# Patient Record
Sex: FEMALE | Race: ASIAN
[De-identification: names, ages, dates, MRNs, and addresses within clinical notes are randomized per-mention and may not be internally consistent; named-entity substitution may affect disease eponyms.]

---

## 2023-03-20 PROBLEM — Z00.00 ENCOUNTER FOR PREVENTIVE HEALTH EXAMINATION: Status: ACTIVE | Noted: 2023-03-20

## 2023-04-05 ENCOUNTER — APPOINTMENT (OUTPATIENT)
Dept: ORTHOPEDIC SURGERY | Facility: CLINIC | Age: 68
End: 2023-04-05
Payer: MEDICARE

## 2023-04-05 DIAGNOSIS — M77.12 LATERAL EPICONDYLITIS, LEFT ELBOW: ICD-10-CM

## 2023-04-05 DIAGNOSIS — M77.01 MEDIAL EPICONDYLITIS, RIGHT ELBOW: ICD-10-CM

## 2023-04-05 PROCEDURE — 99204 OFFICE O/P NEW MOD 45 MIN: CPT

## 2023-04-05 PROCEDURE — 99072 ADDL SUPL MATRL&STAF TM PHE: CPT

## 2023-04-05 NOTE — ASSESSMENT
[FreeTextEntry1] : Patient comes in with pain in her both of her elbows. This has been happening for about 4-5 years. She does not recall any injury. She has not have treatment for this. She also has complaints about her shoulder. She has had injections for her shoulder.\par \par \par full active range of motion of the  left shoulder, wrist and fingers\par full active range of motion of the left elbow\par Tenderness to palpation of the lateral epicondyle and proximal extensor supinator mass\par pain with resisted wrist extension and forearm supination\par 4/5 strength in supination and wrist extension, otherwise 5/5 strength\par median/ulnar/radial sensation intact to light touch\par ain/pin/ulnar motor intact\par palpable pulses CR<2s\par \par full active range of motion of the right shoulder, wrist and fingers\par full active range of motion of the right elbow\par Tenderness to palpation of the medial epicondyle and proximal flexor pronator\par pain with resisted wrist flexion and forearm pronation\par 4/5 strength in pronation, otherwise 5/5 strength\par median/ulnar/radial sensation intact to light touch\par ain/pin/ulnar motor intact\par palpable pulses CR<2s\par \par \par The patient was advised of the diagnosis. The natural history of the pathology was explained in full to the patient in layman's terms. All questions were answered. The risks and benefits of surgical and non-surgical treatment alternatives were explained in full to the patient. We discussed treatment options including nsaids, topical gels, tennis elbow strap, PT, activity modification, cortisone inj, sx .pt is aware that symptoms usually resolve on their own in 95-99% of people, but the timeframe is unknown. Home exercises/stretches were demonstrated and the patient practiced them as well- They are to do the exercises hourly and hold the stretch for 30 seconds. \par Avoid repetitive wrist flexion time was spent instructing the patient on appropriate placement of the elbow strap as well. \par She will start therapy.  She will follow-up as needed\par Patient was shown home exercises and stretches. She was also given a hand out.She will start therapy. She will set up an appointment with  for her shoulder. \par

## 2023-04-06 VITALS — BODY MASS INDEX: 23.92 KG/M2 | HEIGHT: 62 IN | WEIGHT: 130 LBS

## 2023-04-13 ENCOUNTER — OUTPATIENT (OUTPATIENT)
Dept: OUTPATIENT SERVICES | Facility: HOSPITAL | Age: 68
LOS: 1 days | End: 2023-04-13
Payer: MEDICAID

## 2023-04-13 DIAGNOSIS — Z00.00 ENCOUNTER FOR GENERAL ADULT MEDICAL EXAMINATION WITHOUT ABNORMAL FINDINGS: ICD-10-CM

## 2023-04-13 DIAGNOSIS — Z00.8 ENCOUNTER FOR OTHER GENERAL EXAMINATION: ICD-10-CM

## 2023-04-13 PROCEDURE — 97035 APP MDLTY 1+ULTRASOUND EA 15: CPT | Mod: GO

## 2023-04-13 PROCEDURE — 97140 MANUAL THERAPY 1/> REGIONS: CPT | Mod: GO

## 2023-04-14 DIAGNOSIS — Z00.00 ENCOUNTER FOR GENERAL ADULT MEDICAL EXAMINATION WITHOUT ABNORMAL FINDINGS: ICD-10-CM

## 2023-04-26 ENCOUNTER — NON-APPOINTMENT (OUTPATIENT)
Age: 68
End: 2023-04-26

## 2023-04-27 ENCOUNTER — OUTPATIENT (OUTPATIENT)
Dept: OUTPATIENT SERVICES | Facility: HOSPITAL | Age: 68
LOS: 1 days | End: 2023-04-27
Payer: MEDICARE

## 2023-04-27 ENCOUNTER — APPOINTMENT (OUTPATIENT)
Dept: ENDOCRINOLOGY | Facility: CLINIC | Age: 68
End: 2023-04-27
Payer: MEDICARE

## 2023-04-27 VITALS
SYSTOLIC BLOOD PRESSURE: 132 MMHG | BODY MASS INDEX: 25.4 KG/M2 | HEART RATE: 87 BPM | WEIGHT: 138 LBS | HEIGHT: 62 IN | DIASTOLIC BLOOD PRESSURE: 81 MMHG

## 2023-04-27 DIAGNOSIS — Z00.00 ENCOUNTER FOR GENERAL ADULT MEDICAL EXAMINATION WITHOUT ABNORMAL FINDINGS: ICD-10-CM

## 2023-04-27 DIAGNOSIS — Z78.9 OTHER SPECIFIED HEALTH STATUS: ICD-10-CM

## 2023-04-27 PROCEDURE — ZZZZZ: CPT

## 2023-04-27 PROCEDURE — 99204 OFFICE O/P NEW MOD 45 MIN: CPT

## 2023-04-27 PROCEDURE — 97140 MANUAL THERAPY 1/> REGIONS: CPT | Mod: GO

## 2023-04-27 PROCEDURE — 97035 APP MDLTY 1+ULTRASOUND EA 15: CPT | Mod: GO

## 2023-04-27 NOTE — PHYSICAL EXAM
[Alert] : alert [Obese] : obese [No Acute Distress] : no acute distress [Normal Sclera/Conjunctiva] : normal sclera/conjunctiva [Normal Outer Ear/Nose] : the ears and nose were normal in appearance [No Respiratory Distress] : no respiratory distress

## 2023-04-27 NOTE — ASSESSMENT
[FreeTextEntry1] : 68 y/o F w/ PMHx of HTN, HLD, DMII is here to establish care\par \par #DMII\par -FS ~ 120-210s\par -Last A1c ~ 8% (3/2023) per pt\par -On metformin-glipizide 500-5 mg BID \par -On Humulin 70/30 40 units in the AM, 26 units qhs\par Plan:\par -Stop metformin-glipizide 500-5 mg BID\par -Start metformin 500 mg BID\par -Start pioglitazone 30 mg qd\par -Start jardiance 25 mg qd\par -Start ozempic 0.25 mg q1 week >> increase to 0.5 mg after 2 weeks if no nausea\par -Will obtain lab records from [facility]\par -Monitor FS\par -Advised to decrease insulin by 1/2 if FS <80\par \par #HTN\par -/80 this visit\par -C/w losartan 50 mg qd\par -Stop losartan if experiencing dizziness w/ standing \par \par \par RTC in 1 month

## 2023-04-27 NOTE — ASSESSMENT
Hospitalist [FreeTextEntry1] : 68 y/o F w/ PMHx of HTN, HLD, DMII is here to establish care\par \par #DMII\par -FS ~ 120-210s\par -Last A1c ~ 8% (3/2023) per pt\par -On metformin-glipizide 500-5 mg BID \par -On Humulin 70/30 40 units in the AM, 26 units qhs\par Plan:\par -Stop metformin-glipizide 500-5 mg BID\par -Start metformin 500 mg BID\par -Start pioglitazone 30 mg qd\par -Start jardiance 25 mg qd\par -Start ozempic 0.25 mg q1 week >> increase to 0.5 mg after 2 weeks if no nausea\par -Will obtain lab records from [facility]\par -Monitor FS\par -Advised to decrease insulin by 1/2 if FS <80\par \par #HTN\par -/80 this visit\par -C/w losartan 50 mg qd\par -Stop losartan if experiencing dizziness w/ standing \par \par \par RTC in 1 month

## 2023-04-28 DIAGNOSIS — E11.65 TYPE 2 DIABETES MELLITUS WITH HYPERGLYCEMIA: ICD-10-CM

## 2023-04-30 NOTE — HISTORY OF PRESENT ILLNESS
[de-identified] : 68 y/o F w/ PMHx of HTN, HLD, DMII who is here to establish care. Per son, dx'd w/ DMII ~ 25 years ago & has been on insulin for ~ 15 years. Reports pt came from [facility] in 10/2022, and since her glucose levels have been uncontrolled. She is currently taking Metformin-Glipizide 500-5 mg BID in addition to Humulin 70/30 40 units in the AM & 26 units qhs. FS ranges from 120s-210s. A1c ~ 8, tested 1 month ago. Hx of L cataract sx on 8/2022. No hx of DFU per pt.  [FreeTextEntry1] : To establish care

## 2023-04-30 NOTE — HISTORY OF PRESENT ILLNESS
[de-identified] : 66 y/o F w/ PMHx of HTN, HLD, DMII who is here to establish care. Per son, dx'd w/ DMII ~ 25 years ago & has been on insulin for ~ 15 years. Reports pt came from [facility] in 10/2022, and since her glucose levels have been uncontrolled. She is currently taking Metformin-Glipizide 500-5 mg BID in addition to Humulin 70/30 40 units in the AM & 26 units qhs. FS ranges from 120s-210s. A1c ~ 8, tested 1 month ago. Hx of L cataract sx on 8/2022. No hx of DFU per pt.  [FreeTextEntry1] : To establish care

## 2023-05-01 ENCOUNTER — OUTPATIENT (OUTPATIENT)
Dept: OUTPATIENT SERVICES | Facility: HOSPITAL | Age: 68
LOS: 1 days | End: 2023-05-01
Payer: COMMERCIAL

## 2023-05-01 DIAGNOSIS — Z00.00 ENCOUNTER FOR GENERAL ADULT MEDICAL EXAMINATION WITHOUT ABNORMAL FINDINGS: ICD-10-CM

## 2023-05-01 PROCEDURE — 97140 MANUAL THERAPY 1/> REGIONS: CPT | Mod: GO

## 2023-05-01 PROCEDURE — 97035 APP MDLTY 1+ULTRASOUND EA 15: CPT | Mod: GO

## 2023-05-02 ENCOUNTER — APPOINTMENT (OUTPATIENT)
Dept: ORTHOPEDIC SURGERY | Facility: CLINIC | Age: 68
End: 2023-05-02

## 2023-05-03 ENCOUNTER — OUTPATIENT (OUTPATIENT)
Dept: OUTPATIENT SERVICES | Facility: HOSPITAL | Age: 68
LOS: 1 days | End: 2023-05-03
Payer: MEDICARE

## 2023-05-03 DIAGNOSIS — Z00.00 ENCOUNTER FOR GENERAL ADULT MEDICAL EXAMINATION WITHOUT ABNORMAL FINDINGS: ICD-10-CM

## 2023-05-03 PROCEDURE — 97110 THERAPEUTIC EXERCISES: CPT | Mod: GO

## 2023-05-03 PROCEDURE — 97140 MANUAL THERAPY 1/> REGIONS: CPT | Mod: GO

## 2023-05-08 ENCOUNTER — OUTPATIENT (OUTPATIENT)
Dept: OUTPATIENT SERVICES | Facility: HOSPITAL | Age: 68
LOS: 1 days | End: 2023-05-08

## 2023-05-08 DIAGNOSIS — Z00.00 ENCOUNTER FOR GENERAL ADULT MEDICAL EXAMINATION WITHOUT ABNORMAL FINDINGS: ICD-10-CM

## 2023-05-08 PROCEDURE — 97140 MANUAL THERAPY 1/> REGIONS: CPT | Mod: GO

## 2023-05-08 PROCEDURE — L3908: CPT

## 2023-05-10 RX ORDER — LANCETS 28 GAUGE
EACH MISCELLANEOUS
Qty: 100 | Refills: 3 | Status: ACTIVE | COMMUNITY
Start: 2023-05-10 | End: 1900-01-01

## 2023-05-10 RX ORDER — BLOOD SUGAR DIAGNOSTIC
STRIP MISCELLANEOUS 3 TIMES DAILY
Qty: 1 | Refills: 2 | Status: ACTIVE | COMMUNITY
Start: 2023-05-10 | End: 1900-01-01

## 2023-05-10 RX ORDER — BLOOD-GLUCOSE METER
W/DEVICE KIT MISCELLANEOUS
Qty: 1 | Refills: 0 | Status: ACTIVE | COMMUNITY
Start: 2023-05-10 | End: 1900-01-01

## 2023-06-05 RX ORDER — INSULIN LISPRO 100 [IU]/ML
(75-25) 100 INJECTION, SUSPENSION SUBCUTANEOUS
Qty: 3 | Refills: 3 | Status: DISCONTINUED | COMMUNITY
Start: 2023-05-23 | End: 2023-06-05

## 2023-06-05 RX ORDER — SEMAGLUTIDE 0.68 MG/ML
2 INJECTION, SOLUTION SUBCUTANEOUS
Qty: 3 | Refills: 1 | Status: DISCONTINUED | COMMUNITY
Start: 2023-04-27 | End: 2023-06-05

## 2023-06-07 ENCOUNTER — NON-APPOINTMENT (OUTPATIENT)
Age: 68
End: 2023-06-07

## 2023-06-08 RX ORDER — PEN NEEDLE, DIABETIC 29 G X1/2"
31G X 5 MM NEEDLE, DISPOSABLE MISCELLANEOUS
Qty: 100 | Refills: 2 | Status: ACTIVE | COMMUNITY
Start: 2023-05-23 | End: 1900-01-01

## 2023-06-14 ENCOUNTER — OUTPATIENT (OUTPATIENT)
Dept: OUTPATIENT SERVICES | Facility: HOSPITAL | Age: 68
LOS: 1 days | End: 2023-06-14
Payer: MEDICAID

## 2023-06-14 DIAGNOSIS — Z00.00 ENCOUNTER FOR GENERAL ADULT MEDICAL EXAMINATION WITHOUT ABNORMAL FINDINGS: ICD-10-CM

## 2023-06-14 PROCEDURE — 97140 MANUAL THERAPY 1/> REGIONS: CPT | Mod: GO

## 2023-07-26 ENCOUNTER — OUTPATIENT (OUTPATIENT)
Dept: OUTPATIENT SERVICES | Facility: HOSPITAL | Age: 68
LOS: 1 days | End: 2023-07-26
Payer: MEDICAID

## 2023-07-26 DIAGNOSIS — M77.12 LATERAL EPICONDYLITIS, LEFT ELBOW: ICD-10-CM

## 2023-07-26 DIAGNOSIS — M77.01 MEDIAL EPICONDYLITIS, RIGHT ELBOW: ICD-10-CM

## 2023-07-26 PROCEDURE — 97140 MANUAL THERAPY 1/> REGIONS: CPT | Mod: GO

## 2023-07-27 DIAGNOSIS — M77.01 MEDIAL EPICONDYLITIS, RIGHT ELBOW: ICD-10-CM

## 2023-07-27 DIAGNOSIS — M77.12 LATERAL EPICONDYLITIS, LEFT ELBOW: ICD-10-CM

## 2023-08-02 ENCOUNTER — APPOINTMENT (OUTPATIENT)
Dept: ENDOCRINOLOGY | Facility: CLINIC | Age: 68
End: 2023-08-02

## 2023-08-02 ENCOUNTER — OUTPATIENT (OUTPATIENT)
Dept: OUTPATIENT SERVICES | Facility: HOSPITAL | Age: 68
LOS: 1 days | End: 2023-08-02
Payer: COMMERCIAL

## 2023-08-02 DIAGNOSIS — M77.12 LATERAL EPICONDYLITIS, LEFT ELBOW: ICD-10-CM

## 2023-08-02 PROCEDURE — 97140 MANUAL THERAPY 1/> REGIONS: CPT | Mod: GO

## 2023-08-03 DIAGNOSIS — M77.12 LATERAL EPICONDYLITIS, LEFT ELBOW: ICD-10-CM

## 2023-08-04 ENCOUNTER — OUTPATIENT (OUTPATIENT)
Dept: OUTPATIENT SERVICES | Facility: HOSPITAL | Age: 68
LOS: 1 days | End: 2023-08-04

## 2023-08-04 DIAGNOSIS — M77.12 LATERAL EPICONDYLITIS, LEFT ELBOW: ICD-10-CM

## 2023-08-07 ENCOUNTER — EMERGENCY (EMERGENCY)
Facility: HOSPITAL | Age: 68
LOS: 0 days | Discharge: HOME | End: 2023-08-07
Attending: EMERGENCY MEDICINE
Payer: MEDICAID

## 2023-08-07 VITALS
TEMPERATURE: 98 F | RESPIRATION RATE: 18 BRPM | HEART RATE: 100 BPM | DIASTOLIC BLOOD PRESSURE: 72 MMHG | OXYGEN SATURATION: 99 % | SYSTOLIC BLOOD PRESSURE: 166 MMHG

## 2023-08-07 DIAGNOSIS — E78.00 PURE HYPERCHOLESTEROLEMIA, UNSPECIFIED: ICD-10-CM

## 2023-08-07 DIAGNOSIS — J45.909 UNSPECIFIED ASTHMA, UNCOMPLICATED: ICD-10-CM

## 2023-08-07 DIAGNOSIS — Z87.39 PERSONAL HISTORY OF OTHER DISEASES OF THE MUSCULOSKELETAL SYSTEM AND CONNECTIVE TISSUE: ICD-10-CM

## 2023-08-07 DIAGNOSIS — M77.12 LATERAL EPICONDYLITIS, LEFT ELBOW: ICD-10-CM

## 2023-08-07 DIAGNOSIS — M25.522 PAIN IN LEFT ELBOW: ICD-10-CM

## 2023-08-07 DIAGNOSIS — E11.9 TYPE 2 DIABETES MELLITUS WITHOUT COMPLICATIONS: ICD-10-CM

## 2023-08-07 PROCEDURE — 99284 EMERGENCY DEPT VISIT MOD MDM: CPT

## 2023-08-07 PROCEDURE — 96372 THER/PROPH/DIAG INJ SC/IM: CPT

## 2023-08-07 PROCEDURE — 99283 EMERGENCY DEPT VISIT LOW MDM: CPT | Mod: 25

## 2023-08-07 RX ORDER — LIDOCAINE 4 G/100G
1 CREAM TOPICAL ONCE
Refills: 0 | Status: COMPLETED | OUTPATIENT
Start: 2023-08-07 | End: 2023-08-07

## 2023-08-07 RX ORDER — KETOROLAC TROMETHAMINE 30 MG/ML
30 SYRINGE (ML) INJECTION ONCE
Refills: 0 | Status: DISCONTINUED | OUTPATIENT
Start: 2023-08-07 | End: 2023-08-07

## 2023-08-07 RX ORDER — MELOXICAM 15 MG/1
1 TABLET ORAL
Qty: 7 | Refills: 0
Start: 2023-08-07 | End: 2023-08-13

## 2023-08-07 RX ADMIN — Medication 30 MILLIGRAM(S): at 11:53

## 2023-08-07 RX ADMIN — LIDOCAINE 1 PATCH: 4 CREAM TOPICAL at 11:53

## 2023-08-07 NOTE — ED ADULT NURSE NOTE - NSFALLUNIVINTERV_ED_ALL_ED
Bed/Stretcher in lowest position, wheels locked, appropriate side rails in place/Call bell, personal items and telephone in reach/Instruct patient to call for assistance before getting out of bed/chair/stretcher/Non-slip footwear applied when patient is off stretcher/[place] to call system/Physically safe environment - no spills, clutter or unnecessary equipment/Purposeful proactive rounding/Room/bathroom lighting operational, light cord in reach

## 2023-08-07 NOTE — ED ADULT NURSE NOTE - CHIEF COMPLAINT QUOTE
pt was receiving cortisone injections for arm pain in [facility] and having relief. pt now having pain in left arm, worsening with movement to the shoulder

## 2023-08-07 NOTE — ED PROVIDER NOTE - NSFOLLOWUPINSTRUCTIONS_ED_ALL_ED_FT
Our Emergency Department Referral Coordinators will be reaching out to you in the next 24-48 hours from 9:00am to 5:00pm with a follow up appointment. Please expect a phone call from the hospital in that time frame. If you do not receive a call or if you have any questions or concerns, you can reach them at   [phone]    Tennis Elbow  ImageTennis elbow (lateral epicondylitis) is inflammation of the outer tendons of your forearm close to your elbow. Your tendons attach your muscles to your bones. The outer tendons of your forearm are used to extend your wrist, and they attach on the outside part of your elbow. Tennis elbow is often found in people who play tennis, but anyone may get the condition from repeatedly extending the wrist or turning the forearm.    What are the causes?  This condition is caused by repeatedly extending your wrist and using your hands. It can result from sports or work that requires repetitive forearm movements. Tennis elbow may also be caused by an injury.    What increases the risk?  You have a higher risk of developing tennis elbow if you play tennis or another racquet sport. You also have a higher risk if you frequently use your hands for work. This condition is also more likely to develop in:    Musicians.  , painters, and plumbers.  Cooks.  [place].  People who work in factories.  Construction workers.  Butchers.  People who use computers.    What are the signs or symptoms?  Symptoms of this condition include:    Pain and tenderness in your forearm and the outer part of your elbow. You may only feel the pain when you use your arm, or you may feel it even when you are not using your arm.  A burning feeling that runs from your elbow through your arm.  Weak  in your hands.    How is this diagnosed?  This condition may be diagnosed by medical history and physical exam. You may also have other tests, including:    X-rays.  MRI.    How is this treated?  Your health care provider will recommend lifestyle adjustments, such as resting and icing your arm. Treatment may also include:    Medicines for inflammation. This may include shots of cortisone if your pain continues.  Physical therapy. This may include massage or exercises.  An elbow brace.    Surgery may eventually be recommended if your pain does not go away with treatment.    Follow these instructions at home:  Activity     Rest your elbow and wrist as directed by your health care provider. Try to avoid any activities that caused the problem until your health care provider says that you can do them again.  If a physical therapist teaches you exercises, do all of them as directed.  If you lift an object, lift it with your palm facing upward. This lowers the stress on your elbow.  Lifestyle     If your tennis elbow is caused by sports, check your equipment and make sure that:    You are using it correctly.  It is the best fit for you.    If your tennis elbow is caused by work, take breaks frequently, if you are able. Talk with your manager about how to best perform tasks in a way that is safe.    If your tennis elbow is caused by computer use, talk with your manager about any changes that can be made to your work environment.    General instructions     If directed, apply ice to the painful area:    Put ice in a plastic bag.  Place a towel between your skin and the bag.  Leave the ice on for 20 minutes, 2–3 times per day.    Take medicines only as directed by your health care provider.  If you were given a brace, wear it as directed by your health care provider.  Keep all follow-up visits as directed by your health care provider. This is important.  Contact a health care provider if:  Your pain does not get better with treatment.  Your pain gets worse.  You have numbness or weakness in your forearm, hand, or fingers.

## 2023-08-07 NOTE — ED PROVIDER NOTE - ATTENDING APP SHARED VISIT CONTRIBUTION OF CARE
I have reviewed and agree with the mid-level note, except as documented in my note below.    68-year-old female history of HLD, asthma, DM, seen several months ago by orthopedics and diagnosed with bilateral epicondylitis and referred to occupational therapy, now presents with worsening of the left elbow pain, sx are constant, worse with certain movements and position, better with rest, denies fever, tactile temp, chills, paresthesias, focal weakness, or other associated complaints at present. Pt denies recent heavy lifting or trauma. Old chart reviewed. I have reviewed and agree with the initial nursing note, except as documented in my note.    VSS, awake, alert, clear speech, steady gait, LUE: No scapular, clavicle, AC joint, shoulder, elbow, forearm, wrist, hand tenderness, appears symmetrical, no gross deformity, no anterior fullness of anterior shoulder, no lacerations or abrasions, skin breakdown, no crepitus, point tenderness, swelling, warmth, erythema, induration, fluctuance, lymphangitis, purulence or lesions, sensation intact over deltoid region, active ROM and passive ROM intact, no joint laxity appreciated, motor and sensation intact distally, 5/5 strength, NV intact distally with 2+ radial pulses, compartments non-tense, pain not out of proportion to exam not appreciated.

## 2023-08-07 NOTE — ED PROVIDER NOTE - OBJECTIVE STATEMENT
68-year-old female with history of diabetes, high cholesterol, lateral epicondylitis presents to the ED accompanied by son complaining of left elbow pain worsening for 2 months.  Patient was seen by orthopedics and started occupational therapy.  No new trauma, numbness, tingling, chest pain or shortness of breath.

## 2023-08-07 NOTE — ED ADULT TRIAGE NOTE - CHIEF COMPLAINT QUOTE
pt was receiving cortisone injections for arm pain in [facility] and having relief. pt now having pain in left arm, worsening with movement to the shoulder

## 2023-08-07 NOTE — ED PROVIDER NOTE - PATIENT PORTAL LINK FT
You can access the FollowMyHealth Patient Portal offered by [facility] by registering at the following website: [email or web address]. By joining [facility]’s FollowMyHealth portal, you will also be able to view your health information using other applications (apps) compatible with our system.

## 2023-08-13 ENCOUNTER — INPATIENT (INPATIENT)
Facility: HOSPITAL | Age: 68
LOS: 23 days | Discharge: HOME HEALTH | DRG: 165 | End: 2023-09-06
Attending: THORACIC SURGERY (CARDIOTHORACIC VASCULAR SURGERY) | Admitting: INTERNAL MEDICINE
Payer: MEDICAID

## 2023-08-13 VITALS
SYSTOLIC BLOOD PRESSURE: 138 MMHG | TEMPERATURE: 98 F | OXYGEN SATURATION: 99 % | DIASTOLIC BLOOD PRESSURE: 64 MMHG | HEART RATE: 75 BPM | WEIGHT: 137.13 LBS | RESPIRATION RATE: 20 BRPM

## 2023-08-13 DIAGNOSIS — E87.20 ACIDOSIS, UNSPECIFIED: ICD-10-CM

## 2023-08-13 DIAGNOSIS — Z79.82 LONG TERM (CURRENT) USE OF ASPIRIN: ICD-10-CM

## 2023-08-13 DIAGNOSIS — G92.8 OTHER TOXIC ENCEPHALOPATHY: ICD-10-CM

## 2023-08-13 DIAGNOSIS — I97.190 OTHER POSTPROCEDURAL CARDIAC FUNCTIONAL DISTURBANCES FOLLOWING CARDIAC SURGERY: ICD-10-CM

## 2023-08-13 DIAGNOSIS — J45.901 UNSPECIFIED ASTHMA WITH (ACUTE) EXACERBATION: ICD-10-CM

## 2023-08-13 DIAGNOSIS — D62 ACUTE POSTHEMORRHAGIC ANEMIA: ICD-10-CM

## 2023-08-13 DIAGNOSIS — I21.4 NON-ST ELEVATION (NSTEMI) MYOCARDIAL INFARCTION: ICD-10-CM

## 2023-08-13 DIAGNOSIS — J69.0 PNEUMONITIS DUE TO INHALATION OF FOOD AND VOMIT: ICD-10-CM

## 2023-08-13 DIAGNOSIS — R56.9 UNSPECIFIED CONVULSIONS: ICD-10-CM

## 2023-08-13 DIAGNOSIS — D50.9 IRON DEFICIENCY ANEMIA, UNSPECIFIED: ICD-10-CM

## 2023-08-13 DIAGNOSIS — E78.00 PURE HYPERCHOLESTEROLEMIA, UNSPECIFIED: ICD-10-CM

## 2023-08-13 DIAGNOSIS — I50.23 ACUTE ON CHRONIC SYSTOLIC (CONGESTIVE) HEART FAILURE: ICD-10-CM

## 2023-08-13 DIAGNOSIS — E11.65 TYPE 2 DIABETES MELLITUS WITH HYPERGLYCEMIA: ICD-10-CM

## 2023-08-13 DIAGNOSIS — J96.01 ACUTE RESPIRATORY FAILURE WITH HYPOXIA: ICD-10-CM

## 2023-08-13 DIAGNOSIS — I82.442 ACUTE EMBOLISM AND THROMBOSIS OF LEFT TIBIAL VEIN: ICD-10-CM

## 2023-08-13 DIAGNOSIS — I82.412 ACUTE EMBOLISM AND THROMBOSIS OF LEFT FEMORAL VEIN: ICD-10-CM

## 2023-08-13 DIAGNOSIS — I44.7 LEFT BUNDLE-BRANCH BLOCK, UNSPECIFIED: ICD-10-CM

## 2023-08-13 DIAGNOSIS — I48.0 PAROXYSMAL ATRIAL FIBRILLATION: ICD-10-CM

## 2023-08-13 DIAGNOSIS — I11.0 HYPERTENSIVE HEART DISEASE WITH HEART FAILURE: ICD-10-CM

## 2023-08-13 DIAGNOSIS — E66.9 OBESITY, UNSPECIFIED: ICD-10-CM

## 2023-08-13 DIAGNOSIS — J44.9 CHRONIC OBSTRUCTIVE PULMONARY DISEASE, UNSPECIFIED: ICD-10-CM

## 2023-08-13 DIAGNOSIS — I25.5 ISCHEMIC CARDIOMYOPATHY: ICD-10-CM

## 2023-08-13 DIAGNOSIS — I25.119 ATHEROSCLEROTIC HEART DISEASE OF NATIVE CORONARY ARTERY WITH UNSPECIFIED ANGINA PECTORIS: ICD-10-CM

## 2023-08-13 DIAGNOSIS — Z20.822 CONTACT WITH AND (SUSPECTED) EXPOSURE TO COVID-19: ICD-10-CM

## 2023-08-13 DIAGNOSIS — E87.6 HYPOKALEMIA: ICD-10-CM

## 2023-08-13 DIAGNOSIS — F17.220 NICOTINE DEPENDENCE, CHEWING TOBACCO, UNCOMPLICATED: ICD-10-CM

## 2023-08-13 DIAGNOSIS — Z79.1 LONG TERM (CURRENT) USE OF NON-STEROIDAL ANTI-INFLAMMATORIES (NSAID): ICD-10-CM

## 2023-08-13 DIAGNOSIS — M77.12 LATERAL EPICONDYLITIS, LEFT ELBOW: ICD-10-CM

## 2023-08-13 DIAGNOSIS — I26.99 OTHER PULMONARY EMBOLISM WITHOUT ACUTE COR PULMONALE: ICD-10-CM

## 2023-08-13 DIAGNOSIS — Z79.899 OTHER LONG TERM (CURRENT) DRUG THERAPY: ICD-10-CM

## 2023-08-13 DIAGNOSIS — Z79.84 LONG TERM (CURRENT) USE OF ORAL HYPOGLYCEMIC DRUGS: ICD-10-CM

## 2023-08-13 DIAGNOSIS — I82.451 ACUTE EMBOLISM AND THROMBOSIS OF RIGHT PERONEAL VEIN: ICD-10-CM

## 2023-08-13 DIAGNOSIS — K92.2 GASTROINTESTINAL HEMORRHAGE, UNSPECIFIED: ICD-10-CM

## 2023-08-13 PROBLEM — E11.9 TYPE 2 DIABETES MELLITUS WITHOUT COMPLICATIONS: Chronic | Status: ACTIVE | Noted: 2023-08-07

## 2023-08-13 LAB
ALBUMIN SERPL ELPH-MCNC: 4.6 G/DL — SIGNIFICANT CHANGE UP (ref 3.5–5.2)
ALP SERPL-CCNC: 116 U/L — HIGH (ref 30–115)
ALT FLD-CCNC: 15 U/L — SIGNIFICANT CHANGE UP (ref 0–41)
ANION GAP SERPL CALC-SCNC: 12 MMOL/L — SIGNIFICANT CHANGE UP (ref 7–14)
ANISOCYTOSIS BLD QL: SLIGHT — SIGNIFICANT CHANGE UP
AST SERPL-CCNC: 18 U/L — SIGNIFICANT CHANGE UP (ref 0–41)
BASE EXCESS BLDA CALC-SCNC: -9 MMOL/L — LOW (ref -2–3)
BASE EXCESS BLDV CALC-SCNC: -5.4 MMOL/L — LOW (ref -2–3)
BASOPHILS # BLD AUTO: 0.13 K/UL — SIGNIFICANT CHANGE UP (ref 0–0.2)
BASOPHILS NFR BLD AUTO: 0.9 % — SIGNIFICANT CHANGE UP (ref 0–1)
BILIRUB SERPL-MCNC: <0.2 MG/DL — SIGNIFICANT CHANGE UP (ref 0.2–1.2)
BUN SERPL-MCNC: 12 MG/DL — SIGNIFICANT CHANGE UP (ref 10–20)
CA-I SERPL-SCNC: 1.27 MMOL/L — SIGNIFICANT CHANGE UP (ref 1.15–1.33)
CALCIUM SERPL-MCNC: 9.7 MG/DL — SIGNIFICANT CHANGE UP (ref 8.4–10.5)
CHLORIDE SERPL-SCNC: 106 MMOL/L — SIGNIFICANT CHANGE UP (ref 98–110)
CO2 SERPL-SCNC: 21 MMOL/L — SIGNIFICANT CHANGE UP (ref 17–32)
CREAT SERPL-MCNC: 0.6 MG/DL — LOW (ref 0.7–1.5)
EGFR: 96 ML/MIN/1.73M2 — SIGNIFICANT CHANGE UP
EOSINOPHIL # BLD AUTO: 0.13 K/UL — SIGNIFICANT CHANGE UP (ref 0–0.7)
EOSINOPHIL NFR BLD AUTO: 0.9 % — SIGNIFICANT CHANGE UP (ref 0–8)
GAS PNL BLDA: SIGNIFICANT CHANGE UP
GAS PNL BLDV: 141 MMOL/L — SIGNIFICANT CHANGE UP (ref 136–145)
GAS PNL BLDV: SIGNIFICANT CHANGE UP
GLUCOSE BLDC GLUCOMTR-MCNC: 218 MG/DL — HIGH (ref 70–99)
GLUCOSE SERPL-MCNC: 253 MG/DL — HIGH (ref 70–99)
HCO3 BLDA-SCNC: 19 MMOL/L — LOW (ref 21–28)
HCO3 BLDV-SCNC: 22 MMOL/L — SIGNIFICANT CHANGE UP (ref 22–29)
HCT VFR BLD CALC: 34.3 % — LOW (ref 37–47)
HCT VFR BLDA CALC: 30 % — LOW (ref 39–51)
HGB BLD CALC-MCNC: 10.1 G/DL — LOW (ref 12.6–17.4)
HGB BLD-MCNC: 10 G/DL — LOW (ref 12–16)
HOROWITZ INDEX BLDA+IHG-RTO: 100 — SIGNIFICANT CHANGE UP
HYPOCHROMIA BLD QL: SLIGHT — SIGNIFICANT CHANGE UP
LACTATE BLDV-MCNC: 5.7 MMOL/L — CRITICAL HIGH (ref 0.5–2)
LYMPHOCYTES # BLD AUTO: 50.9 % — SIGNIFICANT CHANGE UP (ref 20.5–51.1)
LYMPHOCYTES # BLD AUTO: 7.56 K/UL — HIGH (ref 1.2–3.4)
MANUAL SMEAR VERIFICATION: SIGNIFICANT CHANGE UP
MCHC RBC-ENTMCNC: 22.4 PG — LOW (ref 27–31)
MCHC RBC-ENTMCNC: 29.2 G/DL — LOW (ref 32–37)
MCV RBC AUTO: 76.7 FL — LOW (ref 81–99)
MICROCYTES BLD QL: SLIGHT — SIGNIFICANT CHANGE UP
MONOCYTES # BLD AUTO: 0.13 K/UL — SIGNIFICANT CHANGE UP (ref 0.1–0.6)
MONOCYTES NFR BLD AUTO: 0.9 % — LOW (ref 1.7–9.3)
NEUTROPHILS # BLD AUTO: 6.9 K/UL — HIGH (ref 1.4–6.5)
NEUTROPHILS NFR BLD AUTO: 46.4 % — SIGNIFICANT CHANGE UP (ref 42.2–75.2)
NT-PROBNP SERPL-SCNC: 1305 PG/ML — HIGH (ref 0–300)
PCO2 BLDA: 49 MMHG — HIGH (ref 25–48)
PCO2 BLDV: 55 MMHG — HIGH (ref 39–42)
PH BLDA: 7.19 — CRITICAL LOW (ref 7.35–7.45)
PH BLDV: 7.22 — LOW (ref 7.32–7.43)
PLAT MORPH BLD: NORMAL — SIGNIFICANT CHANGE UP
PLATELET # BLD AUTO: 363 K/UL — SIGNIFICANT CHANGE UP (ref 130–400)
PMV BLD: 10 FL — SIGNIFICANT CHANGE UP (ref 7.4–10.4)
PO2 BLDA: 332 MMHG — HIGH (ref 83–108)
PO2 BLDV: 66 MMHG — SIGNIFICANT CHANGE UP
POLYCHROMASIA BLD QL SMEAR: SLIGHT — SIGNIFICANT CHANGE UP
POTASSIUM BLDV-SCNC: 6.3 MMOL/L — CRITICAL HIGH (ref 3.5–5.1)
POTASSIUM SERPL-MCNC: 5.4 MMOL/L — HIGH (ref 3.5–5)
POTASSIUM SERPL-SCNC: 5.4 MMOL/L — HIGH (ref 3.5–5)
PROT SERPL-MCNC: 8.1 G/DL — HIGH (ref 6–8)
RBC # BLD: 4.47 M/UL — SIGNIFICANT CHANGE UP (ref 4.2–5.4)
RBC # FLD: 19.1 % — HIGH (ref 11.5–14.5)
RBC BLD AUTO: ABNORMAL
SAO2 % BLDA: 97.7 % — SIGNIFICANT CHANGE UP (ref 94–98)
SAO2 % BLDV: 84 % — SIGNIFICANT CHANGE UP
SMUDGE CELLS # BLD: PRESENT — SIGNIFICANT CHANGE UP
SODIUM SERPL-SCNC: 139 MMOL/L — SIGNIFICANT CHANGE UP (ref 135–146)
TROPONIN T SERPL-MCNC: 0.13 NG/ML — CRITICAL HIGH
TROPONIN T SERPL-MCNC: <0.01 NG/ML — SIGNIFICANT CHANGE UP
WBC # BLD: 14.86 K/UL — HIGH (ref 4.8–10.8)
WBC # FLD AUTO: 14.86 K/UL — HIGH (ref 4.8–10.8)

## 2023-08-13 PROCEDURE — 85018 HEMOGLOBIN: CPT

## 2023-08-13 PROCEDURE — 62270 DX LMBR SPI PNXR: CPT

## 2023-08-13 PROCEDURE — 74176 CT ABD & PELVIS W/O CONTRAST: CPT | Mod: 26

## 2023-08-13 PROCEDURE — 87070 CULTURE OTHR SPECIMN AEROBIC: CPT

## 2023-08-13 PROCEDURE — C1729: CPT

## 2023-08-13 PROCEDURE — 36430 TRANSFUSION BLD/BLD COMPNT: CPT

## 2023-08-13 PROCEDURE — 86341 ISLET CELL ANTIBODY: CPT

## 2023-08-13 PROCEDURE — C1889: CPT

## 2023-08-13 PROCEDURE — 71045 X-RAY EXAM CHEST 1 VIEW: CPT | Mod: 26,77

## 2023-08-13 PROCEDURE — 31500 INSERT EMERGENCY AIRWAY: CPT

## 2023-08-13 PROCEDURE — 83690 ASSAY OF LIPASE: CPT

## 2023-08-13 PROCEDURE — 85027 COMPLETE CBC AUTOMATED: CPT

## 2023-08-13 PROCEDURE — 99291 CRITICAL CARE FIRST HOUR: CPT

## 2023-08-13 PROCEDURE — 82550 ASSAY OF CK (CPK): CPT

## 2023-08-13 PROCEDURE — 80307 DRUG TEST PRSMV CHEM ANLYZR: CPT

## 2023-08-13 PROCEDURE — 85014 HEMATOCRIT: CPT

## 2023-08-13 PROCEDURE — 83873 ASSAY OF CSF PROTEIN: CPT

## 2023-08-13 PROCEDURE — 82570 ASSAY OF URINE CREATININE: CPT

## 2023-08-13 PROCEDURE — 87207 SMEAR SPECIAL STAIN: CPT

## 2023-08-13 PROCEDURE — 86880 COOMBS TEST DIRECT: CPT

## 2023-08-13 PROCEDURE — 86789 WEST NILE VIRUS ANTIBODY: CPT

## 2023-08-13 PROCEDURE — 86255 FLUORESCENT ANTIBODY SCREEN: CPT

## 2023-08-13 PROCEDURE — 70450 CT HEAD/BRAIN W/O DYE: CPT | Mod: 26,MA,59

## 2023-08-13 PROCEDURE — 86850 RBC ANTIBODY SCREEN: CPT

## 2023-08-13 PROCEDURE — 84466 ASSAY OF TRANSFERRIN: CPT

## 2023-08-13 PROCEDURE — 74176 CT ABD & PELVIS W/O CONTRAST: CPT

## 2023-08-13 PROCEDURE — 82945 GLUCOSE OTHER FLUID: CPT

## 2023-08-13 PROCEDURE — 94640 AIRWAY INHALATION TREATMENT: CPT

## 2023-08-13 PROCEDURE — 93880 EXTRACRANIAL BILAT STUDY: CPT

## 2023-08-13 PROCEDURE — 83540 ASSAY OF IRON: CPT

## 2023-08-13 PROCEDURE — 84436 ASSAY OF TOTAL THYROXINE: CPT

## 2023-08-13 PROCEDURE — 97530 THERAPEUTIC ACTIVITIES: CPT | Mod: GP

## 2023-08-13 PROCEDURE — 93306 TTE W/DOPPLER COMPLETE: CPT

## 2023-08-13 PROCEDURE — 87799 DETECT AGENT NOS DNA QUANT: CPT

## 2023-08-13 PROCEDURE — 86788 WEST NILE VIRUS AB IGM: CPT

## 2023-08-13 PROCEDURE — 97116 GAIT TRAINING THERAPY: CPT | Mod: GP

## 2023-08-13 PROCEDURE — 87086 URINE CULTURE/COLONY COUNT: CPT

## 2023-08-13 PROCEDURE — 83935 ASSAY OF URINE OSMOLALITY: CPT

## 2023-08-13 PROCEDURE — 84156 ASSAY OF PROTEIN URINE: CPT

## 2023-08-13 PROCEDURE — 82330 ASSAY OF CALCIUM: CPT

## 2023-08-13 PROCEDURE — 93970 EXTREMITY STUDY: CPT

## 2023-08-13 PROCEDURE — 83880 ASSAY OF NATRIURETIC PEPTIDE: CPT

## 2023-08-13 PROCEDURE — 75574 CT ANGIO HRT W/3D IMAGE: CPT

## 2023-08-13 PROCEDURE — 84133 ASSAY OF URINE POTASSIUM: CPT

## 2023-08-13 PROCEDURE — 86592 SYPHILIS TEST NON-TREP QUAL: CPT

## 2023-08-13 PROCEDURE — 82010 KETONE BODYS QUAN: CPT

## 2023-08-13 PROCEDURE — 93010 ELECTROCARDIOGRAM REPORT: CPT

## 2023-08-13 PROCEDURE — 86891 AUTOLOGOUS BLOOD OP SALVAGE: CPT

## 2023-08-13 PROCEDURE — 83036 HEMOGLOBIN GLYCOSYLATED A1C: CPT

## 2023-08-13 PROCEDURE — 80061 LIPID PANEL: CPT

## 2023-08-13 PROCEDURE — 85025 COMPLETE CBC W/AUTO DIFF WBC: CPT

## 2023-08-13 PROCEDURE — 82728 ASSAY OF FERRITIN: CPT

## 2023-08-13 PROCEDURE — 84540 ASSAY OF URINE/UREA-N: CPT

## 2023-08-13 PROCEDURE — 93308 TTE F-UP OR LMTD: CPT | Mod: 26

## 2023-08-13 PROCEDURE — P9016: CPT

## 2023-08-13 PROCEDURE — C1887: CPT

## 2023-08-13 PROCEDURE — 84300 ASSAY OF URINE SODIUM: CPT

## 2023-08-13 PROCEDURE — 86900 BLOOD TYPING SEROLOGIC ABO: CPT

## 2023-08-13 PROCEDURE — 83916 OLIGOCLONAL BANDS: CPT

## 2023-08-13 PROCEDURE — 87205 SMEAR GRAM STAIN: CPT

## 2023-08-13 PROCEDURE — 94010 BREATHING CAPACITY TEST: CPT

## 2023-08-13 PROCEDURE — 83615 LACTATE (LD) (LDH) ENZYME: CPT

## 2023-08-13 PROCEDURE — 87385 HISTOPLASMA CAPSUL AG IA: CPT

## 2023-08-13 PROCEDURE — 94660 CPAP INITIATION&MGMT: CPT

## 2023-08-13 PROCEDURE — 97164 PT RE-EVAL EST PLAN CARE: CPT | Mod: GP

## 2023-08-13 PROCEDURE — 87116 MYCOBACTERIA CULTURE: CPT

## 2023-08-13 PROCEDURE — 80048 BASIC METABOLIC PNL TOTAL CA: CPT

## 2023-08-13 PROCEDURE — 85730 THROMBOPLASTIN TIME PARTIAL: CPT

## 2023-08-13 PROCEDURE — C1751: CPT

## 2023-08-13 PROCEDURE — C9113: CPT

## 2023-08-13 PROCEDURE — 71045 X-RAY EXAM CHEST 1 VIEW: CPT | Mod: 26,76

## 2023-08-13 PROCEDURE — 84295 ASSAY OF SERUM SODIUM: CPT

## 2023-08-13 PROCEDURE — 84132 ASSAY OF SERUM POTASSIUM: CPT

## 2023-08-13 PROCEDURE — 87483 CNS DNA AMP PROBE TYPE 12-25: CPT

## 2023-08-13 PROCEDURE — 83605 ASSAY OF LACTIC ACID: CPT

## 2023-08-13 PROCEDURE — 73070 X-RAY EXAM OF ELBOW: CPT | Mod: 26,LT

## 2023-08-13 PROCEDURE — 99221 1ST HOSP IP/OBS SF/LOW 40: CPT

## 2023-08-13 PROCEDURE — 84439 ASSAY OF FREE THYROXINE: CPT

## 2023-08-13 PROCEDURE — 84100 ASSAY OF PHOSPHORUS: CPT

## 2023-08-13 PROCEDURE — 95714 VEEG EA 12-26 HR UNMNTR: CPT

## 2023-08-13 PROCEDURE — 83735 ASSAY OF MAGNESIUM: CPT

## 2023-08-13 PROCEDURE — 71250 CT THORAX DX C-: CPT

## 2023-08-13 PROCEDURE — 93458 L HRT ARTERY/VENTRICLE ANGIO: CPT

## 2023-08-13 PROCEDURE — 97163 PT EVAL HIGH COMPLEX 45 MIN: CPT | Mod: GP

## 2023-08-13 PROCEDURE — 81001 URINALYSIS AUTO W/SCOPE: CPT

## 2023-08-13 PROCEDURE — 71045 X-RAY EXAM CHEST 1 VIEW: CPT

## 2023-08-13 PROCEDURE — 82947 ASSAY GLUCOSE BLOOD QUANT: CPT

## 2023-08-13 PROCEDURE — 70551 MRI BRAIN STEM W/O DYE: CPT

## 2023-08-13 PROCEDURE — 87640 STAPH A DNA AMP PROBE: CPT

## 2023-08-13 PROCEDURE — 83550 IRON BINDING TEST: CPT

## 2023-08-13 PROCEDURE — C9399: CPT

## 2023-08-13 PROCEDURE — 70498 CT ANGIOGRAPHY NECK: CPT | Mod: 26,MA

## 2023-08-13 PROCEDURE — 94002 VENT MGMT INPAT INIT DAY: CPT

## 2023-08-13 PROCEDURE — 92526 ORAL FUNCTION THERAPY: CPT | Mod: GN

## 2023-08-13 PROCEDURE — 82784 ASSAY IGA/IGD/IGG/IGM EACH: CPT

## 2023-08-13 PROCEDURE — 84484 ASSAY OF TROPONIN QUANT: CPT

## 2023-08-13 PROCEDURE — 0042T: CPT | Mod: MA

## 2023-08-13 PROCEDURE — 93010 ELECTROCARDIOGRAM REPORT: CPT | Mod: 77

## 2023-08-13 PROCEDURE — 80202 ASSAY OF VANCOMYCIN: CPT

## 2023-08-13 PROCEDURE — 84443 ASSAY THYROID STIM HORMONE: CPT

## 2023-08-13 PROCEDURE — 71046 X-RAY EXAM CHEST 2 VIEWS: CPT

## 2023-08-13 PROCEDURE — 87102 FUNGUS ISOLATION CULTURE: CPT

## 2023-08-13 PROCEDURE — 94003 VENT MGMT INPAT SUBQ DAY: CPT

## 2023-08-13 PROCEDURE — 99291 CRITICAL CARE FIRST HOUR: CPT | Mod: 25

## 2023-08-13 PROCEDURE — 85379 FIBRIN DEGRADATION QUANT: CPT

## 2023-08-13 PROCEDURE — 87040 BLOOD CULTURE FOR BACTERIA: CPT

## 2023-08-13 PROCEDURE — 70496 CT ANGIOGRAPHY HEAD: CPT | Mod: 26,MA

## 2023-08-13 PROCEDURE — 82040 ASSAY OF SERUM ALBUMIN: CPT

## 2023-08-13 PROCEDURE — 84480 ASSAY TRIIODOTHYRONINE (T3): CPT

## 2023-08-13 PROCEDURE — 86923 COMPATIBILITY TEST ELECTRIC: CPT

## 2023-08-13 PROCEDURE — 82962 GLUCOSE BLOOD TEST: CPT

## 2023-08-13 PROCEDURE — 82803 BLOOD GASES ANY COMBINATION: CPT

## 2023-08-13 PROCEDURE — 80053 COMPREHEN METABOLIC PANEL: CPT

## 2023-08-13 PROCEDURE — 93005 ELECTROCARDIOGRAM TRACING: CPT

## 2023-08-13 PROCEDURE — 86362 MOG-IGG1 ANTB CBA EACH: CPT

## 2023-08-13 PROCEDURE — C1894: CPT

## 2023-08-13 PROCEDURE — 92610 EVALUATE SWALLOWING FUNCTION: CPT | Mod: GN

## 2023-08-13 PROCEDURE — 36415 COLL VENOUS BLD VENIPUNCTURE: CPT

## 2023-08-13 PROCEDURE — 86403 PARTICLE AGGLUT ANTBDY SCRN: CPT

## 2023-08-13 PROCEDURE — 97110 THERAPEUTIC EXERCISES: CPT | Mod: GP

## 2023-08-13 PROCEDURE — 84145 PROCALCITONIN (PCT): CPT

## 2023-08-13 PROCEDURE — 87529 HSV DNA AMP PROBE: CPT

## 2023-08-13 PROCEDURE — 84157 ASSAY OF PROTEIN OTHER: CPT

## 2023-08-13 PROCEDURE — 83010 ASSAY OF HAPTOGLOBIN QUANT: CPT

## 2023-08-13 PROCEDURE — C1769: CPT

## 2023-08-13 PROCEDURE — 82042 OTHER SOURCE ALBUMIN QUAN EA: CPT

## 2023-08-13 PROCEDURE — 87476 LYME DIS DNA AMP PROBE: CPT

## 2023-08-13 PROCEDURE — 85610 PROTHROMBIN TIME: CPT

## 2023-08-13 PROCEDURE — 85045 AUTOMATED RETICULOCYTE COUNT: CPT

## 2023-08-13 PROCEDURE — 0225U NFCT DS DNA&RNA 21 SARSCOV2: CPT

## 2023-08-13 PROCEDURE — 89051 BODY FLUID CELL COUNT: CPT

## 2023-08-13 PROCEDURE — 86617 LYME DISEASE ANTIBODY: CPT

## 2023-08-13 PROCEDURE — 86901 BLOOD TYPING SEROLOGIC RH(D): CPT

## 2023-08-13 PROCEDURE — 82164 ANGIOTENSIN I ENZYME TEST: CPT

## 2023-08-13 PROCEDURE — 87641 MR-STAPH DNA AMP PROBE: CPT

## 2023-08-13 RX ORDER — FENTANYL CITRATE 50 UG/ML
0.5 INJECTION INTRAVENOUS
Qty: 2500 | Refills: 0 | Status: DISCONTINUED | OUTPATIENT
Start: 2023-08-13 | End: 2023-08-15

## 2023-08-13 RX ORDER — CHLORHEXIDINE GLUCONATE 213 G/1000ML
15 SOLUTION TOPICAL EVERY 12 HOURS
Refills: 0 | Status: DISCONTINUED | OUTPATIENT
Start: 2023-08-13 | End: 2023-08-14

## 2023-08-13 RX ORDER — PROPOFOL 10 MG/ML
20 INJECTION, EMULSION INTRAVENOUS
Qty: 1000 | Refills: 0 | Status: DISCONTINUED | OUTPATIENT
Start: 2023-08-13 | End: 2023-08-15

## 2023-08-13 RX ORDER — DEXTROSE 50 % IN WATER 50 %
15 SYRINGE (ML) INTRAVENOUS ONCE
Refills: 0 | Status: DISCONTINUED | OUTPATIENT
Start: 2023-08-13 | End: 2023-08-30

## 2023-08-13 RX ORDER — INSULIN LISPRO 100/ML
VIAL (ML) SUBCUTANEOUS
Refills: 0 | Status: DISCONTINUED | OUTPATIENT
Start: 2023-08-13 | End: 2023-08-16

## 2023-08-13 RX ORDER — ASPIRIN/CALCIUM CARB/MAGNESIUM 324 MG
81 TABLET ORAL DAILY
Refills: 0 | Status: DISCONTINUED | OUTPATIENT
Start: 2023-08-13 | End: 2023-08-30

## 2023-08-13 RX ORDER — DEXTROSE 50 % IN WATER 50 %
25 SYRINGE (ML) INTRAVENOUS ONCE
Refills: 0 | Status: DISCONTINUED | OUTPATIENT
Start: 2023-08-13 | End: 2023-08-30

## 2023-08-13 RX ORDER — ATORVASTATIN CALCIUM 80 MG/1
40 TABLET, FILM COATED ORAL AT BEDTIME
Refills: 0 | Status: DISCONTINUED | OUTPATIENT
Start: 2023-08-13 | End: 2023-08-22

## 2023-08-13 RX ORDER — ETOMIDATE 2 MG/ML
20 INJECTION INTRAVENOUS ONCE
Refills: 0 | Status: COMPLETED | OUTPATIENT
Start: 2023-08-13 | End: 2023-08-13

## 2023-08-13 RX ORDER — KETOROLAC TROMETHAMINE 30 MG/ML
30 SYRINGE (ML) INJECTION ONCE
Refills: 0 | Status: DISCONTINUED | OUTPATIENT
Start: 2023-08-13 | End: 2023-08-13

## 2023-08-13 RX ORDER — SUCCINYLCHOLINE CHLORIDE 100 MG/5ML
100 SYRINGE (ML) INTRAVENOUS ONCE
Refills: 0 | Status: COMPLETED | OUTPATIENT
Start: 2023-08-13 | End: 2023-08-13

## 2023-08-13 RX ORDER — FENTANYL CITRATE 50 UG/ML
100 INJECTION INTRAVENOUS ONCE
Refills: 0 | Status: DISCONTINUED | OUTPATIENT
Start: 2023-08-13 | End: 2023-08-13

## 2023-08-13 RX ORDER — ATORVASTATIN CALCIUM 80 MG/1
10 TABLET, FILM COATED ORAL AT BEDTIME
Refills: 0 | Status: DISCONTINUED | OUTPATIENT
Start: 2023-08-13 | End: 2023-08-13

## 2023-08-13 RX ORDER — LEVETIRACETAM 250 MG/1
3000 TABLET, FILM COATED ORAL ONCE
Refills: 0 | Status: DISCONTINUED | OUTPATIENT
Start: 2023-08-13 | End: 2023-08-13

## 2023-08-13 RX ORDER — ACETAMINOPHEN 500 MG
650 TABLET ORAL EVERY 6 HOURS
Refills: 0 | Status: DISCONTINUED | OUTPATIENT
Start: 2023-08-13 | End: 2023-08-30

## 2023-08-13 RX ORDER — LEVETIRACETAM 250 MG/1
3000 TABLET, FILM COATED ORAL ONCE
Refills: 0 | Status: COMPLETED | OUTPATIENT
Start: 2023-08-13 | End: 2023-08-13

## 2023-08-13 RX ORDER — METOPROLOL TARTRATE 50 MG
25 TABLET ORAL EVERY 12 HOURS
Refills: 0 | Status: DISCONTINUED | OUTPATIENT
Start: 2023-08-13 | End: 2023-08-23

## 2023-08-13 RX ORDER — LEVETIRACETAM 250 MG/1
1000 TABLET, FILM COATED ORAL EVERY 12 HOURS
Refills: 0 | Status: DISCONTINUED | OUTPATIENT
Start: 2023-08-13 | End: 2023-08-14

## 2023-08-13 RX ORDER — CALCIUM GLUCONATE 100 MG/ML
2 VIAL (ML) INTRAVENOUS ONCE
Refills: 0 | Status: COMPLETED | OUTPATIENT
Start: 2023-08-13 | End: 2023-08-13

## 2023-08-13 RX ORDER — GLUCAGON INJECTION, SOLUTION 0.5 MG/.1ML
1 INJECTION, SOLUTION SUBCUTANEOUS ONCE
Refills: 0 | Status: DISCONTINUED | OUTPATIENT
Start: 2023-08-13 | End: 2023-08-30

## 2023-08-13 RX ORDER — SODIUM CHLORIDE 9 MG/ML
1000 INJECTION, SOLUTION INTRAVENOUS
Refills: 0 | Status: DISCONTINUED | OUTPATIENT
Start: 2023-08-13 | End: 2023-08-30

## 2023-08-13 RX ADMIN — Medication 200 GRAM(S): at 12:00

## 2023-08-13 RX ADMIN — ATORVASTATIN CALCIUM 40 MILLIGRAM(S): 80 TABLET, FILM COATED ORAL at 23:30

## 2023-08-13 RX ADMIN — Medication 4: at 21:07

## 2023-08-13 RX ADMIN — PROPOFOL 7.46 MICROGRAM(S)/KG/MIN: 10 INJECTION, EMULSION INTRAVENOUS at 23:29

## 2023-08-13 RX ADMIN — Medication 100 MILLIGRAM(S): at 12:03

## 2023-08-13 RX ADMIN — Medication 2 MILLIGRAM(S): at 11:40

## 2023-08-13 RX ADMIN — Medication 2 MILLIGRAM(S): at 11:45

## 2023-08-13 RX ADMIN — PROPOFOL 7.46 MICROGRAM(S)/KG/MIN: 10 INJECTION, EMULSION INTRAVENOUS at 12:10

## 2023-08-13 RX ADMIN — ETOMIDATE 20 MILLIGRAM(S): 2 INJECTION INTRAVENOUS at 12:02

## 2023-08-13 RX ADMIN — FENTANYL CITRATE 3.09 MICROGRAM(S)/KG/HR: 50 INJECTION INTRAVENOUS at 23:28

## 2023-08-13 RX ADMIN — LEVETIRACETAM 1000 MILLIGRAM(S): 250 TABLET, FILM COATED ORAL at 11:45

## 2023-08-13 RX ADMIN — FENTANYL CITRATE 100 MICROGRAM(S): 50 INJECTION INTRAVENOUS at 12:40

## 2023-08-13 RX ADMIN — Medication 81 MILLIGRAM(S): at 23:29

## 2023-08-13 RX ADMIN — Medication 25 MILLIGRAM(S): at 23:29

## 2023-08-13 RX ADMIN — FENTANYL CITRATE 100 MICROGRAM(S): 50 INJECTION INTRAVENOUS at 12:10

## 2023-08-13 NOTE — ED PROCEDURE NOTE - NS ED PROCEUDURE1 POST INTUBATION REVIEW
Appropriate capnography/Breath sounds bilateral/Positive end tidal Co2 noted Appropriate capnography/Breath sounds bilateral/Positive end tidal Co2 noted/Chest X-Ray

## 2023-08-13 NOTE — ED PROVIDER NOTE - PHYSICAL EXAMINATION
CONSTITUTIONAL: well-appearing, in NAD  SKIN: Warm dry, normal skin turgor  HEAD: NCAT  EYES: EOMI, PERRLA, no scleral icterus, conjunctiva pink  ENT: normal pharynx with no erythema or exudates  NECK: Supple; non tender. Full ROM.  CARD: RRR, no murmurs.  RESP: clear to ausculation b/l. No crackles or wheezing.  EXT: Full ROM, no bony tenderness, no pedal edema, no calf tenderness. Tenderness to palpation over lateral epicondyle.   NEURO: decreased motor in L UE 3/5. decreased sensory over left forearm. Normal gait.  PSYCH: Cooperative, appropriate.

## 2023-08-13 NOTE — ED ADULT NURSE REASSESSMENT NOTE - NS ED NURSE REASSESS COMMENT FT1
Pt brought back from XR room by XR tech. As per XR Tech pt suddenly c/o shortness of breath, mentation became altered (drowsy), (+) diaphoresis noted, (+) tachypnea, (+) strong pulses. Pt unable to speech. STAT EKG done. Noted (+) ST elevation noted on EKG. Pt upgraded to Trauma 3 - Critical Care Area fur further management. Pt's mentation declined en route to Critical Care. Hand-off report given.

## 2023-08-13 NOTE — H&P ADULT - NSHPLABSRESULTS_GEN_ALL_CORE
.  LABS:                         10.0   14.86 )-----------( 363      ( 13 Aug 2023 11:25 )             34.3     08-13    139  |  106  |  12  ----------------------------<  253<H>  5.4<H>   |  21  |  0.6<L>    Ca    9.7      13 Aug 2023 11:25    TPro  8.1<H>  /  Alb  4.6  /  TBili  <0.2  /  DBili  x   /  AST  18  /  ALT  15  /  AlkPhos  116<H>  08-13      Urinalysis Basic - ( 13 Aug 2023 11:25 )    Color: x / Appearance: x / SG: x / pH: x  Gluc: 253 mg/dL / Ketone: x  / Bili: x / Urobili: x   Blood: x / Protein: x / Nitrite: x   Leuk Esterase: x / RBC: x / WBC x   Sq Epi: x / Non Sq Epi: x / Bacteria: x            RADIOLOGY, EKG & ADDITIONAL TESTS: Reviewed.

## 2023-08-13 NOTE — CONSULT NOTE ADULT - ASSESSMENT
68 year old male with DM, HTN, obesity who presents for severe L arm pain with LBBB on ekg. Pt reported to be having repetitive activity of mouth and midline gaze with respiratory distress. NSICU c/s for management of status epilepticus. Pt received ativan x2 2mg prior to exam. pt found to be in respiratory distress and not following commands. Not withdrawing to pain. She was later intubated and started on propofol gtt. After intubation pt was with flaccid exam, but withdrawing to pain. Low suspicion of status epilepticus. Would continue propofol gtt, IV keppra, and video EEG.      # suspected seizure r/o status epilepticus   - received ativan 2mg IV x2, intubated for airway protection/ respiratory distress  - routine EEG negative for epileptiform/ or seizurelike activity  - CTA H/N no stenosis/ no perfusion deficits/ no structural lesions    PLAN  - continue video eeg  - continue propofol gtt  - keppra IV 1g q12 hours  - MR brain w/ w/o contrast when stable   - r/o metabolic cause of suspected seizure-like activity  - will follow    # Lactic acidosis  # hypoxic respiratory failure  # leukocytosis  # troponin elevation  # pulmonary edema, elevated BNP  # Low EF on bedside echo  - cardiology eval  - sepsis work up    # AMS, likely metabolic  - history negative for headaches/ malaise prior to admission  - no fevers  - no focal deficits on exam  - low suspicion for meningitis/ encephalitis  - if no improvement in mental status or spikes fevers, consider LP    Case and Plan discussed with NSICU attending Dr. Schwab 68 year old male with DM, HTN, obesity who presents for severe L arm pain with LBBB on ekg. Pt reported to be having repetitive activity of mouth and midline gaze with respiratory distress. NSICU c/s for management of status epilepticus. Pt received ativan x2 2mg prior to exam. pt found to be in respiratory distress and not following commands. Not withdrawing to pain. She was later intubated and started on propofol gtt. After intubation pt was with flaccid exam, but withdrawing to pain. Low suspicion of status epilepticus. Would continue propofol gtt, IV keppra, and video EEG.      # suspected seizure r/o status epilepticus   - received ativan 2mg IV x2, intubated for airway protection/ respiratory distress  - routine EEG negative for epileptiform/ or seizurelike activity  - CTH & CTA H/N without any acuity    PLAN  - continue video eeg  - continue propofol gtt  - keppra IV 1g q12 hours  - MR brain w/ w/o contrast when stable   - sepsis/metabolic w/u  - will follow    # Lactic acidosis  # hypoxic respiratory failure  # leukocytosis  # troponin elevation  # pulmonary edema, elevated BNP  # Low EF on bedside echo  - cardiology eval  - sepsis work up    # AMS, likely metabolic  - history negative for headaches/ malaise prior to admission  - no fevers  - no focal deficits on exam  - low suspicion for meningitis/ encephalitis  - if no improvement in mental status or pt develops fevers without source, then consider LP    Case and Plan discussed with NSICU attending Dr. Schwab

## 2023-08-13 NOTE — ED PROVIDER NOTE - DIFFERENTIAL DIAGNOSIS
The differential diagnosis for patients clinical presentation includes but is not limited to:  ACS, MI, aortic dissection, pneumothorax, pneumonia, MSK, pulmonary embolism  CVA, metabolic encepahlopathy, meningitis Differential Diagnosis

## 2023-08-13 NOTE — PATIENT PROFILE ADULT - NSPROPTRIGHTSUPPORTPERSON_GEN_A_NUR
Patient is calling back to speak to a nurse. Patient is having a lot of pain and would like to get seen today or tomorrow    information could not be obtained

## 2023-08-13 NOTE — ED ADULT NURSE REASSESSMENT NOTE - NS ED NURSE REASSESS COMMENT FT1
pt. sleeping comfortably with moderate purposeful movement. pt. on low dose of propofol IV as ordered. pt. started on video EEG as ordered. vss. pt. on continuos cardiac and pulse ox monitoring. awaiting ICU bed

## 2023-08-13 NOTE — H&P ADULT - NSHPPHYSICALEXAM_GEN_ALL_CORE
PHYSICAL EXAM:  GENERAL: sedated, intubated   HEAD: intubated, ET tube noted   EYES: Anicteric sclera   NECK: Supple, No JVD  CHEST/LUNG: CTAB  HEART: Regular rate and rhythm; No murmurs;   ABDOMEN: disteded and hard in suprapubic area; Bowel sounds present; No guarding  EXTREMITIES:  2+ Peripheral Pulses, No cyanosis or edema  PSYCH: Sedated   NEUROLOGY: unable to assess due to intubation   SKIN: No rashes or lesions

## 2023-08-13 NOTE — H&P ADULT - HISTORY OF PRESENT ILLNESS
67yo F w/a PMH of Type II DM, HTN, DL, obesity, lateral epidconylitis presenting to the ED w/ Left Arm pain. with long-standing history of Type 2 DM, on insulin therapy, HTN, DL, obesity, presenting for evaluation to the ER for left arm pain - several weeks duration, dyspnea, mental status changes. Patient had no prior cardiac events, not following with cardiology. Last A1c 9.8%. In the Ed was noted to have LBBB on ECG and STEMI code was called. No chest pain reported. STEMI Code was cancelled. And patient was admitted for further work-up. Troponin was negative. Patient had elevated latae level and was acidotic on the blood gas. While in the ED patient had two seizure episodes and was treated with benzodiazepine. Intubated by ER physician for airway protection. Urgent neuro evaluation was obtained 67yo F w/a PMH of Type II DM, HTN, DL, obesity, lateral epicondylitis presenting to the ED w/ Left Arm pain. At time of my exam, patient intubated so spoke with the son at bedside. This arm started about two months ago and has continued to progress but in the last few days became severe. She went to the ED a few days ago, was given pain medications then sent home. Per son, pain still continued to progress after this.   On admission to the ED today, she presented with severe left arm pain w findings of LBBB on ECG (unsure if new or not). Code STEMI was called and then canceled because troponin negative and no chest pain. Patient was then noted to have acute mental status change, SOB, lactate elevation, and acidosis on abg..  While in the ED patient had two seizure episodes and was treated with benzodiazepine. Intubated by ER for airway protection.     Vitals: /64, HR 75, RR 20, T 98.5, satting 99 percent on RA   Labs: WBC 14.86, Hgb 10, , Na 139, K 5.4, BUN 12, Cr .6, Trop <.01 --> .13 on repeat  Imaging:  67yo F w/a PMH of Type II DM, HTN, DLD, obesity, lateral epicondylitis presenting to the ED w/ Left Arm pain. At time of my exam, patient intubated so spoke with the son at bedside. This arm started about two months ago and has continued to progress but in the last few days became severe. She went to the ED a few days ago, was given pain medications then sent home. Per son, pain still continued to progress after this. No fevers, chills, nausea, vomiting, diarrhea, constipation, SOB, CP.   On admission to the ED today, she presented with severe left arm pain w findings of LBBB on ECG (unsure if new or not). Code STEMI was called and then canceled because troponin negative and no chest pain. Patient was then noted to have acute mental status change, SOB, lactate elevation, and acidosis on abg..Patient had two seizure episodes and was treated with benzodiazepine. Intubated by ER for airway protection.     On Admission  Vitals: /64, HR 75, RR 20, T 98.5, satting 99 percent on RA   Labs: WBC 14.86, Hgb 10, , Na 139, K 5.4, BUN 12, Cr .6, Trop <.01 --> .13 on repeat  Imaging:   CXR -  Patchy bibasilar opacities, right greater than left.  CT Brain Stroke protocol - No evidence of acute transcortical infarct, hydrocephalus, acute intracranial hemorrhage, or mass effect.  CT brain perfusion: No evidence of acute infarct or ischemia.  CTA neck: No stenosis. No dissection.  CTA brain: No stenosis or aneurysm.  CTA Neck: The distal internal carotid arteries are patent.The [place] of [place] is normal in appearance.The visualized cerebral arteries are unremarkable.The vertebrobasilar junction and basilar artery are normal.    In the ED, given Levaquin. Keppra, started on propofol  Admitted to MICU for airway monitoring  67yo F w/a PMH of Type II DM, HTN, DLD, obesity, lateral epicondylitis presenting to the ED w/ Left Arm pain. At time of my exam, patient intubated so spoke with the son at bedside. This arm started about two months ago and has continued to progress but in the last few days became severe. She went to the ED a few days ago, was given pain medications then sent home. Per son, pain still continued to progress after this. No fevers, chills, nausea, vomiting, diarrhea, constipation, SOB, CP.   On admission to the ED today, she presented with severe left arm pain w findings of LBBB on ECG (unsure if new or not). Code STEMI was called and then canceled because troponin negative and no chest pain. Patient was then noted to have acute mental status change, SOB, lactate elevation, and acidosis on abg..Patient had two seizure episodes and was treated with benzodiazepine. Intubated by ER for airway protection.     On Admission  Vitals: /64, HR 75, RR 20, T 98.5, satting 99 percent on RA   Labs: WBC 14.86, Hgb 10, , Na 139, K 5.4, BUN 12, Cr .6, Trop <.01 --> .13 on repeat  Imaging:   CXR -  Patchy bibasilar opacities, right greater than left.  CT Brain Stroke protocol - No evidence of acute transcortical infarct, hydrocephalus, acute intracranial hemorrhage, or mass effect.  CT brain perfusion: No evidence of acute infarct or ischemia.  CTA neck: No stenosis. No dissection.  CTA brain: No stenosis or aneurysm.  CTA Neck: The distal internal carotid arteries are patent. The [place] of [place] is normal in appearance. The visualized cerebral arteries are unremarkable.The vertebrobasilar junction and basilar artery are normal.    In the ED, given Levaquin. Keppra, started on propofol  Admitted to MICU for airway monitoring

## 2023-08-13 NOTE — H&P ADULT - ASSESSMENT
IMPRESSION:  Acute Hypoxemic Respiratory failure   Seizures   AMS   Left Arm Pain w new LBBB on ECG   Missed Ischemic Event/MI?   Lactic Acidosis   IDDM   HTN   DLD  CHF?   PLAN:    CNS: On propofol, Video EEG. Neuro Crit f/u    HEENT: ET tube care     PULMONARY:  HOB @ 45 degrees.  Aspiration precautions. CXR noted. Daily CXR. Daily ABG. Cefepime, Vanc for now     CARDIOVASCULAR:     GI: GI prophylaxis.  NPO for now.  Bowel regimen. lipase. CTAP non contrast.     RENAL:  Follow up lytes.  Correct as needed. Metformin Level. UA.     INFECTIOUS DISEASE: BCX, UCX, UA, CXR noted, lactate, procal.     HEMATOLOGICAL:  DVT prophylaxis.     ENDOCRINE:  Follow up FS.  Insulin protocol if needed.    MUSCULOSKELETAL:           IMPRESSION:  Acute Hypoxemic Respiratory failure   Seizures   AMS   Left Arm Pain w new LBBB on ECG   Missed Ischemic Event/MI?   Lactic Acidosis   IDDM   HTN   DLD  CHF?     PLAN:    CNS: On propofol, Video EEG. Keppra 1g q12.  Neuro Crit f/u. Minimize sedation.     HEENT: Intubated, ET tube care     PULMONARY:  Repeat ABG now. HOB @ 45 degrees. Aspiration precautions. CXR noted. Daily CXR. Daily ABG. C/w Levaquin for now.     CARDIOVASCULAR: Cardio following.Trend CE and lactate. TTE. Will need ischemic w/u. per Cardio, Heparin drip, DAPT, lasix 40 BID. Serial ECG.     GI: GI prophylaxis.  NPO for now.  Bowel regimen. lipase. CTAP non contrast stat. Alcohol level.     RENAL:  Follow up lytes.  Correct as needed. Metformin Level. UA. Monitor Urine output. CK level. Strict I's and O's. Nephro eval if dropping UO.     INFECTIOUS DISEASE: F/u cultures, UA, CXR noted, lactate, procal MRSA nares. ID eval.    HEMATOLOGICAL:  DVT prophylaxis. D-Dimer.     ENDOCRINE: ISS. Insulin Protocol as needed. Monitor FS    MUSCULOSKELETAL: Bedrest     MICU Monitoring            IMPRESSION:  Acute Hypoxemic Respiratory failure   Seizures   AMS   Left Arm Pain w new LBBB on ECG   Missed Ischemic Event/MI?   Lactic Acidosis   IDDM   HTN   DLD  CHF?     PLAN:    CNS: On propofol, Video EEG. Keppra 1g q12.  Neuro Crit f/u. Minimize sedation.     HEENT: Intubated, ET tube care     PULMONARY:  Repeat ABG now. HOB @ 45 degrees. Aspiration precautions. CXR noted. Daily CXR. Daily ABG. C/w Levaquin for now.     CARDIOVASCULAR: Cardio following.Trend CE and lactate. TTE. Will need ischemic w/u. per Cardio, Heparin drip, DAPT, lasix 40 BID. Serial ECG.     GI: GI prophylaxis.  NPO for now.  Bowel regimen. lipase. CTAP non contrast stat. Alcohol level.     RENAL:  Follow up lytes.  Correct as needed. Metformin Level. UA. Monitor Urine output. CK level. Strict I's and O's. Nephro eval if dropping UO.     INFECTIOUS DISEASE: F/u cultures, UA, CXR noted, lactate, procal MRSA nares. ID eval.    HEMATOLOGICAL:  DVT prophylaxis. D-Dimer.     ENDOCRINE: ISS. Insulin Protocol as needed. Monitor FS. BHB      MUSCULOSKELETAL: Bedrest     MICU Monitoring            IMPRESSION:  Acute Hypoxemic Respiratory failure   Seizures   AMS   Left Arm Pain w new LBBB on ECG   Missed Ischemic Event/MI?   Lactic Acidosis   IDDM   HTN   DLD  CHF?     PLAN:    CNS: On propofol, Video EEG. Keppra 1g q12.  Neuro Crit f/u. Minimize sedation.     HEENT: Intubated, ET tube care     PULMONARY:  Repeat ABG now. HOB @ 45 degrees. Aspiration precautions. CXR noted. Daily CXR. Daily ABG. C/w Levaquin for now.     CARDIOVASCULAR: Cardio following. Trend CE and lactate. TTE. Will need ischemic w/u. Serial ECG.     GI: GI prophylaxis.  NPO for now.  Bowel regimen. lipase. CTAP non contrast stat. Alcohol level.     RENAL:  Follow up lytes.  Correct as needed. Metformin Level. UA. Monitor Urine output. CK level. Strict I's and O's. Nephro eval if dropping UO.     INFECTIOUS DISEASE: F/u cultures, UA, CXR noted, lactate, procal MRSA nares. ID eval.    HEMATOLOGICAL:  DVT prophylaxis. D-Dimer.     ENDOCRINE: ISS. Insulin Protocol as needed. Monitor FS. BHB      MUSCULOSKELETAL: Bedrest     MICU Monitoring            IMPRESSION:  Acute Hypoxemic Respiratory failure   Seizures   AMS   Left Arm Pain w new LBBB on ECG   Missed Ischemic Event/MI?   Lactic Acidosis   IDDM   HTN   DLD  CHF?     PLAN:    CNS: On propofol, Video EEG. Keppra 1g q12.  Neuro Crit f/u. Minimize sedation.     HEENT: Intubated, ET tube care     PULMONARY:  Repeat ABG now. HOB @ 45 degrees. Aspiration precautions. CXR noted. Daily CXR. Daily ABG. C/w Levaquin for now.     CARDIOVASCULAR: Cardio following. Trend CE and lactate. TTE. Will need ischemic w/u. Serial ECG.     GI: GI prophylaxis.  NPO for now.  Bowel regimen. lipase. CTAP non contrast stat. Alcohol level.     RENAL:  Follow up lytes.  Correct as needed. Metformin Level. UA. Monitor Urine output. CK level. Strict I's and O's. Nephro eval if dropping UO.     INFECTIOUS DISEASE: F/u cultures, UA, CXR noted, lactate, procal MRSA nares.     HEMATOLOGICAL:  DVT prophylaxis. D-Dimer.     ENDOCRINE: ISS. Insulin Protocol as needed. Monitor FS. BHB      MUSCULOSKELETAL: Bedrest     MICU Monitoring

## 2023-08-13 NOTE — ED PROVIDER NOTE - CARE PLAN
Principal Discharge DX:	Seizure  Secondary Diagnosis:	Lactic acidosis  Secondary Diagnosis:	Acute CHF   1

## 2023-08-13 NOTE — ED ADULT NURSE REASSESSMENT NOTE - NS ED NURSE REASSESS COMMENT FT1
Pt brought back from x-ray by x-ray tech. Tech states that patient states that she cant breathe. Pt placed back in stretcher. SOB/ tachypnea noted. Pt lethargic but reponsive to painful stimuli. 's MD at bedside. EKG/ labs done. code stemi called. Pt placed on zoll and moved to critical care.

## 2023-08-13 NOTE — ED ADULT NURSE NOTE - NSFALLRISKINTERV_ED_ALL_ED

## 2023-08-13 NOTE — ED PROVIDER NOTE - ATTENDING CONTRIBUTION TO CARE
69 yo female, PMHx of DM and asthma, presenting for 2 months of arm pain, worse today, non-radiating, no alleviating or aggravating factors, no associated symptoms. Patient suddenly became short of breath. Unable to obtain ROS at time of exam.    CONSTITUTIONAL: Well-developed; well-nourished; in no acute distress.   SKIN: warm, dry  HEAD: Normocephalic; atraumatic.  EYES: PERRL  ENT: No nasal discharge; airway clear.  NECK: Supple  CARD: S1, S2 normal; tachycardic.   RESP: diffuse crackles with increased respiratory effort and tachypnea  ABD: soft ntnd  EXT: Normal ROM.  No clubbing, cyanosis or edema.   NEURO: minimally responsive

## 2023-08-13 NOTE — ED PROCEDURE NOTE - CPROC ED TOLERANCE1
Patient tolerated procedure well. Estlander Flap (Upper To Lower Lip) Text: The defect of the lower lip was assessed and measured.  Given the location and size of the defect, an Estlander flap was deemed most appropriate.  Using a sterile surgical marker, an appropriate Estlander flap was measured and drawn on the upper lip. Local anesthesia was then infiltrated. A scalpel was then used to incise the lateral aspect of the flap, through skin, muscle and mucosa, leaving the flap pedicled medially.  The flap was then rotated and positioned to fill the lower lip defect.  The flap was then sutured into place with a three layer technique, closing the orbicularis oris muscle layer with subcutaneous buried sutures, followed by a mucosal layer and an epidermal layer.

## 2023-08-13 NOTE — CONSULT NOTE ADULT - ASSESSMENT
67 y/o lady with history and presentation as above.    IDDM  HTn  DL  CHF  Acute MS change  Lactic acidosis  LBBB  Tachycardia  Seizure    Plan:    CNS:  Neuro/Neuro-crit following  CTH, perfusion - f/u official results  EEG, anticonvulsant therapy as per neurology.    HEENT:  Intubated  Oral care      Cardio:  Will hold off on urgent cardiac catheterization, given complex presentation and comorbidities.  Obtain full 2D echo  Serial ECG, serial troponins  If tolerated, gentle diuresis, BB  Once more stable - ischemic w/u    Pulm:  Intubated  F/u ABG, adjust vent settings  F/u CXR    Endo:  Uncontrolled DM  C/w Insulin  No indication of DKA  Close FS follow-up    GI:  NGT  NPO for now  PPI

## 2023-08-13 NOTE — CONSULT NOTE ADULT - SUBJECTIVE AND OBJECTIVE BOX
69yo F w/a PMH of Type II DM, HTN, DLD, obesity, lateral epicondylitis presenting to the ED w/ Left Arm pain. At time of my exam, patient intubated so spoke with the son at bedside. This arm started about two months ago and has continued to progress but in the last few days became severe. She went to the ED a few days ago, was given pain medications then sent home. Per son, pain still continued to progress after this. No fevers, chills, nausea, vomiting, diarrhea, constipation, SOB, CP.   On admission to the ED today, she presented with severe left arm pain w findings of LBBB on ECG (unsure if new or not). Code STEMI was called and then canceled because troponin negative and no chest pain. Patient was then noted to have acute mental status change, SOB, lactate elevation, and acidosis on abg..Patient had two seizure episodes and was treated with benzodiazepine. Intubated by ER for airway protection.     Neurocritical Care consulted for suspected status epilepticus    Physical exam: Pre intubation  pupils equal round reactive to light   flaccid UE, no w/d to pain  tachypneic with significant respiratory distress    LABS: Personally reviewed labs, imaging, and ECG                          10.0   14.86 )-----------( 363      ( 13 Aug 2023 11:25 )             34.3       08-13    139  |  106  |  12  ----------------------------<  253<H>  5.4<H>   |  21  |  0.6<L>    Ca    9.7      13 Aug 2023 11:25    TPro  8.1<H>  /  Alb  4.6  /  TBili  <0.2  /  DBili  x   /  AST  18  /  ALT  15  /  AlkPhos  116<H>  08-13       LIVER FUNCTIONS - ( 13 Aug 2023 11:25 )  Alb: 4.6 g/dL / Pro: 8.1 g/dL / ALK PHOS: 116 U/L / ALT: 15 U/L / AST: 18 U/L / GGT: x                ABG - ( 13 Aug 2023 12:53 )  pH, Arterial: 7.19  pH, Blood: x     /  pCO2: 49    /  pO2: 332   / HCO3: 19    / Base Excess: -9.0  /  SaO2: 97.7                Urinalysis Basic - ( 13 Aug 2023 11:25 )    Color: x / Appearance: x / SG: x / pH: x  Gluc: 253 mg/dL / Ketone: x  / Bili: x / Urobili: x   Blood: x / Protein: x / Nitrite: x   Leuk Esterase: x / RBC: x / WBC x   Sq Epi: x / Non Sq Epi: x / Bacteria: x            Lactate Trend      CARDIAC MARKERS ( 13 Aug 2023 17:52 )  x     / 0.13 ng/mL / x     / x     / x      CARDIAC MARKERS ( 13 Aug 2023 11:25 )  x     / <0.01 ng/mL / x     / x     / x            CAPILLARY BLOOD GLUCOSE    < from: CT Angio Neck Stroke Protocol w/ IV Cont (08.13.23 @ 13:18) >  CT brain perfusion: No evidence of acute infarct or ischemia.    CTA neck: No stenosis. No dissection.    CTA brain: No stenosis or aneurysm.    < end of copied text >        POCT Blood Glucose.: 218 mg/dL (13 Aug 2023 20:22)        RADIOLOGY & ADDITIONAL TESTS:        69yo F w/a PMH of Type II DM, HTN, DLD, obesity, lateral epicondylitis presenting to the ED w/ Left Arm pain. At time of my exam, patient intubated so spoke with the son at bedside. This arm started about two months ago and has continued to progress but in the last few days became severe. She went to the ED a few days ago, was given pain medications then sent home. Per son, pain still continued to progress after this. No fevers, chills, nausea, vomiting, diarrhea, constipation, SOB, CP.   On admission to the ED today, she presented with severe left arm pain w findings of LBBB on ECG (unsure if new or not). Code STEMI was called and then canceled because troponin negative and no chest pain. Patient was then noted to have acute mental status change, SOB, lactate elevation, and acidosis on abg..Patient had two seizure episodes and was treated with benzodiazepine. Intubated by ER for airway protection.     Neurocritical Care consulted for suspected status epilepticus    Physical exam: Pre intubation  Obtunded, EO, pupils equal round reactive to light   flaccid extremities, no w/d to pain  tachypneic with significant respiratory distress    LABS: Personally reviewed labs, imaging, and ECG                          10.0   14.86 )-----------( 363      ( 13 Aug 2023 11:25 )             34.3       08-13    139  |  106  |  12  ----------------------------<  253<H>  5.4<H>   |  21  |  0.6<L>    Ca    9.7      13 Aug 2023 11:25    TPro  8.1<H>  /  Alb  4.6  /  TBili  <0.2  /  DBili  x   /  AST  18  /  ALT  15  /  AlkPhos  116<H>  08-13       LIVER FUNCTIONS - ( 13 Aug 2023 11:25 )  Alb: 4.6 g/dL / Pro: 8.1 g/dL / ALK PHOS: 116 U/L / ALT: 15 U/L / AST: 18 U/L / GGT: x                ABG - ( 13 Aug 2023 12:53 )  pH, Arterial: 7.19  pH, Blood: x     /  pCO2: 49    /  pO2: 332   / HCO3: 19    / Base Excess: -9.0  /  SaO2: 97.7                Urinalysis Basic - ( 13 Aug 2023 11:25 )    Color: x / Appearance: x / SG: x / pH: x  Gluc: 253 mg/dL / Ketone: x  / Bili: x / Urobili: x   Blood: x / Protein: x / Nitrite: x   Leuk Esterase: x / RBC: x / WBC x   Sq Epi: x / Non Sq Epi: x / Bacteria: x            Lactate Trend      CARDIAC MARKERS ( 13 Aug 2023 17:52 )  x     / 0.13 ng/mL / x     / x     / x      CARDIAC MARKERS ( 13 Aug 2023 11:25 )  x     / <0.01 ng/mL / x     / x     / x            CAPILLARY BLOOD GLUCOSE    < from: CT Angio Neck Stroke Protocol w/ IV Cont (08.13.23 @ 13:18) >  CT brain perfusion: No evidence of acute infarct or ischemia.    CTA neck: No stenosis. No dissection.    CTA brain: No stenosis or aneurysm.    < end of copied text >        POCT Blood Glucose.: 218 mg/dL (13 Aug 2023 20:22)        RADIOLOGY & ADDITIONAL TESTS:

## 2023-08-13 NOTE — ED ADULT NURSE NOTE - OBJECTIVE STATEMENT
pt. came to ED c'o left arm pain x 2 months. [place] [facility] [place] pt. became weak, pallor and diaphoretic. pt. brought to critical care area, STEMI code activated by Dr. Emanuel but was cancelled by Dr. Child. Neuro crit at bedside, pt. had 2 seizures in front of ED team. pt. given Ativan 2 mg x 2 IVP as ordered. pt. on non-rebreather mask pt. presents with abdominal retractions and labored breathing. pt. intubated by ED attending ETT 7.5 LL 22. cxr confirmed ET tube placement. pt. started on Propofol IV as ordered. Skin intact, pt. afebrile. pt. went to Ct scan and back to ED without any problems. will monitor closely.

## 2023-08-13 NOTE — ED PROVIDER NOTE - OBJECTIVE STATEMENT
Pt is a 67 yo F w/ PMH DM, HLD, lateral epicondylitis presenting to the ED c/o left elbow pain. Pt states she has been having elbow pain for the past 2-3 months but recently it has gotten unbearable. Pt was here 8/7 for a similar visit where she got pain control and was dc. Last night pain returned and pt was not able to sleep. Pain is sharp, constant, located in the lateral epicondyle and radiates down the arm. Pt has not taken anything for pain today. Pt is also endorsing decreased sensation in her left arm. Denies trauma.     Denies NVD, fevers, sob, cp, abdominal pain

## 2023-08-13 NOTE — PATIENT PROFILE ADULT - FUNCTIONAL SCREEN CURRENT LEVEL: SWALLOWING (IF SCORE 2 OR MORE FOR ANY ITEM, CONSULT REHAB SERVICES), MLM)
2 = difficulty swallowing liquids/foods continue clonidine 0.2 mg q8h   continue amlodipine 10 mg qdialy

## 2023-08-13 NOTE — ED PROVIDER NOTE - CLINICAL SUMMARY MEDICAL DECISION MAKING FREE TEXT BOX
Patient was rushed to critical care emergency department after STEMI code was activated.  Immediate bedside echo performed and shows decreased EF diffusely with no regional wall motion abnormalities.  Unclear if patient is having seizure episodes but appeared to possibly be in status with repetitive movements of mouth and was tachypneic.  VBG performed and shows respiratory acidosis.  Patient was also not protecting airway and had to go to CT scan for stroke imaging.  Full blood work sent.  Patient was intubated without issue and chest x-ray shows ET tube in correct positioning.  CT brain and CTA of neck shows no signs of stroke or LVO.  Chest x-ray shows patchy bibasilar opacities and patient has an elevated white blood cell count of 14.8.  Patient given IV Levaquin.  Respiratory rate adjusted on vent given acidosis and repeat blood gases performed and show improvement.  Neuro critical care at bedside, cardiology team at bedside and initially canceled STEMI code.  ICU team consulted for care and patient to be admitted for further work-up and management.

## 2023-08-13 NOTE — CONSULT NOTE ADULT - CRITICAL CARE ATTENDING COMMENT
pt with concern for acute seizure r/o status epilepticus  s/p ativan 4mg, keppra 3g load x1, intubated on propofol gtt  initial EEG negative for seizure    c/w vEEG, keppra 1g q12  propofol gtt  CTH and CTA head are without acuity  sepsis w/u  cardiology consultation for ischemic evaluation/Acute CHF      will continue to follow

## 2023-08-13 NOTE — ED PROVIDER NOTE - CRTICAL CARE TIME SPENT (MIN)
Medication requesting   Requested Prescriptions     Pending Prescriptions Disp Refills    montelukast (SINGULAIR) 10 mg tablet 90 Tablet 1     Sig: TAKE 1 TABLET BY MOUTH DAILY       Date patient last seen 4/22/2022    Follow up appt scheduled for 9/15/22 63

## 2023-08-13 NOTE — CHART NOTE - NSCHARTNOTEFT_GEN_A_CORE
Code STEMI was called in ED. I immediately responded and went to the ED. I examined and assessed the patient.   EKG reviewed and case discussed with interventional cardiologist at bedside; Dr. Child.     67 YO F presenting with L arm pain. LBBB on EKG.    Code STEMI cancelled. Get serial EKGs and troponin. Code STEMI was called in ED. I immediately responded and went to the ED. I examined and assessed the patient.   EKG reviewed and case discussed with interventional cardiologist at bedside; Dr. Child.     67 YO F presenting with L arm pain. LBBB on EKG.    Code STEMI cancelled. Get serial EKGs and troponin.  CT Head, given MS changes  Obtain blood work, 2D echo  Further w/u after these results

## 2023-08-13 NOTE — ED PROVIDER NOTE - PROGRESS NOTE DETAILS
PARK: note delayed due to critical patient care. Patient was seen 1050 am found tachypneic, tachycardic, not able to speak. Diffuse crackles noted on exam. EKG obtained and STEMi called at 1059 and patient moved to critical care. Cardio at bedside and cancelled STEMI. Concomitantly, patient appeared to be seizing and Ativan 2mg x2 given. Steven Magana, DO: Pt evaluated by neuro and neuro crit. Received lorazepam 4mg and keppra 3000mg total so far. S/p intubated. CT brain and perfusion ordered.

## 2023-08-13 NOTE — CONSULT NOTE ADULT - SUBJECTIVE AND OBJECTIVE BOX
Patient is a 68y old  Female who presents with a chief complaint of MS changes    HPI:  69 y/o female with long-standing history of Type 2 DM, on insulin therapy, HTN, DL, obesity, presenting for evaluation to the ER for left arm pain - several weeks duration, dyspnea, mental status changes. Patient had no prior cardiac events, not following with cardiology. Last A1c 9.8%. In the Ed was noted to have LBBB on ECG and STEMI code was called. No chest pain reported. STEMI Code was cancelled. And patient was admitted for further work-up. Troponin was negative. Patient had elevated latae level and was acidotic on the blood gas. While in the ED patient had two seizure episodes and was treated with benzodiazepine. Intubated by ER physician for airway protection. Urgent neuro evaluation was obtained    PAST MEDICAL & SURGICAL HISTORY:  Diabetes  HTN  DL  Obesity        PREVIOUS DIAGNOSTIC TESTING:      ECHO  FINDINGS:    STRESS  FINDINGS:    CATHETERIZATION  FINDINGS:    MEDICATIONS  (STANDING):  levoFLOXacin IVPB      levoFLOXacin IVPB 750 milliGRAM(s) IV Intermittent once  propofol Infusion 20 MICROgram(s)/kG/Min (7.46 mL/Hr) IV Continuous <Continuous>    MEDICATIONS  (PRN):      FAMILY HISTORY:  NC    SOCIAL HISTORY:  CIGARETTES:  Non-smoker    Came from [facility] in 2022  ALCOHOL:  No    REVIEW OF SYSTEMS:  As per HPI, otherwise negative  Unable to obtain further ROS, as patient is unresponsive.    Vital Signs Last 24 Hrs  T(C): 37.3 (13 Aug 2023 12:10), Max: 37.3 (13 Aug 2023 12:10)  T(F): 99.1 (13 Aug 2023 12:10), Max: 99.1 (13 Aug 2023 12:10)  HR: 106 (13 Aug 2023 13:28) (75 - 130)  BP: 149/67 (13 Aug 2023 13:28) (74/56 - 177/77)  BP(mean): --  RR: 16 (13 Aug 2023 13:28) (16 - 26)  SpO2: 100% (13 Aug 2023 13:28) (99% - 100%)    Parameters below as of 13 Aug 2023 13:28  Patient On (Oxygen Delivery Method): ventilator            PHYSICAL EXAM:  GENERAL: Unresponsive, obtunded, subsequently intubated  HEAD:  Atraumatic, Normocephalic  EYES:  PERRL, conjunctiva and sclera clear  ENMT: Moist mucous membranes  NECK: Supple, No JVD, No bruits  NERVOUS SYSTEM: Unable to assess due to mental status  CHEST/LUNG: No rales. B/L BS  HEART: Regular rate and rhythm; Tachycardic. Normal S1-S2  ABDOMEN: Soft, distended; Bowel sounds hypoactive  EXTREMITIES:   No clubbing, cyanosis, + trace edema  SKIN: No rashes or lesions    INTERPRETATION OF TELEMETRY: Sinus tachy    ECG:  ST with LBBB    I&O's Detail      LABS:                        10.0   14.86 )-----------( 363      ( 13 Aug 2023 11:25 )             34.3     08-13    139  |  106  |  12  ----------------------------<  253<H>  5.4<H>   |  21  |  0.6<L>    Ca    9.7      13 Aug 2023 11:25    TPro  8.1<H>  /  Alb  4.6  /  TBili  <0.2  /  DBili  x   /  AST  18  /  ALT  15  /  AlkPhos  116<H>  08-13    CARDIAC MARKERS ( 13 Aug 2023 11:25 )  x     / <0.01 ng/mL / x     / x     / x            Urinalysis Basic - ( 13 Aug 2023 11:25 )    Color: x / Appearance: x / SG: x / pH: x  Gluc: 253 mg/dL / Ketone: x  / Bili: x / Urobili: x   Blood: x / Protein: x / Nitrite: x   Leuk Esterase: x / RBC: x / WBC x   Sq Epi: x / Non Sq Epi: x / Bacteria: x      I&O's Summary        RADIOLOGY & ADDITIONAL STUDIES:

## 2023-08-14 LAB
A1C WITH ESTIMATED AVERAGE GLUCOSE RESULT: 8.2 % — HIGH (ref 4–5.6)
ALBUMIN SERPL ELPH-MCNC: 3.9 G/DL — SIGNIFICANT CHANGE UP (ref 3.5–5.2)
ALBUMIN SERPL ELPH-MCNC: 3.9 G/DL — SIGNIFICANT CHANGE UP (ref 3.5–5.2)
ALBUMIN SERPL ELPH-MCNC: 4.1 G/DL — SIGNIFICANT CHANGE UP (ref 3.5–5.2)
ALP SERPL-CCNC: 96 U/L — SIGNIFICANT CHANGE UP (ref 30–115)
ALP SERPL-CCNC: 97 U/L — SIGNIFICANT CHANGE UP (ref 30–115)
ALP SERPL-CCNC: 98 U/L — SIGNIFICANT CHANGE UP (ref 30–115)
ALT FLD-CCNC: 20 U/L — SIGNIFICANT CHANGE UP (ref 0–41)
ALT FLD-CCNC: 22 U/L — SIGNIFICANT CHANGE UP (ref 0–41)
ALT FLD-CCNC: 23 U/L — SIGNIFICANT CHANGE UP (ref 0–41)
ANION GAP SERPL CALC-SCNC: 13 MMOL/L — SIGNIFICANT CHANGE UP (ref 7–14)
ANION GAP SERPL CALC-SCNC: 15 MMOL/L — HIGH (ref 7–14)
ANION GAP SERPL CALC-SCNC: 18 MMOL/L — HIGH (ref 7–14)
APPEARANCE CSF: ABNORMAL
APPEARANCE SPUN FLD: CLEAR — SIGNIFICANT CHANGE UP
APPEARANCE UR: CLEAR — SIGNIFICANT CHANGE UP
APPEARANCE UR: CLEAR — SIGNIFICANT CHANGE UP
AST SERPL-CCNC: 33 U/L — SIGNIFICANT CHANGE UP (ref 0–41)
AST SERPL-CCNC: 46 U/L — HIGH (ref 0–41)
AST SERPL-CCNC: 50 U/L — HIGH (ref 0–41)
BASOPHILS # BLD AUTO: 0.02 K/UL — SIGNIFICANT CHANGE UP (ref 0–0.2)
BASOPHILS NFR BLD AUTO: 0.2 % — SIGNIFICANT CHANGE UP (ref 0–1)
BILIRUB SERPL-MCNC: 0.2 MG/DL — SIGNIFICANT CHANGE UP (ref 0.2–1.2)
BILIRUB SERPL-MCNC: 0.2 MG/DL — SIGNIFICANT CHANGE UP (ref 0.2–1.2)
BILIRUB SERPL-MCNC: 0.3 MG/DL — SIGNIFICANT CHANGE UP (ref 0.2–1.2)
BILIRUB UR-MCNC: NEGATIVE — SIGNIFICANT CHANGE UP
BILIRUB UR-MCNC: NEGATIVE — SIGNIFICANT CHANGE UP
BLD GP AB SCN SERPL QL: SIGNIFICANT CHANGE UP
BUN SERPL-MCNC: 17 MG/DL — SIGNIFICANT CHANGE UP (ref 10–20)
BUN SERPL-MCNC: 18 MG/DL — SIGNIFICANT CHANGE UP (ref 10–20)
BUN SERPL-MCNC: 19 MG/DL — SIGNIFICANT CHANGE UP (ref 10–20)
CALCIUM SERPL-MCNC: 9.3 MG/DL — SIGNIFICANT CHANGE UP (ref 8.4–10.5)
CALCIUM SERPL-MCNC: 9.3 MG/DL — SIGNIFICANT CHANGE UP (ref 8.4–10.5)
CALCIUM SERPL-MCNC: 9.4 MG/DL — SIGNIFICANT CHANGE UP (ref 8.4–10.5)
CHLORIDE SERPL-SCNC: 108 MMOL/L — SIGNIFICANT CHANGE UP (ref 98–110)
CHLORIDE SERPL-SCNC: 110 MMOL/L — SIGNIFICANT CHANGE UP (ref 98–110)
CHLORIDE SERPL-SCNC: 110 MMOL/L — SIGNIFICANT CHANGE UP (ref 98–110)
CHOLEST SERPL-MCNC: 194 MG/DL — SIGNIFICANT CHANGE UP
CK SERPL-CCNC: 182 U/L — SIGNIFICANT CHANGE UP (ref 0–225)
CO2 SERPL-SCNC: 16 MMOL/L — LOW (ref 17–32)
CO2 SERPL-SCNC: 20 MMOL/L — SIGNIFICANT CHANGE UP (ref 17–32)
CO2 SERPL-SCNC: 22 MMOL/L — SIGNIFICANT CHANGE UP (ref 17–32)
COLOR CSF: ABNORMAL
COLOR SPEC: YELLOW — SIGNIFICANT CHANGE UP
COLOR SPEC: YELLOW — SIGNIFICANT CHANGE UP
CREAT SERPL-MCNC: 0.8 MG/DL — SIGNIFICANT CHANGE UP (ref 0.7–1.5)
CREAT SERPL-MCNC: 0.9 MG/DL — SIGNIFICANT CHANGE UP (ref 0.7–1.5)
CREAT SERPL-MCNC: 0.9 MG/DL — SIGNIFICANT CHANGE UP (ref 0.7–1.5)
DIFF PNL FLD: ABNORMAL
DIFF PNL FLD: NEGATIVE — SIGNIFICANT CHANGE UP
EGFR: 69 ML/MIN/1.73M2 — SIGNIFICANT CHANGE UP
EGFR: 69 ML/MIN/1.73M2 — SIGNIFICANT CHANGE UP
EGFR: 79 ML/MIN/1.73M2 — SIGNIFICANT CHANGE UP
EOSINOPHIL # BLD AUTO: 0 K/UL — SIGNIFICANT CHANGE UP (ref 0–0.7)
EOSINOPHIL NFR BLD AUTO: 0 % — SIGNIFICANT CHANGE UP (ref 0–8)
ESTIMATED AVERAGE GLUCOSE: 189 MG/DL — HIGH (ref 68–114)
ETHANOL SERPL-MCNC: <10 MG/DL — SIGNIFICANT CHANGE UP
GAS PNL BLDA: SIGNIFICANT CHANGE UP
GAS PNL BLDA: SIGNIFICANT CHANGE UP
GLUCOSE BLDC GLUCOMTR-MCNC: 142 MG/DL — HIGH (ref 70–99)
GLUCOSE BLDC GLUCOMTR-MCNC: 152 MG/DL — HIGH (ref 70–99)
GLUCOSE BLDC GLUCOMTR-MCNC: 195 MG/DL — HIGH (ref 70–99)
GLUCOSE CSF-MCNC: 106 MG/DL — HIGH (ref 45–75)
GLUCOSE SERPL-MCNC: 137 MG/DL — HIGH (ref 70–99)
GLUCOSE SERPL-MCNC: 159 MG/DL — HIGH (ref 70–99)
GLUCOSE SERPL-MCNC: 182 MG/DL — HIGH (ref 70–99)
GLUCOSE SERPL-MCNC: 209 MG/DL — HIGH (ref 70–99)
GLUCOSE UR QL: >=1000 MG/DL
GLUCOSE UR QL: >=1000 MG/DL
GRAM STN FLD: SIGNIFICANT CHANGE UP
GRAM STN FLD: SIGNIFICANT CHANGE UP
HCT VFR BLD CALC: 28.4 % — LOW (ref 37–47)
HDLC SERPL-MCNC: 55 MG/DL — SIGNIFICANT CHANGE UP
HGB BLD-MCNC: 8.1 G/DL — LOW (ref 12–16)
IMM GRANULOCYTES NFR BLD AUTO: 0.4 % — HIGH (ref 0.1–0.3)
KETONES UR-MCNC: 15 MG/DL
KETONES UR-MCNC: ABNORMAL MG/DL
LACTATE SERPL-SCNC: 1.7 MMOL/L — SIGNIFICANT CHANGE UP (ref 0.7–2)
LACTATE SERPL-SCNC: 2.3 MMOL/L — HIGH (ref 0.7–2)
LACTATE SERPL-SCNC: 2.9 MMOL/L — HIGH (ref 0.7–2)
LEUKOCYTE ESTERASE UR-ACNC: NEGATIVE — SIGNIFICANT CHANGE UP
LEUKOCYTE ESTERASE UR-ACNC: NEGATIVE — SIGNIFICANT CHANGE UP
LIDOCAIN IGE QN: 17 U/L — SIGNIFICANT CHANGE UP (ref 7–60)
LIPID PNL WITH DIRECT LDL SERPL: 112 MG/DL — HIGH
LYMPHOCYTES # BLD AUTO: 1.24 K/UL — SIGNIFICANT CHANGE UP (ref 1.2–3.4)
LYMPHOCYTES # BLD AUTO: 9.6 % — LOW (ref 20.5–51.1)
LYMPHOCYTES # CSF: 7 % — SIGNIFICANT CHANGE UP (ref 40–80)
MAGNESIUM SERPL-MCNC: 2 MG/DL — SIGNIFICANT CHANGE UP (ref 1.8–2.4)
MAGNESIUM SERPL-MCNC: 2.1 MG/DL — SIGNIFICANT CHANGE UP (ref 1.8–2.4)
MCHC RBC-ENTMCNC: 21.5 PG — LOW (ref 27–31)
MCHC RBC-ENTMCNC: 28.5 G/DL — LOW (ref 32–37)
MCV RBC AUTO: 75.3 FL — LOW (ref 81–99)
MONOCYTES # BLD AUTO: 0.56 K/UL — SIGNIFICANT CHANGE UP (ref 0.1–0.6)
MONOCYTES NFR BLD AUTO: 4.3 % — SIGNIFICANT CHANGE UP (ref 1.7–9.3)
MONOS+MACROS NFR CSF: 6 % — LOW (ref 15–45)
MRSA PCR RESULT.: NEGATIVE — SIGNIFICANT CHANGE UP
MRSA PCR RESULT.: NEGATIVE — SIGNIFICANT CHANGE UP
NEUTROPHILS # BLD AUTO: 11.02 K/UL — HIGH (ref 1.4–6.5)
NEUTROPHILS # CSF: 87 % — HIGH (ref 0–6)
NEUTROPHILS NFR BLD AUTO: 85.5 % — HIGH (ref 42.2–75.2)
NITRITE UR-MCNC: NEGATIVE — SIGNIFICANT CHANGE UP
NITRITE UR-MCNC: NEGATIVE — SIGNIFICANT CHANGE UP
NON HDL CHOLESTEROL: 139 MG/DL — HIGH
NRBC # BLD: 0 /100 WBCS — SIGNIFICANT CHANGE UP (ref 0–0)
NRBC NFR CSF: 53 /UL — HIGH (ref 0–5)
OSMOLALITY UR: 511 MOS/KG — SIGNIFICANT CHANGE UP (ref 50–1200)
PH UR: 5 — SIGNIFICANT CHANGE UP (ref 5–8)
PH UR: 5.5 — SIGNIFICANT CHANGE UP (ref 5–8)
PHOSPHATE SERPL-MCNC: 3.8 MG/DL — SIGNIFICANT CHANGE UP (ref 2.1–4.9)
PLATELET # BLD AUTO: 325 K/UL — SIGNIFICANT CHANGE UP (ref 130–400)
PMV BLD: 10.6 FL — HIGH (ref 7.4–10.4)
POTASSIUM SERPL-MCNC: 4.2 MMOL/L — SIGNIFICANT CHANGE UP (ref 3.5–5)
POTASSIUM SERPL-MCNC: 5 MMOL/L — SIGNIFICANT CHANGE UP (ref 3.5–5)
POTASSIUM SERPL-MCNC: 5.4 MMOL/L — HIGH (ref 3.5–5)
POTASSIUM SERPL-SCNC: 4.2 MMOL/L — SIGNIFICANT CHANGE UP (ref 3.5–5)
POTASSIUM SERPL-SCNC: 5 MMOL/L — SIGNIFICANT CHANGE UP (ref 3.5–5)
POTASSIUM SERPL-SCNC: 5.4 MMOL/L — HIGH (ref 3.5–5)
POTASSIUM UR-SCNC: 46 MMOL/L — SIGNIFICANT CHANGE UP
PROCALCITONIN SERPL-MCNC: 4.82 NG/ML — HIGH (ref 0.02–0.1)
PROT CSF-MCNC: 64 MG/DL — HIGH (ref 15–45)
PROT SERPL-MCNC: 6.9 G/DL — SIGNIFICANT CHANGE UP (ref 6–8)
PROT SERPL-MCNC: 7 G/DL — SIGNIFICANT CHANGE UP (ref 6–8)
PROT SERPL-MCNC: 7 G/DL — SIGNIFICANT CHANGE UP (ref 6–8)
PROT UR-MCNC: 30 MG/DL
PROT UR-MCNC: 30 MG/DL
RBC # BLD: 3.77 M/UL — LOW (ref 4.2–5.4)
RBC # CSF: 9650 /UL — SIGNIFICANT CHANGE UP (ref 0–0)
RBC # FLD: 18.8 % — HIGH (ref 11.5–14.5)
SODIUM SERPL-SCNC: 143 MMOL/L — SIGNIFICANT CHANGE UP (ref 135–146)
SODIUM SERPL-SCNC: 144 MMOL/L — SIGNIFICANT CHANGE UP (ref 135–146)
SODIUM SERPL-SCNC: 145 MMOL/L — SIGNIFICANT CHANGE UP (ref 135–146)
SODIUM UR-SCNC: 26 MMOL/L — SIGNIFICANT CHANGE UP
SP GR SPEC: 1.02 — SIGNIFICANT CHANGE UP (ref 1–1.03)
SP GR SPEC: 1.02 — SIGNIFICANT CHANGE UP (ref 1–1.03)
SPECIMEN SOURCE: SIGNIFICANT CHANGE UP
TRIGL SERPL-MCNC: 132 MG/DL — SIGNIFICANT CHANGE UP
TROPONIN T SERPL-MCNC: 0.04 NG/ML — CRITICAL HIGH
TROPONIN T SERPL-MCNC: 0.08 NG/ML — CRITICAL HIGH
TROPONIN T SERPL-MCNC: 0.13 NG/ML — CRITICAL HIGH
TROPONIN T SERPL-MCNC: 0.14 NG/ML — CRITICAL HIGH
TUBE TYPE: SIGNIFICANT CHANGE UP
UROBILINOGEN FLD QL: 0.2 MG/DL — SIGNIFICANT CHANGE UP (ref 0.2–1)
UROBILINOGEN FLD QL: 0.2 MG/DL — SIGNIFICANT CHANGE UP (ref 0.2–1)
WBC # BLD: 12.89 K/UL — HIGH (ref 4.8–10.8)
WBC # FLD AUTO: 12.89 K/UL — HIGH (ref 4.8–10.8)

## 2023-08-14 PROCEDURE — 71045 X-RAY EXAM CHEST 1 VIEW: CPT | Mod: 26

## 2023-08-14 PROCEDURE — 93306 TTE W/DOPPLER COMPLETE: CPT | Mod: 26

## 2023-08-14 PROCEDURE — 99291 CRITICAL CARE FIRST HOUR: CPT

## 2023-08-14 PROCEDURE — 95720 EEG PHY/QHP EA INCR W/VEEG: CPT

## 2023-08-14 PROCEDURE — 31500 INSERT EMERGENCY AIRWAY: CPT

## 2023-08-14 PROCEDURE — 71045 X-RAY EXAM CHEST 1 VIEW: CPT | Mod: 26,77

## 2023-08-14 PROCEDURE — 62270 DX LMBR SPI PNXR: CPT

## 2023-08-14 PROCEDURE — 93010 ELECTROCARDIOGRAM REPORT: CPT

## 2023-08-14 RX ORDER — LEVETIRACETAM 250 MG/1
1000 TABLET, FILM COATED ORAL EVERY 12 HOURS
Refills: 0 | Status: DISCONTINUED | OUTPATIENT
Start: 2023-08-14 | End: 2023-08-22

## 2023-08-14 RX ORDER — PANTOPRAZOLE SODIUM 20 MG/1
40 TABLET, DELAYED RELEASE ORAL DAILY
Refills: 0 | Status: DISCONTINUED | OUTPATIENT
Start: 2023-08-14 | End: 2023-08-21

## 2023-08-14 RX ORDER — AMPICILLIN TRIHYDRATE 250 MG
2 CAPSULE ORAL EVERY 4 HOURS
Refills: 0 | Status: DISCONTINUED | OUTPATIENT
Start: 2023-08-14 | End: 2023-08-15

## 2023-08-14 RX ORDER — VANCOMYCIN HCL 1 G
1000 VIAL (EA) INTRAVENOUS EVERY 12 HOURS
Refills: 0 | Status: DISCONTINUED | OUTPATIENT
Start: 2023-08-14 | End: 2023-08-15

## 2023-08-14 RX ORDER — SODIUM CHLORIDE 9 MG/ML
500 INJECTION INTRAMUSCULAR; INTRAVENOUS; SUBCUTANEOUS ONCE
Refills: 0 | Status: COMPLETED | OUTPATIENT
Start: 2023-08-14 | End: 2023-08-14

## 2023-08-14 RX ORDER — CHLORHEXIDINE GLUCONATE 213 G/1000ML
15 SOLUTION TOPICAL EVERY 12 HOURS
Refills: 0 | Status: DISCONTINUED | OUTPATIENT
Start: 2023-08-14 | End: 2023-08-15

## 2023-08-14 RX ORDER — AMPICILLIN TRIHYDRATE 250 MG
2 CAPSULE ORAL ONCE
Refills: 0 | Status: COMPLETED | OUTPATIENT
Start: 2023-08-14 | End: 2023-08-14

## 2023-08-14 RX ORDER — CHLORHEXIDINE GLUCONATE 213 G/1000ML
1 SOLUTION TOPICAL
Refills: 0 | Status: DISCONTINUED | OUTPATIENT
Start: 2023-08-14 | End: 2023-08-30

## 2023-08-14 RX ORDER — ENOXAPARIN SODIUM 100 MG/ML
40 INJECTION SUBCUTANEOUS EVERY 24 HOURS
Refills: 0 | Status: DISCONTINUED | OUTPATIENT
Start: 2023-08-14 | End: 2023-08-15

## 2023-08-14 RX ORDER — AMPICILLIN TRIHYDRATE 250 MG
CAPSULE ORAL
Refills: 0 | Status: DISCONTINUED | OUTPATIENT
Start: 2023-08-14 | End: 2023-08-15

## 2023-08-14 RX ADMIN — LEVETIRACETAM 400 MILLIGRAM(S): 250 TABLET, FILM COATED ORAL at 00:38

## 2023-08-14 RX ADMIN — PROPOFOL 7.46 MICROGRAM(S)/KG/MIN: 10 INJECTION, EMULSION INTRAVENOUS at 09:33

## 2023-08-14 RX ADMIN — Medication 650 MILLIGRAM(S): at 13:00

## 2023-08-14 RX ADMIN — Medication 25 MILLIGRAM(S): at 06:37

## 2023-08-14 RX ADMIN — Medication 650 MILLIGRAM(S): at 20:00

## 2023-08-14 RX ADMIN — PANTOPRAZOLE SODIUM 40 MILLIGRAM(S): 20 TABLET, DELAYED RELEASE ORAL at 12:01

## 2023-08-14 RX ADMIN — CHLORHEXIDINE GLUCONATE 15 MILLILITER(S): 213 SOLUTION TOPICAL at 17:36

## 2023-08-14 RX ADMIN — Medication 0: at 12:01

## 2023-08-14 RX ADMIN — Medication 81 MILLIGRAM(S): at 12:01

## 2023-08-14 RX ADMIN — Medication 200 GRAM(S): at 20:30

## 2023-08-14 RX ADMIN — Medication 2: at 09:08

## 2023-08-14 RX ADMIN — SODIUM CHLORIDE 1000 MILLILITER(S): 9 INJECTION INTRAMUSCULAR; INTRAVENOUS; SUBCUTANEOUS at 01:18

## 2023-08-14 RX ADMIN — Medication 25 MILLIGRAM(S): at 17:36

## 2023-08-14 RX ADMIN — Medication 250 MILLIGRAM(S): at 20:30

## 2023-08-14 RX ADMIN — CHLORHEXIDINE GLUCONATE 15 MILLILITER(S): 213 SOLUTION TOPICAL at 06:36

## 2023-08-14 RX ADMIN — LEVETIRACETAM 400 MILLIGRAM(S): 250 TABLET, FILM COATED ORAL at 17:36

## 2023-08-14 RX ADMIN — Medication 650 MILLIGRAM(S): at 18:48

## 2023-08-14 RX ADMIN — ATORVASTATIN CALCIUM 40 MILLIGRAM(S): 80 TABLET, FILM COATED ORAL at 21:38

## 2023-08-14 RX ADMIN — CHLORHEXIDINE GLUCONATE 1 APPLICATION(S): 213 SOLUTION TOPICAL at 06:36

## 2023-08-14 RX ADMIN — ENOXAPARIN SODIUM 40 MILLIGRAM(S): 100 INJECTION SUBCUTANEOUS at 12:01

## 2023-08-14 RX ADMIN — Medication 200 GRAM(S): at 23:35

## 2023-08-14 RX ADMIN — PROPOFOL 7.46 MICROGRAM(S)/KG/MIN: 10 INJECTION, EMULSION INTRAVENOUS at 15:30

## 2023-08-14 RX ADMIN — Medication 650 MILLIGRAM(S): at 12:01

## 2023-08-14 NOTE — DIETITIAN INITIAL EVALUATION ADULT - OTHER INFO
Pertinent Medical Information: Per H&P, pt is a 69 y/o female with PMHx of Type II DM, HTN, DLD, obesity, lateral epicondylitis presenting to the ED w/ Left Arm pain. Intubated by ER for airway protection. GI: Feeding NPO for now.     Weight hx: UBW unknown. Current dosing weight is 61.7 KG. Previous admission weight was  Pertinent Medical Information: Per H&P, pt is a 69 y/o female with PMHx of Type II DM, HTN, DLD, obesity, lateral epicondylitis presenting to the ED w/ Left Arm pain. Intubated by ER for airway protection. GI: Feeding NPO for now.     Weight hx: UBW unknown. Current dosing weight is 61.7 KG. Previous admission weight was 59.9 KG on 7/22.  Pertinent Medical Information: Per H&P, pt is a 69 y/o female with PMHx of Type II DM, HTN, DLD, obesity, lateral epicondylitis presenting to the ED w/ Left Arm pain. Intubated by ER for airway protection. GI: Feeding NPO for now.     Weight hx: UBW unknown. Current dosing weight is 61.7 KG. Previous admission weight was 59.9 KG on 7/22/23. 2.9% unintentional weight gain in over 3 weeks compared to current dosing weight. Pt does not meet weight criteria for malnutrition at this time.

## 2023-08-14 NOTE — DIETITIAN INITIAL EVALUATION ADULT - PERTINENT LABORATORY DATA
08-14    145  |  110  |  17  ----------------------------<  159<H>  4.2   |  22  |  0.8    Ca    9.3      14 Aug 2023 04:40  Phos  3.8     08-14  Mg     2.0     08-14    TPro  7.0  /  Alb  4.1  /  TBili  0.3  /  DBili  x   /  AST  33  /  ALT  20  /  AlkPhos  97  08-14  POCT Blood Glucose.: 142 mg/dL (08-14-23 @ 11:47)

## 2023-08-14 NOTE — CONSULT NOTE ADULT - SUBJECTIVE AND OBJECTIVE BOX
Patient is a 68y old  Female who presents with a chief complaint of Arm Pain, AMS (14 Aug 2023 07:15)      HPI:    69yo F w/a PMH of Type II DM, HTN, DLD, obesity, lateral epicondylitis presenting to the ED w/ Left Arm pain. At time of my exam, patient intubated so spoke with the son at bedside. This arm started about two months ago and has continued to progress but in the last few days became severe. She went to the ED a few days ago, was given pain medications then sent home. Per son, pain still continued to progress after this. No fevers, chills, nausea, vomiting, diarrhea, constipation, SOB, CP.   On admission to the ED today, she presented with severe left arm pain w findings of LBBB on ECG (unsure if new or not). Code STEMI was called and then canceled because troponin negative and no chest pain. Patient was then noted to have acute mental status change, SOB, lactate elevation, and acidosis on abg. Patient had two seizure episodes and was treated with benzodiazepine. Intubated by ER for airway protection.     On Admission  Vitals: /64, HR 75, RR 20, T 98.5, satting 99 percent on RA   Labs: WBC 14.86, Hgb 10, , Na 139, K 5.4, BUN 12, Cr .6, Trop <.01 --> .13 on repeat  Imaging:   CXR -  Patchy bibasilar opacities, right greater than left.  CT Brain Stroke protocol - No evidence of acute transcortical infarct, hydrocephalus, acute intracranial hemorrhage, or mass effect.  CT brain perfusion: No evidence of acute infarct or ischemia.  CTA neck: No stenosis. No dissection.  CTA brain: No stenosis or aneurysm.  CTA Neck: The distal internal carotid arteries are patent. The [place] is normal in appearance. The visualized cerebral arteries are unremarkable.The vertebrobasilar junction and basilar artery are normal.    In the ED, given Levaquin. Keppra, started on propofol  Admitted to MICU for airway monitoring  (13 Aug 2023 19:09)      PAST MEDICAL & SURGICAL HISTORY:  Diabetes          FAMILY HISTORY:  :  No known cardiovacular family hisotry     ROS:  See HPI     Allergies    No Known Allergies    Intolerances          PHYSICAL EXAM    ICU Vital Signs Last 24 Hrs  T(C): 37.6 (14 Aug 2023 04:00), Max: 37.8 (13 Aug 2023 22:00)  T(F): 99.7 (14 Aug 2023 04:00), Max: 100 (13 Aug 2023 22:00)  HR: 91 (14 Aug 2023 06:00) (72 - 130)  BP: 99/55 (14 Aug 2023 06:00) (74/56 - 177/77)  BP(mean): 74 (14 Aug 2023 06:00) (63 - 103)  ABP: --  ABP(mean): --  RR: 16 (14 Aug 2023 06:00) (10 - 26)  SpO2: 100% (14 Aug 2023 06:00) (99% - 100%)    O2 Parameters below as of 14 Aug 2023 07:00  Patient On (Oxygen Delivery Method): ventilator    O2 Concentration (%): 40        General: Intubated  HEENT:  EPIFANIO              Lymphatic system: No cervical LN   Lungs: Bilateral BS; clear   Cardiovascular: Regular  Gastrointestinal: Soft, Positive BS  Musculoskeletal: Sedated; no movement  Skin: Warm.  Intact  Neurological: No motor or sensory deficit       08-13-23 @ 07:01  -  08-14-23 @ 07:00  --------------------------------------------------------  IN:    FentaNYL: 33.7 mL    Propofol: 150.6 mL    Sodium Chloride 0.9% Bolus: 500 mL  Total IN: 684.3 mL    OUT:    Indwelling Catheter - Urethral (mL): 2590 mL  Total OUT: 2590 mL    Total NET: -1905.7 mL          LABS:                          8.1    12.89 )-----------( 325      ( 14 Aug 2023 04:40 )             28.4                                               08-14    145  |  110  |  17  ----------------------------<  159<H>  4.2   |  22  |  0.8    Ca    9.3      14 Aug 2023 04:40  Phos  3.8     08-14  Mg     2.0     08-14    TPro  7.0  /  Alb  4.1  /  TBili  0.3  /  DBili  x   /  AST  33  /  ALT  20  /  AlkPhos  97  08-14                                             Urinalysis Basic - ( 14 Aug 2023 04:40 )    Color: x / Appearance: x / SG: x / pH: x  Gluc: 159 mg/dL / Ketone: x  / Bili: x / Urobili: x   Blood: x / Protein: x / Nitrite: x   Leuk Esterase: x / RBC: x / WBC x   Sq Epi: x / Non Sq Epi: x / Bacteria: x        CARDIAC MARKERS ( 14 Aug 2023 04:40 )  x     / 0.13 ng/mL / x     / x     / x      CARDIAC MARKERS ( 14 Aug 2023 00:20 )  x     / 0.14 ng/mL / 182 U/L / x     / x      CARDIAC MARKERS ( 13 Aug 2023 17:52 )  x     / 0.13 ng/mL / x     / x     / x      CARDIAC MARKERS ( 13 Aug 2023 11:25 )  x     / <0.01 ng/mL / x     / x     / x                                                LIVER FUNCTIONS - ( 14 Aug 2023 04:40 )  Alb: 4.1 g/dL / Pro: 7.0 g/dL / ALK PHOS: 97 U/L / ALT: 20 U/L / AST: 33 U/L / GGT: x                                                                                               Mode: AC/ CMV (Assist Control/ Continuous Mandatory Ventilation)  RR (machine): 16  TV (machine): 400  FiO2: 40  PEEP: 8  ITime: 1  MAP: 13  PIP: 34                                      ABG - ( 14 Aug 2023 03:37 )  pH, Arterial: 7.38  pH, Blood: x     /  pCO2: 38    /  pO2: 198   / HCO3: 22    / Base Excess: -2.3  /  SaO2: 97.9                X-Rays                                                                                     ECHO    MEDICATIONS  (STANDING):  aspirin  chewable 81 milliGRAM(s) Oral daily  atorvastatin 40 milliGRAM(s) Oral at bedtime  chlorhexidine 0.12% Liquid 15 milliLiter(s) Oral Mucosa every 12 hours  chlorhexidine 2% Cloths 1 Application(s) Topical <User Schedule>  dextrose 5%. 1000 milliLiter(s) (50 mL/Hr) IV Continuous <Continuous>  dextrose 50% Injectable 25 Gram(s) IV Push once  fentaNYL   Infusion. 0.5 MICROgram(s)/kG/Hr (3.09 mL/Hr) IV Continuous <Continuous>  glucagon  Injectable 1 milliGRAM(s) IntraMuscular once  insulin lispro (ADMELOG) corrective regimen sliding scale   SubCutaneous three times a day before meals  levETIRAcetam  IVPB 1000 milliGRAM(s) IV Intermittent every 12 hours  levoFLOXacin IVPB 750 milliGRAM(s) IV Intermittent every 24 hours  levoFLOXacin IVPB      metoprolol tartrate 25 milliGRAM(s) Oral every 12 hours  propofol Infusion 20 MICROgram(s)/kG/Min (7.46 mL/Hr) IV Continuous <Continuous>    MEDICATIONS  (PRN):  acetaminophen     Tablet .. 650 milliGRAM(s) Oral every 6 hours PRN Temp greater or equal to 38C (100.4F), Mild Pain (1 - 3)  dextrose Oral Gel 15 Gram(s) Oral once PRN Blood Glucose LESS THAN 70 milliGRAM(s)/deciliter

## 2023-08-14 NOTE — PROGRESS NOTE ADULT - SUBJECTIVE AND OBJECTIVE BOX
Patient is a 68y old  Female who presents with a chief complaint of Arm Pain, AMS (14 Aug 2023 07:35)    HPI:  67yo F w/a PMH of Type II DM, HTN, DLD, obesity, lateral epicondylitis presenting to the ED w/ Left Arm pain. At time of my exam, patient intubated so spoke with the son at bedside. This arm started about two months ago and has continued to progress but in the last few days became severe. She went to the ED a few days ago, was given pain medications then sent home. Per son, pain still continued to progress after this. No fevers, chills, nausea, vomiting, diarrhea, constipation, SOB, CP.   On admission to the ED today, she presented with severe left arm pain w findings of LBBB on ECG (unsure if new or not). Code STEMI was called and then canceled because troponin negative and no chest pain. Patient was then noted to have acute mental status change, SOB, lactate elevation, and acidosis on abg..Patient had two seizure episodes and was treated with benzodiazepine. Intubated by ER for airway protection.     On Admission  Vitals: /64, HR 75, RR 20, T 98.5, satting 99 percent on RA   Labs: WBC 14.86, Hgb 10, , Na 139, K 5.4, BUN 12, Cr .6, Trop <.01 --> .13 on repeat  Imaging:   CXR -  Patchy bibasilar opacities, right greater than left.  CT Brain Stroke protocol - No evidence of acute transcortical infarct, hydrocephalus, acute intracranial hemorrhage, or mass effect.  CT brain perfusion: No evidence of acute infarct or ischemia.  CTA neck: No stenosis. No dissection.  CTA brain: No stenosis or aneurysm.  CTA Neck: The distal internal carotid arteries are patent. The [place] is normal in appearance. The visualized cerebral arteries are unremarkable.The vertebrobasilar junction and basilar artery are normal.    In the ED, given Levaquin. Keppra, started on propofol  Admitted to MICU for airway monitoring  (13 Aug 2023 19:09)      SUBJ:  Patient seen and examined. Remains intubated. More alert, but not following any commands.      MEDICATIONS  (STANDING):  aspirin  chewable 81 milliGRAM(s) Oral daily  atorvastatin 40 milliGRAM(s) Oral at bedtime  chlorhexidine 0.12% Liquid 15 milliLiter(s) Oral Mucosa every 12 hours  chlorhexidine 2% Cloths 1 Application(s) Topical <User Schedule>  dextrose 5%. 1000 milliLiter(s) (50 mL/Hr) IV Continuous <Continuous>  dextrose 50% Injectable 25 Gram(s) IV Push once  enoxaparin Injectable 40 milliGRAM(s) SubCutaneous every 24 hours  fentaNYL   Infusion. 0.5 MICROgram(s)/kG/Hr (3.09 mL/Hr) IV Continuous <Continuous>  glucagon  Injectable 1 milliGRAM(s) IntraMuscular once  insulin lispro (ADMELOG) corrective regimen sliding scale   SubCutaneous three times a day before meals  levETIRAcetam  IVPB 1000 milliGRAM(s) IV Intermittent every 12 hours  levoFLOXacin IVPB      levoFLOXacin IVPB 750 milliGRAM(s) IV Intermittent every 24 hours  metoprolol tartrate 25 milliGRAM(s) Oral every 12 hours  propofol Infusion 20 MICROgram(s)/kG/Min (7.46 mL/Hr) IV Continuous <Continuous>    MEDICATIONS  (PRN):  acetaminophen     Tablet .. 650 milliGRAM(s) Oral every 6 hours PRN Temp greater or equal to 38C (100.4F), Mild Pain (1 - 3)  dextrose Oral Gel 15 Gram(s) Oral once PRN Blood Glucose LESS THAN 70 milliGRAM(s)/deciliter            Vital Signs Last 24 Hrs  T(C): 37.6 (14 Aug 2023 04:00), Max: 37.8 (13 Aug 2023 22:00)  T(F): 99.7 (14 Aug 2023 04:00), Max: 100 (13 Aug 2023 22:00)  HR: 82 (14 Aug 2023 07:47) (72 - 130)  BP: 99/55 (14 Aug 2023 06:00) (74/56 - 177/77)  BP(mean): 74 (14 Aug 2023 06:00) (63 - 103)  RR: 16 (14 Aug 2023 06:00) (10 - 26)  SpO2: 100% (14 Aug 2023 07:47) (99% - 100%)    Parameters below as of 14 Aug 2023 07:00  Patient On (Oxygen Delivery Method): ventilator    O2 Concentration (%): 40      PHYSICAL EXAM:    GEN: intubated, alert, not following commands  HEENT: NC/AT, PERRL  Neck: No JVD  CV: Reg, S1-S2  Lungs: CTAB  Abd: Soft, non-tender  Ext: trace edema        08-13-23 @ 07:01  -  08-14-23 @ 07:00  --------------------------------------------------------  IN: 684.3 mL / OUT: 2590 mL / NET: -1905.7 mL        I&O's Summary    13 Aug 2023 07:01  -  14 Aug 2023 07:00  --------------------------------------------------------  IN: 684.3 mL / OUT: 2590 mL / NET: -1905.7 mL    	        ECG:  < from: 12 Lead ECG (08.13.23 @ 11:15) >   Sinus tachycardia  Left bundle branch block  Abnormal ECG    < end of copied text >    TTE:  Pending    LABS:                        8.1    12.89 )-----------( 325      ( 14 Aug 2023 04:40 )             28.4     08-14    145  |  110  |  17  ----------------------------<  159<H>  4.2   |  22  |  0.8    Ca    9.3      14 Aug 2023 04:40  Phos  3.8     08-14  Mg     2.0     08-14    TPro  7.0  /  Alb  4.1  /  TBili  0.3  /  DBili  x   /  AST  33  /  ALT  20  /  AlkPhos  97  08-14    CARDIAC MARKERS ( 14 Aug 2023 04:40 )  x     / 0.13 ng/mL / x     / x     / x      CARDIAC MARKERS ( 14 Aug 2023 00:20 )  x     / 0.14 ng/mL / 182 U/L / x     / x      CARDIAC MARKERS ( 13 Aug 2023 17:52 )  x     / 0.13 ng/mL / x     / x     / x      CARDIAC MARKERS ( 13 Aug 2023 11:25 )  x     / <0.01 ng/mL / x     / x     / x                BNP  RADIOLOGY & ADDITIONAL STUDIES:  < from: Xray Chest 1 View-PORTABLE IMMEDIATE (Xray Chest 1 View-PORTABLE IMMEDIATE .) (08.13.23 @ 23:45) >    Impression:    No radiographic evidence of acute cardiopulmonary disease.    < end of copied text >  < from: CT Angio Neck Stroke Protocol w/ IV Cont (08.13.23 @ 13:18) >  CTA brain:  The distal internal carotid arteries are patent.  The [place] is normal in appearance.  The visualized cerebral arteries are unremarkable.  The vertebrobasilar junction and basilar artery are normal.      IMPRESSION:  CT brain perfusion: No evidence of acute infarct or ischemia.    CTA neck: No stenosis. No dissection.    CTA brain: No stenosis or aneurysm.    < end of copied text >  < from: CT Brain Stroke Protocol (08.13.23 @ 13:04) >    IMPRESSION:  No evidence of acute transcortical infarct, hydrocephalus, acute   intracranial hemorrhage, or mass effect.    Findings were discussed with readback confirmation Aston Holman by   radiology resident Faustino Nunez on 8/13/2023 at 1:12 PM.    < end of copied text >      IMPRESSION AND PLAN:

## 2023-08-14 NOTE — PROGRESS NOTE ADULT - SUBJECTIVE AND OBJECTIVE BOX
MARCIANO RODRIGUES 68y Female  MRN#: [id number]     Hospital Day: 1d    Pt is currently admitted with the primary diagnosis of  Convulsions        SUBJECTIVE     Overnight events  trop is trending up     Subjective complaints  Pt was evaluated this am.                                             ----------------------------------------------------------  OBJECTIVE  PAST MEDICAL & SURGICAL HISTORY  Diabetes                                              -----------------------------------------------------------  ALLERGIES:  No Known Allergies                                            ------------------------------------------------------------    HOME MEDICATIONS  Home Medications:  aspirin 81 mg oral tablet, chewable: 1 chewed once a day (13 Aug 2023 19:53)  atorvastatin 10 mg oral tablet: 1 orally once a day (at bedtime) (13 Aug 2023 19:52)  Jardiance 10 mg oral tablet: 1 orally once a day (13 Aug 2023 19:53)  MetFORMIN (Eqv-Glumetza) 500 mg oral tablet, extended release: 1 orally 2 times a day (13 Aug 2023 19:51)  pioglitazone 30 mg oral tablet: 1 orally once a day (13 Aug 2023 19:52)                           MEDICATIONS:  STANDING MEDICATIONS  aspirin  chewable 81 milliGRAM(s) Oral daily  atorvastatin 40 milliGRAM(s) Oral at bedtime  chlorhexidine 0.12% Liquid 15 milliLiter(s) Oral Mucosa every 12 hours  chlorhexidine 2% Cloths 1 Application(s) Topical <User Schedule>  dextrose 5%. 1000 milliLiter(s) IV Continuous <Continuous>  dextrose 50% Injectable 25 Gram(s) IV Push once  fentaNYL   Infusion. 0.5 MICROgram(s)/kG/Hr IV Continuous <Continuous>  glucagon  Injectable 1 milliGRAM(s) IntraMuscular once  insulin lispro (ADMELOG) corrective regimen sliding scale   SubCutaneous three times a day before meals  levETIRAcetam  IVPB 1000 milliGRAM(s) IV Intermittent every 12 hours  levoFLOXacin IVPB      levoFLOXacin IVPB 750 milliGRAM(s) IV Intermittent every 24 hours  metoprolol tartrate 25 milliGRAM(s) Oral every 12 hours  propofol Infusion 20 MICROgram(s)/kG/Min IV Continuous <Continuous>    PRN MEDICATIONS  acetaminophen     Tablet .. 650 milliGRAM(s) Oral every 6 hours PRN  dextrose Oral Gel 15 Gram(s) Oral once PRN                                            ------------------------------------------------------------  VITAL SIGNS: Last 24 Hours  T(C): 37.6 (14 Aug 2023 04:00), Max: 37.8 (13 Aug 2023 22:00)  T(F): 99.7 (14 Aug 2023 04:00), Max: 100 (13 Aug 2023 22:00)  HR: 91 (14 Aug 2023 06:00) (72 - 130)  BP: 99/55 (14 Aug 2023 06:00) (74/56 - 177/77)  BP(mean): 74 (14 Aug 2023 06:00) (63 - 103)  RR: 16 (14 Aug 2023 06:00) (10 - 26)  SpO2: 100% (14 Aug 2023 06:00) (99% - 100%)      08-13-23 @ 07:01  -  08-14-23 @ 07:00  --------------------------------------------------------  IN: 684.3 mL / OUT: 2590 mL / NET: -1905.7 mL                                             --------------------------------------------------------------  LABS:                        8.1    12.89 )-----------( 325      ( 14 Aug 2023 04:40 )             28.4     08-14    145  |  110  |  17  ----------------------------<  159<H>  4.2   |  22  |  0.8    Ca    9.3      14 Aug 2023 04:40  Phos  3.8     08-14  Mg     2.0     08-14    TPro  7.0  /  Alb  4.1  /  TBili  0.3  /  DBili  x   /  AST  33  /  ALT  20  /  AlkPhos  97  08-14      Urinalysis Basic - ( 14 Aug 2023 04:40 )    Color: x / Appearance: x / SG: x / pH: x  Gluc: 159 mg/dL / Ketone: x  / Bili: x / Urobili: x   Blood: x / Protein: x / Nitrite: x   Leuk Esterase: x / RBC: x / WBC x   Sq Epi: x / Non Sq Epi: x / Bacteria: x      ABG - ( 14 Aug 2023 03:37 )  pH, Arterial: 7.38  pH, Blood: x     /  pCO2: 38    /  pO2: 198   / HCO3: 22    / Base Excess: -2.3  /  SaO2: 97.9              Troponin T, Serum: 0.13 ng/mL *HH* (08-14-23 @ 04:40)  Lactate, Blood: 2.3 mmol/L *H* (08-14-23 @ 04:40)  Creatine Kinase, Serum: 182 U/L (08-14-23 @ 00:20)  Troponin T, Serum: 0.14 ng/mL *HH* (08-14-23 @ 00:20)  Lactate, Blood: 2.9 mmol/L *H* (08-14-23 @ 00:20)  Troponin T, Serum: 0.13 ng/mL *HH* (08-13-23 @ 17:52)  Troponin T, Serum: <0.01 ng/mL (08-13-23 @ 11:25)          CARDIAC MARKERS ( 14 Aug 2023 04:40 )  x     / 0.13 ng/mL / x     / x     / x      CARDIAC MARKERS ( 14 Aug 2023 00:20 )  x     / 0.14 ng/mL / 182 U/L / x     / x      CARDIAC MARKERS ( 13 Aug 2023 17:52 )  x     / 0.13 ng/mL / x     / x     / x      CARDIAC MARKERS ( 13 Aug 2023 11:25 )  x     / <0.01 ng/mL / x     / x     / x                                                  -------------------------------------------------------------  RADIOLOGY:                                            --------------------------------------------------------------    PHYSICAL EXAM:  GENERAL: intubated   NECK: Supple, No JVD  CHEST/LUNG: Clear to auscultation bilaterally  HEART: regular rate  ABDOMEN: soft  NERVOUS SYSTEM:patient is intubated and unable to communicate                                              --------------------------------------------------------------                 MARCIANO RODRIGUES 68y Female  MRN#: [id number]     Hospital Day: 1d    Pt is currently admitted with the primary diagnosis of  Convulsions        SUBJECTIVE     Overnight events  episode of fever around 8:20     Subjective complaints  Pt was evaluated this am.                                             ----------------------------------------------------------  OBJECTIVE  PAST MEDICAL & SURGICAL HISTORY  Diabetes                                              -----------------------------------------------------------  ALLERGIES:  No Known Allergies                                            ------------------------------------------------------------    HOME MEDICATIONS  Home Medications:  aspirin 81 mg oral tablet, chewable: 1 chewed once a day (13 Aug 2023 19:53)  atorvastatin 10 mg oral tablet: 1 orally once a day (at bedtime) (13 Aug 2023 19:52)  Jardiance 10 mg oral tablet: 1 orally once a day (13 Aug 2023 19:53)  MetFORMIN (Eqv-Glumetza) 500 mg oral tablet, extended release: 1 orally 2 times a day (13 Aug 2023 19:51)  pioglitazone 30 mg oral tablet: 1 orally once a day (13 Aug 2023 19:52)                           MEDICATIONS:  STANDING MEDICATIONS  aspirin  chewable 81 milliGRAM(s) Oral daily  atorvastatin 40 milliGRAM(s) Oral at bedtime  chlorhexidine 0.12% Liquid 15 milliLiter(s) Oral Mucosa every 12 hours  chlorhexidine 2% Cloths 1 Application(s) Topical <User Schedule>  dextrose 5%. 1000 milliLiter(s) IV Continuous <Continuous>  dextrose 50% Injectable 25 Gram(s) IV Push once  fentaNYL   Infusion. 0.5 MICROgram(s)/kG/Hr IV Continuous <Continuous>  glucagon  Injectable 1 milliGRAM(s) IntraMuscular once  insulin lispro (ADMELOG) corrective regimen sliding scale   SubCutaneous three times a day before meals  levETIRAcetam  IVPB 1000 milliGRAM(s) IV Intermittent every 12 hours  levoFLOXacin IVPB      levoFLOXacin IVPB 750 milliGRAM(s) IV Intermittent every 24 hours  metoprolol tartrate 25 milliGRAM(s) Oral every 12 hours  propofol Infusion 20 MICROgram(s)/kG/Min IV Continuous <Continuous>    PRN MEDICATIONS  acetaminophen     Tablet .. 650 milliGRAM(s) Oral every 6 hours PRN  dextrose Oral Gel 15 Gram(s) Oral once PRN                                            ------------------------------------------------------------  VITAL SIGNS: Last 24 Hours  T(C): 37.6 (14 Aug 2023 04:00), Max: 37.8 (13 Aug 2023 22:00)  T(F): 99.7 (14 Aug 2023 04:00), Max: 100 (13 Aug 2023 22:00)  HR: 91 (14 Aug 2023 06:00) (72 - 130)  BP: 99/55 (14 Aug 2023 06:00) (74/56 - 177/77)  BP(mean): 74 (14 Aug 2023 06:00) (63 - 103)  RR: 16 (14 Aug 2023 06:00) (10 - 26)  SpO2: 100% (14 Aug 2023 06:00) (99% - 100%)      08-13-23 @ 07:01  -  08-14-23 @ 07:00  --------------------------------------------------------  IN: 684.3 mL / OUT: 2590 mL / NET: -1905.7 mL                                             --------------------------------------------------------------  LABS:                        8.1    12.89 )-----------( 325      ( 14 Aug 2023 04:40 )             28.4     08-14    145  |  110  |  17  ----------------------------<  159<H>  4.2   |  22  |  0.8    Ca    9.3      14 Aug 2023 04:40  Phos  3.8     08-14  Mg     2.0     08-14    TPro  7.0  /  Alb  4.1  /  TBili  0.3  /  DBili  x   /  AST  33  /  ALT  20  /  AlkPhos  97  08-14      Urinalysis Basic - ( 14 Aug 2023 04:40 )    Color: x / Appearance: x / SG: x / pH: x  Gluc: 159 mg/dL / Ketone: x  / Bili: x / Urobili: x   Blood: x / Protein: x / Nitrite: x   Leuk Esterase: x / RBC: x / WBC x   Sq Epi: x / Non Sq Epi: x / Bacteria: x      ABG - ( 14 Aug 2023 03:37 )  pH, Arterial: 7.38  pH, Blood: x     /  pCO2: 38    /  pO2: 198   / HCO3: 22    / Base Excess: -2.3  /  SaO2: 97.9              Troponin T, Serum: 0.13 ng/mL *HH* (08-14-23 @ 04:40)  Lactate, Blood: 2.3 mmol/L *H* (08-14-23 @ 04:40)  Creatine Kinase, Serum: 182 U/L (08-14-23 @ 00:20)  Troponin T, Serum: 0.14 ng/mL *HH* (08-14-23 @ 00:20)  Lactate, Blood: 2.9 mmol/L *H* (08-14-23 @ 00:20)  Troponin T, Serum: 0.13 ng/mL *HH* (08-13-23 @ 17:52)  Troponin T, Serum: <0.01 ng/mL (08-13-23 @ 11:25)          CARDIAC MARKERS ( 14 Aug 2023 04:40 )  x     / 0.13 ng/mL / x     / x     / x      CARDIAC MARKERS ( 14 Aug 2023 00:20 )  x     / 0.14 ng/mL / 182 U/L / x     / x      CARDIAC MARKERS ( 13 Aug 2023 17:52 )  x     / 0.13 ng/mL / x     / x     / x      CARDIAC MARKERS ( 13 Aug 2023 11:25 )  x     / <0.01 ng/mL / x     / x     / x                                                  -------------------------------------------------------------  RADIOLOGY:                                            --------------------------------------------------------------    PHYSICAL EXAM:  GENERAL: intubated   NECK: Supple, No JVD  CHEST/LUNG: Clear to auscultation bilaterally  HEART: regular rate  ABDOMEN: soft  NERVOUS SYSTEM:patient is intubated and unable to communicate                                              --------------------------------------------------------------

## 2023-08-14 NOTE — DIETITIAN INITIAL EVALUATION ADULT - ORAL INTAKE PTA/DIET HISTORY
Pt unable to participate in nutrition assessment interview at this time; intubated. Hx limited to medical chart.

## 2023-08-14 NOTE — CONSULT NOTE ADULT - ASSESSMENT
Impression:     Seizures   Altered mental status  Acute hypoxemic respiratory failure   Lactic acidosis     PLAN:    CNS: Spontaneous awakening trial. CTH and CTA negative. Neuro ICU following. Video EEG. Keppra 1g BID.     HEENT: Oral care. ETT care.     PULMONARY:  HOB @ 45 degrees.  Vent changes as follows: Lower Fio2 to 35%. CXR with mild right infiltrates. ETT ok.     CARDIOVASCULAR: Not on pressors. Keep equal balance. Mild Trop elevation stable.     GI: GI prophylaxis.  Feeding NPO for now.     RENAL:  Follow up lytes.  Correct as needed. Johnson for retention.     INFECTIOUS DISEASE: Follow up cultures. UA; Ucx, Blood culture, RVP, procal. Continue Levaquin for now.     HEMATOLOGICAL:  DVT prophylaxis. Subcut Lovenox.     ENDOCRINE:  Follow up FS.  Insulin protocol if needed.    MUSCULOSKELETAL: bedrest for now.     CODE STATUS: Full code.     ICU care for now.

## 2023-08-14 NOTE — DIETITIAN INITIAL EVALUATION ADULT - PERTINENT MEDS FT
MEDICATIONS  (STANDING):  aspirin  chewable 81 milliGRAM(s) Oral daily  atorvastatin 40 milliGRAM(s) Oral at bedtime  chlorhexidine 0.12% Liquid 15 milliLiter(s) Oral Mucosa every 12 hours  chlorhexidine 2% Cloths 1 Application(s) Topical <User Schedule>  dextrose 5%. 1000 milliLiter(s) (50 mL/Hr) IV Continuous <Continuous>  dextrose 50% Injectable 25 Gram(s) IV Push once  enoxaparin Injectable 40 milliGRAM(s) SubCutaneous every 24 hours  fentaNYL   Infusion. 0.5 MICROgram(s)/kG/Hr (3.09 mL/Hr) IV Continuous <Continuous>  glucagon  Injectable 1 milliGRAM(s) IntraMuscular once  insulin lispro (ADMELOG) corrective regimen sliding scale   SubCutaneous three times a day before meals  levETIRAcetam  IVPB 1000 milliGRAM(s) IV Intermittent every 12 hours  levoFLOXacin IVPB      levoFLOXacin IVPB 750 milliGRAM(s) IV Intermittent every 24 hours  metoprolol tartrate 25 milliGRAM(s) Oral every 12 hours  pantoprazole  Injectable 40 milliGRAM(s) IV Push daily  propofol Infusion 20 MICROgram(s)/kG/Min (7.46 mL/Hr) IV Continuous <Continuous>    MEDICATIONS  (PRN):  acetaminophen     Tablet .. 650 milliGRAM(s) Oral every 6 hours PRN Temp greater or equal to 38C (100.4F), Mild Pain (1 - 3)  dextrose Oral Gel 15 Gram(s) Oral once PRN Blood Glucose LESS THAN 70 milliGRAM(s)/deciliter

## 2023-08-14 NOTE — DIETITIAN INITIAL EVALUATION ADULT - NUTRITIONGOAL OUTCOME1
Pt to meet >81 - <105% of estimated needs via TF within 3-5 days (if within GOC, tube to be determined by team)

## 2023-08-14 NOTE — CHART NOTE - NSCHARTNOTEFT_GEN_A_CORE
Bedside echo performed  EF ~25-30%  No pericardial effusion  No valvular heart disease  Septal wall motion abnormality seen  Patient being treated as T2MI  Started on aspirin, atorvastatin 40, and beta blocker  Johnson placed and drained ~2 L urine Bedside echo performed  EF ~25-30%  No pericardial effusion  No valvular heart disease  Septal wall motion abnormality seen  Patient being treated as T2MI  Started on aspirin, atorvastatin 40, and beta blocker  Johnson placed and drained ~2 L urine  Patient hypotensive, 500cc bolus given, likely post-obstructive diuresis

## 2023-08-14 NOTE — DIETITIAN INITIAL EVALUATION ADULT - ADD RECOMMEND
1. If within GOC, recommend TF (tube to be determined by team): Peptamen AF, continuous, total volume: 1200 mL. TF regimen provides: 1440 kcal, 91.2g pro, 974.4 mL free water. Additional water flushes: 140 mL q6hrs; team to adjust prn. TF regimen meets >85 - <105% of estimated needs.   2. Maintain all aspiration precautions    Pt is at high nutrition risk; will f/u in 3-5 days or prn. 1. If within GOC, recommend TF (tube to be determined by team): Peptamen AF, continuous, total volume: 1200 mL. Additional water flushes: 140 mL q6hrs; team to adjust prn. Note: Propofol calories: 116 kcal (4.4 mL/hr). TF regimen with propofol kcal + additional water flushes provides: 1556 kcal, 91.2g pro, 1534.4 mL free water.  TF regimen meets >85 - <105% of estimated needs.  2. Maintain all aspiration precautions    Pt is at high nutrition risk; will f/u in 3-5 days or prn.

## 2023-08-14 NOTE — EEG REPORT - NS EEG TEXT BOX
Epilepsy Attending Note:     MARCIANO RODRIGUES    68y Female  MRN [id number]    Vital Signs Last 24 Hrs  T(C): 39.2 (14 Aug 2023 08:00), Max: 39.2 (14 Aug 2023 08:00)  T(F): 102.5 (14 Aug 2023 08:00), Max: 102.5 (14 Aug 2023 08:00)  HR: 107 (14 Aug 2023 09:00) (72 - 130)  BP: 165/64 (14 Aug 2023 09:00) (74/56 - 177/77)  BP(mean): 92 (14 Aug 2023 09:00) (63 - 111)  RR: 27 (14 Aug 2023 09:00) (10 - 28)  SpO2: 100% (14 Aug 2023 09:00) (99% - 100%)    Parameters below as of 14 Aug 2023 09:00  Patient On (Oxygen Delivery Method): ventilator    O2 Concentration (%): 35                          8.1    12.89 )-----------( 325      ( 14 Aug 2023 04:40 )             28.4       08-14    145  |  110  |  17  ----------------------------<  159<H>  4.2   |  22  |  0.8    Ca    9.3      14 Aug 2023 04:40  Phos  3.8     08-14  Mg     2.0     08-14    TPro  7.0  /  Alb  4.1  /  TBili  0.3  /  DBili  x   /  AST  33  /  ALT  20  /  AlkPhos  97  08-14      MEDICATIONS  (STANDING):  aspirin  chewable 81 milliGRAM(s) Oral daily  atorvastatin 40 milliGRAM(s) Oral at bedtime  chlorhexidine 0.12% Liquid 15 milliLiter(s) Oral Mucosa every 12 hours  chlorhexidine 2% Cloths 1 Application(s) Topical <User Schedule>  dextrose 5%. 1000 milliLiter(s) (50 mL/Hr) IV Continuous <Continuous>  dextrose 50% Injectable 25 Gram(s) IV Push once  enoxaparin Injectable 40 milliGRAM(s) SubCutaneous every 24 hours  fentaNYL   Infusion. 0.5 MICROgram(s)/kG/Hr (3.09 mL/Hr) IV Continuous <Continuous>  glucagon  Injectable 1 milliGRAM(s) IntraMuscular once  insulin lispro (ADMELOG) corrective regimen sliding scale   SubCutaneous three times a day before meals  levETIRAcetam  IVPB 1000 milliGRAM(s) IV Intermittent every 12 hours  levoFLOXacin IVPB      levoFLOXacin IVPB 750 milliGRAM(s) IV Intermittent every 24 hours  metoprolol tartrate 25 milliGRAM(s) Oral every 12 hours  propofol Infusion 20 MICROgram(s)/kG/Min (7.46 mL/Hr) IV Continuous <Continuous>    MEDICATIONS  (PRN):  acetaminophen     Tablet .. 650 milliGRAM(s) Oral every 6 hours PRN Temp greater or equal to 38C (100.4F), Mild Pain (1 - 3)  dextrose Oral Gel 15 Gram(s) Oral once PRN Blood Glucose LESS THAN 70 milliGRAM(s)/deciliter            VEEG in the last 24 hours: intubated  and sedated    Background---------------  continues, symmetrical less than optimally organized reaching 7-8 hz that is superimposed by large amount of low voltage fast and low amplitude lower range theta activity and showing reactivity    Focal and generalized slowing------------ mild to moderate generalized slowing. Mild right hemispheric focal slowing     Interictal activity----rare to small number of right anterior quadrants sharp transients    Events--------------- none    Seizures--none    Impression: abnormal as above    Plan - as/neurology

## 2023-08-14 NOTE — PROGRESS NOTE ADULT - ASSESSMENT
67 y/o female with HTN, DL, DM2, obesity  Acute respiratory failure  Acute MS changes  Possible seizure  LBBB  Cardiomyopathy  Elevated troponin - NSTEMI vs. demand ischemia    Recommend:    C/w MICU care  Neurology following  EEG - pending    Wean off the vent, SBT today  Official echo to assess LV  Troponin is mildly elevated - continue to trend.    C/w ASA, statin    B-blocker for rate control. Once extubated and clinically more stable - start low dose Entresto.  Will need ischemic w/u once more stable.

## 2023-08-14 NOTE — DIETITIAN INITIAL EVALUATION ADULT - NAME AND PHONE
Martina [phone] or TEAMS    Nutrition Interventions: TF regimen, coordination of care; Nutrition Monitoring: Diet order, weights, labs, NFPF, body composition, BM and tolerance to TF, GOC

## 2023-08-14 NOTE — CHART NOTE - NSCHARTNOTEFT_GEN_A_CORE
EEG reviewed, pt not in status epilepticus. Pt has no further NCC needs. General Neurology to follow, pt endorsed to Neurology resident and attending Dr Moralez. Will sign off at this time, please call with any further questions.

## 2023-08-14 NOTE — PROGRESS NOTE ADULT - ASSESSMENT
Impression:     Altered mental status  Metabolic Enceph  Acute hypoxemic respiratory failure   Lactic acidosis s/p 2 seizure episode     PLAN:    CNS: CTH and CTA negative. . Keppra 1g BID. f/u NEURO ,LP ordered ,VEEG ordered,trend lactate level     HEENT: Oral care. ETT care.     PULMONARY:  HOB @ 45 degrees.  On Vent . CXR with mild right infiltrates on levofloxacin .    CARDIOVASCULAR: Not on pressors. Keep equal balance. Mild Trop elevation stable. f/u ECHO,bedside echo showed a EF of 25 to 30 percent.  trend lactate     GI: GI prophylaxis.  Feeding NPO for now.     RENAL:     f/u urinalaysis ,On felder ,f/u ABGs    INFECTIOUS DISEASE: Follow up cultures. UA; Ucx, Blood culture, RVP, procal. Continue Levaquin 750 for now.     HEMATOLOGICAL:  DVT prophylaxis. Subcut Lovenox.     ENDOCRINE:  Follow up FS.  Insulin protocol if needed.    MUSCULOSKELETAL: bedrest for now.     CODE STATUS: Full code.     ICU care for now.

## 2023-08-14 NOTE — DIETITIAN INITIAL EVALUATION ADULT - OTHER CALCULATIONS
Using ABW: ENERGY: PSE 1442.44 (Tmax 38.9 C, Ve 4.58); PROTEIN: 74-86 g/day (1.2-1.4 g/kg); FLUID: 1543 mL/day (25 mL/kg) -- with consideration for age, BMI, intubated, critical care setting Using ABW: ENERGY: PSE 1442.44 (Tmax 38.9 C, Ve 4.58) vs. 6206-1182 kcal/day (20-25 kcal/kg); PROTEIN: 74-86 g/day (1.2-1.4 g/kg); FLUID: 1543 mL/day (25 mL/kg) -- with consideration for age, BMI, intubated, critical care setting

## 2023-08-15 LAB
ALBUMIN CSF-MCNC: 18.3 MG/DL — SIGNIFICANT CHANGE UP (ref 14–25)
ALBUMIN SERPL ELPH-MCNC: 3.3 G/DL — LOW (ref 3.5–5.2)
ALBUMIN SERPL ELPH-MCNC: 3087 MG/DL — LOW (ref 3500–5200)
ALP SERPL-CCNC: 83 U/L — SIGNIFICANT CHANGE UP (ref 30–115)
ALT FLD-CCNC: 14 U/L — SIGNIFICANT CHANGE UP (ref 0–41)
ANION GAP SERPL CALC-SCNC: 17 MMOL/L — HIGH (ref 7–14)
APTT BLD: 31.9 SEC — SIGNIFICANT CHANGE UP (ref 27–39.2)
AST SERPL-CCNC: 19 U/L — SIGNIFICANT CHANGE UP (ref 0–41)
BASE EXCESS BLDA CALC-SCNC: -3.2 MMOL/L — LOW (ref -2–3)
BASE EXCESS BLDA CALC-SCNC: -5.6 MMOL/L — LOW (ref -2–3)
BASOPHILS # BLD AUTO: 0.02 K/UL — SIGNIFICANT CHANGE UP (ref 0–0.2)
BASOPHILS NFR BLD AUTO: 0.1 % — SIGNIFICANT CHANGE UP (ref 0–1)
BILIRUB SERPL-MCNC: 0.3 MG/DL — SIGNIFICANT CHANGE UP (ref 0.2–1.2)
BUN SERPL-MCNC: 16 MG/DL — SIGNIFICANT CHANGE UP (ref 10–20)
CALCIUM SERPL-MCNC: 8.8 MG/DL — SIGNIFICANT CHANGE UP (ref 8.4–10.5)
CHLORIDE SERPL-SCNC: 113 MMOL/L — HIGH (ref 98–110)
CO2 SERPL-SCNC: 17 MMOL/L — SIGNIFICANT CHANGE UP (ref 17–32)
CREAT ?TM UR-MCNC: 60 MG/DL — SIGNIFICANT CHANGE UP
CREAT SERPL-MCNC: 0.7 MG/DL — SIGNIFICANT CHANGE UP (ref 0.7–1.5)
CRYPTOC AG CSF-ACNC: NEGATIVE — SIGNIFICANT CHANGE UP
CSF PCR RESULT: SIGNIFICANT CHANGE UP
CULTURE RESULTS: NO GROWTH — SIGNIFICANT CHANGE UP
D DIMER BLD IA.RAPID-MCNC: 1064 NG/ML DDU — HIGH
DIR ANTIGLOB POLYSPECIFIC INTERPRETATION: SIGNIFICANT CHANGE UP
EGFR: 93 ML/MIN/1.73M2 — SIGNIFICANT CHANGE UP
EOSINOPHIL # BLD AUTO: 0 K/UL — SIGNIFICANT CHANGE UP (ref 0–0.7)
EOSINOPHIL NFR BLD AUTO: 0 % — SIGNIFICANT CHANGE UP (ref 0–8)
GAS PNL BLDA: SIGNIFICANT CHANGE UP
GAS PNL BLDA: SIGNIFICANT CHANGE UP
GLUCOSE BLDC GLUCOMTR-MCNC: 133 MG/DL — HIGH (ref 70–99)
GLUCOSE BLDC GLUCOMTR-MCNC: 148 MG/DL — HIGH (ref 70–99)
GLUCOSE BLDC GLUCOMTR-MCNC: 174 MG/DL — HIGH (ref 70–99)
GLUCOSE BLDC GLUCOMTR-MCNC: 175 MG/DL — HIGH (ref 70–99)
GLUCOSE SERPL-MCNC: 168 MG/DL — HIGH (ref 70–99)
HCO3 BLDA-SCNC: 20 MMOL/L — LOW (ref 21–28)
HCO3 BLDA-SCNC: 23 MMOL/L — SIGNIFICANT CHANGE UP (ref 21–28)
HCT VFR BLD CALC: 22.7 % — LOW (ref 37–47)
HCT VFR BLD CALC: 23.7 % — LOW (ref 37–47)
HCT VFR BLD CALC: 26.6 % — LOW (ref 37–47)
HCT VFR BLD CALC: 29.3 % — LOW (ref 37–47)
HGB BLD-MCNC: 6.4 G/DL — CRITICAL LOW (ref 12–16)
HGB BLD-MCNC: 6.7 G/DL — CRITICAL LOW (ref 12–16)
HGB BLD-MCNC: 7.8 G/DL — LOW (ref 12–16)
HGB BLD-MCNC: 8.6 G/DL — LOW (ref 12–16)
HOROWITZ INDEX BLDA+IHG-RTO: 35 — SIGNIFICANT CHANGE UP
IGG CSF-MCNC: 3.7 MG/DL — HIGH
IGG FLD-MCNC: 1198 MG/DL — SIGNIFICANT CHANGE UP (ref 610–1660)
IGG SYNTH RATE SER+CSF CALC-MRATE: -1.8 MG/DAY — SIGNIFICANT CHANGE UP
IGG/ALB CLEAR SER+CSF-RTO: 0.5 — SIGNIFICANT CHANGE UP
IGG/ALB CSF: 0.2 RATIO — SIGNIFICANT CHANGE UP
IGG/ALB SER: 0.39 RATIO — SIGNIFICANT CHANGE UP
IMM GRANULOCYTES NFR BLD AUTO: 0.6 % — HIGH (ref 0.1–0.3)
LABORATORY COMMENT REPORT: SIGNIFICANT CHANGE UP
LACTATE SERPL-SCNC: 1.2 MMOL/L — SIGNIFICANT CHANGE UP (ref 0.7–2)
LDH CSF L TO P-CCNC: 14 U/L — SIGNIFICANT CHANGE UP
LDH FLD-CCNC: 14 U/L — SIGNIFICANT CHANGE UP
LDH SERPL L TO P-CCNC: 318 — HIGH (ref 50–242)
LDH SERPL L TO P-CCNC: 398 — HIGH (ref 50–242)
LYMPHOCYTES # BLD AUTO: 1.9 K/UL — SIGNIFICANT CHANGE UP (ref 1.2–3.4)
LYMPHOCYTES # BLD AUTO: 12.7 % — LOW (ref 20.5–51.1)
MAGNESIUM SERPL-MCNC: 2.2 MG/DL — SIGNIFICANT CHANGE UP (ref 1.8–2.4)
MCHC RBC-ENTMCNC: 21.5 PG — LOW (ref 27–31)
MCHC RBC-ENTMCNC: 22 PG — LOW (ref 27–31)
MCHC RBC-ENTMCNC: 22.6 PG — LOW (ref 27–31)
MCHC RBC-ENTMCNC: 23 PG — LOW (ref 27–31)
MCHC RBC-ENTMCNC: 28.2 G/DL — LOW (ref 32–37)
MCHC RBC-ENTMCNC: 28.3 G/DL — LOW (ref 32–37)
MCHC RBC-ENTMCNC: 29.3 G/DL — LOW (ref 32–37)
MCHC RBC-ENTMCNC: 29.4 G/DL — LOW (ref 32–37)
MCV RBC AUTO: 76.4 FL — LOW (ref 81–99)
MCV RBC AUTO: 77.1 FL — LOW (ref 81–99)
MCV RBC AUTO: 77.7 FL — LOW (ref 81–99)
MCV RBC AUTO: 78.3 FL — LOW (ref 81–99)
MONOCYTES # BLD AUTO: 0.78 K/UL — HIGH (ref 0.1–0.6)
MONOCYTES NFR BLD AUTO: 5.2 % — SIGNIFICANT CHANGE UP (ref 1.7–9.3)
NEUTROPHILS # BLD AUTO: 12.15 K/UL — HIGH (ref 1.4–6.5)
NEUTROPHILS NFR BLD AUTO: 81.4 % — HIGH (ref 42.2–75.2)
NIGHT BLUE STAIN TISS: SIGNIFICANT CHANGE UP
NRBC # BLD: 0 /100 WBCS — SIGNIFICANT CHANGE UP (ref 0–0)
PARASITE BLOOD SMEAR RESULT: SIGNIFICANT CHANGE UP
PCO2 BLDA: 39 MMHG — SIGNIFICANT CHANGE UP (ref 25–48)
PCO2 BLDA: 43 MMHG — SIGNIFICANT CHANGE UP (ref 25–48)
PH BLDA: 7.32 — LOW (ref 7.35–7.45)
PH BLDA: 7.33 — LOW (ref 7.35–7.45)
PHOSPHATE SERPL-MCNC: 4 MG/DL — SIGNIFICANT CHANGE UP (ref 2.1–4.9)
PLATELET # BLD AUTO: 185 K/UL — SIGNIFICANT CHANGE UP (ref 130–400)
PLATELET # BLD AUTO: 189 K/UL — SIGNIFICANT CHANGE UP (ref 130–400)
PLATELET # BLD AUTO: 192 K/UL — SIGNIFICANT CHANGE UP (ref 130–400)
PLATELET # BLD AUTO: 213 K/UL — SIGNIFICANT CHANGE UP (ref 130–400)
PMV BLD: 10.4 FL — SIGNIFICANT CHANGE UP (ref 7.4–10.4)
PMV BLD: 10.5 FL — HIGH (ref 7.4–10.4)
PMV BLD: 10.6 FL — HIGH (ref 7.4–10.4)
PMV BLD: 11.3 FL — HIGH (ref 7.4–10.4)
PO2 BLDA: 190 MMHG — HIGH (ref 83–108)
PO2 BLDA: 191 MMHG — HIGH (ref 83–108)
POTASSIUM SERPL-MCNC: 3.7 MMOL/L — SIGNIFICANT CHANGE UP (ref 3.5–5)
POTASSIUM SERPL-SCNC: 3.7 MMOL/L — SIGNIFICANT CHANGE UP (ref 3.5–5)
PROCALCITONIN SERPL-MCNC: 2.92 NG/ML — HIGH (ref 0.02–0.1)
PROT ?TM UR-MCNC: 27 MG/DLG/24H — SIGNIFICANT CHANGE UP
PROT SERPL-MCNC: 6.1 G/DL — SIGNIFICANT CHANGE UP (ref 6–8)
PROT/CREAT UR-RTO: 0.4 RATIO — HIGH (ref 0–0.2)
RAPID RVP RESULT: SIGNIFICANT CHANGE UP
RBC # BLD: 2.97 M/UL — LOW (ref 4.2–5.4)
RBC # BLD: 3.05 M/UL — LOW (ref 4.2–5.4)
RBC # BLD: 3.07 M/UL — LOW (ref 4.2–5.4)
RBC # BLD: 3.45 M/UL — LOW (ref 4.2–5.4)
RBC # BLD: 3.74 M/UL — LOW (ref 4.2–5.4)
RBC # FLD: 18.6 % — HIGH (ref 11.5–14.5)
RBC # FLD: 18.6 % — HIGH (ref 11.5–14.5)
RBC # FLD: 18.8 % — HIGH (ref 11.5–14.5)
RBC # FLD: 19 % — HIGH (ref 11.5–14.5)
RETICS #: 55.3 K/UL — SIGNIFICANT CHANGE UP (ref 25–125)
RETICS/RBC NFR: 1.8 % — HIGH (ref 0.5–1.5)
SAO2 % BLDA: 96.9 % — SIGNIFICANT CHANGE UP (ref 94–98)
SAO2 % BLDA: 97.1 % — SIGNIFICANT CHANGE UP (ref 94–98)
SARS-COV-2 RNA SPEC QL NAA+PROBE: SIGNIFICANT CHANGE UP
SODIUM SERPL-SCNC: 147 MMOL/L — HIGH (ref 135–146)
SOURCE HSV 1/2: SIGNIFICANT CHANGE UP
SPECIMEN SOURCE: SIGNIFICANT CHANGE UP
SPECIMEN SOURCE: SIGNIFICANT CHANGE UP
TROPONIN T SERPL-MCNC: <0.01 NG/ML — SIGNIFICANT CHANGE UP
UUN UR-MCNC: 465 MG/DL — SIGNIFICANT CHANGE UP
VANCOMYCIN FLD-MCNC: 8.6 UG/ML — SIGNIFICANT CHANGE UP (ref 5–10)
WBC # BLD: 13.98 K/UL — HIGH (ref 4.8–10.8)
WBC # BLD: 14.94 K/UL — HIGH (ref 4.8–10.8)
WBC # BLD: 14.97 K/UL — HIGH (ref 4.8–10.8)
WBC # BLD: 16.59 K/UL — HIGH (ref 4.8–10.8)
WBC # FLD AUTO: 13.98 K/UL — HIGH (ref 4.8–10.8)
WBC # FLD AUTO: 14.94 K/UL — HIGH (ref 4.8–10.8)
WBC # FLD AUTO: 14.97 K/UL — HIGH (ref 4.8–10.8)
WBC # FLD AUTO: 16.59 K/UL — HIGH (ref 4.8–10.8)

## 2023-08-15 PROCEDURE — 99233 SBSQ HOSP IP/OBS HIGH 50: CPT

## 2023-08-15 PROCEDURE — 99291 CRITICAL CARE FIRST HOUR: CPT

## 2023-08-15 PROCEDURE — 95720 EEG PHY/QHP EA INCR W/VEEG: CPT

## 2023-08-15 PROCEDURE — 93970 EXTREMITY STUDY: CPT | Mod: 26

## 2023-08-15 PROCEDURE — 99232 SBSQ HOSP IP/OBS MODERATE 35: CPT

## 2023-08-15 PROCEDURE — 93010 ELECTROCARDIOGRAM REPORT: CPT

## 2023-08-15 PROCEDURE — 71045 X-RAY EXAM CHEST 1 VIEW: CPT | Mod: 26,77

## 2023-08-15 PROCEDURE — 71045 X-RAY EXAM CHEST 1 VIEW: CPT | Mod: 26

## 2023-08-15 RX ORDER — CEFTRIAXONE 500 MG/1
2000 INJECTION, POWDER, FOR SOLUTION INTRAMUSCULAR; INTRAVENOUS EVERY 12 HOURS
Refills: 0 | Status: DISCONTINUED | OUTPATIENT
Start: 2023-08-15 | End: 2023-08-17

## 2023-08-15 RX ORDER — IPRATROPIUM/ALBUTEROL SULFATE 18-103MCG
3 AEROSOL WITH ADAPTER (GRAM) INHALATION EVERY 6 HOURS
Refills: 0 | Status: DISCONTINUED | OUTPATIENT
Start: 2023-08-15 | End: 2023-08-28

## 2023-08-15 RX ORDER — HEPARIN SODIUM 5000 [USP'U]/ML
5000 INJECTION INTRAVENOUS; SUBCUTANEOUS EVERY 6 HOURS
Refills: 0 | Status: DISCONTINUED | OUTPATIENT
Start: 2023-08-15 | End: 2023-08-16

## 2023-08-15 RX ORDER — HEPARIN SODIUM 5000 [USP'U]/ML
2500 INJECTION INTRAVENOUS; SUBCUTANEOUS EVERY 6 HOURS
Refills: 0 | Status: DISCONTINUED | OUTPATIENT
Start: 2023-08-15 | End: 2023-08-16

## 2023-08-15 RX ORDER — HEPARIN SODIUM 5000 [USP'U]/ML
5000 INJECTION INTRAVENOUS; SUBCUTANEOUS ONCE
Refills: 0 | Status: COMPLETED | OUTPATIENT
Start: 2023-08-15 | End: 2023-08-15

## 2023-08-15 RX ORDER — FUROSEMIDE 40 MG
20 TABLET ORAL ONCE
Refills: 0 | Status: COMPLETED | OUTPATIENT
Start: 2023-08-15 | End: 2023-08-15

## 2023-08-15 RX ORDER — ACETAMINOPHEN 500 MG
650 TABLET ORAL ONCE
Refills: 0 | Status: COMPLETED | OUTPATIENT
Start: 2023-08-15 | End: 2023-08-15

## 2023-08-15 RX ORDER — HEPARIN SODIUM 5000 [USP'U]/ML
INJECTION INTRAVENOUS; SUBCUTANEOUS
Qty: 25000 | Refills: 0 | Status: DISCONTINUED | OUTPATIENT
Start: 2023-08-15 | End: 2023-08-16

## 2023-08-15 RX ORDER — SACUBITRIL AND VALSARTAN 24; 26 MG/1; MG/1
1 TABLET, FILM COATED ORAL
Refills: 0 | Status: DISCONTINUED | OUTPATIENT
Start: 2023-08-15 | End: 2023-08-28

## 2023-08-15 RX ADMIN — Medication 2: at 05:46

## 2023-08-15 RX ADMIN — HEPARIN SODIUM 5000 UNIT(S): 5000 INJECTION INTRAVENOUS; SUBCUTANEOUS at 20:19

## 2023-08-15 RX ADMIN — Medication 200 GRAM(S): at 03:29

## 2023-08-15 RX ADMIN — CHLORHEXIDINE GLUCONATE 1 APPLICATION(S): 213 SOLUTION TOPICAL at 05:25

## 2023-08-15 RX ADMIN — Medication 20 MILLIGRAM(S): at 10:00

## 2023-08-15 RX ADMIN — ATORVASTATIN CALCIUM 40 MILLIGRAM(S): 80 TABLET, FILM COATED ORAL at 21:56

## 2023-08-15 RX ADMIN — Medication 650 MILLIGRAM(S): at 02:57

## 2023-08-15 RX ADMIN — Medication 200 GRAM(S): at 12:10

## 2023-08-15 RX ADMIN — CHLORHEXIDINE GLUCONATE 15 MILLILITER(S): 213 SOLUTION TOPICAL at 05:25

## 2023-08-15 RX ADMIN — PROPOFOL 7.46 MICROGRAM(S)/KG/MIN: 10 INJECTION, EMULSION INTRAVENOUS at 01:39

## 2023-08-15 RX ADMIN — Medication 650 MILLIGRAM(S): at 01:39

## 2023-08-15 RX ADMIN — Medication 20 MILLIGRAM(S): at 15:02

## 2023-08-15 RX ADMIN — Medication 25 MILLIGRAM(S): at 18:23

## 2023-08-15 RX ADMIN — Medication 200 GRAM(S): at 08:14

## 2023-08-15 RX ADMIN — PANTOPRAZOLE SODIUM 40 MILLIGRAM(S): 20 TABLET, DELAYED RELEASE ORAL at 12:09

## 2023-08-15 RX ADMIN — Medication 250 MILLIGRAM(S): at 04:16

## 2023-08-15 RX ADMIN — Medication 25 MILLIGRAM(S): at 05:26

## 2023-08-15 RX ADMIN — Medication 3 MILLILITER(S): at 19:42

## 2023-08-15 RX ADMIN — Medication 650 MILLIGRAM(S): at 15:33

## 2023-08-15 RX ADMIN — LEVETIRACETAM 400 MILLIGRAM(S): 250 TABLET, FILM COATED ORAL at 05:26

## 2023-08-15 RX ADMIN — HEPARIN SODIUM 1100 UNIT(S)/HR: 5000 INJECTION INTRAVENOUS; SUBCUTANEOUS at 20:19

## 2023-08-15 RX ADMIN — Medication 650 MILLIGRAM(S): at 14:33

## 2023-08-15 RX ADMIN — SACUBITRIL AND VALSARTAN 1 TABLET(S): 24; 26 TABLET, FILM COATED ORAL at 18:23

## 2023-08-15 RX ADMIN — LEVETIRACETAM 400 MILLIGRAM(S): 250 TABLET, FILM COATED ORAL at 18:16

## 2023-08-15 RX ADMIN — CEFTRIAXONE 100 MILLIGRAM(S): 500 INJECTION, POWDER, FOR SOLUTION INTRAMUSCULAR; INTRAVENOUS at 18:15

## 2023-08-15 NOTE — CHART NOTE - NSCHARTNOTEFT_GEN_A_CORE
The patients Duplex came back positive for:  Right femoral DVT  Left mid femoral, PTU, and peroneal vein DVT  Patient had a drop in Hb to 6.4 in the morning, she received 1 unit of PRBC  Repeat CBC showed a Hb of 8  Will start her on Heparin Nomogram and fu on CBC around the clock  Plan discussed with pulm fellow

## 2023-08-15 NOTE — CONSULT NOTE ADULT - ASSESSMENT
This is a 67yo F w/a PMH of Type II DM, HTN, DLD, obesity, lateral epicondylitis who initially presented to the ED w/ Left Arm pain and subsequently was found to have 2 episodes of brief loss of consiousness associated with some abnormal movements, originally thought to be due to seizures, s/p 4mg ativan total, Keppra load, intubated, now extubated today. Neurology following for further work up. On exam today, which was somewhat limited due to patient being on BIPAP, patient was able to follow simple commands with some difficulty with cross commands. No focal motor or sensory deficits otherwise. Unclear if patient's original episodes were truly seizures vs psychogenic seizures. Thus far, no seizures captured on vEEG.  Would need to rule out any structural abnormalities given focal slowing noted on eeg.       Recommendations:   - c/w Keppra 1g BID for now  - c/w vEEG for now  - Obtain MR brain non cont when stable  - LP results thus far reviewed - elevated protein, cell counted (wnl when adjusted for RBC), neutrophillic predominance  - c/w abx as per ID    Discussed with Attending

## 2023-08-15 NOTE — PROGRESS NOTE ADULT - SUBJECTIVE AND OBJECTIVE BOX
Patient is a 68y old  Female who presents with a chief complaint of Arm Pain, AMS (15 Aug 2023 05:54)        Over Night Events:    Febrile again to 102.5  Drop in hgb noted         ROS:  See HPI    PHYSICAL EXAM    ICU Vital Signs Last 24 Hrs  T(C): 37.6 (15 Aug 2023 04:00), Max: 39.2 (14 Aug 2023 08:00)  T(F): 99.6 (15 Aug 2023 04:00), Max: 102.5 (14 Aug 2023 08:00)  HR: 81 (15 Aug 2023 07:25) (81 - 110)  BP: 102/83 (15 Aug 2023 06:00) (96/46 - 165/64)  BP(mean): 89 (15 Aug 2023 06:00) (67 - 111)  ABP: --  ABP(mean): --  RR: 18 (15 Aug 2023 06:00) (18 - 33)  SpO2: 100% (15 Aug 2023 07:25) (94% - 100%)    O2 Parameters below as of 15 Aug 2023 07:00  Patient On (Oxygen Delivery Method): ventilator    O2 Concentration (%): 35        General: intubated  HEENT: EPIFANIO             Lymphatic system: No cervical LN   Lungs: Bilateral BS; clear   Cardiovascular: Regular   Gastrointestinal: Soft, Positive BS  Extremities: No clubbing.  No edema. No wounds.   Skin: Warm, intact  Neurological:  sedated; cannot assess mental status.       08-14-23 @ 07:01  -  08-15-23 @ 07:00  --------------------------------------------------------  IN:    Enteral Tube Flush: 440 mL    FentaNYL: 47.2 mL    IV PiggyBack: 1150 mL    Propofol: 180.2 mL  Total IN: 1817.4 mL    OUT:    Indwelling Catheter - Urethral (mL): 1825 mL  Total OUT: 1825 mL    Total NET: -7.6 mL          LABS:                            6.4    14.97 )-----------( 189      ( 15 Aug 2023 06:20 )             22.7                                               08-15    147<H>  |  113<H>  |  16  ----------------------------<  168<H>  3.7   |  17  |  0.7    Ca    8.8      15 Aug 2023 05:17  Phos  4.0     08-15  Mg     2.2     08-15    TPro  6.1  /  Alb  3.3<L>  /  TBili  0.3  /  DBili  x   /  AST  19  /  ALT  14  /  AlkPhos  83  08-15                                             Urinalysis Basic - ( 15 Aug 2023 05:17 )    Color: x / Appearance: x / SG: x / pH: x  Gluc: 168 mg/dL / Ketone: x  / Bili: x / Urobili: x   Blood: x / Protein: x / Nitrite: x   Leuk Esterase: x / RBC: x / WBC x   Sq Epi: x / Non Sq Epi: x / Bacteria: x        CARDIAC MARKERS ( 15 Aug 2023 05:17 )  x     / <0.01 ng/mL / x     / x     / x      CARDIAC MARKERS ( 14 Aug 2023 20:46 )  x     / 0.04 ng/mL / x     / x     / x      CARDIAC MARKERS ( 14 Aug 2023 12:16 )  x     / 0.08 ng/mL / x     / x     / x      CARDIAC MARKERS ( 14 Aug 2023 04:40 )  x     / 0.13 ng/mL / x     / x     / x      CARDIAC MARKERS ( 14 Aug 2023 00:20 )  x     / 0.14 ng/mL / 182 U/L / x     / x      CARDIAC MARKERS ( 13 Aug 2023 17:52 )  x     / 0.13 ng/mL / x     / x     / x      CARDIAC MARKERS ( 13 Aug 2023 11:25 )  x     / <0.01 ng/mL / x     / x     / x                                                LIVER FUNCTIONS - ( 15 Aug 2023 05:17 )  Alb: 3.3 g/dL / Pro: 6.1 g/dL / ALK PHOS: 83 U/L / ALT: 14 U/L / AST: 19 U/L / GGT: x                                                  Culture - Fungal, CSF (collected 14 Aug 2023 13:33)  Source: .CSF CSF  Preliminary Report (15 Aug 2023 06:26):    Testing in progress    Culture - CSF with Gram Stain (collected 14 Aug 2023 13:33)  Source: .CSF CSF  Gram Stain (14 Aug 2023 22:20):    polymorphonuclear leukocytes seen    No organisms seen    by cytocentrifuge                                                   Mode: AC/ CMV (Assist Control/ Continuous Mandatory Ventilation)  RR (machine): 16  TV (machine): 350  FiO2: 35  PEEP: 8  ITime: 0.8  MAP: 13  PIP: 32                                      ABG - ( 15 Aug 2023 03:35 )  pH, Arterial: 7.32  pH, Blood: x     /  pCO2: 39    /  pO2: 191   / HCO3: 20    / Base Excess: -5.6  /  SaO2: 96.9                MEDICATIONS  (STANDING):  ampicillin  IVPB      ampicillin  IVPB 2 Gram(s) IV Intermittent every 4 hours  aspirin  chewable 81 milliGRAM(s) Oral daily  atorvastatin 40 milliGRAM(s) Oral at bedtime  chlorhexidine 0.12% Liquid 15 milliLiter(s) Oral Mucosa every 12 hours  chlorhexidine 2% Cloths 1 Application(s) Topical <User Schedule>  dextrose 5%. 1000 milliLiter(s) (50 mL/Hr) IV Continuous <Continuous>  dextrose 50% Injectable 25 Gram(s) IV Push once  enoxaparin Injectable 40 milliGRAM(s) SubCutaneous every 24 hours  fentaNYL   Infusion. 0.5 MICROgram(s)/kG/Hr (3.09 mL/Hr) IV Continuous <Continuous>  glucagon  Injectable 1 milliGRAM(s) IntraMuscular once  insulin lispro (ADMELOG) corrective regimen sliding scale   SubCutaneous three times a day before meals  levETIRAcetam  IVPB 1000 milliGRAM(s) IV Intermittent every 12 hours  levoFLOXacin IVPB      levoFLOXacin IVPB 750 milliGRAM(s) IV Intermittent every 24 hours  metoprolol tartrate 25 milliGRAM(s) Oral every 12 hours  pantoprazole  Injectable 40 milliGRAM(s) IV Push daily  propofol Infusion 20 MICROgram(s)/kG/Min (7.46 mL/Hr) IV Continuous <Continuous>  vancomycin  IVPB 1000 milliGRAM(s) IV Intermittent every 12 hours    MEDICATIONS  (PRN):  acetaminophen     Tablet .. 650 milliGRAM(s) Oral every 6 hours PRN Temp greater or equal to 38C (100.4F), Mild Pain (1 - 3)  dextrose Oral Gel 15 Gram(s) Oral once PRN Blood Glucose LESS THAN 70 milliGRAM(s)/deciliter      Xrays: No infiltrates, ETT ok                                                                                     ECHO

## 2023-08-15 NOTE — PROGRESS NOTE ADULT - ASSESSMENT
Impression:     Seizures   Altered mental status  Acute hypoxemic respiratory failure   Lactic acidosis     PLAN:    CNS: Spontaneous awakening trial daily. Repeat CTH today. No seizure on EEG. On Keppra BID.     HEENT: Oral care. ETT care.     PULMONARY:  HOB @ 45 degrees.  Vent changes as follows: Lower Fio2 to 35%. SBT today CXR with no infiltratess.     CARDIOVASCULAR: Not on pressors. Keep equal balance. Trop trended down. EF 30-35%; moderate systolic dysfunction.     GI: GI prophylaxis.  Feeding NPO for now.     RENAL:  Follow up lytes.  Correct as needed. Johnson for retention.     INFECTIOUS DISEASE: Culture: Blood culture negative, Ucx negative, CSF sent yesterday. Procal markedly elevated. Continue same abx for now. ID eval.     HEMATOLOGICAL:  DVT prophylaxis. SCDs for now. 1 unit PRBC transfusion. Send smear, LDH, iron studies.     ENDOCRINE:  Follow up FS.  Insulin protocol if needed.    MUSCULOSKELETAL: bedrest for now.     CODE STATUS: Full code.     ICU care for now.

## 2023-08-15 NOTE — PROGRESS NOTE ADULT - ASSESSMENT
Assessment:  This is a 69yo F w/a PMH of Type II DM, HTN, DLD, obesity, lateral epicondylitis, initially presenting for re-evaluation of left arm pain, now s/p seizures and intubation, CCU day 2.    Impression:   Seizures   Altered mental status  Acute hypoxemic respiratory failure   Lactic acidosis     PLAN:    CNS: Spontaneous awakening trial daily. Repeat CTH today. No seizure on EEG. On Keppra BID.     HEENT: Oral care. ETT care.     PULMONARY:  HOB @ 45 degrees.  Vent changes as follows: Lower Fio2 to 35%. SBT today CXR with no infiltratess.     CARDIOVASCULAR: Not on pressors. Keep equal balance. Trop trended down. EF 30-35%; moderate systolic dysfunction.     GI: GI prophylaxis.  Feeding NPO for now.     RENAL:  Follow up lytes.  Correct as needed. Johnson for retention.     INFECTIOUS DISEASE: Culture: Blood culture negative, Ucx negative, CSF sent yesterday. Procal markedly elevated. Continue same abx for now. ID eval.     HEMATOLOGICAL:  DVT prophylaxis. SCDs for now. 1 unit PRBC transfusion. Send smear, LDH, iron studies.     ENDOCRINE:  Follow up FS.  Insulin protocol if needed.    MUSCULOSKELETAL: bedrest for now.     CODE STATUS: Full code.     ICU care for now.      Assessment:  This is a 69yo F w/a PMH of Type II DM, HTN, DLD, obesity, lateral epicondylitis, initially presenting for re-evaluation of left arm pain, now s/p seizures and intubation, CCU day 2.    Impression:   Seizures   Altered mental status( Meningitis ? ENCEPHALITIS?)  Acute hypoxemic respiratory failure   Lactic acidosis s/p seizures      PLAN:    CNS: Spontaneous awakening trial daily. Repeat CTH today. No seizure on EEG. On Keppra BID.     HEENT: Oral care. ETT care.     PULMONARY:  HOB @ 45 degrees.  Vent changes as follows: Lower Fio2 to 35%. SBT done .patient extubated on 8/15 on BIPAP now. CXR with no infiltratess.     CARDIOVASCULAR: Not on pressors. Keep equal balance. Trop trended down. EF 30-35%; moderate systolic dysfunction.     GI: GI prophylaxis.  Feeding NPO for now.     RENAL:  Follow up lytes.  Correct as needed. Johnson for retention.     INFECTIOUS DISEASE: Culture: CSF sent yesterday. Procal markedly elevated. c/w levofloxacin .started on VAnco 1g and Ampicillin 2g.f/u ID eval and BCx and UCx.    HEMATOLOGICAL:  DVT prophylaxis. SCDs for now.held AC. 1 unit PRBC transfusion. Send smear, LDH, iron studies.     ENDOCRINE:  Follow up FS.  Insulin protocol if needed.    MUSCULOSKELETAL: bedrest for now.     CODE STATUS: Full code.     ICU care for now.

## 2023-08-15 NOTE — PROGRESS NOTE ADULT - ASSESSMENT
Events noted.  No evidence of STEMI  Cardiomyopathy  LBBBB      C/w neurology w/u  EEG noted  2D echo - c/w cardiomyopathy - will need ischemic w/u once stable.  ID evaluation appreciated  Empiric Abx as per CCM  Wean of the vent.  If BP stable, c/w BB, start Entresto. C/w ASA, statin.  DM control. c/w insulin.  Patient remains critically ill.

## 2023-08-15 NOTE — PROGRESS NOTE ADULT - SUBJECTIVE AND OBJECTIVE BOX
Patient is a 68y old  Female who presents with a chief complaint of Arm Pain, AMS (15 Aug 2023 07:56)    HPI:  67yo F w/a PMH of Type II DM, HTN, DLD, obesity, lateral epicondylitis presenting to the ED  w/left arm pain. This arm started about two months ago and has continued to progress but in the last few days became severe. Pt was previously seen for this pain in the ED on , was given pain medications then sent home. Per son, pain still continued to progress after this. No fevers, chills, nausea, vomiting, diarrhea, constipation, SOB, CP.   On admission to the ED today, she presented with severe left arm pain w findings of LBBB on ECG (unsure if new or not). Code STEMI was called and then canceled because troponin negative and no chest pain. Patient was then noted to have acute mental status change, SOB, lactate elevation, and acidosis on abg. Patient had two seizure episodes and was treated with benzodiazepine. Intubated by ER for airway protection.     On Admission  Vitals: /64, HR 75, RR 20, T 98.5, satting 99 percent on RA   Labs: WBC 14.86, Hgb 10, , Na 139, K 5.4, BUN 12, Cr .6, Trop <.01 --> .13 on repeat  Imaging:   CXR -  Patchy bibasilar opacities, right greater than left.  CT Brain Stroke protocol - No evidence of acute transcortical infarct, hydrocephalus, acute intracranial hemorrhage, or mass effect.  CT brain perfusion: No evidence of acute infarct or ischemia.  CTA neck: No stenosis. No dissection.  CTA brain: No stenosis or aneurysm.  CTA Neck: The distal internal carotid arteries are patent. The [place] of [place] is normal in appearance. The visualized cerebral arteries are unremarkable.The vertebrobasilar junction and basilar artery are normal.    In the ED, given Levaquin. Keppra, started on propofol  Admitted to MICU for airway monitoring  (13 Aug 2023 19:09)       INTERVAL HPI/OVERNIGHT EVENTS:   Overnight, pt's hemoglobin noted to be 6.7, then 6.4 on repeat. Pt received 1 unit packed RBCs this AM.    Pt was awake and able to follow commands this AM. Will attempt SBT and extubate if tolerated.    Subjective:    ICU Vital Signs Last 24 Hrs  T(C): 37.6 (15 Aug 2023 04:00), Max: 38.9 (14 Aug 2023 12:00)  T(F): 99.6 (15 Aug 2023 04:00), Max: 102.1 (14 Aug 2023 12:00)  HR: 81 (15 Aug 2023 07:25) (81 - 110)  BP: 102/83 (15 Aug 2023 06:00) (96/46 - 165/64)  BP(mean): 89 (15 Aug 2023 06:00) (67 - 111)  ABP: --  ABP(mean): --  RR: 18 (15 Aug 2023 06:00) (18 - 33)  SpO2: 100% (15 Aug 2023 07:25) (94% - 100%)    O2 Parameters below as of 15 Aug 2023 07:00  Patient On (Oxygen Delivery Method): ventilator    O2 Concentration (%): 35      I&O's Summary    14 Aug 2023 07:01  -  15 Aug 2023 07:00  --------------------------------------------------------  IN: 1817.4 mL / OUT: 1825 mL / NET: -7.6 mL      Mode: AC/ CMV (Assist Control/ Continuous Mandatory Ventilation)  RR (machine): 16  TV (machine): 350  FiO2: 35  PEEP: 8  ITime: 0.8  MAP: 13  PIP: 32      Daily Height in cm: 152.4 (14 Aug 2023 08:33)    Daily Weight in k.4 (15 Aug 2023 05:00)    Adult Advanced Hemodynamics Last 24 Hrs  CVP(mm Hg): --  CVP(cm H2O): --  CO: --  CI: --  PA: --  PA(mean): --  PCWP: --  SVR: --  SVRI: --  PVR: --  PVRI: --    EKG/Telemetry Events: No overnight telemetry events. Pt remains in NSR with LBBB.    MEDICATIONS  (STANDING):  ampicillin  IVPB      ampicillin  IVPB 2 Gram(s) IV Intermittent every 4 hours  aspirin  chewable 81 milliGRAM(s) Oral daily  atorvastatin 40 milliGRAM(s) Oral at bedtime  chlorhexidine 0.12% Liquid 15 milliLiter(s) Oral Mucosa every 12 hours  chlorhexidine 2% Cloths 1 Application(s) Topical <User Schedule>  dextrose 5%. 1000 milliLiter(s) (50 mL/Hr) IV Continuous <Continuous>  dextrose 50% Injectable 25 Gram(s) IV Push once  enoxaparin Injectable 40 milliGRAM(s) SubCutaneous every 24 hours  fentaNYL   Infusion. 0.5 MICROgram(s)/kG/Hr (3.09 mL/Hr) IV Continuous <Continuous>  glucagon  Injectable 1 milliGRAM(s) IntraMuscular once  insulin lispro (ADMELOG) corrective regimen sliding scale   SubCutaneous three times a day before meals  levETIRAcetam  IVPB 1000 milliGRAM(s) IV Intermittent every 12 hours  levoFLOXacin IVPB      levoFLOXacin IVPB 750 milliGRAM(s) IV Intermittent every 24 hours  metoprolol tartrate 25 milliGRAM(s) Oral every 12 hours  pantoprazole  Injectable 40 milliGRAM(s) IV Push daily  propofol Infusion 20 MICROgram(s)/kG/Min (7.46 mL/Hr) IV Continuous <Continuous>  vancomycin  IVPB 1000 milliGRAM(s) IV Intermittent every 12 hours    MEDICATIONS  (PRN):  acetaminophen     Tablet .. 650 milliGRAM(s) Oral every 6 hours PRN Temp greater or equal to 38C (100.4F), Mild Pain (1 - 3)  dextrose Oral Gel 15 Gram(s) Oral once PRN Blood Glucose LESS THAN 70 milliGRAM(s)/deciliter      PHYSICAL EXAM:  GENERAL: Intubated pt in NAD.   HEAD:  Atraumatic, Normocephalic  EYES: Pupils unevenly dilated  NERVOUS SYSTEM:  Pt awake & following commands   CHEST/LUNG: B/L good air entry; No rales, rhonchi, or wheezing  HEART: S1S2 normal, no S3, Regular rate and rhythm; No murmurs  ABDOMEN: Soft, Nontender, Nondistended; Bowel sounds present  EXTREMITIES:  2+ Peripheral Pulses, No clubbing, cyanosis, or edema  SKIN: No rashes or lesions    LABS:                        6.4    14.97 )-----------( 189      ( 15 Aug 2023 06:20 )             22.7     08-15    147<H>  |  113<H>  |  16  ----------------------------<  168<H>  3.7   |  17  |  0.7    Ca    8.8      15 Aug 2023 05:17  Phos  4.0     08-15  Mg     2.2     08-15    TPro  6.1  /  Alb  3.3<L>  /  TBili  0.3  /  DBili  x   /  AST  19  /  ALT  14  /  AlkPhos  83  15    LIVER FUNCTIONS - ( 15 Aug 2023 05:17 )  Alb: 3.3 g/dL / Pro: 6.1 g/dL / ALK PHOS: 83 U/L / ALT: 14 U/L / AST: 19 U/L / GGT: x             CAPILLARY BLOOD GLUCOSE      POCT Blood Glucose.: 133 mg/dL (15 Aug 2023 08:16)  POCT Blood Glucose.: 174 mg/dL (15 Aug 2023 05:29)  POCT Blood Glucose.: 152 mg/dL (14 Aug 2023 16:09)  POCT Blood Glucose.: 142 mg/dL (14 Aug 2023 11:47)  POCT Blood Glucose.: 195 mg/dL (14 Aug 2023 09:03)    ABG - ( 15 Aug 2023 03:35 )  pH, Arterial: 7.32  pH, Blood: x     /  pCO2: 39    /  pO2: 191   / HCO3: 20    / Base Excess: -5.6  /  SaO2: 96.9              Troponin T, Serum: <0.01 ng/mL (08-15 @ 05:17)  Troponin T, Serum: 0.04 ng/mL ( @ 20:46)  Troponin T, Serum: 0.08 ng/mL ( @ 12:16)    CARDIAC MARKERS ( 15 Aug 2023 05:17 )  x     / <0.01 ng/mL / x     / x     / x      CARDIAC MARKERS ( 14 Aug 2023 20:46 )  x     / 0.04 ng/mL / x     / x     / x      CARDIAC MARKERS ( 14 Aug 2023 12:16 )  x     / 0.08 ng/mL / x     / x     / x      CARDIAC MARKERS ( 14 Aug 2023 04:40 )  x     / 0.13 ng/mL / x     / x     / x      CARDIAC MARKERS ( 14 Aug 2023 00:20 )  x     / 0.14 ng/mL / 182 U/L / x     / x      CARDIAC MARKERS ( 13 Aug 2023 17:52 )  x     / 0.13 ng/mL / x     / x     / x      CARDIAC MARKERS ( 13 Aug 2023 11:25 )  x     / <0.01 ng/mL / x     / x     / x          Urinalysis Basic - ( 15 Aug 2023 05:17 )    Color: x / Appearance: x / SG: x / pH: x  Gluc: 168 mg/dL / Ketone: x  / Bili: x / Urobili: x   Blood: x / Protein: x / Nitrite: x   Leuk Esterase: x / RBC: x / WBC x   Sq Epi: x / Non Sq Epi: x / Bacteria: x          RADIOLOGY & ADDITIONAL TESTS:  CXR:   ACC: [id number] EXAM:  XR CHEST PORTABLE IMMED 1V   ORDERED BY: AFRICA PERALTA     PROCEDURE DATE:  2023    INTERPRETATION:  CLINICAL HISTORY: et tube.  COMPARISON: Chest radiograph from earlier the same day.  TECHNIQUE: Portablefrontal chest radiograph. Adequate positioning.  FINDINGS:  Support devices: Endotracheal tube and enteric tube are in satisfactory   position. Endotracheal tube is about 3 cm above the marquis.  Cardiac/mediastinum/hilum: Stable.  Lung parenchyma/Pleura: No focal parenchymal opacities, pleural   effusions, or pneumothorax.  Skeleton/soft tissues: Stable.      IMPRESSION:  Support devices are in satisfactory position.  No radiographic evidence of acute cardiopulmonary disease.    --- Endof Report ---  LYUBOV CARDENAS MD; Attending Radiologist  This document has been electronically signed. Aug 14 2023  2:18PM       Patient is a 68y old  Female who presents with a chief complaint of Arm Pain, AMS (15 Aug 2023 07:56)    HPI:  69yo F w/a PMH of Type II DM, HTN, DLD, obesity, lateral epicondylitis presenting to the ED  w/left arm pain. This arm started about two months ago and has continued to progress but in the last few days became severe. Pt was previously seen for this pain in the ED on , was given pain medications then sent home. Per son, pain still continued to progress after this. No fevers, chills, nausea, vomiting, diarrhea, constipation, SOB, CP.   On admission to the ED today, she presented with severe left arm pain w findings of LBBB on ECG (unsure if new or not). Code STEMI was called and then canceled because troponin negative and no chest pain. Patient was then noted to have acute mental status change, SOB, lactate elevation, and acidosis on abg. Patient had two seizure episodes and was treated with benzodiazepine. Intubated by ER for airway protection.     On Admission  Vitals: /64, HR 75, RR 20, T 98.5, satting 99 percent on RA   Labs: WBC 14.86, Hgb 10, , Na 139, K 5.4, BUN 12, Cr .6, Trop <.01 --> .13 on repeat  Imaging:   CXR -  Patchy bibasilar opacities, right greater than left.  CT Brain Stroke protocol - No evidence of acute transcortical infarct, hydrocephalus, acute intracranial hemorrhage, or mass effect.  CT brain perfusion: No evidence of acute infarct or ischemia.  CTA neck: No stenosis. No dissection.  CTA brain: No stenosis or aneurysm.  CTA Neck: The distal internal carotid arteries are patent. The [place] of [place] is normal in appearance. The visualized cerebral arteries are unremarkable.The vertebrobasilar junction and basilar artery are normal.    In the ED, given Levaquin. Keppra, started on propofol  Admitted to MICU for airway monitoring  (13 Aug 2023 19:09)       INTERVAL HPI/OVERNIGHT EVENTS:   Overnight, pt's hemoglobin noted to be 6.7, then 6.4 on repeat. Pt received 1 unit packed RBCs this AM.    Pt was awake and able to follow commands this AM. Extubated(8/15) on Bipap now .    Subjective:    ICU Vital Signs Last 24 Hrs  T(C): 37.6 (15 Aug 2023 04:00), Max: 38.9 (14 Aug 2023 12:00)  T(F): 99.6 (15 Aug 2023 04:00), Max: 102.1 (14 Aug 2023 12:00)  HR: 81 (15 Aug 2023 07:25) (81 - 110)  BP: 102/83 (15 Aug 2023 06:00) (96/46 - 165/64)  BP(mean): 89 (15 Aug 2023 06:00) (67 - 111)  ABP: --  ABP(mean): --  RR: 18 (15 Aug 2023 06:00) (18 - 33)  SpO2: 100% (15 Aug 2023 07:25) (94% - 100%)    O2 Parameters below as of 15 Aug 2023 07:00  Patient On (Oxygen Delivery Method): ventilator    O2 Concentration (%): 35      I&O's Summary    14 Aug 2023 07:01  -  15 Aug 2023 07:00  --------------------------------------------------------  IN: 1817.4 mL / OUT: 1825 mL / NET: -7.6 mL      Mode: AC/ CMV (Assist Control/ Continuous Mandatory Ventilation)  RR (machine): 16  TV (machine): 350  FiO2: 35  PEEP: 8  ITime: 0.8  MAP: 13  PIP: 32      Daily Height in cm: 152.4 (14 Aug 2023 08:33)    Daily Weight in k.4 (15 Aug 2023 05:00)    Adult Advanced Hemodynamics Last 24 Hrs  CVP(mm Hg): --  CVP(cm H2O): --  CO: --  CI: --  PA: --  PA(mean): --  PCWP: --  SVR: --  SVRI: --  PVR: --  PVRI: --    EKG/Telemetry Events: No overnight telemetry events. Pt remains in NSR with LBBB.    MEDICATIONS  (STANDING):  ampicillin  IVPB      ampicillin  IVPB 2 Gram(s) IV Intermittent every 4 hours  aspirin  chewable 81 milliGRAM(s) Oral daily  atorvastatin 40 milliGRAM(s) Oral at bedtime  chlorhexidine 0.12% Liquid 15 milliLiter(s) Oral Mucosa every 12 hours  chlorhexidine 2% Cloths 1 Application(s) Topical <User Schedule>  dextrose 5%. 1000 milliLiter(s) (50 mL/Hr) IV Continuous <Continuous>  dextrose 50% Injectable 25 Gram(s) IV Push once  enoxaparin Injectable 40 milliGRAM(s) SubCutaneous every 24 hours  fentaNYL   Infusion. 0.5 MICROgram(s)/kG/Hr (3.09 mL/Hr) IV Continuous <Continuous>  glucagon  Injectable 1 milliGRAM(s) IntraMuscular once  insulin lispro (ADMELOG) corrective regimen sliding scale   SubCutaneous three times a day before meals  levETIRAcetam  IVPB 1000 milliGRAM(s) IV Intermittent every 12 hours  levoFLOXacin IVPB      levoFLOXacin IVPB 750 milliGRAM(s) IV Intermittent every 24 hours  metoprolol tartrate 25 milliGRAM(s) Oral every 12 hours  pantoprazole  Injectable 40 milliGRAM(s) IV Push daily  propofol Infusion 20 MICROgram(s)/kG/Min (7.46 mL/Hr) IV Continuous <Continuous>  vancomycin  IVPB 1000 milliGRAM(s) IV Intermittent every 12 hours    MEDICATIONS  (PRN):  acetaminophen     Tablet .. 650 milliGRAM(s) Oral every 6 hours PRN Temp greater or equal to 38C (100.4F), Mild Pain (1 - 3)  dextrose Oral Gel 15 Gram(s) Oral once PRN Blood Glucose LESS THAN 70 milliGRAM(s)/deciliter      PHYSICAL EXAM:  GENERAL: Intubated pt in NAD.   HEAD:  Atraumatic, Normocephalic  EYES: Pupils unevenly dilated  NERVOUS SYSTEM:  Pt awake & following commands   CHEST/LUNG: B/L good air entry; No rales, rhonchi, or wheezing  HEART: S1S2 normal, no S3, Regular rate and rhythm; No murmurs  ABDOMEN: Soft, Nontender, Nondistended; Bowel sounds present  EXTREMITIES:  2+ Peripheral Pulses, No clubbing, cyanosis, or edema  SKIN: No rashes or lesions    LABS:                        6.4    14.97 )-----------( 189      ( 15 Aug 2023 06:20 )             22.7     08-15    147<H>  |  113<H>  |  16  ----------------------------<  168<H>  3.7   |  17  |  0.7    Ca    8.8      15 Aug 2023 05:17  Phos  4.0     08-15  Mg     2.2     08-15    TPro  6.1  /  Alb  3.3<L>  /  TBili  0.3  /  DBili  x   /  AST  19  /  ALT  14  /  AlkPhos  83  15    LIVER FUNCTIONS - ( 15 Aug 2023 05:17 )  Alb: 3.3 g/dL / Pro: 6.1 g/dL / ALK PHOS: 83 U/L / ALT: 14 U/L / AST: 19 U/L / GGT: x             CAPILLARY BLOOD GLUCOSE      POCT Blood Glucose.: 133 mg/dL (15 Aug 2023 08:16)  POCT Blood Glucose.: 174 mg/dL (15 Aug 2023 05:29)  POCT Blood Glucose.: 152 mg/dL (14 Aug 2023 16:09)  POCT Blood Glucose.: 142 mg/dL (14 Aug 2023 11:47)  POCT Blood Glucose.: 195 mg/dL (14 Aug 2023 09:03)    ABG - ( 15 Aug 2023 03:35 )  pH, Arterial: 7.32  pH, Blood: x     /  pCO2: 39    /  pO2: 191   / HCO3: 20    / Base Excess: -5.6  /  SaO2: 96.9              Troponin T, Serum: <0.01 ng/mL (08-15 @ 05:17)  Troponin T, Serum: 0.04 ng/mL ( @ 20:46)  Troponin T, Serum: 0.08 ng/mL ( @ 12:16)    CARDIAC MARKERS ( 15 Aug 2023 05:17 )  x     / <0.01 ng/mL / x     / x     / x      CARDIAC MARKERS ( 14 Aug 2023 20:46 )  x     / 0.04 ng/mL / x     / x     / x      CARDIAC MARKERS ( 14 Aug 2023 12:16 )  x     / 0.08 ng/mL / x     / x     / x      CARDIAC MARKERS ( 14 Aug 2023 04:40 )  x     / 0.13 ng/mL / x     / x     / x      CARDIAC MARKERS ( 14 Aug 2023 00:20 )  x     / 0.14 ng/mL / 182 U/L / x     / x      CARDIAC MARKERS ( 13 Aug 2023 17:52 )  x     / 0.13 ng/mL / x     / x     / x      CARDIAC MARKERS ( 13 Aug 2023 11:25 )  x     / <0.01 ng/mL / x     / x     / x          Urinalysis Basic - ( 15 Aug 2023 05:17 )    Color: x / Appearance: x / SG: x / pH: x  Gluc: 168 mg/dL / Ketone: x  / Bili: x / Urobili: x   Blood: x / Protein: x / Nitrite: x   Leuk Esterase: x / RBC: x / WBC x   Sq Epi: x / Non Sq Epi: x / Bacteria: x          RADIOLOGY & ADDITIONAL TESTS:  CXR:   ACC: [id number] EXAM:  XR CHEST PORTABLE IMMED 1V   ORDERED BY: AFRICA PERALTA     PROCEDURE DATE:  2023    INTERPRETATION:  CLINICAL HISTORY: et tube.  COMPARISON: Chest radiograph from earlier the same day.  TECHNIQUE: Portablefrontal chest radiograph. Adequate positioning.  FINDINGS:  Support devices: Endotracheal tube and enteric tube are in satisfactory   position. Endotracheal tube is about 3 cm above the marquis.  Cardiac/mediastinum/hilum: Stable.  Lung parenchyma/Pleura: No focal parenchymal opacities, pleural   effusions, or pneumothorax.  Skeleton/soft tissues: Stable.      IMPRESSION:  Support devices are in satisfactory position.  No radiographic evidence of acute cardiopulmonary disease.    --- Endof Report ---  LYUBOV CARDENAS MD; Attending Radiologist  This document has been electronically signed. Aug 14 2023  2:18PM

## 2023-08-15 NOTE — CONSULT NOTE ADULT - SUBJECTIVE AND OBJECTIVE BOX
Neurology Consult    Patient is a 68y old  Female who presents with a chief complaint of Arm Pain, AMS (15 Aug 2023 12:14)      HPI:  69yo F w/a PMH of Type II DM, HTN, DLD, obesity, lateral epicondylitis presenting to the ED w/ Left Arm pain. At time of my exam, patient intubated so spoke with the son at bedside. This arm started about two months ago and has continued to progress but in the last few days became severe. She went to the ED a few days ago, was given pain medications then sent home. Per son, pain still continued to progress after this. No fevers, chills, nausea, vomiting, diarrhea, constipation, SOB, CP.   On admission to the ED today, she presented with severe left arm pain w findings of LBBB on ECG (unsure if new or not). Code STEMI was called and then canceled because troponin negative and no chest pain. Patient was then noted to have acute mental status change, SOB, lactate elevation, and acidosis on abg. Patient had two episodes of transient loss of consciousness and abnormal movement that were suspicious for possible seizure, and was treated with ativan 2mg x2. Then Intubated by ER for airway protection and later loaded with keppra 3 grams, and continued on maintenance of 1g bid. vEEG over last 2 days have been negative for any clinical seizures. Patient was subsequently extubated this morning, after which she had a transient episode of unresponsiveness, with no EEG correlate for seizures.       PAST MEDICAL & SURGICAL HISTORY:  Diabetes          FAMILY HISTORY:      Social History: (-) x 3    Allergies    No Known Allergies    Intolerances        MEDICATIONS  (STANDING):  acetaminophen  Suppository .. 650 milliGRAM(s) Rectal once  ampicillin  IVPB      ampicillin  IVPB 2 Gram(s) IV Intermittent every 4 hours  aspirin  chewable 81 milliGRAM(s) Oral daily  atorvastatin 40 milliGRAM(s) Oral at bedtime  chlorhexidine 0.12% Liquid 15 milliLiter(s) Oral Mucosa every 12 hours  chlorhexidine 2% Cloths 1 Application(s) Topical <User Schedule>  dextrose 5%. 1000 milliLiter(s) (50 mL/Hr) IV Continuous <Continuous>  dextrose 50% Injectable 25 Gram(s) IV Push once  enoxaparin Injectable 40 milliGRAM(s) SubCutaneous every 24 hours  glucagon  Injectable 1 milliGRAM(s) IntraMuscular once  insulin lispro (ADMELOG) corrective regimen sliding scale   SubCutaneous three times a day before meals  levETIRAcetam  IVPB 1000 milliGRAM(s) IV Intermittent every 12 hours  levoFLOXacin IVPB 750 milliGRAM(s) IV Intermittent every 24 hours  levoFLOXacin IVPB      metoprolol tartrate 25 milliGRAM(s) Oral every 12 hours  pantoprazole  Injectable 40 milliGRAM(s) IV Push daily  sacubitril 49 mG/valsartan 51 mG 1 Tablet(s) Oral two times a day  vancomycin  IVPB 1000 milliGRAM(s) IV Intermittent every 12 hours    MEDICATIONS  (PRN):  acetaminophen     Tablet .. 650 milliGRAM(s) Oral every 6 hours PRN Temp greater or equal to 38C (100.4F), Mild Pain (1 - 3)  dextrose Oral Gel 15 Gram(s) Oral once PRN Blood Glucose LESS THAN 70 milliGRAM(s)/deciliter      Review of systems:    Unable to obtain as patient is on BIPAP this morning    Vital Signs Last 24 Hrs  T(C): 37.7 (15 Aug 2023 08:00), Max: 38.3 (14 Aug 2023 14:00)  T(F): 99.8 (15 Aug 2023 08:00), Max: 101 (14 Aug 2023 14:00)  HR: 113 (15 Aug 2023 11:15) (81 - 119)  BP: 126/60 (15 Aug 2023 11:15) (92/58 - 147/66)  BP(mean): 86 (15 Aug 2023 11:15) (68 - 95)  RR: 27 (15 Aug 2023 11:15) (18 - 36)  SpO2: 100% (15 Aug 2023 11:15) (100% - 100%)    Parameters below as of 15 Aug 2023 08:00  Patient On (Oxygen Delivery Method): ventilator    O2 Concentration (%): 35    Examination:  General:  Appearance is consistent with chronologic age, extubated on BIPAP, able to follow simple commands, some difficulty with cross commands though maybe limited with translation  Cognitive/Language:  The patient is oriented to person, place, time and date.  Recent and remote memory intact.  Fund of knowledge is intact and normal.  Language with normal repetition, comprehension and naming.  Nondysarthric.    Eyes:  EOMI w/o nystagmus, skew or reported double vision.  PERRL.  No ptosis/weakness of eyelid closure.    Face:  Facial sensation normal V1 - 3, no facial asymmetry.    Motor examination:   Normal tone, bulk and range of motion.  No tenderness, twitching, tremors or involuntary movements.  Formal Muscle Strength Testing: (MRC grade R/L) 4/5 UE; 4/5 LE.  No observable drift.  Sensory examination: Intact to light touch in all extremities  Cerebellum: Unable to asses      Labs:   CBC Full  -  ( 15 Aug 2023 06:20 )  WBC Count : 14.97 K/uL  RBC Count : 2.97 M/uL  Hemoglobin : 6.4 g/dL  Hematocrit : 22.7 %  Platelet Count - Automated : 189 K/uL  Mean Cell Volume : 76.4 fL  Mean Cell Hemoglobin : 21.5 pg  Mean Cell Hemoglobin Concentration : 28.2 g/dL  Auto Neutrophil # : x  Auto Lymphocyte # : x  Auto Monocyte # : x  Auto Eosinophil # : x  Auto Basophil # : x  Auto Neutrophil % : x  Auto Lymphocyte % : x  Auto Monocyte % : x  Auto Eosinophil % : x  Auto Basophil % : x    08-15    147<H>  |  113<H>  |  16  ----------------------------<  168<H>  3.7   |  17  |  0.7    Ca    8.8      15 Aug 2023 05:17  Phos  4.0     08-15  Mg     2.2     08-15    TPro  6.1  /  Alb  3.3<L>  /  TBili  0.3  /  DBili  x   /  AST  19  /  ALT  14  /  AlkPhos  83  08-15    LIVER FUNCTIONS - ( 15 Aug 2023 05:17 )  Alb: 3.3 g/dL / Pro: 6.1 g/dL / ALK PHOS: 83 U/L / ALT: 14 U/L / AST: 19 U/L / GGT: x             Urinalysis Basic - ( 15 Aug 2023 05:17 )    Color: x / Appearance: x / SG: x / pH: x  Gluc: 168 mg/dL / Ketone: x  / Bili: x / Urobili: x   Blood: x / Protein: x / Nitrite: x   Leuk Esterase: x / RBC: x / WBC x   Sq Epi: x / Non Sq Epi: x / Bacteria: x      CSF Appearance: Slightly Cloudy (08-14-23 @ 13:33)  CSF Color: Pink (08-14-23 @ 13:33)  CSF Lymphocytes: 7 % (08-14-23 @ 13:33)  Glucose, CSF: 106 mg/dL (08-14-23 @ 13:33)  Protein, CSF: 64 mg/dL (08-14-23 @ 13:33)  Lactate Dehydrogenase, CSF: 14 U/L (08-14-23 @ 13:33)  IgG CSF: 3.7 mg/dL (08-14-23 @ 13:33)  CSF ALBU: 18.3 mg/dL (08-14-23 @ 13:33)  IgG/Albumin Ratio, CSF: 0.20 Ratio (08-14-23 @ 13:33)      Neuroimaging:  NCHCT:     08-15-23 @ 13:57

## 2023-08-15 NOTE — CONSULT NOTE ADULT - ASSESSMENT
67yo F w/a PMH of Type II DM, HTN, DLD, obesity, lateral epicondylitis presenting to the ED w/ Left Arm pain.      67yo F w/a PMH of Type II DM, HTN, DLD, obesity, lateral epicondylitis presenting to the ED w/ Left Arm pain.     8/12 CT A/P No Cont : No evidence of acute abdominal pathology.    8/13 CT Angio Neck Stroke Protocol w/ IV Cont   CT brain perfusion: No evidence of acute infarct or ischemia.  CTA neck: No stenosis. No dissection.  CTA brain: No stenosis or aneurysm.    IMPRESSION/RECOMMENDATIONS  No evidence of an infection on admission  Seizures in ER with possible aspiration with possible RUL bacterial PNA vs pneumonitis  EEG presently with no Seizures  Possible aseptic meningitis with no evidence of HSV 1 encephalitis  R/o parameningeal focus   CSF with hemorrhagic changes with CT with no evidence of bleed  Clinically no vasculitis/arteritis  8/14 BCx NG  8/14 COVID-19 NG  8/14 Marisa DIXON NG    -d/c droplet  -d/c vanco/levoquin/ampicillin  -start Rocephin 2 gm iv q12h  -Off loading to prevent pressure sores and preventive measures to avoid aspiration

## 2023-08-15 NOTE — CONSULT NOTE ADULT - SUBJECTIVE AND OBJECTIVE BOX
MARCIANO RODRIGUES  68y, Female  Allergy: No Known Allergies      All historical available data reviewed.    HPI:  67yo F w/a PMH of Type II DM, HTN, DLD, obesity, lateral epicondylitis presenting to the ED w/ Left Arm pain. At time of my exam, patient intubated so spoke with the son at bedside. This arm started about two months ago and has continued to progress but in the last few days became severe. She went to the ED a few days ago, was given pain medications then sent home. Per son, pain still continued to progress after this. No fevers, chills, nausea, vomiting, diarrhea, constipation, SOB, CP.   On admission to the ED today, she presented with severe left arm pain w findings of LBBB on ECG (unsure if new or not). Code STEMI was called and then canceled because troponin negative and no chest pain. Patient was then noted to have acute mental status change, SOB, lactate elevation, and acidosis on abg..Patient had two seizure episodes and was treated with benzodiazepine. Intubated by ER for airway protection.     On Admission  Vitals: /64, HR 75, RR 20, T 98.5, satting 99 percent on RA   Labs: WBC 14.86, Hgb 10, , Na 139, K 5.4, BUN 12, Cr .6, Trop <.01 --> .13 on repeat  Imaging:   CXR -  Patchy bibasilar opacities, right greater than left.  CT Brain Stroke protocol - No evidence of acute transcortical infarct, hydrocephalus, acute intracranial hemorrhage, or mass effect.  CT brain perfusion: No evidence of acute infarct or ischemia.  CTA neck: No stenosis. No dissection.  CTA brain: No stenosis or aneurysm.  CTA Neck: The distal internal carotid arteries are patent. The [place] is normal in appearance. The visualized cerebral arteries are unremarkable.The vertebrobasilar junction and basilar artery are normal.    In the ED, given Levaquin. Keppra, started on propofol  Admitted to MICU for airway monitoring  (13 Aug 2023 19:09)    FAMILY HISTORY:    PAST MEDICAL & SURGICAL HISTORY:  Diabetes            VITALS:  T(F): 99.9, Max: 102.5 (23 @ 08:00)  HR: 90  BP: 123/60  RR: 19Vital Signs Last 24 Hrs  T(C): 37.7 (15 Aug 2023 04:00), Max: 39.2 (14 Aug 2023 08:00)  T(F): 99.9 (15 Aug 2023 04:00), Max: 102.5 (14 Aug 2023 08:00)  HR: 90 (15 Aug 2023 05:00) (82 - 110)  BP: 123/60 (15 Aug 2023 05:00) (96/46 - 165/64)  BP(mean): 87 (15 Aug 2023 05:) (67 - 111)  RR: 19 (15 Aug 2023 05:00) (16 - 33)  SpO2: 100% (15 Aug 2023 05:) (94% - 100%)    Parameters below as of 15 Aug 2023 05:  Patient On (Oxygen Delivery Method): ventilator    O2 Concentration (%): 35    TESTS & MEASUREMENTS:                        6.7    14.94 )-----------( 192      ( 15 Aug 2023 05:17 )             23.7     08-14    x   |  x   |  x   ----------------------------<  137<H>  x    |  x   |  x     Ca    9.3      14 Aug 2023 04:40  Phos  3.8       Mg     2.0         TPro  7.0  /  Alb  4.1  /  TBili  0.3  /  DBili  x   /  AST  33  /  ALT  20  /  AlkPhos  97  08-14    LIVER FUNCTIONS - ( 14 Aug 2023 04:40 )  Alb: 4.1 g/dL / Pro: 7.0 g/dL / ALK PHOS: 97 U/L / ALT: 20 U/L / AST: 33 U/L / GGT: x             Culture - CSF with Gram Stain (collected 23 @ 13:33)  Source: .CSF CSF  Gram Stain (23 @ 22:20):    polymorphonuclear leukocytes seen    No organisms seen    by cytocentrifuge      Urinalysis Basic - ( 14 Aug 2023 18:19 )    Color: Yellow / Appearance: Clear / S.024 / pH: x  Gluc: x / Ketone: 15 mg/dL  / Bili: Negative / Urobili: 0.2 mg/dL   Blood: x / Protein: 30 mg/dL / Nitrite: Negative   Leuk Esterase: Negative / RBC: 1 /HPF / WBC 4 /HPF   Sq Epi: x / Non Sq Epi: 2 /HPF / Bacteria: Negative /HPF          RADIOLOGY & ADDITIONAL TESTS:  Personal review of radiological diagnostics performed  Echo and EKG results noted when applicable.     MEDICATIONS:  acetaminophen     Tablet .. 650 milliGRAM(s) Oral every 6 hours PRN  ampicillin  IVPB      ampicillin  IVPB 2 Gram(s) IV Intermittent every 4 hours  aspirin  chewable 81 milliGRAM(s) Oral daily  atorvastatin 40 milliGRAM(s) Oral at bedtime  chlorhexidine 0.12% Liquid 15 milliLiter(s) Oral Mucosa every 12 hours  chlorhexidine 2% Cloths 1 Application(s) Topical <User Schedule>  dextrose 5%. 1000 milliLiter(s) IV Continuous <Continuous>  dextrose 50% Injectable 25 Gram(s) IV Push once  dextrose Oral Gel 15 Gram(s) Oral once PRN  enoxaparin Injectable 40 milliGRAM(s) SubCutaneous every 24 hours  fentaNYL   Infusion. 0.5 MICROgram(s)/kG/Hr IV Continuous <Continuous>  glucagon  Injectable 1 milliGRAM(s) IntraMuscular once  insulin lispro (ADMELOG) corrective regimen sliding scale   SubCutaneous three times a day before meals  levETIRAcetam  IVPB 1000 milliGRAM(s) IV Intermittent every 12 hours  levoFLOXacin IVPB      levoFLOXacin IVPB 750 milliGRAM(s) IV Intermittent every 24 hours  metoprolol tartrate 25 milliGRAM(s) Oral every 12 hours  pantoprazole  Injectable 40 milliGRAM(s) IV Push daily  propofol Infusion 20 MICROgram(s)/kG/Min IV Continuous <Continuous>  vancomycin  IVPB 1000 milliGRAM(s) IV Intermittent every 12 hours      ANTIBIOTICS:  ampicillin  IVPB      ampicillin  IVPB 2 Gram(s) IV Intermittent every 4 hours  levoFLOXacin IVPB      levoFLOXacin IVPB 750 milliGRAM(s) IV Intermittent every 24 hours  vancomycin  IVPB 1000 milliGRAM(s) IV Intermittent every 12 hours

## 2023-08-15 NOTE — PROGRESS NOTE ADULT - SUBJECTIVE AND OBJECTIVE BOX
Patient is a 68y old  Female who presents with a chief complaint of Arm Pain, AMS (15 Aug 2023 08:27)    HPI:  69yo F w/a PMH of Type II DM, HTN, DLD, obesity, lateral epicondylitis presenting to the ED w/ Left Arm pain. At time of my exam, patient intubated so spoke with the son at bedside. This arm started about two months ago and has continued to progress but in the last few days became severe. She went to the ED a few days ago, was given pain medications then sent home. Per son, pain still continued to progress after this. No fevers, chills, nausea, vomiting, diarrhea, constipation, SOB, CP.   On admission to the ED today, she presented with severe left arm pain w findings of LBBB on ECG (unsure if new or not). Code STEMI was called and then canceled because troponin negative and no chest pain. Patient was then noted to have acute mental status change, SOB, lactate elevation, and acidosis on abg..Patient had two seizure episodes and was treated with benzodiazepine. Intubated by ER for airway protection.     On Admission  Vitals: /64, HR 75, RR 20, T 98.5, satting 99 percent on RA   Labs: WBC 14.86, Hgb 10, , Na 139, K 5.4, BUN 12, Cr .6, Trop <.01 --> .13 on repeat  Imaging:   CXR -  Patchy bibasilar opacities, right greater than left.  CT Brain Stroke protocol - No evidence of acute transcortical infarct, hydrocephalus, acute intracranial hemorrhage, or mass effect.  CT brain perfusion: No evidence of acute infarct or ischemia.  CTA neck: No stenosis. No dissection.  CTA brain: No stenosis or aneurysm.  CTA Neck: The distal internal carotid arteries are patent. The [place] of [place] is normal in appearance. The visualized cerebral arteries are unremarkable.The vertebrobasilar junction and basilar artery are normal.    In the ED, given Levaquin. Keppra, started on propofol  Admitted to MICU for airway monitoring  (13 Aug 2023 19:09)      SUBJ:  Patient seen and examined. Remains intubated, but being weaned off.       MEDICATIONS  (STANDING):  ampicillin  IVPB      ampicillin  IVPB 2 Gram(s) IV Intermittent every 4 hours  aspirin  chewable 81 milliGRAM(s) Oral daily  atorvastatin 40 milliGRAM(s) Oral at bedtime  chlorhexidine 0.12% Liquid 15 milliLiter(s) Oral Mucosa every 12 hours  chlorhexidine 2% Cloths 1 Application(s) Topical <User Schedule>  dextrose 5%. 1000 milliLiter(s) (50 mL/Hr) IV Continuous <Continuous>  dextrose 50% Injectable 25 Gram(s) IV Push once  enoxaparin Injectable 40 milliGRAM(s) SubCutaneous every 24 hours  fentaNYL   Infusion. 0.5 MICROgram(s)/kG/Hr (3.09 mL/Hr) IV Continuous <Continuous>  glucagon  Injectable 1 milliGRAM(s) IntraMuscular once  insulin lispro (ADMELOG) corrective regimen sliding scale   SubCutaneous three times a day before meals  levETIRAcetam  IVPB 1000 milliGRAM(s) IV Intermittent every 12 hours  levoFLOXacin IVPB      levoFLOXacin IVPB 750 milliGRAM(s) IV Intermittent every 24 hours  metoprolol tartrate 25 milliGRAM(s) Oral every 12 hours  pantoprazole  Injectable 40 milliGRAM(s) IV Push daily  propofol Infusion 20 MICROgram(s)/kG/Min (7.46 mL/Hr) IV Continuous <Continuous>  vancomycin  IVPB 1000 milliGRAM(s) IV Intermittent every 12 hours    MEDICATIONS  (PRN):  acetaminophen     Tablet .. 650 milliGRAM(s) Oral every 6 hours PRN Temp greater or equal to 38C (100.4F), Mild Pain (1 - 3)  dextrose Oral Gel 15 Gram(s) Oral once PRN Blood Glucose LESS THAN 70 milliGRAM(s)/deciliter            Vital Signs Last 24 Hrs  T(C): 37.7 (15 Aug 2023 08:00), Max: 38.3 (14 Aug 2023 14:00)  T(F): 99.8 (15 Aug 2023 08:00), Max: 101 (14 Aug 2023 14:00)  HR: 113 (15 Aug 2023 11:15) (81 - 119)  BP: 126/60 (15 Aug 2023 11:15) (92/58 - 147/66)  BP(mean): 86 (15 Aug 2023 11:15) (67 - 95)  RR: 27 (15 Aug 2023 11:15) (18 - 36)  SpO2: 100% (15 Aug 2023 11:15) (94% - 100%)    Parameters below as of 15 Aug 2023 08:00  Patient On (Oxygen Delivery Method): ventilator    O2 Concentration (%): 35      PHYSICAL EXAM:    GEN: intubated, NAD  HEENT: NC/AT, PERRL  Neck: No JVD, no bruits  CV: Reg, S1-S2, no murmur  Lungs: CTAB  Abd: Soft, non-tender  Ext: No edema        08-14-23 @ 07:01  -  08-15-23 @ 07:00  --------------------------------------------------------  IN: 1817.4 mL / OUT: 1825 mL / NET: -7.6 mL    08-15-23 @ 07:01  -  08-15-23 @ 12:15  --------------------------------------------------------  IN: 409.8 mL / OUT: 500 mL / NET: -90.2 mL        I&O's Summary    14 Aug 2023 07:01  -  15 Aug 2023 07:00  --------------------------------------------------------  IN: 1817.4 mL / OUT: 1825 mL / NET: -7.6 mL    15 Aug 2023 07:01  -  15 Aug 2023 12:15  --------------------------------------------------------  IN: 409.8 mL / OUT: 500 mL / NET: -90.2 mL    	    TELEMETRY:  Sinus    ECG:  LBBB  TTE:  < from: TTE Echo Complete w/o Contrast w/ Doppler (08.14.23 @ 08:23) >    Summary:   1. Left ventricular ejection fraction, by visual estimation, is 30 to   35%.   2. Moderately to severely decreased global left ventricular systolic   function.   3. The left ventricular diastolic function could not be assessed in this   study.   4. Left atrial enlargement.   5. Mild mitral regurgitation.   6. Adequate TR velocity was not obtained to accurately assess RVSP.    PHYSICIAN INTERPRETATION:  Left Ventricle: The left ventricular internal cavity size is moderately   increased. Global LV systolic function was moderately to severely   decreased. Left ventricular ejection fraction, by visual estimation, is   30 to 35%. The left ventriculardiastolic function could not be assessed   in this study.  Right Ventricle: Normal right ventricular size and function.  Left Atrium: Left atrial enlargement.  Right Atrium: Normal right atrial size.  Pericardium: There is no evidence of pericardial effusion.  Mitral Valve: Mild thickening of the anterior and posterior mitral valve   leaflets. No evidence of mitral stenosis. Mild mitral regurgitation.  Tricuspid Valve: The tricuspid valve is not well visualized. No tricuspid   regurgitation visualized. Adequate TR velocity was not obtained to   accurately assess RVSP.  Aortic Valve: The aortic valve was not well visualized. No evidence of   aortic stenosis. No aortic regurgitation.  Pulmonic Valve: The pulmonic valve was not well visualized.  Aorta: The aortic root and ascending aorta are normal in size.  Venous: The inferior vena cava size is normal.    < end of copied text >      LABS:                        6.4    14.97 )-----------( 189      ( 15 Aug 2023 06:20 )             22.7     08-15    147<H>  |  113<H>  |  16  ----------------------------<  168<H>  3.7   |  17  |  0.7    Ca    8.8      15 Aug 2023 05:17  Phos  4.0     08-15  Mg     2.2     08-15    TPro  6.1  /  Alb  3.3<L>  /  TBili  0.3  /  DBili  x   /  AST  19  /  ALT  14  /  AlkPhos  83  08-15    CARDIAC MARKERS ( 15 Aug 2023 05:17 )  x     / <0.01 ng/mL / x     / x     / x      CARDIAC MARKERS ( 14 Aug 2023 20:46 )  x     / 0.04 ng/mL / x     / x     / x      CARDIAC MARKERS ( 14 Aug 2023 12:16 )  x     / 0.08 ng/mL / x     / x     / x      CARDIAC MARKERS ( 14 Aug 2023 04:40 )  x     / 0.13 ng/mL / x     / x     / x      CARDIAC MARKERS ( 14 Aug 2023 00:20 )  x     / 0.14 ng/mL / 182 U/L / x     / x      CARDIAC MARKERS ( 13 Aug 2023 17:52 )  x     / 0.13 ng/mL / x     / x     / x                BNP  RADIOLOGY & ADDITIONAL STUDIES:      IMPRESSION AND PLAN:

## 2023-08-15 NOTE — EEG REPORT - NS EEG TEXT BOX
Epilepsy Attending Note:     MARCIANO RODRIGUES    68y Female  MRN [id number]    Vital Signs Last 24 Hrs  T(C): 37.7 (15 Aug 2023 08:00), Max: 38.9 (14 Aug 2023 12:00)  T(F): 99.8 (15 Aug 2023 08:00), Max: 102.1 (14 Aug 2023 12:00)  HR: 113 (15 Aug 2023 11:15) (81 - 119)  BP: 126/60 (15 Aug 2023 11:15) (92/58 - 147/66)  BP(mean): 86 (15 Aug 2023 11:15) (67 - 95)  RR: 27 (15 Aug 2023 11:15) (18 - 36)  SpO2: 100% (15 Aug 2023 11:15) (94% - 100%)    Parameters below as of 15 Aug 2023 08:00  Patient On (Oxygen Delivery Method): ventilator    O2 Concentration (%): 35                          6.4    14.97 )-----------( 189      ( 15 Aug 2023 06:20 )             22.7       08-15    147<H>  |  113<H>  |  16  ----------------------------<  168<H>  3.7   |  17  |  0.7    Ca    8.8      15 Aug 2023 05:17  Phos  4.0     08-15  Mg     2.2     08-15    TPro  6.1  /  Alb  3.3<L>  /  TBili  0.3  /  DBili  x   /  AST  19  /  ALT  14  /  AlkPhos  83  08-15      MEDICATIONS  (STANDING):  ampicillin  IVPB      ampicillin  IVPB 2 Gram(s) IV Intermittent every 4 hours  aspirin  chewable 81 milliGRAM(s) Oral daily  atorvastatin 40 milliGRAM(s) Oral at bedtime  chlorhexidine 0.12% Liquid 15 milliLiter(s) Oral Mucosa every 12 hours  chlorhexidine 2% Cloths 1 Application(s) Topical <User Schedule>  dextrose 5%. 1000 milliLiter(s) (50 mL/Hr) IV Continuous <Continuous>  dextrose 50% Injectable 25 Gram(s) IV Push once  enoxaparin Injectable 40 milliGRAM(s) SubCutaneous every 24 hours  fentaNYL   Infusion. 0.5 MICROgram(s)/kG/Hr (3.09 mL/Hr) IV Continuous <Continuous>  furosemide   Injectable 20 milliGRAM(s) IV Push once  glucagon  Injectable 1 milliGRAM(s) IntraMuscular once  insulin lispro (ADMELOG) corrective regimen sliding scale   SubCutaneous three times a day before meals  levETIRAcetam  IVPB 1000 milliGRAM(s) IV Intermittent every 12 hours  levoFLOXacin IVPB      levoFLOXacin IVPB 750 milliGRAM(s) IV Intermittent every 24 hours  metoprolol tartrate 25 milliGRAM(s) Oral every 12 hours  pantoprazole  Injectable 40 milliGRAM(s) IV Push daily  propofol Infusion 20 MICROgram(s)/kG/Min (7.46 mL/Hr) IV Continuous <Continuous>  vancomycin  IVPB 1000 milliGRAM(s) IV Intermittent every 12 hours    MEDICATIONS  (PRN):  acetaminophen     Tablet .. 650 milliGRAM(s) Oral every 6 hours PRN Temp greater or equal to 38C (100.4F), Mild Pain (1 - 3)  dextrose Oral Gel 15 Gram(s) Oral once PRN Blood Glucose LESS THAN 70 milliGRAM(s)/deciliter            VEEG in the last 24 hours:    Background - continuous, symmetrical, reaching frequencies in the range of 7-8 Hz towards the latter part of the recording, relatively more organized compare to the earlier portion.      Focal and generalized slowin. mild to moderate generalized slowing (showing improvement towards the latter part)  2. borderline ot mild bilateral focal slowing with shifting asymmetry, R>L    Interictal activity - none    Events - one event reported around 10am today. It was not associated with epileptiform EEG change form the baseline.    Seizures - non    Impression: VEEG as above    Plan - per neurology team     Epilepsy Attending Note:     MARCIANO RODRIGUES    68y Female  MRN [id number]    Vital Signs Last 24 Hrs  T(C): 37.7 (15 Aug 2023 08:00), Max: 38.9 (14 Aug 2023 12:00)  T(F): 99.8 (15 Aug 2023 08:00), Max: 102.1 (14 Aug 2023 12:00)  HR: 113 (15 Aug 2023 11:15) (81 - 119)  BP: 126/60 (15 Aug 2023 11:15) (92/58 - 147/66)  BP(mean): 86 (15 Aug 2023 11:15) (67 - 95)  RR: 27 (15 Aug 2023 11:15) (18 - 36)  SpO2: 100% (15 Aug 2023 11:15) (94% - 100%)    Parameters below as of 15 Aug 2023 08:00  Patient On (Oxygen Delivery Method): ventilator    O2 Concentration (%): 35                          6.4    14.97 )-----------( 189      ( 15 Aug 2023 06:20 )             22.7       08-15    147<H>  |  113<H>  |  16  ----------------------------<  168<H>  3.7   |  17  |  0.7    Ca    8.8      15 Aug 2023 05:17  Phos  4.0     08-15  Mg     2.2     08-15    TPro  6.1  /  Alb  3.3<L>  /  TBili  0.3  /  DBili  x   /  AST  19  /  ALT  14  /  AlkPhos  83  08-15      MEDICATIONS  (STANDING):  ampicillin  IVPB      ampicillin  IVPB 2 Gram(s) IV Intermittent every 4 hours  aspirin  chewable 81 milliGRAM(s) Oral daily  atorvastatin 40 milliGRAM(s) Oral at bedtime  chlorhexidine 0.12% Liquid 15 milliLiter(s) Oral Mucosa every 12 hours  chlorhexidine 2% Cloths 1 Application(s) Topical <User Schedule>  dextrose 5%. 1000 milliLiter(s) (50 mL/Hr) IV Continuous <Continuous>  dextrose 50% Injectable 25 Gram(s) IV Push once  enoxaparin Injectable 40 milliGRAM(s) SubCutaneous every 24 hours  fentaNYL   Infusion. 0.5 MICROgram(s)/kG/Hr (3.09 mL/Hr) IV Continuous <Continuous>  furosemide   Injectable 20 milliGRAM(s) IV Push once  glucagon  Injectable 1 milliGRAM(s) IntraMuscular once  insulin lispro (ADMELOG) corrective regimen sliding scale   SubCutaneous three times a day before meals  levETIRAcetam  IVPB 1000 milliGRAM(s) IV Intermittent every 12 hours  levoFLOXacin IVPB      levoFLOXacin IVPB 750 milliGRAM(s) IV Intermittent every 24 hours  metoprolol tartrate 25 milliGRAM(s) Oral every 12 hours  pantoprazole  Injectable 40 milliGRAM(s) IV Push daily  propofol Infusion 20 MICROgram(s)/kG/Min (7.46 mL/Hr) IV Continuous <Continuous>  vancomycin  IVPB 1000 milliGRAM(s) IV Intermittent every 12 hours    MEDICATIONS  (PRN):  acetaminophen     Tablet .. 650 milliGRAM(s) Oral every 6 hours PRN Temp greater or equal to 38C (100.4F), Mild Pain (1 - 3)  dextrose Oral Gel 15 Gram(s) Oral once PRN Blood Glucose LESS THAN 70 milliGRAM(s)/deciliter            VEEG in the last 24 hours:    Background - continuous, symmetrical, reaching frequencies in the range of 7-8 Hz towards the latter part of the recording, relatively more organized compare to the earlier portion.      Focal and generalized slowin. mild to moderate generalized slowing (showing improvement towards the latter part)  2. borderline ot mild bilateral focal slowing with shifting asymmetry, R>L    Interictal activity - none    Events - one event reported around 10am today. It was not associated with epileptiform EEG change form the baseline.    Seizures - none    Impression: VEEG as above    Plan - per neurology team

## 2023-08-16 ENCOUNTER — APPOINTMENT (OUTPATIENT)
Dept: ORTHOPEDIC SURGERY | Facility: CLINIC | Age: 68
End: 2023-08-16

## 2023-08-16 LAB
ACE SERPL-CCNC: 71 U/L — SIGNIFICANT CHANGE UP (ref 14–82)
ALBUMIN SERPL ELPH-MCNC: 3.4 G/DL — LOW (ref 3.5–5.2)
ALBUMIN SERPL ELPH-MCNC: 3.6 G/DL — SIGNIFICANT CHANGE UP (ref 3.5–5.2)
ALP SERPL-CCNC: 92 U/L — SIGNIFICANT CHANGE UP (ref 30–115)
ALP SERPL-CCNC: 94 U/L — SIGNIFICANT CHANGE UP (ref 30–115)
ALT FLD-CCNC: 23 U/L — SIGNIFICANT CHANGE UP (ref 0–41)
ALT FLD-CCNC: 24 U/L — SIGNIFICANT CHANGE UP (ref 0–41)
ANION GAP SERPL CALC-SCNC: 19 MMOL/L — HIGH (ref 7–14)
ANION GAP SERPL CALC-SCNC: 21 MMOL/L — HIGH (ref 7–14)
APTT BLD: 100.9 SEC — CRITICAL HIGH (ref 27–39.2)
APTT BLD: 142.4 SEC — CRITICAL HIGH (ref 27–39.2)
AST SERPL-CCNC: 56 U/L — HIGH (ref 0–41)
AST SERPL-CCNC: 65 U/L — HIGH (ref 0–41)
B-OH-BUTYR SERPL-SCNC: 6.4 MMOL/L — HIGH
BILIRUB SERPL-MCNC: 0.4 MG/DL — SIGNIFICANT CHANGE UP (ref 0.2–1.2)
BILIRUB SERPL-MCNC: 0.8 MG/DL — SIGNIFICANT CHANGE UP (ref 0.2–1.2)
BUN SERPL-MCNC: 14 MG/DL — SIGNIFICANT CHANGE UP (ref 10–20)
BUN SERPL-MCNC: 17 MG/DL — SIGNIFICANT CHANGE UP (ref 10–20)
CALCIUM SERPL-MCNC: 8.4 MG/DL — SIGNIFICANT CHANGE UP (ref 8.4–10.4)
CALCIUM SERPL-MCNC: 8.8 MG/DL — SIGNIFICANT CHANGE UP (ref 8.4–10.5)
CHLORIDE SERPL-SCNC: 108 MMOL/L — SIGNIFICANT CHANGE UP (ref 98–110)
CHLORIDE SERPL-SCNC: 114 MMOL/L — HIGH (ref 98–110)
CO2 SERPL-SCNC: 16 MMOL/L — LOW (ref 17–32)
CO2 SERPL-SCNC: 20 MMOL/L — SIGNIFICANT CHANGE UP (ref 17–32)
CREAT SERPL-MCNC: 0.7 MG/DL — SIGNIFICANT CHANGE UP (ref 0.7–1.5)
CREAT SERPL-MCNC: 0.8 MG/DL — SIGNIFICANT CHANGE UP (ref 0.7–1.5)
CULTURE RESULTS: SIGNIFICANT CHANGE UP
EBV PCR: SIGNIFICANT CHANGE UP IU/ML
EGFR: 79 ML/MIN/1.73M2 — SIGNIFICANT CHANGE UP
EGFR: 93 ML/MIN/1.73M2 — SIGNIFICANT CHANGE UP
GLUCOSE BLDC GLUCOMTR-MCNC: 152 MG/DL — HIGH (ref 70–99)
GLUCOSE BLDC GLUCOMTR-MCNC: 152 MG/DL — HIGH (ref 70–99)
GLUCOSE BLDC GLUCOMTR-MCNC: 154 MG/DL — HIGH (ref 70–99)
GLUCOSE BLDC GLUCOMTR-MCNC: 166 MG/DL — HIGH (ref 70–99)
GLUCOSE BLDC GLUCOMTR-MCNC: 168 MG/DL — HIGH (ref 70–99)
GLUCOSE BLDC GLUCOMTR-MCNC: 169 MG/DL — HIGH (ref 70–99)
GLUCOSE BLDC GLUCOMTR-MCNC: 184 MG/DL — HIGH (ref 70–99)
GLUCOSE BLDC GLUCOMTR-MCNC: 192 MG/DL — HIGH (ref 70–99)
GLUCOSE SERPL-MCNC: 161 MG/DL — HIGH (ref 70–99)
GLUCOSE SERPL-MCNC: 186 MG/DL — HIGH (ref 70–99)
HAPTOGLOB SERPL-MCNC: 220 MG/DL — HIGH (ref 34–200)
HAPTOGLOB SERPL-MCNC: 252 MG/DL — HIGH (ref 34–200)
HCT VFR BLD CALC: 28.2 % — LOW (ref 37–47)
HCT VFR BLD CALC: 28.8 % — LOW (ref 37–47)
HCT VFR BLD CALC: 29.9 % — LOW (ref 37–47)
HGB BLD-MCNC: 8.2 G/DL — LOW (ref 12–16)
HGB BLD-MCNC: 8.5 G/DL — LOW (ref 12–16)
HGB BLD-MCNC: 8.6 G/DL — LOW (ref 12–16)
INR BLD: 1.14 RATIO — SIGNIFICANT CHANGE UP (ref 0.65–1.3)
IRON SATN MFR SERPL: 13 % — LOW (ref 15–50)
IRON SATN MFR SERPL: 39 UG/DL — SIGNIFICANT CHANGE UP (ref 35–150)
JCPYV DNA # CSF NAA+PROBE: SIGNIFICANT CHANGE UP COPIES/ML
LACTATE SERPL-SCNC: 1.7 MMOL/L — SIGNIFICANT CHANGE UP (ref 0.7–2)
MCHC RBC-ENTMCNC: 22.4 PG — LOW (ref 27–31)
MCHC RBC-ENTMCNC: 22.6 PG — LOW (ref 27–31)
MCHC RBC-ENTMCNC: 23.2 PG — LOW (ref 27–31)
MCHC RBC-ENTMCNC: 28.4 G/DL — LOW (ref 32–37)
MCHC RBC-ENTMCNC: 29.1 G/DL — LOW (ref 32–37)
MCHC RBC-ENTMCNC: 29.9 G/DL — LOW (ref 32–37)
MCV RBC AUTO: 77.7 FL — LOW (ref 81–99)
MCV RBC AUTO: 77.8 FL — LOW (ref 81–99)
MCV RBC AUTO: 78.7 FL — LOW (ref 81–99)
NRBC # BLD: 0 /100 WBCS — SIGNIFICANT CHANGE UP (ref 0–0)
PLATELET # BLD AUTO: 181 K/UL — SIGNIFICANT CHANGE UP (ref 130–400)
PLATELET # BLD AUTO: 193 K/UL — SIGNIFICANT CHANGE UP (ref 130–400)
PLATELET # BLD AUTO: 205 K/UL — SIGNIFICANT CHANGE UP (ref 130–400)
PMV BLD: 10.5 FL — HIGH (ref 7.4–10.4)
PMV BLD: 10.6 FL — HIGH (ref 7.4–10.4)
PMV BLD: 10.7 FL — HIGH (ref 7.4–10.4)
POTASSIUM SERPL-MCNC: 2.9 MMOL/L — LOW (ref 3.5–5)
POTASSIUM SERPL-MCNC: 3.7 MMOL/L — SIGNIFICANT CHANGE UP (ref 3.5–5)
POTASSIUM SERPL-SCNC: 2.9 MMOL/L — LOW (ref 3.5–5)
POTASSIUM SERPL-SCNC: 3.7 MMOL/L — SIGNIFICANT CHANGE UP (ref 3.5–5)
PROT SERPL-MCNC: 6.5 G/DL — SIGNIFICANT CHANGE UP (ref 6–8)
PROT SERPL-MCNC: 6.6 G/DL — SIGNIFICANT CHANGE UP (ref 6–8)
PROTHROM AB SERPL-ACNC: 13.1 SEC — HIGH (ref 9.95–12.87)
RBC # BLD: 3.63 M/UL — LOW (ref 4.2–5.4)
RBC # BLD: 3.7 M/UL — LOW (ref 4.2–5.4)
RBC # BLD: 3.8 M/UL — LOW (ref 4.2–5.4)
RBC # FLD: 18.6 % — HIGH (ref 11.5–14.5)
RBC # FLD: 18.6 % — HIGH (ref 11.5–14.5)
RBC # FLD: 19.1 % — HIGH (ref 11.5–14.5)
SODIUM SERPL-SCNC: 147 MMOL/L — HIGH (ref 135–146)
SODIUM SERPL-SCNC: 151 MMOL/L — HIGH (ref 135–146)
SPECIMEN SOURCE: SIGNIFICANT CHANGE UP
TIBC SERPL-MCNC: 309 UG/DL — SIGNIFICANT CHANGE UP (ref 220–430)
TRANSFERRIN SERPL-MCNC: 261 MG/DL — SIGNIFICANT CHANGE UP (ref 200–360)
TROPONIN T SERPL-MCNC: 0.38 NG/ML — CRITICAL HIGH
TROPONIN T SERPL-MCNC: 0.4 NG/ML — CRITICAL HIGH
UIBC SERPL-MCNC: 270 UG/DL — SIGNIFICANT CHANGE UP (ref 110–370)
WBC # BLD: 12.05 K/UL — HIGH (ref 4.8–10.8)
WBC # BLD: 12.35 K/UL — HIGH (ref 4.8–10.8)
WBC # BLD: 13.61 K/UL — HIGH (ref 4.8–10.8)
WBC # FLD AUTO: 12.05 K/UL — HIGH (ref 4.8–10.8)
WBC # FLD AUTO: 12.35 K/UL — HIGH (ref 4.8–10.8)
WBC # FLD AUTO: 13.61 K/UL — HIGH (ref 4.8–10.8)

## 2023-08-16 PROCEDURE — 93010 ELECTROCARDIOGRAM REPORT: CPT

## 2023-08-16 PROCEDURE — 71045 X-RAY EXAM CHEST 1 VIEW: CPT | Mod: 26

## 2023-08-16 PROCEDURE — 99233 SBSQ HOSP IP/OBS HIGH 50: CPT

## 2023-08-16 PROCEDURE — 95720 EEG PHY/QHP EA INCR W/VEEG: CPT

## 2023-08-16 RX ORDER — SODIUM BICARBONATE 1 MEQ/ML
650 SYRINGE (ML) INTRAVENOUS THREE TIMES A DAY
Refills: 0 | Status: DISCONTINUED | OUTPATIENT
Start: 2023-08-16 | End: 2023-08-20

## 2023-08-16 RX ORDER — FUROSEMIDE 40 MG
20 TABLET ORAL ONCE
Refills: 0 | Status: COMPLETED | OUTPATIENT
Start: 2023-08-16 | End: 2023-08-16

## 2023-08-16 RX ORDER — SODIUM CHLORIDE 9 MG/ML
1000 INJECTION, SOLUTION INTRAVENOUS
Refills: 0 | Status: DISCONTINUED | OUTPATIENT
Start: 2023-08-16 | End: 2023-08-17

## 2023-08-16 RX ORDER — ENOXAPARIN SODIUM 100 MG/ML
60 INJECTION SUBCUTANEOUS EVERY 12 HOURS
Refills: 0 | Status: DISCONTINUED | OUTPATIENT
Start: 2023-08-16 | End: 2023-08-25

## 2023-08-16 RX ORDER — INSULIN HUMAN 100 [IU]/ML
1 INJECTION, SOLUTION SUBCUTANEOUS
Qty: 100 | Refills: 0 | Status: DISCONTINUED | OUTPATIENT
Start: 2023-08-16 | End: 2023-08-17

## 2023-08-16 RX ADMIN — HEPARIN SODIUM 800 UNIT(S)/HR: 5000 INJECTION INTRAVENOUS; SUBCUTANEOUS at 12:39

## 2023-08-16 RX ADMIN — CEFTRIAXONE 100 MILLIGRAM(S): 500 INJECTION, POWDER, FOR SOLUTION INTRAMUSCULAR; INTRAVENOUS at 05:46

## 2023-08-16 RX ADMIN — LEVETIRACETAM 400 MILLIGRAM(S): 250 TABLET, FILM COATED ORAL at 17:31

## 2023-08-16 RX ADMIN — Medication 3 MILLILITER(S): at 08:24

## 2023-08-16 RX ADMIN — Medication 2: at 11:32

## 2023-08-16 RX ADMIN — SACUBITRIL AND VALSARTAN 1 TABLET(S): 24; 26 TABLET, FILM COATED ORAL at 17:31

## 2023-08-16 RX ADMIN — Medication 650 MILLIGRAM(S): at 21:24

## 2023-08-16 RX ADMIN — Medication 2: at 05:51

## 2023-08-16 RX ADMIN — Medication 81 MILLIGRAM(S): at 11:32

## 2023-08-16 RX ADMIN — ENOXAPARIN SODIUM 60 MILLIGRAM(S): 100 INJECTION SUBCUTANEOUS at 15:00

## 2023-08-16 RX ADMIN — INSULIN HUMAN 2 UNIT(S)/HR: 100 INJECTION, SOLUTION SUBCUTANEOUS at 21:34

## 2023-08-16 RX ADMIN — Medication 20 MILLIGRAM(S): at 08:38

## 2023-08-16 RX ADMIN — Medication 3 MILLILITER(S): at 19:25

## 2023-08-16 RX ADMIN — Medication 3 MILLILITER(S): at 03:04

## 2023-08-16 RX ADMIN — CHLORHEXIDINE GLUCONATE 1 APPLICATION(S): 213 SOLUTION TOPICAL at 05:45

## 2023-08-16 RX ADMIN — INSULIN HUMAN 6 UNIT(S)/HR: 100 INJECTION, SOLUTION SUBCUTANEOUS at 17:20

## 2023-08-16 RX ADMIN — CEFTRIAXONE 100 MILLIGRAM(S): 500 INJECTION, POWDER, FOR SOLUTION INTRAMUSCULAR; INTRAVENOUS at 17:34

## 2023-08-16 RX ADMIN — HEPARIN SODIUM 0 UNIT(S)/HR: 5000 INJECTION INTRAVENOUS; SUBCUTANEOUS at 03:23

## 2023-08-16 RX ADMIN — LEVETIRACETAM 400 MILLIGRAM(S): 250 TABLET, FILM COATED ORAL at 05:33

## 2023-08-16 RX ADMIN — Medication 650 MILLIGRAM(S): at 08:38

## 2023-08-16 RX ADMIN — Medication 650 MILLIGRAM(S): at 15:00

## 2023-08-16 RX ADMIN — ATORVASTATIN CALCIUM 40 MILLIGRAM(S): 80 TABLET, FILM COATED ORAL at 21:24

## 2023-08-16 RX ADMIN — Medication 25 MILLIGRAM(S): at 05:33

## 2023-08-16 RX ADMIN — SODIUM CHLORIDE 75 MILLILITER(S): 9 INJECTION, SOLUTION INTRAVENOUS at 17:20

## 2023-08-16 RX ADMIN — Medication 25 MILLIGRAM(S): at 17:31

## 2023-08-16 RX ADMIN — HEPARIN SODIUM 900 UNIT(S)/HR: 5000 INJECTION INTRAVENOUS; SUBCUTANEOUS at 04:30

## 2023-08-16 RX ADMIN — PANTOPRAZOLE SODIUM 40 MILLIGRAM(S): 20 TABLET, DELAYED RELEASE ORAL at 11:32

## 2023-08-16 RX ADMIN — SACUBITRIL AND VALSARTAN 1 TABLET(S): 24; 26 TABLET, FILM COATED ORAL at 05:33

## 2023-08-16 NOTE — PROGRESS NOTE ADULT - SUBJECTIVE AND OBJECTIVE BOX
Patient is a 68y old  Female who presents with a chief complaint of Arm Pain, AMS (15 Aug 2023 13:56)        Over Night Events:    No further fever  On room air   No distress         ROS:  See HPI    PHYSICAL EXAM    ICU Vital Signs Last 24 Hrs  T(C): 36.3 (16 Aug 2023 04:00), Max: 39.2 (15 Aug 2023 13:00)  T(F): 97.4 (16 Aug 2023 04:00), Max: 102.6 (15 Aug 2023 13:00)  HR: 88 (16 Aug 2023 07:00) (88 - 126)  BP: 123/58 (16 Aug 2023 07:00) (92/58 - 149/76)  BP(mean): 83 (16 Aug 2023 07:00) (71 - 106)  ABP: --  ABP(mean): --  RR: 24 (16 Aug 2023 07:00) (20 - 36)  SpO2: 93% (16 Aug 2023 07:00) (93% - 100%)    O2 Parameters below as of 16 Aug 2023 07:00  Patient On (Oxygen Delivery Method): room air            General: NAD. MS better   HEENT: EPIFANIO             Lymphatic system: No cervical LN   Lungs: Bilateral BS; clear   Cardiovascular: Regular   Gastrointestinal: Soft, Positive BS  Extremities: No clubbing.  Moves extremities.  Full Range of motion   Skin: Warm, intact  Neurological: no MS issues.       08-15-23 @ 07:01  -  08-16-23 @ 07:00  --------------------------------------------------------  IN:    FentaNYL: 6.2 mL    Heparin Infusion: 84 mL    IV PiggyBack: 400 mL    Oral Fluid: 280 mL    PRBCs (Packed Red Blood Cells): 385 mL    Propofol: 18.6 mL  Total IN: 1173.8 mL    OUT:    Indwelling Catheter - Urethral (mL): 3070 mL  Total OUT: 3070 mL    Total NET: -1896.2 mL          LABS:                            8.6    12.05 )-----------( 193      ( 16 Aug 2023 05:24 )             28.8                                               08-16    151<H>  |  114<H>  |  17  ----------------------------<  161<H>  3.7   |  16<L>  |  0.8    Ca    8.8      16 Aug 2023 05:24  Phos  4.0     08-15  Mg     2.2     08-15    TPro  6.6  /  Alb  3.6  /  TBili  0.4  /  DBili  x   /  AST  65<H>  /  ALT  24  /  AlkPhos  92  08-16      PT/INR - ( 16 Aug 2023 05:24 )   PT: 13.10 sec;   INR: 1.14 ratio         PTT - ( 16 Aug 2023 01:48 )  PTT:142.4 sec                                       Urinalysis Basic - ( 16 Aug 2023 05:24 )    Color: x / Appearance: x / SG: x / pH: x  Gluc: 161 mg/dL / Ketone: x  / Bili: x / Urobili: x   Blood: x / Protein: x / Nitrite: x   Leuk Esterase: x / RBC: x / WBC x   Sq Epi: x / Non Sq Epi: x / Bacteria: x        CARDIAC MARKERS ( 16 Aug 2023 05:24 )  x     / 0.40 ng/mL / x     / x     / x      CARDIAC MARKERS ( 15 Aug 2023 05:17 )  x     / <0.01 ng/mL / x     / x     / x      CARDIAC MARKERS ( 14 Aug 2023 20:46 )  x     / 0.04 ng/mL / x     / x     / x      CARDIAC MARKERS ( 14 Aug 2023 12:16 )  x     / 0.08 ng/mL / x     / x     / x                                                LIVER FUNCTIONS - ( 16 Aug 2023 05:24 )  Alb: 3.6 g/dL / Pro: 6.6 g/dL / ALK PHOS: 92 U/L / ALT: 24 U/L / AST: 65 U/L / GGT: x                                                  Culture - Urine (collected 14 Aug 2023 17:37)  Source: Catheterized Catheterized  Final Report (15 Aug 2023 20:17):    No growth    Culture - Acid Fast - CSF (collected 14 Aug 2023 13:33)  Source: .CSF CSF    Culture - CSF with Gram Stain (collected 14 Aug 2023 13:33)  Source: .CSF CSF  Gram Stain (14 Aug 2023 22:20):    polymorphonuclear leukocytes seen    No organisms seen    by cytocentrifuge  Preliminary Report (15 Aug 2023 15:23):    No growth    Culture - Fungal, CSF (collected 14 Aug 2023 13:33)  Source: .CSF CSF  Preliminary Report (15 Aug 2023 06:26):    Testing in progress    Culture - Blood (collected 14 Aug 2023 12:06)  Source: .Blood Blood  Preliminary Report (15 Aug 2023 23:02):    No growth at 24 hours    Culture - Blood (collected 14 Aug 2023 04:40)  Source: .Blood None  Preliminary Report (15 Aug 2023 14:01):    No growth at 24 hours    Culture - Blood (collected 14 Aug 2023 00:20)  Source: .Blood Blood  Preliminary Report (15 Aug 2023 09:02):    No growth at 24 hours    Culture - Blood (collected 13 Aug 2023 21:17)  Source: .Blood None  Preliminary Report (15 Aug 2023 09:02):    No growth at 24 hours                                                   Mode: AC/ CMV (Assist Control/ Continuous Mandatory Ventilation)  RR (machine): 16  TV (machine): 350  FiO2: 35  PEEP: 8                                      ABG - ( 15 Aug 2023 11:03 )  pH, Arterial: 7.33  pH, Blood: x     /  pCO2: 43    /  pO2: 190   / HCO3: 23    / Base Excess: -3.2  /  SaO2: 97.1                MEDICATIONS  (STANDING):  albuterol/ipratropium for Nebulization 3 milliLiter(s) Nebulizer every 6 hours  aspirin  chewable 81 milliGRAM(s) Oral daily  atorvastatin 40 milliGRAM(s) Oral at bedtime  cefTRIAXone   IVPB 2000 milliGRAM(s) IV Intermittent every 12 hours  chlorhexidine 2% Cloths 1 Application(s) Topical <User Schedule>  dextrose 5%. 1000 milliLiter(s) (50 mL/Hr) IV Continuous <Continuous>  dextrose 50% Injectable 25 Gram(s) IV Push once  glucagon  Injectable 1 milliGRAM(s) IntraMuscular once  heparin  Infusion.  Unit(s)/Hr (11 mL/Hr) IV Continuous <Continuous>  insulin lispro (ADMELOG) corrective regimen sliding scale   SubCutaneous three times a day before meals  levETIRAcetam  IVPB 1000 milliGRAM(s) IV Intermittent every 12 hours  metoprolol tartrate 25 milliGRAM(s) Oral every 12 hours  pantoprazole  Injectable 40 milliGRAM(s) IV Push daily  sacubitril 49 mG/valsartan 51 mG 1 Tablet(s) Oral two times a day    MEDICATIONS  (PRN):  acetaminophen     Tablet .. 650 milliGRAM(s) Oral every 6 hours PRN Temp greater or equal to 38C (100.4F), Mild Pain (1 - 3)  dextrose Oral Gel 15 Gram(s) Oral once PRN Blood Glucose LESS THAN 70 milliGRAM(s)/deciliter  heparin   Injectable 5000 Unit(s) IV Push every 6 hours PRN For aPTT less than 40  heparin   Injectable 2500 Unit(s) IV Push every 6 hours PRN For aPTT between 40 - 57      Xrays: increase markings; left infiltrate                                                                                    ECHO

## 2023-08-16 NOTE — SWALLOW BEDSIDE ASSESSMENT ADULT - NS SPL SWALLOW CLINIC TRIAL FT
Mildly prolonged mastication w soft solids +toleration of thin, puree and easy to chew solids w/o overt s/s of penetration/aspiration

## 2023-08-16 NOTE — PROGRESS NOTE ADULT - ASSESSMENT
Impression:     Seizures   Altered mental status  Acute hypoxemic respiratory failure   Lactic acidosis     PLAN:    CNS: Still on Keppra 1g BID. EEG no seizures.     HEENT: Oral care.     PULMONARY:  On room air. CXR noted.     CARDIOVASCULAR: Trop noted 0.4. EKG today. Trend the trop. Reduced EF on echo.     GI: GI prophylaxis.  Speech and swallow for diet. Needs free water.     RENAL:  Follow up lytes.  Correct as needed. No felder. Oral bicarb TID. LActic acid.     INFECTIOUS DISEASE: Culture: Blood culture negative, Ucx negative, CSF negative. Finish abx course. No further fever.     HEMATOLOGICAL:  Duplex positive with bilateral DVTs. On IV heparin. If hgb stable then switch to Lovenox.     ENDOCRINE:  Follow up FS.  Insulin protocol if needed. Beta-hydroxy.     MUSCULOSKELETAL: PT rehab.     CODE STATUS: Full code.     SDU if stable in the afternoon.            normal...

## 2023-08-16 NOTE — PROGRESS NOTE ADULT - SUBJECTIVE AND OBJECTIVE BOX
HPI:  67yo F w/a PMH of Type II DM, HTN, DLD, obesity, lateral epicondylitis presenting to the ED 8/13 w/left arm pain. This arm started about two months ago and has continued to progress but in the last few days became severe. Pt was previously seen for this pain in the ED on 8/8, was given pain medications then sent home. Per son, pain still continued to progress after this. No fevers, chills, nausea, vomiting, diarrhea, constipation, SOB, CP.   On admission to the ED, she presented with severe left arm pain w findings of LBBB on ECG (unsure if new or not). Code STEMI was called and then canceled because troponin negative and no chest pain. Patient was then noted to have acute mental status change, SOB, lactate elevation, and acidosis on abg. Patient had two seizure episodes and was treated with benzodiazepine. Intubated by ER for airway protection, extubated yesterday 8/15      On Admission  Vitals: /64, HR 75, RR 20, T 98.5, satting 99 percent on RA   Labs: WBC 14.86, Hgb 10, , Na 139, K 5.4, BUN 12, Cr .6, Trop <.01 --> .13 on repeat  Imaging:   CXR -  Patchy bibasilar opacities, right greater than left.  CT Brain Stroke protocol - No evidence of acute transcortical infarct, hydrocephalus, acute intracranial hemorrhage, or mass effect.  CT brain perfusion: No evidence of acute infarct or ischemia.  CTA neck: No stenosis. No dissection.  CTA brain: No stenosis or aneurysm.  CTA Neck: The distal internal carotid arteries are patent. The [place] is normal in appearance. The visualized cerebral arteries are unremarkable.The vertebrobasilar junction and basilar artery are normal.    In the ED, given Levaquin. Keppra, started on propofol  Admitted to MICU for airway monitoring  (13 Aug 2023 19:09)       INTERVAL HPI/OVERNIGHT EVENTS:   Pt extubated, required intermittent nebulizers, otherwise tolerating room air. Mental status improving.     Subjective:  ICU Vital Signs Last 24 Hrs  T(C): 36.8 (16 Aug 2023 08:00), Max: 39.2 (15 Aug 2023 13:00)  T(F): 98.3 (16 Aug 2023 08:00), Max: 102.6 (15 Aug 2023 13:00)  HR: 90 (16 Aug 2023 08:00) (88 - 126)  BP: 118/58 (16 Aug 2023 08:00) (92/58 - 149/76)  BP(mean): 83 (16 Aug 2023 08:00) (71 - 106)  ABP: --  ABP(mean): --  RR: 23 (16 Aug 2023 08:00) (20 - 36)  SpO2: 95% (16 Aug 2023 08:00) (93% - 100%)    O2 Parameters below as of 16 Aug 2023 08:00  Patient On (Oxygen Delivery Method): room air          I&O's Summary    15 Aug 2023 07:01  -  16 Aug 2023 07:00  --------------------------------------------------------  IN: 1173.8 mL / OUT: 3070 mL / NET: -1896.2 mL      EKG/Telemetry Events: Intermittently tachy ~115 overnight.    MEDICATIONS  (STANDING):  albuterol/ipratropium for Nebulization 3 milliLiter(s) Nebulizer every 6 hours  aspirin  chewable 81 milliGRAM(s) Oral daily  atorvastatin 40 milliGRAM(s) Oral at bedtime  cefTRIAXone   IVPB 2000 milliGRAM(s) IV Intermittent every 12 hours  chlorhexidine 2% Cloths 1 Application(s) Topical <User Schedule>  dextrose 5%. 1000 milliLiter(s) (50 mL/Hr) IV Continuous <Continuous>  dextrose 50% Injectable 25 Gram(s) IV Push once  furosemide   Injectable 20 milliGRAM(s) IV Push once  glucagon  Injectable 1 milliGRAM(s) IntraMuscular once  heparin  Infusion.  Unit(s)/Hr (11 mL/Hr) IV Continuous <Continuous>  insulin lispro (ADMELOG) corrective regimen sliding scale   SubCutaneous three times a day before meals  levETIRAcetam  IVPB 1000 milliGRAM(s) IV Intermittent every 12 hours  metoprolol tartrate 25 milliGRAM(s) Oral every 12 hours  pantoprazole  Injectable 40 milliGRAM(s) IV Push daily  sacubitril 49 mG/valsartan 51 mG 1 Tablet(s) Oral two times a day  sodium bicarbonate 650 milliGRAM(s) Oral three times a day    MEDICATIONS  (PRN):  acetaminophen     Tablet .. 650 milliGRAM(s) Oral every 6 hours PRN Temp greater or equal to 38C (100.4F), Mild Pain (1 - 3)  dextrose Oral Gel 15 Gram(s) Oral once PRN Blood Glucose LESS THAN 70 milliGRAM(s)/deciliter  heparin   Injectable 5000 Unit(s) IV Push every 6 hours PRN For aPTT less than 40  heparin   Injectable 2500 Unit(s) IV Push every 6 hours PRN For aPTT between 40 - 57      PHYSICAL EXAM:  GENERAL: Pt resting comfortably in no acute distress this AM.  HEAD:  Atraumatic, Normocephalic  NERVOUS SYSTEM:  Alert & Awake.   CHEST/LUNG: B/L chest rise and fall, expiratory wheezing noted on auscultation  HEART: S1S2 normal, no S3, Regular rate and rhythm; No murmurs  ABDOMEN: Soft, Nontender, Nondistended; Bowel sounds present  EXTREMITIES:  2+ Peripheral Pulses. No restriction on passive flexion/extension of L upper extremity, no pain to palpation.  SKIN: No rashes or lesions    LABS:                        8.6    12.05 )-----------( 193      ( 16 Aug 2023 05:24 )             28.8     08-16    151<H>  |  114<H>  |  17  ----------------------------<  161<H>  3.7   |  16<L>  |  0.8    Ca    8.8      16 Aug 2023 05:24  Phos  4.0     08-15  Mg     2.2     08-15    TPro  6.6  /  Alb  3.6  /  TBili  0.4  /  DBili  x   /  AST  65<H>  /  ALT  24  /  AlkPhos  92  08-16    LIVER FUNCTIONS - ( 16 Aug 2023 05:24 )  Alb: 3.6 g/dL / Pro: 6.6 g/dL / ALK PHOS: 92 U/L / ALT: 24 U/L / AST: 65 U/L / GGT: x           PT/INR - ( 16 Aug 2023 05:24 )   PT: 13.10 sec;   INR: 1.14 ratio         PTT - ( 16 Aug 2023 01:48 )  PTT:142.4 sec  CAPILLARY BLOOD GLUCOSE      POCT Blood Glucose.: 152 mg/dL (16 Aug 2023 05:45)  POCT Blood Glucose.: 175 mg/dL (15 Aug 2023 16:05)  POCT Blood Glucose.: 148 mg/dL (15 Aug 2023 11:04)    ABG - ( 15 Aug 2023 11:03 )  pH, Arterial: 7.33  pH, Blood: x     /  pCO2: 43    /  pO2: 190   / HCO3: 23    / Base Excess: -3.2  /  SaO2: 97.1        Troponin T, Serum: 0.40 ng/mL (08-16 @ 05:24)    CARDIAC MARKERS ( 16 Aug 2023 05:24 )  x     / 0.40 ng/mL / x     / x     / x      CARDIAC MARKERS ( 15 Aug 2023 05:17 )  x     / <0.01 ng/mL / x     / x     / x      CARDIAC MARKERS ( 14 Aug 2023 20:46 )  x     / 0.04 ng/mL / x     / x     / x      CARDIAC MARKERS ( 14 Aug 2023 12:16 )  x     / 0.08 ng/mL / x     / x     / x          Urinalysis Basic - ( 16 Aug 2023 05:24 )    Color: x / Appearance: x / SG: x / pH: x  Gluc: 161 mg/dL / Ketone: x  / Bili: x / Urobili: x   Blood: x / Protein: x / Nitrite: x   Leuk Esterase: x / RBC: x / WBC x   Sq Epi: x / Non Sq Epi: x / Bacteria: x

## 2023-08-16 NOTE — SWALLOW BEDSIDE ASSESSMENT ADULT - SLP PERTINENT HISTORY OF CURRENT PROBLEM
67yo F w/a PMH of Type II DM, HTN, DLD, obesity, lateral epicondylitis presenting to the ED 8/13 w/left arm pain. This arm started about two months ago and has continued to progress but in the last few days became severe. Patient was then noted to have acute mental status change, SOB, lactate elevation, and acidosis on abg. Patient had two seizure episodes and was treated with benzodiazepine. Intubated by ER for airway protection, extubated 8/15. Weaned to O2 NC 8/16, on VEEG.

## 2023-08-16 NOTE — EEG REPORT - NS EEG TEXT BOX
Epilepsy Attending Note:     MARCIANO RODRIGUES    68y Female  MRN [id number]    Vital Signs Last 24 Hrs  T(C): 36.8 (16 Aug 2023 08:00), Max: 39.2 (15 Aug 2023 13:00)  T(F): 98.3 (16 Aug 2023 08:00), Max: 102.6 (15 Aug 2023 13:00)  HR: 100 (16 Aug 2023 11:00) (88 - 126)  BP: 132/60 (16 Aug 2023 11:00) (98/49 - 149/76)  BP(mean): 86 (16 Aug 2023 11:00) (71 - 106)  RR: 24 (16 Aug 2023 11:00) (20 - 33)  SpO2: 96% (16 Aug 2023 11:00) (93% - 100%)    Parameters below as of 16 Aug 2023 11:00  Patient On (Oxygen Delivery Method): room air                              8.6    12.05 )-----------( 193      ( 16 Aug 2023 05:24 )             28.8       08-16    151<H>  |  114<H>  |  17  ----------------------------<  161<H>  3.7   |  16<L>  |  0.8    Ca    8.8      16 Aug 2023 05:24  Phos  4.0     08-15  Mg     2.2     08-15    TPro  6.6  /  Alb  3.6  /  TBili  0.4  /  DBili  x   /  AST  65<H>  /  ALT  24  /  AlkPhos  92  08-16      MEDICATIONS  (STANDING):  albuterol/ipratropium for Nebulization 3 milliLiter(s) Nebulizer every 6 hours  aspirin  chewable 81 milliGRAM(s) Oral daily  atorvastatin 40 milliGRAM(s) Oral at bedtime  cefTRIAXone   IVPB 2000 milliGRAM(s) IV Intermittent every 12 hours  chlorhexidine 2% Cloths 1 Application(s) Topical <User Schedule>  dextrose 5%. 1000 milliLiter(s) (50 mL/Hr) IV Continuous <Continuous>  dextrose 50% Injectable 25 Gram(s) IV Push once  glucagon  Injectable 1 milliGRAM(s) IntraMuscular once  heparin  Infusion.  Unit(s)/Hr (11 mL/Hr) IV Continuous <Continuous>  insulin lispro (ADMELOG) corrective regimen sliding scale   SubCutaneous three times a day before meals  levETIRAcetam  IVPB 1000 milliGRAM(s) IV Intermittent every 12 hours  metoprolol tartrate 25 milliGRAM(s) Oral every 12 hours  pantoprazole  Injectable 40 milliGRAM(s) IV Push daily  sacubitril 49 mG/valsartan 51 mG 1 Tablet(s) Oral two times a day  sodium bicarbonate 650 milliGRAM(s) Oral three times a day    MEDICATIONS  (PRN):  acetaminophen     Tablet .. 650 milliGRAM(s) Oral every 6 hours PRN Temp greater or equal to 38C (100.4F), Mild Pain (1 - 3)  dextrose Oral Gel 15 Gram(s) Oral once PRN Blood Glucose LESS THAN 70 milliGRAM(s)/deciliter  heparin   Injectable 5000 Unit(s) IV Push every 6 hours PRN For aPTT less than 40  heparin   Injectable 2500 Unit(s) IV Push every 6 hours PRN For aPTT between 40 - 57            VEEG in the last 24 hours:    Background - continuous, symmetrical, reaching frequencies in the range of 7-8 Hz, relatively more organized compare to the day before.      Focal and generalized slowin. mild to moderate generalized slowing   2. borderline tt mild bilateral focal slowing with shifting asymmetry, R>L    Interictal activity - none    Events - none    Seizures - none    Impression: VEEG as above    Plan - per neurology team

## 2023-08-16 NOTE — CHART NOTE - NSCHARTNOTEFT_GEN_A_CORE
Transfer Note:      Transfer Note CCU   >   Floor     Handoff:  f/u speech and swallow eval  f/u BHB,lactate and ABGs  (suspecting HAGMA)   f/u Hb if remains stable switch to Lovenox  from Heparin (b/l DVTs)   f/u trop trend and ECG and follow up cardiology if needed (pt is stable rn as per Cardio)             HPI :  69yo F w/a PMH of Type II DM, HTN, DLD, obesity, lateral epicondylitis presenting to the ED 8/13 w/left arm pain. This arm started about two months ago and has continued to progress but in the last few days became severe. Pt was previously seen for this pain in the ED on 8/8, was given pain medications then sent home. Per son, pain still continued to progress after this. No fevers, chills, nausea, vomiting, diarrhea, constipation, SOB, CP.         ED course :  On admission to the ED, she presented with severe left arm pain w findings of LBBB on ECG (unsure if new or not). Code STEMI was called and then canceled because troponin negative and no chest pain. Patient was then noted to have acute mental status change, SOB, lactate elevation, and acidosis on abg. Patient had two seizure episodes and was treated with benzodiazepine. Intubated by ER for airway protection, extubated yesterday 8/15  Vitals: /64, HR 75, RR 20, T 98.5, satting 99 percent on RA   Labs: WBC 14.86, Hgb 10, , Na 139, K 5.4, BUN 12, Cr .6, Trop <.01 --> .13 on repeat  Imaging:   CXR -  Patchy bibasilar opacities, right greater than left.  CT Brain Stroke protocol - No evidence of acute transcortical infarct, hydrocephalus, acute intracranial hemorrhage, or mass effect.  CT brain perfusion: No evidence of acute infarct or ischemia.  CTA neck: No stenosis. No dissection.  CTA brain: No stenosis or aneurysm.  CTA Neck: The distal internal carotid arteries are patent. The [place] is normal in appearance. The visualized cerebral arteries are unremarkable.The vertebrobasilar junction and basilar artery are normal.  In the ED, given Levaquin. Keppra, started on propofol  Admitted to MICU for airway monitoring  (13 Aug 2023 19:09)        CCU Course :  Came to CCU on DAY 4 s/p 2 seizure episode so we started pt on keppra 1g BID and put her on Video EEG9as per Neuro consult)  which was negative for any seizure activity.  The lactic acidosis was most likely  s/p seizure and was ultimately resolved.  To r/o the cause for Altered mental status we did BCx ,UCx and CSF studies all came back negative.Owing to the infiltrates on CXRAY in ED pt was started on Levofloxacin 750 and   pt  was  on empiric treatment with Vanc 1g and ampicillin 2 g for AMS but later d/c as per ID consult and started pt of Rocephin 2g iv q12 and pt mental status been improved then.  For Acute hypoxemic respiratory failure pt was successfully extubated on 8/15 .  for elevated TROP in ED we  Trend the trop.ON ECHO there was a Reduced EF. Ischemic work-up per cardiology and pt was  stable.  For low bicarb we started pt on Oral bicarb TID on 8/16 and started a workup for   Pt's Hb dropped so we held AC and one pint of blood was given on 8/15 and Hb was stable after that.  Duplex positive with bilateral DVTs. Restarted on IV heparin. Plan is if  hgb stays  stable then will switch to Lovenox.   Suspecting euglycemic HAGMA as per ABGS on 8/16 so ordered  Beta-hydroxy,lactate ,repeat ABGs and urine for ketones. Transfer Note:      Transfer Note CCU   >   Floor     Handoff:  f/u speech and swallow eval  f/u BHB,lactate and ABGs  (suspecting HAGMA)   f/u Hb if remains stable switch to Lovenox  from Heparin (b/l DVTs)   f/u trop trend and ECG and follow up cardiology if needed (pt is stable rn as per Cardio)             HPI :  69yo F w/a PMH of Type II DM, HTN, DLD, obesity, lateral epicondylitis presenting to the ED 8/13 w/left arm pain. This arm started about two months ago and has continued to progress but in the last few days became severe. Pt was previously seen for this pain in the ED on 8/8, was given pain medications then sent home. Per son, pain still continued to progress after this. No fevers, chills, nausea, vomiting, diarrhea, constipation, SOB, CP.         ED course :  On admission to the ED, she presented with severe left arm pain w findings of LBBB on ECG (unsure if new or not). Code STEMI was called and then canceled because troponin negative and no chest pain. Patient was then noted to have acute mental status change, SOB, lactate elevation, and acidosis on abg. Patient had two seizure episodes and was treated with benzodiazepine. Intubated by ER for airway protection, extubated yesterday 8/15  Vitals: /64, HR 75, RR 20, T 98.5, satting 99 percent on RA   Labs: WBC 14.86, Hgb 10, , Na 139, K 5.4, BUN 12, Cr .6, Trop <.01 --> .13 on repeat  Imaging:   CXR -  Patchy bibasilar opacities, right greater than left.  CT Brain Stroke protocol - No evidence of acute transcortical infarct, hydrocephalus, acute intracranial hemorrhage, or mass effect.  CT brain perfusion: No evidence of acute infarct or ischemia.  CTA neck: No stenosis. No dissection.  CTA brain: No stenosis or aneurysm.  CTA Neck: The distal internal carotid arteries are patent. The [place] is normal in appearance. The visualized cerebral arteries are unremarkable.The vertebrobasilar junction and basilar artery are normal.  In the ED, given Levaquin. Keppra, started on propofol  Admitted to MICU for airway monitoring  (13 Aug 2023 19:09)        CCU Course :  Came to CCU on DAY 4 s/p 2 seizure episode so we started pt on keppra 1g BID and put her on Video EEG9as per Neuro consult)  which was negative for any seizure activity.  The lactic acidosis was most likely  s/p seizure and was ultimately resolved.patient had episodes of fever.  To r/o the cause for Altered mental status we did BCx ,UCx and CSF studies all came back negative.Owing to the infiltrates on CXRAY in ED pt was started on Levofloxacin 750 and   pt  was  on empiric treatment with Vanc 1g and ampicillin 2 g for AMS but later d/c as per ID consult and started pt of Rocephin 2g iv q12 and pt mental status been improved then.  For Acute hypoxemic respiratory failure pt was successfully extubated on 8/15 .  For elevated TROP in ED we trended  the trop in CCU.On ECHO there was a Reduced EF. Ischemic work-up pwas done er cardiology and pt was  stable.  For low bicarb we started pt on Oral bicarb TID on 8/16 and started a workup for   Pt's Hb dropped so we held AC and one pint of blood was given on 8/15 and Hb was stable after that.  Duplex positive with bilateral DVTs. Restarted on IV heparin. Plan is if  hgb stays  stable then will switch to Lovenox.   Suspecting euglycemic HAGMA as per ABGS on 8/16 so ordered  Beta-hydroxy,lactate ,repeat ABGs and urine for ketones.      Impression:   Seizures   Altered mental status  Acute hypoxemic respiratory failure   Lactic acidosis     PLAN:    CNS: Still on Keppra 1g BID. EEG no seizures.   HEENT: Oral care.   PULMONARY:  On room air. CXR noted.   CARDIOVASCULAR: Trop noted 0.4. EKG today. Trend the trop. Reduced EF on echo. Ischemic work-up per cardiology once pt is stable.  GI: GI prophylaxis.  Speech and swallow for diet. Needs free water.   RENAL:  Follow up lytes.  Correct as needed. No felder. Oral bicarb TID. Lactic acid.   INFECTIOUS DISEASE: Culture: Blood culture negative, Ucx negative, CSF negative. No further fever. Per ID, d/c other antibiotics, start Rocephin    HEMATOLOGICAL:  Duplex positive with bilateral DVTs. On IV heparin. If hgb stable then switch to Lovenox.   ENDOCRINE:  Follow up FS.  Insulin protocol if needed. Beta-hydroxy.   MUSCULOSKELETAL: PT rehab.   CODE STATUS: Full code.   Dispo: SDU if stable in the afternoon.

## 2023-08-16 NOTE — SWALLOW BEDSIDE ASSESSMENT ADULT - SWALLOW EVAL: RECOMMENDED DIET
easy to chew w/ thin liquids, if intake is slow 2' prolonged mastication w/ ETC downgrade to soft and bite size, no need for ST re-eval

## 2023-08-16 NOTE — PROGRESS NOTE ADULT - ASSESSMENT
Assessment: 69yo F w/a PMH of Type II DM, HTN, DLD, obesity, lateral epicondylitis presenting to the ED 8/13 w/left arm pain, upgraded to CCU following multiple seizures requiring intubation, now CCU day 4. Pt now extubated and tolerating room air.    Impression:   Seizures   Altered mental status  Acute hypoxemic respiratory failure   Lactic acidosis     PLAN:    CNS: Still on Keppra 1g BID. EEG no seizures.   HEENT: Oral care.   PULMONARY:  On room air. CXR noted.   CARDIOVASCULAR: Trop noted 0.4. EKG today. Trend the trop. Reduced EF on echo. Ischemic work-up per cardiology once pt is stable.  GI: GI prophylaxis.  Speech and swallow for diet. Needs free water.   RENAL:  Follow up lytes.  Correct as needed. No felder. Oral bicarb TID. Lactic acid.   INFECTIOUS DISEASE: Culture: Blood culture negative, Ucx negative, CSF negative. No further fever. Per ID, d/c other antibiotics, start Rocephin    HEMATOLOGICAL:  Duplex positive with bilateral DVTs. On IV heparin. If hgb stable then switch to Lovenox.   ENDOCRINE:  Follow up FS.  Insulin protocol if needed. Beta-hydroxy.   MUSCULOSKELETAL: PT rehab.   CODE STATUS: Full code.   Dispo: SDU if stable in the afternoon.

## 2023-08-16 NOTE — SWALLOW BEDSIDE ASSESSMENT ADULT - SLP GENERAL OBSERVATIONS
Pt received in bed asleep, woke to verbal stim, +low vocal intensity,  had difficult time hearing pt unless she projected purposefully.

## 2023-08-17 LAB
ALBUMIN SERPL ELPH-MCNC: 3.1 G/DL — LOW (ref 3.5–5.2)
ALBUMIN SERPL ELPH-MCNC: 3.3 G/DL — LOW (ref 3.5–5.2)
ALP SERPL-CCNC: 83 U/L — SIGNIFICANT CHANGE UP (ref 30–115)
ALP SERPL-CCNC: 92 U/L — SIGNIFICANT CHANGE UP (ref 30–115)
ALT FLD-CCNC: 21 U/L — SIGNIFICANT CHANGE UP (ref 0–41)
ALT FLD-CCNC: 23 U/L — SIGNIFICANT CHANGE UP (ref 0–41)
ANION GAP SERPL CALC-SCNC: 12 MMOL/L — SIGNIFICANT CHANGE UP (ref 7–14)
ANION GAP SERPL CALC-SCNC: 18 MMOL/L — HIGH (ref 7–14)
AST SERPL-CCNC: 43 U/L — HIGH (ref 0–41)
AST SERPL-CCNC: 52 U/L — HIGH (ref 0–41)
B-OH-BUTYR SERPL-SCNC: 1.7 MMOL/L — HIGH
BILIRUB SERPL-MCNC: 0.3 MG/DL — SIGNIFICANT CHANGE UP (ref 0.2–1.2)
BILIRUB SERPL-MCNC: 0.3 MG/DL — SIGNIFICANT CHANGE UP (ref 0.2–1.2)
BUN SERPL-MCNC: 10 MG/DL — SIGNIFICANT CHANGE UP (ref 10–20)
BUN SERPL-MCNC: 12 MG/DL — SIGNIFICANT CHANGE UP (ref 10–20)
CALCIUM SERPL-MCNC: 8.2 MG/DL — LOW (ref 8.4–10.4)
CALCIUM SERPL-MCNC: 8.2 MG/DL — LOW (ref 8.4–10.5)
CHLORIDE SERPL-SCNC: 106 MMOL/L — SIGNIFICANT CHANGE UP (ref 98–110)
CHLORIDE SERPL-SCNC: 110 MMOL/L — SIGNIFICANT CHANGE UP (ref 98–110)
CO2 SERPL-SCNC: 19 MMOL/L — SIGNIFICANT CHANGE UP (ref 17–32)
CO2 SERPL-SCNC: 22 MMOL/L — SIGNIFICANT CHANGE UP (ref 17–32)
CREAT SERPL-MCNC: 0.6 MG/DL — LOW (ref 0.7–1.5)
CREAT SERPL-MCNC: 0.6 MG/DL — LOW (ref 0.7–1.5)
CULTURE RESULTS: NO GROWTH — SIGNIFICANT CHANGE UP
EGFR: 96 ML/MIN/1.73M2 — SIGNIFICANT CHANGE UP
EGFR: 96 ML/MIN/1.73M2 — SIGNIFICANT CHANGE UP
GLUCOSE BLDC GLUCOMTR-MCNC: 139 MG/DL — HIGH (ref 70–99)
GLUCOSE BLDC GLUCOMTR-MCNC: 140 MG/DL — HIGH (ref 70–99)
GLUCOSE BLDC GLUCOMTR-MCNC: 141 MG/DL — HIGH (ref 70–99)
GLUCOSE BLDC GLUCOMTR-MCNC: 156 MG/DL — HIGH (ref 70–99)
GLUCOSE BLDC GLUCOMTR-MCNC: 156 MG/DL — HIGH (ref 70–99)
GLUCOSE BLDC GLUCOMTR-MCNC: 198 MG/DL — HIGH (ref 70–99)
GLUCOSE BLDC GLUCOMTR-MCNC: 209 MG/DL — HIGH (ref 70–99)
GLUCOSE BLDC GLUCOMTR-MCNC: 270 MG/DL — HIGH (ref 70–99)
GLUCOSE BLDC GLUCOMTR-MCNC: 278 MG/DL — HIGH (ref 70–99)
GLUCOSE SERPL-MCNC: 149 MG/DL — HIGH (ref 70–99)
GLUCOSE SERPL-MCNC: 167 MG/DL — HIGH (ref 70–99)
HCT VFR BLD CALC: 25.4 % — LOW (ref 37–47)
HCT VFR BLD CALC: 28 % — LOW (ref 37–47)
HGB BLD-MCNC: 7.7 G/DL — LOW (ref 12–16)
HGB BLD-MCNC: 8.5 G/DL — LOW (ref 12–16)
INNER EAR 68KD AB FLD QL: <1.5 U/L — SIGNIFICANT CHANGE UP (ref 0–3.1)
LACTATE SERPL-SCNC: 1 MMOL/L — SIGNIFICANT CHANGE UP (ref 0.7–2)
MCHC RBC-ENTMCNC: 22.6 PG — LOW (ref 27–31)
MCHC RBC-ENTMCNC: 22.7 PG — LOW (ref 27–31)
MCHC RBC-ENTMCNC: 30.3 G/DL — LOW (ref 32–37)
MCHC RBC-ENTMCNC: 30.4 G/DL — LOW (ref 32–37)
MCV RBC AUTO: 74.7 FL — LOW (ref 81–99)
MCV RBC AUTO: 74.9 FL — LOW (ref 81–99)
NRBC # BLD: 0 /100 WBCS — SIGNIFICANT CHANGE UP (ref 0–0)
NRBC # BLD: 0 /100 WBCS — SIGNIFICANT CHANGE UP (ref 0–0)
PLATELET # BLD AUTO: 209 K/UL — SIGNIFICANT CHANGE UP (ref 130–400)
PLATELET # BLD AUTO: 225 K/UL — SIGNIFICANT CHANGE UP (ref 130–400)
PMV BLD: 10.7 FL — HIGH (ref 7.4–10.4)
PMV BLD: 11 FL — HIGH (ref 7.4–10.4)
POTASSIUM SERPL-MCNC: 3 MMOL/L — LOW (ref 3.5–5)
POTASSIUM SERPL-MCNC: 3.6 MMOL/L — SIGNIFICANT CHANGE UP (ref 3.5–5)
POTASSIUM SERPL-SCNC: 3 MMOL/L — LOW (ref 3.5–5)
POTASSIUM SERPL-SCNC: 3.6 MMOL/L — SIGNIFICANT CHANGE UP (ref 3.5–5)
PROT SERPL-MCNC: 5.9 G/DL — LOW (ref 6–8)
PROT SERPL-MCNC: 6.1 G/DL — SIGNIFICANT CHANGE UP (ref 6–8)
RBC # BLD: 3.4 M/UL — LOW (ref 4.2–5.4)
RBC # BLD: 3.74 M/UL — LOW (ref 4.2–5.4)
RBC # FLD: 18.6 % — HIGH (ref 11.5–14.5)
RBC # FLD: 18.7 % — HIGH (ref 11.5–14.5)
SODIUM SERPL-SCNC: 143 MMOL/L — SIGNIFICANT CHANGE UP (ref 135–146)
SODIUM SERPL-SCNC: 144 MMOL/L — SIGNIFICANT CHANGE UP (ref 135–146)
SPECIMEN SOURCE: SIGNIFICANT CHANGE UP
TROPONIN T SERPL-MCNC: 0.43 NG/ML — CRITICAL HIGH
TROPONIN T SERPL-MCNC: 0.48 NG/ML — CRITICAL HIGH
WBC # BLD: 8.91 K/UL — SIGNIFICANT CHANGE UP (ref 4.8–10.8)
WBC # BLD: 8.92 K/UL — SIGNIFICANT CHANGE UP (ref 4.8–10.8)
WBC # FLD AUTO: 8.91 K/UL — SIGNIFICANT CHANGE UP (ref 4.8–10.8)
WBC # FLD AUTO: 8.92 K/UL — SIGNIFICANT CHANGE UP (ref 4.8–10.8)
WNV IGG CSF IA-ACNC: NEGATIVE — SIGNIFICANT CHANGE UP
WNV IGM CSF IA-ACNC: NEGATIVE — SIGNIFICANT CHANGE UP

## 2023-08-17 PROCEDURE — 95720 EEG PHY/QHP EA INCR W/VEEG: CPT

## 2023-08-17 PROCEDURE — 99233 SBSQ HOSP IP/OBS HIGH 50: CPT

## 2023-08-17 PROCEDURE — 70551 MRI BRAIN STEM W/O DYE: CPT | Mod: 26

## 2023-08-17 PROCEDURE — 71045 X-RAY EXAM CHEST 1 VIEW: CPT | Mod: 26

## 2023-08-17 RX ORDER — INSULIN LISPRO 100/ML
5 VIAL (ML) SUBCUTANEOUS
Refills: 0 | Status: DISCONTINUED | OUTPATIENT
Start: 2023-08-17 | End: 2023-08-18

## 2023-08-17 RX ORDER — INSULIN LISPRO 100/ML
VIAL (ML) SUBCUTANEOUS
Refills: 0 | Status: DISCONTINUED | OUTPATIENT
Start: 2023-08-17 | End: 2023-08-30

## 2023-08-17 RX ORDER — METOPROLOL TARTRATE 50 MG
100 TABLET ORAL DAILY
Refills: 0 | Status: DISCONTINUED | OUTPATIENT
Start: 2023-08-18 | End: 2023-08-18

## 2023-08-17 RX ORDER — INSULIN GLARGINE 100 [IU]/ML
16 INJECTION, SOLUTION SUBCUTANEOUS EVERY MORNING
Refills: 0 | Status: DISCONTINUED | OUTPATIENT
Start: 2023-08-17 | End: 2023-08-18

## 2023-08-17 RX ORDER — METOPROLOL TARTRATE 50 MG
100 TABLET ORAL ONCE
Refills: 0 | Status: COMPLETED | OUTPATIENT
Start: 2023-08-17 | End: 2023-08-17

## 2023-08-17 RX ORDER — METOPROLOL TARTRATE 50 MG
100 TABLET ORAL AT BEDTIME
Refills: 0 | Status: DISCONTINUED | OUTPATIENT
Start: 2023-08-17 | End: 2023-08-17

## 2023-08-17 RX ORDER — POTASSIUM CHLORIDE 20 MEQ
20 PACKET (EA) ORAL
Refills: 0 | Status: COMPLETED | OUTPATIENT
Start: 2023-08-17 | End: 2023-08-17

## 2023-08-17 RX ORDER — METOPROLOL TARTRATE 50 MG
100 TABLET ORAL AT BEDTIME
Refills: 0 | Status: COMPLETED | OUTPATIENT
Start: 2023-08-17 | End: 2023-08-17

## 2023-08-17 RX ADMIN — Medication 100 MILLIGRAM(S): at 10:04

## 2023-08-17 RX ADMIN — SACUBITRIL AND VALSARTAN 1 TABLET(S): 24; 26 TABLET, FILM COATED ORAL at 05:36

## 2023-08-17 RX ADMIN — INSULIN HUMAN 1 UNIT(S)/HR: 100 INJECTION, SOLUTION SUBCUTANEOUS at 01:58

## 2023-08-17 RX ADMIN — Medication 3 MILLILITER(S): at 01:15

## 2023-08-17 RX ADMIN — Medication 81 MILLIGRAM(S): at 12:30

## 2023-08-17 RX ADMIN — Medication 25 MILLIGRAM(S): at 05:36

## 2023-08-17 RX ADMIN — Medication 25 MILLIGRAM(S): at 18:16

## 2023-08-17 RX ADMIN — SACUBITRIL AND VALSARTAN 1 TABLET(S): 24; 26 TABLET, FILM COATED ORAL at 18:16

## 2023-08-17 RX ADMIN — LEVETIRACETAM 400 MILLIGRAM(S): 250 TABLET, FILM COATED ORAL at 05:35

## 2023-08-17 RX ADMIN — Medication 3: at 18:15

## 2023-08-17 RX ADMIN — INSULIN GLARGINE 16 UNIT(S): 100 INJECTION, SOLUTION SUBCUTANEOUS at 08:10

## 2023-08-17 RX ADMIN — CEFTRIAXONE 100 MILLIGRAM(S): 500 INJECTION, POWDER, FOR SOLUTION INTRAMUSCULAR; INTRAVENOUS at 05:35

## 2023-08-17 RX ADMIN — CHLORHEXIDINE GLUCONATE 1 APPLICATION(S): 213 SOLUTION TOPICAL at 05:59

## 2023-08-17 RX ADMIN — LEVETIRACETAM 400 MILLIGRAM(S): 250 TABLET, FILM COATED ORAL at 18:03

## 2023-08-17 RX ADMIN — Medication 100 MILLIGRAM(S): at 21:29

## 2023-08-17 RX ADMIN — INSULIN HUMAN 2 UNIT(S)/HR: 100 INJECTION, SOLUTION SUBCUTANEOUS at 23:25

## 2023-08-17 RX ADMIN — Medication 50 MILLIEQUIVALENT(S): at 04:24

## 2023-08-17 RX ADMIN — PANTOPRAZOLE SODIUM 40 MILLIGRAM(S): 20 TABLET, DELAYED RELEASE ORAL at 12:31

## 2023-08-17 RX ADMIN — ENOXAPARIN SODIUM 60 MILLIGRAM(S): 100 INJECTION SUBCUTANEOUS at 05:35

## 2023-08-17 RX ADMIN — Medication 5 UNIT(S): at 18:16

## 2023-08-17 RX ADMIN — Medication 650 MILLIGRAM(S): at 21:29

## 2023-08-17 RX ADMIN — SODIUM CHLORIDE 75 MILLILITER(S): 9 INJECTION, SOLUTION INTRAVENOUS at 07:07

## 2023-08-17 RX ADMIN — Medication 650 MILLIGRAM(S): at 13:12

## 2023-08-17 RX ADMIN — Medication 50 MILLIEQUIVALENT(S): at 02:25

## 2023-08-17 RX ADMIN — ATORVASTATIN CALCIUM 40 MILLIGRAM(S): 80 TABLET, FILM COATED ORAL at 21:29

## 2023-08-17 RX ADMIN — Medication 50 MILLIEQUIVALENT(S): at 00:46

## 2023-08-17 RX ADMIN — Medication 3 MILLILITER(S): at 20:55

## 2023-08-17 RX ADMIN — ENOXAPARIN SODIUM 60 MILLIGRAM(S): 100 INJECTION SUBCUTANEOUS at 18:16

## 2023-08-17 RX ADMIN — Medication 650 MILLIGRAM(S): at 05:35

## 2023-08-17 NOTE — PROGRESS NOTE ADULT - SUBJECTIVE AND OBJECTIVE BOX
Patient is a 68y old  Female who presents with a chief complaint of Arm Pain, AMS (16 Aug 2023 08:31)        Over Night Events:    Tmax noted         ROS:  See HPI    PHYSICAL EXAM    ICU Vital Signs Last 24 Hrs  T(C): 36.9 (17 Aug 2023 04:00), Max: 36.9 (16 Aug 2023 20:00)  T(F): 98.5 (17 Aug 2023 04:00), Max: 98.5 (17 Aug 2023 04:00)  HR: 90 (17 Aug 2023 07:00) (90 - 107)  BP: 113/53 (17 Aug 2023 07:00) (106/83 - 142/60)  BP(mean): 77 (17 Aug 2023 07:00) (76 - 91)  ABP: --  ABP(mean): --  RR: 23 (17 Aug 2023 07:00) (23 - 31)  SpO2: 96% (17 Aug 2023 07:00) (95% - 98%)    O2 Parameters below as of 17 Aug 2023 07:00  Patient On (Oxygen Delivery Method): room air            General: NAD   HEENT: EPIFANIO             Lymphatic system: No cervical LN   Lungs: Bilateral BS; clear  Cardiovascular: Regular   Gastrointestinal: Soft, Positive BS  Extremities: No clubbing.  Moves extremities.  Full Range of motion   Skin: Warm, intact  Neurological: No motor or sensory deficit       08-16-23 @ 07:01  -  08-17-23 @ 07:00  --------------------------------------------------------  IN:    dextrose 5%: 900 mL    Heparin Infusion: 53 mL    Insulin: 1 mL    Insulin: 3 mL    Insulin: 4 mL    Insulin: 2 mL    Insulin: 6 mL    IV PiggyBack: 100 mL    Oral Fluid: 480 mL  Total IN: 1549 mL    OUT:    Indwelling Catheter - Urethral (mL): 1260 mL    Intermittent Catheterization - Urethral (mL): 1550 mL  Total OUT: 2810 mL    Total NET: -1261 mL          LABS:                            7.7    8.91  )-----------( 209      ( 17 Aug 2023 05:13 )             25.4                                               08-17    144  |  110  |  10  ----------------------------<  149<H>  3.6   |  22  |  0.6<L>    Ca    8.2<L>      17 Aug 2023 05:13    TPro  5.9<L>  /  Alb  3.1<L>  /  TBili  0.3  /  DBili  x   /  AST  43<H>  /  ALT  21  /  AlkPhos  83  08-17      PT/INR - ( 16 Aug 2023 05:24 )   PT: 13.10 sec;   INR: 1.14 ratio         PTT - ( 16 Aug 2023 11:39 )  PTT:100.9 sec                                       Urinalysis Basic - ( 17 Aug 2023 05:13 )    Color: x / Appearance: x / SG: x / pH: x  Gluc: 149 mg/dL / Ketone: x  / Bili: x / Urobili: x   Blood: x / Protein: x / Nitrite: x   Leuk Esterase: x / RBC: x / WBC x   Sq Epi: x / Non Sq Epi: x / Bacteria: x        CARDIAC MARKERS ( 17 Aug 2023 05:13 )  x     / 0.43 ng/mL / x     / x     / x      CARDIAC MARKERS ( 16 Aug 2023 11:35 )  x     / 0.38 ng/mL / x     / x     / x      CARDIAC MARKERS ( 16 Aug 2023 05:24 )  x     / 0.40 ng/mL / x     / x     / x                                                LIVER FUNCTIONS - ( 17 Aug 2023 05:13 )  Alb: 3.1 g/dL / Pro: 5.9 g/dL / ALK PHOS: 83 U/L / ALT: 21 U/L / AST: 43 U/L / GGT: x                                                  Culture - Blood (collected 15 Aug 2023 18:33)  Source: .Blood Blood  Preliminary Report (17 Aug 2023 02:01):    No growth at 24 hours    Culture - Blood (collected 15 Aug 2023 18:33)  Source: .Blood Blood  Preliminary Report (17 Aug 2023 02:01):    No growth at 24 hours    Culture - Urine (collected 14 Aug 2023 17:37)  Source: Catheterized Catheterized  Final Report (15 Aug 2023 20:17):    No growth    Culture - Fungal, CSF (collected 14 Aug 2023 13:33)  Source: .CSF CSF  Preliminary Report (16 Aug 2023 15:03):    Culture is being performed. Fungal cultures are held for 4 weeks.    Culture - CSF with Gram Stain (collected 14 Aug 2023 13:33)  Source: .CSF CSF  Gram Stain (14 Aug 2023 22:20):    polymorphonuclear leukocytes seen    No organisms seen    by cytocentrifuge  Preliminary Report (15 Aug 2023 15:23):    No growth    Culture - Acid Fast - CSF (collected 14 Aug 2023 13:33)  Source: .CSF CSF  Preliminary Report (16 Aug 2023 15:09):    Culture is being performed.    Culture - Blood (collected 14 Aug 2023 12:06)  Source: .Blood Blood  Preliminary Report (16 Aug 2023 23:01):    No growth at 48 Hours                                                                                       ABG - ( 15 Aug 2023 11:03 )  pH, Arterial: 7.33  pH, Blood: x     /  pCO2: 43    /  pO2: 190   / HCO3: 23    / Base Excess: -3.2  /  SaO2: 97.1                MEDICATIONS  (STANDING):  albuterol/ipratropium for Nebulization 3 milliLiter(s) Nebulizer every 6 hours  aspirin  chewable 81 milliGRAM(s) Oral daily  atorvastatin 40 milliGRAM(s) Oral at bedtime  cefTRIAXone   IVPB 2000 milliGRAM(s) IV Intermittent every 12 hours  chlorhexidine 2% Cloths 1 Application(s) Topical <User Schedule>  dextrose 5%. 1000 milliLiter(s) (75 mL/Hr) IV Continuous <Continuous>  dextrose 5%. 1000 milliLiter(s) (50 mL/Hr) IV Continuous <Continuous>  dextrose 50% Injectable 25 Gram(s) IV Push once  enoxaparin Injectable 60 milliGRAM(s) SubCutaneous every 12 hours  glucagon  Injectable 1 milliGRAM(s) IntraMuscular once  insulin glargine Injectable (LANTUS) 16 Unit(s) SubCutaneous every morning  insulin lispro (ADMELOG) corrective regimen sliding scale   SubCutaneous three times a day before meals  insulin lispro Injectable (ADMELOG) 5 Unit(s) SubCutaneous three times a day before meals  insulin regular Infusion 1 Unit(s)/Hr (1 mL/Hr) IV Continuous <Continuous>  levETIRAcetam  IVPB 1000 milliGRAM(s) IV Intermittent every 12 hours  metoprolol tartrate 25 milliGRAM(s) Oral every 12 hours  pantoprazole  Injectable 40 milliGRAM(s) IV Push daily  sacubitril 49 mG/valsartan 51 mG 1 Tablet(s) Oral two times a day  sodium bicarbonate 650 milliGRAM(s) Oral three times a day    MEDICATIONS  (PRN):  acetaminophen     Tablet .. 650 milliGRAM(s) Oral every 6 hours PRN Temp greater or equal to 38C (100.4F), Mild Pain (1 - 3)  dextrose Oral Gel 15 Gram(s) Oral once PRN Blood Glucose LESS THAN 70 milliGRAM(s)/deciliter      Xrays:                                                                                     ECHO

## 2023-08-17 NOTE — PROGRESS NOTE ADULT - ASSESSMENT
· Assessment	  69yo F w/a PMH of Type II DM, HTN, DLD, obesity, lateral epicondylitis presenting to the ED w/ Left Arm pain.     8/12 CT A/P No Cont : No evidence of acute abdominal pathology.    8/13 CT Angio Neck Stroke Protocol w/ IV Cont   CT brain perfusion: No evidence of acute infarct or ischemia.  CTA neck: No stenosis. No dissection.  CTA brain: No stenosis or aneurysm.    IMPRESSION/RECOMMENDATIONS  Extubated. Doing well with no complaints and no fevers  No evidence of an infection on admission  Seizures in ER with possible aspiration with possible RUL bacterial PNA vs pneumonitis  8/17 EEG presently with no Seizures, slowing  Possible resolved aseptic meningitis with no evidence of HSV 1/viral  encephalitis  CSF with hemorrhagic changes with CT with no evidence of bleed  Clinically no vasculitis/arteritis  8/14 , 15 BCx NG  8/14 UCx NG  8/14 COVID-19 NG  8/14 Nares ORSA NG    < from: [facility] Duplex Lower Ext Vein Scan, Bilat (08.15.23 @ 18:34) >  Right-acute appearing DVT is visualized in peroneal vein.  Left-acute appearing DVT is visualized in mid femoral, posterior tibial,   peroneal veins.    < end of copied text >    LLE DVT > source of SIRS     -d/c Rocephin   -Off loading to prevent pressure sores and preventive measures to avoid aspiration     Please do not hesitate to recall ID if any questions arise either through [place] or through [employer]

## 2023-08-17 NOTE — PROGRESS NOTE ADULT - SUBJECTIVE AND OBJECTIVE BOX
RAVI, MARCIANO  68y, Female    All available historical data reviewed    OVERNIGHT EVENTS:  no fevers  extubated  no pressors  no diarrhea  felder in ( was retaining )  no central lines    ROS:  not reliable    VITALS:  T(F): 99.5, Max: 99.5 (08-17-23 @ 12:00)  HR: 81  BP: 124/60  RR: 25Vital Signs Last 24 Hrs  T(C): 37.5 (17 Aug 2023 12:00), Max: 37.5 (17 Aug 2023 12:00)  T(F): 99.5 (17 Aug 2023 12:00), Max: 99.5 (17 Aug 2023 12:00)  HR: 81 (17 Aug 2023 12:00) (81 - 101)  BP: 124/60 (17 Aug 2023 12:00) (106/83 - 142/60)  BP(mean): 85 (17 Aug 2023 12:00) (76 - 91)  RR: 25 (17 Aug 2023 08:00) (23 - 31)  SpO2: 97% (17 Aug 2023 12:00) (95% - 98%)    Parameters below as of 17 Aug 2023 12:00  Patient On (Oxygen Delivery Method): room air        TESTS & MEASUREMENTS:                        8.5    8.92  )-----------( 225      ( 17 Aug 2023 11:52 )             28.0     08-17    144  |  110  |  10  ----------------------------<  149<H>  3.6   |  22  |  0.6<L>    Ca    8.2<L>      17 Aug 2023 05:13    TPro  5.9<L>  /  Alb  3.1<L>  /  TBili  0.3  /  DBili  x   /  AST  43<H>  /  ALT  21  /  AlkPhos  83  08-17    LIVER FUNCTIONS - ( 17 Aug 2023 05:13 )  Alb: 3.1 g/dL / Pro: 5.9 g/dL / ALK PHOS: 83 U/L / ALT: 21 U/L / AST: 43 U/L / GGT: x             Culture - Blood (collected 08-15-23 @ 18:33)  Source: .Blood Blood  Preliminary Report (08-17-23 @ 02:01):    No growth at 24 hours    Culture - Blood (collected 08-15-23 @ 18:33)  Source: .Blood Blood  Preliminary Report (08-17-23 @ 02:01):    No growth at 24 hours    Culture - Urine (collected 08-14-23 @ 17:37)  Source: Catheterized Catheterized  Final Report (08-15-23 @ 20:17):    No growth    Culture - Fungal, CSF (collected 08-14-23 @ 13:33)  Source: .CSF CSF  Preliminary Report (08-16-23 @ 15:03):    Culture is being performed. Fungal cultures are held for 4 weeks.    Culture - CSF with Gram Stain (collected 08-14-23 @ 13:33)  Source: .CSF CSF  Gram Stain (08-14-23 @ 22:20):    polymorphonuclear leukocytes seen    No organisms seen    by cytocentrifuge  Preliminary Report (08-15-23 @ 15:23):    No growth    Culture - Acid Fast - CSF (collected 08-14-23 @ 13:33)  Source: .CSF CSF  Preliminary Report (08-16-23 @ 15:09):    Culture is being performed.    Culture - Blood (collected 08-14-23 @ 12:06)  Source: .Blood Blood  Preliminary Report (08-16-23 @ 23:01):    No growth at 48 Hours    Culture - Blood (collected 08-14-23 @ 04:40)  Source: .Blood None  Preliminary Report (08-17-23 @ 14:01):    No growth at 72 Hours    Culture - Blood (collected 08-14-23 @ 00:20)  Source: .Blood Blood  Preliminary Report (08-17-23 @ 09:01):    No growth at 72 Hours    Culture - Blood (collected 08-13-23 @ 21:17)  Source: .Blood None  Preliminary Report (08-17-23 @ 09:01):    No growth at 72 Hours      Urinalysis Basic - ( 17 Aug 2023 05:13 )    Color: x / Appearance: x / SG: x / pH: x  Gluc: 149 mg/dL / Ketone: x  / Bili: x / Urobili: x   Blood: x / Protein: x / Nitrite: x   Leuk Esterase: x / RBC: x / WBC x   Sq Epi: x / Non Sq Epi: x / Bacteria: x          RADIOLOGY & ADDITIONAL TESTS:  Personal review of radiological diagnostics performed  Echo and EKG results noted when applicable.     MEDICATIONS:  acetaminophen     Tablet .. 650 milliGRAM(s) Oral every 6 hours PRN  albuterol/ipratropium for Nebulization 3 milliLiter(s) Nebulizer every 6 hours  aspirin  chewable 81 milliGRAM(s) Oral daily  atorvastatin 40 milliGRAM(s) Oral at bedtime  cefTRIAXone   IVPB 2000 milliGRAM(s) IV Intermittent every 12 hours  chlorhexidine 2% Cloths 1 Application(s) Topical <User Schedule>  dextrose 5%. 1000 milliLiter(s) IV Continuous <Continuous>  dextrose 50% Injectable 25 Gram(s) IV Push once  dextrose Oral Gel 15 Gram(s) Oral once PRN  enoxaparin Injectable 60 milliGRAM(s) SubCutaneous every 12 hours  glucagon  Injectable 1 milliGRAM(s) IntraMuscular once  insulin glargine Injectable (LANTUS) 16 Unit(s) SubCutaneous every morning  insulin lispro (ADMELOG) corrective regimen sliding scale   SubCutaneous three times a day before meals  insulin lispro Injectable (ADMELOG) 5 Unit(s) SubCutaneous three times a day before meals  levETIRAcetam  IVPB 1000 milliGRAM(s) IV Intermittent every 12 hours  metoprolol tartrate 25 milliGRAM(s) Oral every 12 hours  pantoprazole  Injectable 40 milliGRAM(s) IV Push daily  sacubitril 49 mG/valsartan 51 mG 1 Tablet(s) Oral two times a day  sodium bicarbonate 650 milliGRAM(s) Oral three times a day      ANTIBIOTICS:  cefTRIAXone   IVPB 2000 milliGRAM(s) IV Intermittent every 12 hours

## 2023-08-17 NOTE — PROGRESS NOTE ADULT - ASSESSMENT
This is a 69yo F w/a PMH of Type II DM, HTN, DLD, obesity, lateral epicondylitis who initially presented to the ED w/ Left Arm pain and subsequently was found to have 2 episodes of brief loss of consiousness associated with some abnormal movements, originally thought to be due to seizures, s/p 4mg ativan total, Keppra load, intubated, now extubated today. Neurology following for further work up. On exam today, no focal deficits noted. Thus far, no seizures captured on vEEG.  Would need to rule out any structural abnormalities given focal slowing noted on eeg.     Recommendations:  This is a 69yo F w/a PMH of Type II DM, HTN, DLD, obesity, lateral epicondylitis who initially presented to the ED w/ Left Arm pain and subsequently was found to have 2 episodes of brief loss of consiousness associated with some abnormal movements, originally thought to be due to seizures, s/p 4mg ativan total, Keppra load, intubated, now extubated today. Neurology following for further work up. On exam today, no focal deficits noted. Thus far, no seizures captured on vEEG.  Would need to rule out any structural abnormalities given focal slowing noted on eeg.     Recommendations:   - Obtain MR brain non cont  - Discontinue vEEG  - c/w Keppra 1g BID for now    Discussed with Attending This is a 67yo F w/a PMH of Type II DM, HTN, DLD, obesity, lateral epicondylitis who initially presented to the ED w/ Left Arm pain and subsequently was found to have 2 episodes of brief loss of consiousness associated with some abnormal movements, originally thought to be due to seizures, s/p 4mg ativan total, Keppra load, intubated, now extubated today. Neurology following for further work up. On exam today, no focal deficits noted. Thus far, no seizures captured on vEEG.  Would need to rule out any structural abnormalities given focal slowing noted on eeg. Patient's transient loss of consciousness episodes must likely cardiopulmonary rather than seizures.     Recommendations:   - Obtain MR brain non cont  - Discontinue vEEG  - c/w Keppra 1g BID for now    Discussed with Attending

## 2023-08-17 NOTE — PROGRESS NOTE ADULT - ASSESSMENT
Impression:     Seizures   Altered mental status  Acute hypoxemic respiratory failure   Lactic acidosis     PLAN:    CNS: No sedation. No seizure on EEG. On Keppra BID.     HEENT: Oral care.     PULMONARY:  HOB @ 45 degrees.  On room air.     CARDIOVASCULAR: Not on pressors. Keep equal balance. Trop noted; CCTA per cardiology EF 30-35%; moderate systolic dysfunction.     GI: GI prophylaxis.  Oral diet.     RENAL:  Follow up lytes.  Correct as needed. Johnson placed again for retention.     INFECTIOUS DISEASE: Culture: All cultures negative. Rocephin per ID.     HEMATOLOGICAL:  On therapeutic Lovenox. CBC in the afternoon. Keep above 7.     ENDOCRINE:  Follow up FS.  Insulin protocol if needed.    MUSCULOSKELETAL: Out of bed and PT OT.     CODE STATUS: Full code.     Telemetry.            Impression:     Seizures   Altered mental status  Acute hypoxemic respiratory failure   Lactic acidosis   DVT     PLAN:    CNS: No sedation. No seizure on EEG. On Keppra BID.     HEENT: Oral care.     PULMONARY:  HOB @ 45 degrees.  On room air.     CARDIOVASCULAR: Not on pressors. Keep equal balance. Trop noted; CCTA per cardiology EF 30-35%; moderate systolic dysfunction.     GI: GI prophylaxis.  Oral diet.     RENAL:  Follow up lytes.  Correct as needed. Johnson placed again for retention.     INFECTIOUS DISEASE: Culture: All cultures negative. Rocephin per ID.     HEMATOLOGICAL:  On therapeutic Lovenox. CBC in the afternoon. Keep above 7.     ENDOCRINE:  Follow up FS.  Insulin protocol if needed.    MUSCULOSKELETAL: Out of bed and PT OT.     CODE STATUS: Full code.     Telemetry.

## 2023-08-17 NOTE — EEG REPORT - NS EEG TEXT BOX
Epilepsy Attending Note:     MARCIANO RODRIGUES    68y Female  MRN [id number]    Vital Signs Last 24 Hrs  T(C): 36.9 (17 Aug 2023 04:00), Max: 36.9 (16 Aug 2023 20:00)  T(F): 98.5 (17 Aug 2023 04:00), Max: 98.5 (17 Aug 2023 04:00)  HR: 92 (17 Aug 2023 08:00) (90 - 102)  BP: 131/61 (17 Aug 2023 08:00) (106/83 - 142/60)  BP(mean): 83 (17 Aug 2023 08:00) (76 - 91)  RR: 25 (17 Aug 2023 08:00) (23 - 31)  SpO2: 98% (17 Aug 2023 08:00) (95% - 98%)    Parameters below as of 17 Aug 2023 08:00  Patient On (Oxygen Delivery Method): room air                              7.7    8.91  )-----------( 209      ( 17 Aug 2023 05:13 )             25.4           144  |  110  |  10  ----------------------------<  149<H>  3.6   |  22  |  0.6<L>    Ca    8.2<L>      17 Aug 2023 05:13    TPro  5.9<L>  /  Alb  3.1<L>  /  TBili  0.3  /  DBili  x   /  AST  43<H>  /  ALT  21  /  AlkPhos  83        MEDICATIONS  (STANDING):  albuterol/ipratropium for Nebulization 3 milliLiter(s) Nebulizer every 6 hours  aspirin  chewable 81 milliGRAM(s) Oral daily  atorvastatin 40 milliGRAM(s) Oral at bedtime  cefTRIAXone   IVPB 2000 milliGRAM(s) IV Intermittent every 12 hours  chlorhexidine 2% Cloths 1 Application(s) Topical <User Schedule>  dextrose 5%. 1000 milliLiter(s) (75 mL/Hr) IV Continuous <Continuous>  dextrose 5%. 1000 milliLiter(s) (50 mL/Hr) IV Continuous <Continuous>  dextrose 50% Injectable 25 Gram(s) IV Push once  enoxaparin Injectable 60 milliGRAM(s) SubCutaneous every 12 hours  glucagon  Injectable 1 milliGRAM(s) IntraMuscular once  insulin glargine Injectable (LANTUS) 16 Unit(s) SubCutaneous every morning  insulin lispro (ADMELOG) corrective regimen sliding scale   SubCutaneous three times a day before meals  insulin lispro Injectable (ADMELOG) 5 Unit(s) SubCutaneous three times a day before meals  insulin regular Infusion 1 Unit(s)/Hr (1 mL/Hr) IV Continuous <Continuous>  levETIRAcetam  IVPB 1000 milliGRAM(s) IV Intermittent every 12 hours  metoprolol tartrate 25 milliGRAM(s) Oral every 12 hours  pantoprazole  Injectable 40 milliGRAM(s) IV Push daily  sacubitril 49 mG/valsartan 51 mG 1 Tablet(s) Oral two times a day  sodium bicarbonate 650 milliGRAM(s) Oral three times a day    MEDICATIONS  (PRN):  acetaminophen     Tablet .. 650 milliGRAM(s) Oral every 6 hours PRN Temp greater or equal to 38C (100.4F), Mild Pain (1 - 3)  dextrose Oral Gel 15 Gram(s) Oral once PRN Blood Glucose LESS THAN 70 milliGRAM(s)/deciliter            VEEG in the last 24 hours:    Background - continuous, symmetrical, reaching frequencies in the range of 7-8 Hz, relatively more organized       Focal and generalized slowin. mild generalized slowing   2. borderline to mild bilateral focal slowing with shifting asymmetry, R>L    Interictal activity - none    Events - none    Seizures - none    Impression: VEEG as above    Plan - per neurology team

## 2023-08-17 NOTE — PROGRESS NOTE ADULT - SUBJECTIVE AND OBJECTIVE BOX
Neurology Progress Note    Interval History:  No acute events overnight. Patient seen and examined beside with family present. Patient denies any HA, SOB, abd pain. No focal weakness, no agitation. No complaints this morning. Family bedside state they feel patient is doing better.       PAST MEDICAL & SURGICAL HISTORY:  Diabetes      Medications:  acetaminophen     Tablet .. 650 milliGRAM(s) Oral every 6 hours PRN  albuterol/ipratropium for Nebulization 3 milliLiter(s) Nebulizer every 6 hours  aspirin  chewable 81 milliGRAM(s) Oral daily  atorvastatin 40 milliGRAM(s) Oral at bedtime  cefTRIAXone   IVPB 2000 milliGRAM(s) IV Intermittent every 12 hours  chlorhexidine 2% Cloths 1 Application(s) Topical <User Schedule>  dextrose 5%. 1000 milliLiter(s) IV Continuous <Continuous>  dextrose 5%. 1000 milliLiter(s) IV Continuous <Continuous>  dextrose 50% Injectable 25 Gram(s) IV Push once  dextrose Oral Gel 15 Gram(s) Oral once PRN  enoxaparin Injectable 60 milliGRAM(s) SubCutaneous every 12 hours  glucagon  Injectable 1 milliGRAM(s) IntraMuscular once  insulin glargine Injectable (LANTUS) 16 Unit(s) SubCutaneous every morning  insulin lispro (ADMELOG) corrective regimen sliding scale   SubCutaneous three times a day before meals  insulin lispro Injectable (ADMELOG) 5 Unit(s) SubCutaneous three times a day before meals  insulin regular Infusion 1 Unit(s)/Hr IV Continuous <Continuous>  levETIRAcetam  IVPB 1000 milliGRAM(s) IV Intermittent every 12 hours  metoprolol tartrate 25 milliGRAM(s) Oral every 12 hours  metoprolol tartrate 100 milliGRAM(s) Oral once  pantoprazole  Injectable 40 milliGRAM(s) IV Push daily  sacubitril 49 mG/valsartan 51 mG 1 Tablet(s) Oral two times a day  sodium bicarbonate 650 milliGRAM(s) Oral three times a day      Vital Signs Last 24 Hrs  T(C): 36.9 (17 Aug 2023 04:00), Max: 36.9 (16 Aug 2023 20:00)  T(F): 98.5 (17 Aug 2023 04:00), Max: 98.5 (17 Aug 2023 04:00)  HR: 92 (17 Aug 2023 08:00) (90 - 107)  BP: 131/61 (17 Aug 2023 08:00) (106/83 - 142/60)  BP(mean): 83 (17 Aug 2023 08:00) (76 - 91)  RR: 25 (17 Aug 2023 08:00) (23 - 31)  SpO2: 98% (17 Aug 2023 08:00) (95% - 98%)    Parameters below as of 17 Aug 2023 08:00  Patient On (Oxygen Delivery Method): room air        Neurological Exam:   Mental status: Awake, alert and oriented x3.  Recent and remote memory intact.  Naming, repetition and comprehension intact.  Attention/concentration intact.  No dysarthria, no aphasia.  Fund of knowledge appropriate.    Cranial nerves: Pupils equally round and reactive to light, visual fields full, no nystagmus, extraocular muscles intact, V1 through V3 intact bilaterally and symmetric, face symmetric, hearing intact to finger rub, palate elevation symmetric, tongue was midline.  Motor:  MRC grading 5/5 b/l UE/LE.   strength 5/5.  Normal tone and bulk.  No abnormal movements.    Sensation: Intact to light touch  Coordination: No dysmetria on finger-to-nose.  No dysdiadokinesia.  Reflexes: 2+ in bilateral UE/LE, downgoing toes bilaterally. (-) Clark.  Gait: Deferred    Labs:  CBC Full  -  ( 17 Aug 2023 05:13 )  WBC Count : 8.91 K/uL  RBC Count : 3.40 M/uL  Hemoglobin : 7.7 g/dL  Hematocrit : 25.4 %  Platelet Count - Automated : 209 K/uL  Mean Cell Volume : 74.7 fL  Mean Cell Hemoglobin : 22.6 pg  Mean Cell Hemoglobin Concentration : 30.3 g/dL  Auto Neutrophil # : x  Auto Lymphocyte # : x  Auto Monocyte # : x  Auto Eosinophil # : x  Auto Basophil # : x  Auto Neutrophil % : x  Auto Lymphocyte % : x  Auto Monocyte % : x  Auto Eosinophil % : x  Auto Basophil % : x    08-17    144  |  110  |  10  ----------------------------<  149<H>  3.6   |  22  |  0.6<L>    Ca    8.2<L>      17 Aug 2023 05:13    TPro  5.9<L>  /  Alb  3.1<L>  /  TBili  0.3  /  DBili  x   /  AST  43<H>  /  ALT  21  /  AlkPhos  83  08-17    LIVER FUNCTIONS - ( 17 Aug 2023 05:13 )  Alb: 3.1 g/dL / Pro: 5.9 g/dL / ALK PHOS: 83 U/L / ALT: 21 U/L / AST: 43 U/L / GGT: x           PT/INR - ( 16 Aug 2023 05:24 )   PT: 13.10 sec;   INR: 1.14 ratio         PTT - ( 16 Aug 2023 11:39 )  PTT:100.9 sec  Urinalysis Basic - ( 17 Aug 2023 05:13 )    Color: x / Appearance: x / SG: x / pH: x  Gluc: 149 mg/dL / Ketone: x  / Bili: x / Urobili: x   Blood: x / Protein: x / Nitrite: x   Leuk Esterase: x / RBC: x / WBC x   Sq Epi: x / Non Sq Epi: x / Bacteria: x

## 2023-08-17 NOTE — CHART NOTE - NSCHARTNOTEFT_GEN_A_CORE
Transfer Note:      Transfer Note CCU   >   Floor     Handoff:  f/u repeat CBC at 11am  to see if the Hb is stable   trending Trop ,f/u trop from  11am        f/u trop trend and ECG and follow up cardiology if needed (pt is stable rn as per Cardio)             HPI :  69yo F w/a PMH of Type II DM, HTN, DLD, obesity, lateral epicondylitis presenting to the ED 8/13 w/left arm pain. This arm started about two months ago and has continued to progress but in the last few days became severe. Pt was previously seen for this pain in the ED on 8/8, was given pain medications then sent home. Per son, pain still continued to progress after this. No fevers, chills, nausea, vomiting, diarrhea, constipation, SOB, CP.         ED course :  On admission to the ED, she presented with severe left arm pain w findings of LBBB on ECG (unsure if new or not). Code STEMI was called and then canceled because troponin negative and no chest pain. Patient was then noted to have acute mental status change, SOB, lactate elevation, and acidosis on abg. Patient had two seizure episodes and was treated with benzodiazepine. Intubated by ER for airway protection, extubated yesterday 8/15  Vitals: /64, HR 75, RR 20, T 98.5, satting 99 percent on RA   Labs: WBC 14.86, Hgb 10, , Na 139, K 5.4, BUN 12, Cr .6, Trop <.01 --> .13 on repeat  Imaging:   CXR -  Patchy bibasilar opacities, right greater than left.  CT Brain Stroke protocol - No evidence of acute transcortical infarct, hydrocephalus, acute intracranial hemorrhage, or mass effect.  CT brain perfusion: No evidence of acute infarct or ischemia.  CTA neck: No stenosis. No dissection.  CTA brain: No stenosis or aneurysm.  CTA Neck: The distal internal carotid arteries are patent. The [place] is normal in appearance. The visualized cerebral arteries are unremarkable.The vertebrobasilar junction and basilar artery are normal.  In the ED, given Levaquin. Keppra, started on propofol  Admitted to MICU for airway monitoring  (13 Aug 2023 19:09)        CCU Course :  Came to CCU on DAY 4 s/p 2 seizure episode so we started pt on keppra 1g BID and put her on Video EEG9as per Neuro consult)  which was negative for any seizure activity.  The lactic acidosis was most likely  s/p seizure and was ultimately resolved.patient had episodes of fever.  To r/o the cause for Altered mental status we did BCx ,UCx and CSF studies all came back negative.Owing to the infiltrates on CXRAY in ED pt was started on Levofloxacin 750 and   pt  was  on empiric treatment with Vanc 1g and ampicillin 2 g for AMS but later d/c as per ID consult and started pt of Rocephin 2g iv q12 and pt mental status been improved then.  For Acute hypoxemic respiratory failure pt was successfully extubated on 8/15 .  For elevated TROP in ED we trended  the trop in CCU.On ECHO there was a Reduced EF. Ischemic work-up pwas done er cardiology and pt was  stable.  For low bicarb we started pt on Oral bicarb TID on 8/16 and started a workup for   Pt's Hb dropped so we held AC and one pint of blood was given on 8/15 and Hb was stable after that.  Duplex positive with bilateral DVTs. Restarted on IV heparin. Plan is if  hgb stays  stable then will switch to Lovenox.   Suspecting euglycemic HAGMA as per ABGS on 8/16 so ordered  Beta-hydroxy,lactate ,repeat ABGs and urine for ketones.      Impression:   Seizures   Altered mental status  Acute hypoxemic respiratory failure   Lactic acidosis     PLAN:    CNS: Still on Keppra 1g BID. EEG no seizures.   HEENT: Oral care.   PULMONARY:  On room air. CXR noted.   CARDIOVASCULAR: Trop noted 0.4. EKG today. Trend the trop. Reduced EF on echo. Ischemic work-up per cardiology once pt is stable.  GI: GI prophylaxis.  Speech and swallow for diet. Needs free water.   RENAL:  Follow up lytes.  Correct as needed. No felder. Oral bicarb TID. Lactic acid.   INFECTIOUS DISEASE: Culture: Blood culture negative, Ucx negative, CSF negative. No further fever. Per ID, d/c other antibiotics, start Rocephin    HEMATOLOGICAL:  Duplex positive with bilateral DVTs. On IV heparin. If hgb stable then switch to Lovenox.   ENDOCRINE:  Follow up FS.  Insulin protocol if needed. Beta-hydroxy.   MUSCULOSKELETAL: PT rehab.   CODE STATUS: Full code.   Dispo: SDU if stable in the afternoon. Transfer Note:      Transfer Note CCU   >   Floor     Handoff:  f/u repeat CBC at 11am  to see if the Hb is stable   trending Trop ,f/u trop from  11am    scheduled for CCTA  8/17             HPI :  67yo F w/a PMH of Type II DM, HTN, DLD, obesity, lateral epicondylitis presenting to the ED 8/13 w/left arm pain. This arm started about two months ago and has continued to progress but in the last few days became severe. Pt was previously seen for this pain in the ED on 8/8, was given pain medications then sent home. Per son, pain still continued to progress after this. No fevers, chills, nausea, vomiting, diarrhea, constipation, SOB, CP.         ED course :  On admission to the ED, she presented with severe left arm pain w findings of LBBB on ECG (unsure if new or not). Code STEMI was called and then canceled because troponin negative and no chest pain. Patient was then noted to have acute mental status change, SOB, lactate elevation, and acidosis on abg. Patient had two seizure episodes and was treated with benzodiazepine. Intubated by ER for airway protection, extubated yesterday 8/15  Vitals: /64, HR 75, RR 20, T 98.5, satting 99 percent on RA   Labs: WBC 14.86, Hgb 10, , Na 139, K 5.4, BUN 12, Cr .6, Trop <.01 --> .13 on repeat  Imaging:   CXR -  Patchy bibasilar opacities, right greater than left.  CT Brain Stroke protocol - No evidence of acute transcortical infarct, hydrocephalus, acute intracranial hemorrhage, or mass effect.  CT brain perfusion: No evidence of acute infarct or ischemia.  CTA neck: No stenosis. No dissection.  CTA brain: No stenosis or aneurysm.  CTA Neck: The distal internal carotid arteries are patent. The [place] is normal in appearance. The visualized cerebral arteries are unremarkable.The vertebrobasilar junction and basilar artery are normal.  In the ED, given Levaquin. Keppra, started on propofol  Admitted to MICU for airway monitoring  (13 Aug 2023 19:09)        CCU Course :  Came to CCU on DAY 4 s/p 2 seizure episode so we started pt on keppra 1g BID and put her on Video EEG (as per Neuro consult)  which was negative for any seizure activity.  The lactic acidosis was most likely  s/p seizure and was ultimately resolved. Patient had episodes of fever.  To r/o the cause for Altered mental status  and Fever we did BCx ,UCx and CSF studies all came back negative.Owing to the infiltrates on CXRAY in ED pt was started on Levofloxacin 750 and   pt  was  on empiric treatment with Vanc 1g and ampicillin 2 g for AMS but later d/c as per ID consult and started pt of Rocephin 2g iv q12  for suspected aspiration PNA   and pt mental status been improved then.  For Acute hypoxemic respiratory failure pt was successfully extubated on 8/15 (known case of asthma on Duoneb )   For elevated TROP in ED we trended  the trop in CCU.On ECHO there was a Reduced EF. Ischemic work-up was done and cardiology ask for CCTA as per ECG findings of   LBBB and Cardiomyopathy.  For low bicarb we started pt on Oral bicarb TID on 8/16 and started a workup for   Pt's Hb dropped so we held AC and one pint of blood was given on 8/15 and Hb was stable after that.  Duplex positive with bilateral DVTs. Restarted on IV heparin. Plan is if  hgb stays  stable then will switch to Lovenox.   Treated for  euglycemic DKA on 8/16 (resolved)   Scheduled for CCTA on 8/17 .      Impression:   Seizures   Altered mental status  Acute hypoxemic respiratory failure   Lactic acidosis     PLAN:    CNS: Still on Keppra 1g BID. EEG no seizures.   HEENT: Oral care.   PULMONARY:  On room air. CXR noted.   CARDIOVASCULAR: Trop noted 0.4. EKG today. Trend the trop. Reduced EF on echo. Ischemic work-up per cardiology once pt is stable.  GI: GI prophylaxis.  Speech and swallow for diet. Needs free water.   RENAL:  Follow up lytes.  Correct as needed. No felder. Oral bicarb TID. Lactic acid.   INFECTIOUS DISEASE: Culture: Blood culture negative, Ucx negative, CSF negative. No further fever. Per ID, d/c other antibiotics, start Rocephin    HEMATOLOGICAL:  Duplex positive with bilateral DVTs. On IV heparin. If hgb stable then switch to Lovenox.   ENDOCRINE:  Follow up FS.  Insulin protocol if needed. Beta-hydroxy.   MUSCULOSKELETAL: PT rehab.   CODE STATUS: Full code.   Dispo: SDU if stable in the afternoon.

## 2023-08-18 LAB
ALBUMIN SERPL ELPH-MCNC: 3.2 G/DL — LOW (ref 3.5–5.2)
ALBUMIN SERPL ELPH-MCNC: 3.2 G/DL — LOW (ref 3.5–5.2)
ALP SERPL-CCNC: 88 U/L — SIGNIFICANT CHANGE UP (ref 30–115)
ALP SERPL-CCNC: 91 U/L — SIGNIFICANT CHANGE UP (ref 30–115)
ALT FLD-CCNC: 22 U/L — SIGNIFICANT CHANGE UP (ref 0–41)
ALT FLD-CCNC: 23 U/L — SIGNIFICANT CHANGE UP (ref 0–41)
ANION GAP SERPL CALC-SCNC: 13 MMOL/L — SIGNIFICANT CHANGE UP (ref 7–14)
ANION GAP SERPL CALC-SCNC: 15 MMOL/L — HIGH (ref 7–14)
AST SERPL-CCNC: 27 U/L — SIGNIFICANT CHANGE UP (ref 0–41)
AST SERPL-CCNC: 30 U/L — SIGNIFICANT CHANGE UP (ref 0–41)
BILIRUB SERPL-MCNC: 0.3 MG/DL — SIGNIFICANT CHANGE UP (ref 0.2–1.2)
BILIRUB SERPL-MCNC: 0.4 MG/DL — SIGNIFICANT CHANGE UP (ref 0.2–1.2)
BUN SERPL-MCNC: 10 MG/DL — SIGNIFICANT CHANGE UP (ref 10–20)
BUN SERPL-MCNC: 11 MG/DL — SIGNIFICANT CHANGE UP (ref 10–20)
CALCIUM SERPL-MCNC: 8.3 MG/DL — LOW (ref 8.4–10.5)
CALCIUM SERPL-MCNC: 8.5 MG/DL — SIGNIFICANT CHANGE UP (ref 8.4–10.4)
CHLORIDE SERPL-SCNC: 108 MMOL/L — SIGNIFICANT CHANGE UP (ref 98–110)
CHLORIDE SERPL-SCNC: 112 MMOL/L — HIGH (ref 98–110)
CO2 SERPL-SCNC: 21 MMOL/L — SIGNIFICANT CHANGE UP (ref 17–32)
CO2 SERPL-SCNC: 23 MMOL/L — SIGNIFICANT CHANGE UP (ref 17–32)
CREAT SERPL-MCNC: 0.5 MG/DL — LOW (ref 0.7–1.5)
CREAT SERPL-MCNC: 0.6 MG/DL — LOW (ref 0.7–1.5)
EGFR: 101 ML/MIN/1.73M2 — SIGNIFICANT CHANGE UP
EGFR: 96 ML/MIN/1.73M2 — SIGNIFICANT CHANGE UP
GLUCOSE BLDC GLUCOMTR-MCNC: 168 MG/DL — HIGH (ref 70–99)
GLUCOSE BLDC GLUCOMTR-MCNC: 196 MG/DL — HIGH (ref 70–99)
GLUCOSE BLDC GLUCOMTR-MCNC: 235 MG/DL — HIGH (ref 70–99)
GLUCOSE BLDC GLUCOMTR-MCNC: 256 MG/DL — HIGH (ref 70–99)
GLUCOSE BLDC GLUCOMTR-MCNC: 287 MG/DL — HIGH (ref 70–99)
GLUCOSE BLDC GLUCOMTR-MCNC: 304 MG/DL — HIGH (ref 70–99)
GLUCOSE SERPL-MCNC: 196 MG/DL — HIGH (ref 70–99)
GLUCOSE SERPL-MCNC: 238 MG/DL — HIGH (ref 70–99)
HCT VFR BLD CALC: 28.2 % — LOW (ref 37–47)
HGB BLD-MCNC: 8.5 G/DL — LOW (ref 12–16)
MCHC RBC-ENTMCNC: 22.9 PG — LOW (ref 27–31)
MCHC RBC-ENTMCNC: 30.1 G/DL — LOW (ref 32–37)
MCV RBC AUTO: 76 FL — LOW (ref 81–99)
NRBC # BLD: 0 /100 WBCS — SIGNIFICANT CHANGE UP (ref 0–0)
PLATELET # BLD AUTO: 196 K/UL — SIGNIFICANT CHANGE UP (ref 130–400)
PMV BLD: 11.4 FL — HIGH (ref 7.4–10.4)
POTASSIUM SERPL-MCNC: 3.2 MMOL/L — LOW (ref 3.5–5)
POTASSIUM SERPL-MCNC: 4.1 MMOL/L — SIGNIFICANT CHANGE UP (ref 3.5–5)
POTASSIUM SERPL-SCNC: 3.2 MMOL/L — LOW (ref 3.5–5)
POTASSIUM SERPL-SCNC: 4.1 MMOL/L — SIGNIFICANT CHANGE UP (ref 3.5–5)
PROT SERPL-MCNC: 6.1 G/DL — SIGNIFICANT CHANGE UP (ref 6–8)
PROT SERPL-MCNC: 6.2 G/DL — SIGNIFICANT CHANGE UP (ref 6–8)
RBC # BLD: 3.71 M/UL — LOW (ref 4.2–5.4)
RBC # FLD: 18.7 % — HIGH (ref 11.5–14.5)
SODIUM SERPL-SCNC: 144 MMOL/L — SIGNIFICANT CHANGE UP (ref 135–146)
SODIUM SERPL-SCNC: 148 MMOL/L — HIGH (ref 135–146)
TROPONIN T SERPL-MCNC: 0.67 NG/ML — CRITICAL HIGH
WBC # BLD: 7.29 K/UL — SIGNIFICANT CHANGE UP (ref 4.8–10.8)
WBC # FLD AUTO: 7.29 K/UL — SIGNIFICANT CHANGE UP (ref 4.8–10.8)

## 2023-08-18 PROCEDURE — 75574 CT ANGIO HRT W/3D IMAGE: CPT | Mod: 26

## 2023-08-18 PROCEDURE — 93010 ELECTROCARDIOGRAM REPORT: CPT

## 2023-08-18 PROCEDURE — 99233 SBSQ HOSP IP/OBS HIGH 50: CPT

## 2023-08-18 PROCEDURE — 99232 SBSQ HOSP IP/OBS MODERATE 35: CPT

## 2023-08-18 PROCEDURE — 71045 X-RAY EXAM CHEST 1 VIEW: CPT | Mod: 26

## 2023-08-18 RX ORDER — INSULIN LISPRO 100/ML
3 VIAL (ML) SUBCUTANEOUS ONCE
Refills: 0 | Status: COMPLETED | OUTPATIENT
Start: 2023-08-18 | End: 2023-08-18

## 2023-08-18 RX ORDER — METOPROLOL TARTRATE 50 MG
5 TABLET ORAL ONCE
Refills: 0 | Status: COMPLETED | OUTPATIENT
Start: 2023-08-18 | End: 2023-08-18

## 2023-08-18 RX ORDER — METOPROLOL TARTRATE 50 MG
5 TABLET ORAL
Refills: 0 | Status: COMPLETED | OUTPATIENT
Start: 2023-08-18 | End: 2023-08-18

## 2023-08-18 RX ORDER — POTASSIUM CHLORIDE 20 MEQ
40 PACKET (EA) ORAL EVERY 4 HOURS
Refills: 0 | Status: COMPLETED | OUTPATIENT
Start: 2023-08-18 | End: 2023-08-18

## 2023-08-18 RX ORDER — METOPROLOL TARTRATE 50 MG
100 TABLET ORAL ONCE
Refills: 0 | Status: COMPLETED | OUTPATIENT
Start: 2023-08-18 | End: 2023-08-18

## 2023-08-18 RX ORDER — IVABRADINE 7.5 MG/1
5 TABLET, FILM COATED ORAL ONCE
Refills: 0 | Status: COMPLETED | OUTPATIENT
Start: 2023-08-18 | End: 2023-08-18

## 2023-08-18 RX ORDER — INSULIN LISPRO 100/ML
8 VIAL (ML) SUBCUTANEOUS
Refills: 0 | Status: DISCONTINUED | OUTPATIENT
Start: 2023-08-18 | End: 2023-08-23

## 2023-08-18 RX ORDER — INSULIN GLARGINE 100 [IU]/ML
22 INJECTION, SOLUTION SUBCUTANEOUS AT BEDTIME
Refills: 0 | Status: DISCONTINUED | OUTPATIENT
Start: 2023-08-18 | End: 2023-08-28

## 2023-08-18 RX ORDER — METOPROLOL TARTRATE 50 MG
100 TABLET ORAL ONCE
Refills: 0 | Status: DISCONTINUED | OUTPATIENT
Start: 2023-08-18 | End: 2023-08-18

## 2023-08-18 RX ORDER — METOPROLOL TARTRATE 50 MG
5 TABLET ORAL
Refills: 0 | Status: DISCONTINUED | OUTPATIENT
Start: 2023-08-18 | End: 2023-08-18

## 2023-08-18 RX ORDER — IVABRADINE 7.5 MG/1
7.5 TABLET, FILM COATED ORAL ONCE
Refills: 0 | Status: COMPLETED | OUTPATIENT
Start: 2023-08-18 | End: 2023-08-18

## 2023-08-18 RX ORDER — METOPROLOL TARTRATE 50 MG
5 TABLET ORAL ONCE
Refills: 0 | Status: DISCONTINUED | OUTPATIENT
Start: 2023-08-18 | End: 2023-08-18

## 2023-08-18 RX ADMIN — ATORVASTATIN CALCIUM 40 MILLIGRAM(S): 80 TABLET, FILM COATED ORAL at 21:45

## 2023-08-18 RX ADMIN — PANTOPRAZOLE SODIUM 40 MILLIGRAM(S): 20 TABLET, DELAYED RELEASE ORAL at 11:18

## 2023-08-18 RX ADMIN — CHLORHEXIDINE GLUCONATE 1 APPLICATION(S): 213 SOLUTION TOPICAL at 05:23

## 2023-08-18 RX ADMIN — Medication 5 MILLIGRAM(S): at 13:20

## 2023-08-18 RX ADMIN — INSULIN GLARGINE 22 UNIT(S): 100 INJECTION, SOLUTION SUBCUTANEOUS at 21:50

## 2023-08-18 RX ADMIN — SACUBITRIL AND VALSARTAN 1 TABLET(S): 24; 26 TABLET, FILM COATED ORAL at 17:04

## 2023-08-18 RX ADMIN — Medication 8 UNIT(S): at 17:05

## 2023-08-18 RX ADMIN — Medication 3: at 17:05

## 2023-08-18 RX ADMIN — Medication 1: at 11:18

## 2023-08-18 RX ADMIN — Medication 650 MILLIGRAM(S): at 05:23

## 2023-08-18 RX ADMIN — Medication 650 MILLIGRAM(S): at 13:01

## 2023-08-18 RX ADMIN — LEVETIRACETAM 400 MILLIGRAM(S): 250 TABLET, FILM COATED ORAL at 05:23

## 2023-08-18 RX ADMIN — Medication 5 MILLIGRAM(S): at 13:54

## 2023-08-18 RX ADMIN — Medication 81 MILLIGRAM(S): at 11:18

## 2023-08-18 RX ADMIN — Medication 3 UNIT(S): at 01:03

## 2023-08-18 RX ADMIN — Medication 40 MILLIEQUIVALENT(S): at 11:17

## 2023-08-18 RX ADMIN — Medication 100 MILLIGRAM(S): at 08:17

## 2023-08-18 RX ADMIN — Medication 40 MILLIEQUIVALENT(S): at 08:03

## 2023-08-18 RX ADMIN — Medication 25 MILLIGRAM(S): at 05:23

## 2023-08-18 RX ADMIN — IVABRADINE 5 MILLIGRAM(S): 7.5 TABLET, FILM COATED ORAL at 12:52

## 2023-08-18 RX ADMIN — Medication 3 MILLILITER(S): at 21:09

## 2023-08-18 RX ADMIN — Medication 650 MILLIGRAM(S): at 21:45

## 2023-08-18 RX ADMIN — Medication 3 MILLILITER(S): at 10:00

## 2023-08-18 RX ADMIN — Medication 5 MILLIGRAM(S): at 14:45

## 2023-08-18 RX ADMIN — IVABRADINE 7.5 MILLIGRAM(S): 7.5 TABLET, FILM COATED ORAL at 11:18

## 2023-08-18 RX ADMIN — Medication 5 MILLIGRAM(S): at 13:25

## 2023-08-18 RX ADMIN — SACUBITRIL AND VALSARTAN 1 TABLET(S): 24; 26 TABLET, FILM COATED ORAL at 05:23

## 2023-08-18 RX ADMIN — ENOXAPARIN SODIUM 60 MILLIGRAM(S): 100 INJECTION SUBCUTANEOUS at 05:24

## 2023-08-18 RX ADMIN — Medication 1: at 08:03

## 2023-08-18 RX ADMIN — LEVETIRACETAM 400 MILLIGRAM(S): 250 TABLET, FILM COATED ORAL at 17:06

## 2023-08-18 RX ADMIN — ENOXAPARIN SODIUM 60 MILLIGRAM(S): 100 INJECTION SUBCUTANEOUS at 17:04

## 2023-08-18 NOTE — PROGRESS NOTE ADULT - ASSESSMENT
Impression:     Seizures   Altered mental status  Acute hypoxemic respiratory failure   Lactic acidosis   DVT   NSTEMI     PLAN:    CNS: No sedation. No seizure on EEG. On Keppra BID. MR brain done. Neurology following.     HEENT: Oral care.     PULMONARY:  HOB @ 45 degrees.  On room air.     CARDIOVASCULAR: Not on pressors. Keep equal balance. Trop noted; trend; CCTA ordered; EF 30-35%; moderate systolic dysfunction. On Metoprolol and Entresto. Cardiology following.     GI: GI prophylaxis.  Oral diet. Increase free water intake.     RENAL:  Follow up lytes.  Correct as needed. Voiding trial again. Check Mg and correct K.     INFECTIOUS DISEASE: Culture: All cultures negative. Observe off abx.     HEMATOLOGICAL:  On therapeutic Lovenox. Hgb stable. May transition to oral DOAC.     ENDOCRINE:  Follow up FS.  Insulin protocol if needed.    MUSCULOSKELETAL: Out of bed and PT OT.     CODE STATUS: Full code.     Transfer to Telemetry.

## 2023-08-18 NOTE — CHART NOTE - NSCHARTNOTEFT_GEN_A_CORE
I was called by the radiology team for abnormal CCTA results  Patient was found to have PE, she is already on therapeutic Lovenox for b/l DVT  Proximal LAD calcium score 130, unable to assess mid LAD due to motion artifact  EKG: LBBB, trop on the rise  Cardiology team consulted and fellow on call informed of abnormal results, no need for further management at the moment  Possible cath on Monday?

## 2023-08-18 NOTE — CONSULT NOTE ADULT - SUBJECTIVE AND OBJECTIVE BOX
Outpt cardiologist: None     HPI:  67yo F w/a PMH of Type II DM, HTN, DLD, obesity, lateral epicondylitis presenting to the ED w/ Left Arm pain. At time of my exam, patient intubated so spoke with the son at bedside. This arm started about two months ago and has continued to progress but in the last few days became severe. She went to the ED a few days ago, was given pain medications then sent home. Per son, pain still continued to progress after this. No fevers, chills, nausea, vomiting, diarrhea, constipation, SOB, CP.   On admission to the ED today, she presented with severe left arm pain w findings of LBBB on ECG (unsure if new or not). Code STEMI was called and then canceled because troponin negative and no chest pain. Patient was then noted to have acute mental status change, SOB, lactate elevation, and acidosis on abg..Patient had two seizure episodes and was treated with benzodiazepine. Intubated by ER for airway protection.       PAST MEDICAL & SURGICAL HISTORY  Diabetes        FAMILY HISTORY:  FAMILY HISTORY: no significant cardiac hx       SOCIAL HISTORY:  Social History:  Per son, no smoking, alcohol use, drug use (13 Aug 2023 19:09)      ALLERGIES:  No Known Allergies      MEDICATIONS:  albuterol/ipratropium for Nebulization 3 milliLiter(s) Nebulizer every 6 hours  aspirin  chewable 81 milliGRAM(s) Oral daily  atorvastatin 40 milliGRAM(s) Oral at bedtime  chlorhexidine 2% Cloths 1 Application(s) Topical <User Schedule>  dextrose 5%. 1000 milliLiter(s) (50 mL/Hr) IV Continuous <Continuous>  dextrose 50% Injectable 25 Gram(s) IV Push once  enoxaparin Injectable 60 milliGRAM(s) SubCutaneous every 12 hours  glucagon  Injectable 1 milliGRAM(s) IntraMuscular once  insulin glargine Injectable (LANTUS) 22 Unit(s) SubCutaneous at bedtime  insulin lispro (ADMELOG) corrective regimen sliding scale   SubCutaneous three times a day before meals  insulin lispro Injectable (ADMELOG) 8 Unit(s) SubCutaneous three times a day before meals  levETIRAcetam  IVPB 1000 milliGRAM(s) IV Intermittent every 12 hours  metoprolol tartrate 25 milliGRAM(s) Oral every 12 hours  pantoprazole  Injectable 40 milliGRAM(s) IV Push daily  sacubitril 49 mG/valsartan 51 mG 1 Tablet(s) Oral two times a day  sodium bicarbonate 650 milliGRAM(s) Oral three times a day    PRN:  acetaminophen     Tablet .. 650 milliGRAM(s) Oral every 6 hours PRN  dextrose Oral Gel 15 Gram(s) Oral once PRN      HOME MEDICATIONS:  Home Medications:  aspirin 81 mg oral tablet, chewable: 1 chewed once a day (13 Aug 2023 19:53)  atorvastatin 10 mg oral tablet: 1 orally once a day (at bedtime) (13 Aug 2023 19:52)  Jardiance 10 mg oral tablet: 1 orally once a day (13 Aug 2023 19:53)  MetFORMIN (Eqv-Glumetza) 500 mg oral tablet, extended release: 1 orally 2 times a day (13 Aug 2023 19:51)  pioglitazone 30 mg oral tablet: 1 orally once a day (13 Aug 2023 19:52)      VITALS:   T(F): 98.4 (08-18 @ 20:00), Max: 100.9 (08-15 @ 15:29)  HR: 75 (08-18 @ 20:00) (66 - 121)  BP: 115/56 (08-18 @ 20:00) (98/49 - 149/65)  BP(mean): 79 (08-18 @ 20:00) (69 - 100)  RR: 20 (08-18 @ 20:00) (19 - 35)  SpO2: 96% (08-18 @ 20:00) (93% - 100%)    I&O's Summary    17 Aug 2023 07:01  -  18 Aug 2023 07:00  --------------------------------------------------------  IN: 540 mL / OUT: 2230 mL / NET: -1690 mL    18 Aug 2023 07:01  -  18 Aug 2023 21:17  --------------------------------------------------------  IN: 0 mL / OUT: 250 mL / NET: -250 mL        REVIEW OF SYSTEMS:  CONSTITUTIONAL: No weakness, fevers or chills  HEENT: No visual changes, neck/ear pain  RESPIRATORY: No cough, sob  CARDIOVASCULAR: See HPI  GASTROINTESTINAL: No abdominal pain. No nausea, vomiting, diarrhea   GENITOURINARY: No dysuria, frequency or hematuria  NEUROLOGICAL: No new focal deficits  SKIN: No new rashes    PHYSICAL EXAM:  General: Not in distress.  Non-toxic appearing.   HEENT: EOMI  Cardio: regular, S1, S2, no murmur  Pulm: B/L BS.  No wheezing / crackles / rales  Abdomen: Soft, non-tender, non-distended. Normoactive bowel sounds  Extremities: No edema b/l le  Neuro: A&O x3. No focal deficits    LABS:                        8.5    7.29  )-----------( 196      ( 18 Aug 2023 04:39 )             28.2     08-18    144  |  108  |  11  ----------------------------<  238<H>  4.1   |  21  |  0.5<L>    Ca    8.3<L>      18 Aug 2023 16:11    TPro  6.2  /  Alb  3.2<L>  /  TBili  0.4  /  DBili  x   /  AST  27  /  ALT  23  /  AlkPhos  88  08-18      Troponin T, Serum: 0.67 ng/mL *HH* (08-18-23 @ 11:29)    CARDIAC MARKERS ( 18 Aug 2023 11:29 )  x     / 0.67 ng/mL / x     / x     / x      CARDIAC MARKERS ( 17 Aug 2023 11:52 )  x     / 0.48 ng/mL / x     / x     / x      CARDIAC MARKERS ( 17 Aug 2023 05:13 )  x     / 0.43 ng/mL / x     / x     / x          08-14 Chol 194 LDL -- HDL 55 Trig 132  SARS-CoV-2: NotDetec (14 Aug 2023 09:10)      RADIOLOGY:  - Echo (08/14/23): EF 30 to 35%; Moderately to severely decreased global left ventricular systolic function.      - CCTA: Ca score 130; Proximal to mid LAD as well as the proximal second diagonal branch poorly opacified likely related to motion artifact. Significant   (moderate/severe) narrowing therefore cannot be entirely excluded and further evaluation is recommended     Outpt cardiologist: None     HPI:  67yo F w/a PMH of Type II DM, HTN, DLD, obesity, lateral epicondylitis presenting to the ED w/ Left Arm pain. At time of my exam, patient intubated so spoke with the son at bedside. This arm started about two months ago and has continued to progress but in the last few days became severe. She went to the ED a few days ago, was given pain medications then sent home. Per son, pain still continued to progress after this. No fevers, chills, nausea, vomiting, diarrhea, constipation, SOB, CP.   On admission to the ED today, she presented with severe left arm pain w findings of LBBB on ECG (unsure if new or not). Code STEMI was called and then canceled because troponin negative and no chest pain. Patient was then noted to have acute mental status change, SOB, lactate elevation, and acidosis on abg..Patient had two seizure episodes and was treated with benzodiazepine. Intubated by ER for airway protection.       PAST MEDICAL & SURGICAL HISTORY  Diabetes        FAMILY HISTORY:  FAMILY HISTORY: no significant cardiac hx       SOCIAL HISTORY:  Social History:  Per son, no smoking, alcohol use, drug use (13 Aug 2023 19:09)      ALLERGIES:  No Known Allergies      MEDICATIONS:  albuterol/ipratropium for Nebulization 3 milliLiter(s) Nebulizer every 6 hours  aspirin  chewable 81 milliGRAM(s) Oral daily  atorvastatin 40 milliGRAM(s) Oral at bedtime  chlorhexidine 2% Cloths 1 Application(s) Topical <User Schedule>  dextrose 5%. 1000 milliLiter(s) (50 mL/Hr) IV Continuous <Continuous>  dextrose 50% Injectable 25 Gram(s) IV Push once  enoxaparin Injectable 60 milliGRAM(s) SubCutaneous every 12 hours  glucagon  Injectable 1 milliGRAM(s) IntraMuscular once  insulin glargine Injectable (LANTUS) 22 Unit(s) SubCutaneous at bedtime  insulin lispro (ADMELOG) corrective regimen sliding scale   SubCutaneous three times a day before meals  insulin lispro Injectable (ADMELOG) 8 Unit(s) SubCutaneous three times a day before meals  levETIRAcetam  IVPB 1000 milliGRAM(s) IV Intermittent every 12 hours  metoprolol tartrate 25 milliGRAM(s) Oral every 12 hours  pantoprazole  Injectable 40 milliGRAM(s) IV Push daily  sacubitril 49 mG/valsartan 51 mG 1 Tablet(s) Oral two times a day  sodium bicarbonate 650 milliGRAM(s) Oral three times a day    PRN:  acetaminophen     Tablet .. 650 milliGRAM(s) Oral every 6 hours PRN  dextrose Oral Gel 15 Gram(s) Oral once PRN      HOME MEDICATIONS:  Home Medications:  aspirin 81 mg oral tablet, chewable: 1 chewed once a day (13 Aug 2023 19:53)  atorvastatin 10 mg oral tablet: 1 orally once a day (at bedtime) (13 Aug 2023 19:52)  Jardiance 10 mg oral tablet: 1 orally once a day (13 Aug 2023 19:53)  MetFORMIN (Eqv-Glumetza) 500 mg oral tablet, extended release: 1 orally 2 times a day (13 Aug 2023 19:51)  pioglitazone 30 mg oral tablet: 1 orally once a day (13 Aug 2023 19:52)      VITALS:   T(F): 98.4 (08-18 @ 20:00), Max: 100.9 (08-15 @ 15:29)  HR: 75 (08-18 @ 20:00) (66 - 121)  BP: 115/56 (08-18 @ 20:00) (98/49 - 149/65)  BP(mean): 79 (08-18 @ 20:00) (69 - 100)  RR: 20 (08-18 @ 20:00) (19 - 35)  SpO2: 96% (08-18 @ 20:00) (93% - 100%)    I&O's Summary    17 Aug 2023 07:01  -  18 Aug 2023 07:00  --------------------------------------------------------  IN: 540 mL / OUT: 2230 mL / NET: -1690 mL    18 Aug 2023 07:01  -  18 Aug 2023 21:17  --------------------------------------------------------  IN: 0 mL / OUT: 250 mL / NET: -250 mL        REVIEW OF SYSTEMS:  CONSTITUTIONAL: No weakness, fevers or chills  HEENT: No visual changes, neck/ear pain  RESPIRATORY: No cough, sob  CARDIOVASCULAR: See HPI  GASTROINTESTINAL: No abdominal pain. No nausea, vomiting, diarrhea   GENITOURINARY: No dysuria, frequency or hematuria  NEUROLOGICAL: No new focal deficits  SKIN: No new rashes  10 point ROS completed; negative except as stated in HPI    PHYSICAL EXAM:  General: Not in distress.  Non-toxic appearing.   HEENT: EOMI, PERRLA  Neck: supple, no JVD  Cardio: regular, S1, S2, no murmur  Pulm: B/L BS.  No wheezing / crackles / rales  Abdomen: Soft, non-tender, non-distended. Normoactive bowel sounds  Extremities: No edema b/l le  Neuro: A&O x3. No focal deficits    LABS:                        8.5    7.29  )-----------( 196      ( 18 Aug 2023 04:39 )             28.2     08-18    144  |  108  |  11  ----------------------------<  238<H>  4.1   |  21  |  0.5<L>    Ca    8.3<L>      18 Aug 2023 16:11    TPro  6.2  /  Alb  3.2<L>  /  TBili  0.4  /  DBili  x   /  AST  27  /  ALT  23  /  AlkPhos  88  08-18      Troponin T, Serum: 0.67 ng/mL *HH* (08-18-23 @ 11:29)    CARDIAC MARKERS ( 18 Aug 2023 11:29 )  x     / 0.67 ng/mL / x     / x     / x      CARDIAC MARKERS ( 17 Aug 2023 11:52 )  x     / 0.48 ng/mL / x     / x     / x      CARDIAC MARKERS ( 17 Aug 2023 05:13 )  x     / 0.43 ng/mL / x     / x     / x          08-14 Chol 194 LDL -- HDL 55 Trig 132  SARS-CoV-2: NotDetec (14 Aug 2023 09:10)      RADIOLOGY:  - Echo (08/14/23): EF 30 to 35%; Moderately to severely decreased global left ventricular systolic function.      - CCTA: Ca score 130; Proximal to mid LAD as well as the proximal second diagonal branch poorly opacified likely related to motion artifact. Significant   (moderate/severe) narrowing therefore cannot be entirely excluded and further evaluation is recommended

## 2023-08-18 NOTE — CHART NOTE - NSCHARTNOTEFT_GEN_A_CORE
Transfer Note:      Transfer Note CCU   >   Floor     Handoff:  trending Trop ,f/u trop from  11am    scheduled for CCTA  8/18            HPI :  69yo F w/a PMH of Type II DM, HTN, DLD, obesity, lateral epicondylitis presenting to the ED 8/13 w/left arm pain. This arm started about two months ago and has continued to progress but in the last few days became severe. Pt was previously seen for this pain in the ED on 8/8, was given pain medications then sent home. Per son, pain still continued to progress after this. No fevers, chills, nausea, vomiting, diarrhea, constipation, SOB, CP.         ED course :  On admission to the ED, she presented with severe left arm pain w findings of LBBB on ECG (unsure if new or not). Code STEMI was called and then canceled because troponin negative and no chest pain. Patient was then noted to have acute mental status change, SOB, lactate elevation, and acidosis on abg. Patient had two seizure episodes and was treated with benzodiazepine. Intubated by ER for airway protection, extubated yesterday 8/15  Vitals: /64, HR 75, RR 20, T 98.5, satting 99 percent on RA   Labs: WBC 14.86, Hgb 10, , Na 139, K 5.4, BUN 12, Cr .6, Trop <.01 --> .13 on repeat  Imaging:   CXR -  Patchy bibasilar opacities, right greater than left.  CT Brain Stroke protocol - No evidence of acute transcortical infarct, hydrocephalus, acute intracranial hemorrhage, or mass effect.  CT brain perfusion: No evidence of acute infarct or ischemia.  CTA neck: No stenosis. No dissection.  CTA brain: No stenosis or aneurysm.  CTA Neck: The distal internal carotid arteries are patent. The [place] of [place] is normal in appearance. The visualized cerebral arteries are unremarkable.The vertebrobasilar junction and basilar artery are normal.  In the ED, given Levaquin. Keppra, started on propofol  Admitted to MICU for airway monitoring  (13 Aug 2023 19:09)        CCU Course :  Came to CCU on DAY 4 s/p 2 seizure episode so we started pt on keppra 1g BID and put her on Video EEG (as per Neuro consult)  which was negative for any seizure activity.  The lactic acidosis was most likely  s/p seizure and was ultimately resolved. Patient had episodes of fever.  To r/o the cause for Altered mental status  and Fever we did BCx ,UCx and CSF studies all came back negative.Owing to the infiltrates on CXRAY in ED pt was started on Levofloxacin 750 and   pt  was  on empiric treatment with Vanc 1g and ampicillin 2 g for AMS but later d/c as per ID consult and started pt of Rocephin 2g iv q12  for suspected aspiration PNA   and pt mental status been improved then.  For Acute hypoxemic respiratory failure pt was successfully extubated on 8/15 (known case of asthma on Duoneb )   For elevated TROP in ED we trended  the trop in CCU.On ECHO there was a Reduced EF. Ischemic work-up was done and cardiology ask for CCTA as per ECG findings of   LBBB and Cardiomyopathy.Trending trop   For low bicarb we started pt on Oral bicarb TID on 8/16 and started a workup for   Pt's Hb dropped so we held AC and one pint of blood was given on 8/15 and Hb was stable after that.  Duplex positive with bilateral DVTs. Restarted on IV heparin. Plan is if  hgb stays  stable then will switch to Lovenox.   Treated for  euglycemic DKA on 8/16 (resolved)   Scheduled for CCTA on 8/18 gave metop to control HR  .      Impression:   Seizures   Altered mental status  Acute hypoxemic respiratory failure   Lactic acidosis     PLAN:    CNS: Still on Keppra 1g BID. EEG no seizures.   HEENT: Oral care.   PULMONARY:  On room air. CXR noted.   CARDIOVASCULAR: Trop noted 0.4. EKG today. Trend the trop. Reduced EF on echo. Ischemic work-up per cardiology once pt is stable.  GI: GI prophylaxis.  Speech and swallow for diet. Needs free water.   RENAL:  Follow up lytes.  Correct as needed. No felder. Oral bicarb TID. Lactic acid.   INFECTIOUS DISEASE: Culture: Blood culture negative, Ucx negative, CSF negative. No further fever. Per ID, d/c other antibiotics, start Rocephin    HEMATOLOGICAL:  Duplex positive with bilateral DVTs. On IV heparin. If hgb stable then switch to Lovenox.   ENDOCRINE:  Follow up FS.  Insulin protocol if needed. Beta-hydroxy.   MUSCULOSKELETAL: PT rehab.   CODE STATUS: Full code.   Dispo: SDU if stable in the afternoon.

## 2023-08-18 NOTE — CHART NOTE - NSCHARTNOTEFT_GEN_A_CORE
Registered Dietitian Follow-Up     Patient Profile Reviewed                           Yes [x]   No []     Nutrition History Previously Obtained        Yes []  No [x]       Pertinent Subjective Information: Pt has good appetite; consumed % of meals on  (documented per flow sheet). No chewing or swallowing difficulties reported. No nausea or vomiting noted.      Pertinent Medical Information: 67 y/o w/ PMH of Type II DM, HTN, DLD, obesity, lateral epicondylitis presenting to the ED  w/left arm pain. Intubated by ER for airway protection; extubated 8/15. Scheduled for CCTA  .    Diet order: Diet, DASH/TLC:   Sodium & Cholesterol Restricted  [place] (23 @ 14:09)    Anthropometrics:  Height (cm): 152.4 (08-15-23 @ 12:14)  Weight (kg): 61.7 (08-15-23 @ 12:14)  BMI (kg/m2): 26.6 (08-15-23 @ 12:14)  IBW: 45.4 KG    Daily Weight in k.5 (), Weight in k.6 (), Weight in k.8 (), Weight in k.4 (08-15), Weight in k.7 () -- wt fluctuations likely d/t fluid shifts    MEDICATIONS  (STANDING):  albuterol/ipratropium for Nebulization 3 milliLiter(s) Nebulizer every 6 hours  aspirin  chewable 81 milliGRAM(s) Oral daily  atorvastatin 40 milliGRAM(s) Oral at bedtime  chlorhexidine 2% Cloths 1 Application(s) Topical <User Schedule>  dextrose 5%. 1000 milliLiter(s) (50 mL/Hr) IV Continuous <Continuous>  dextrose 50% Injectable 25 Gram(s) IV Push once  enoxaparin Injectable 60 milliGRAM(s) SubCutaneous every 12 hours  glucagon  Injectable 1 milliGRAM(s) IntraMuscular once  insulin glargine Injectable (LANTUS) 22 Unit(s) SubCutaneous at bedtime  insulin lispro (ADMELOG) corrective regimen sliding scale   SubCutaneous three times a day before meals  insulin lispro Injectable (ADMELOG) 8 Unit(s) SubCutaneous three times a day before meals  ivabradine 7.5 milliGRAM(s) Oral once  levETIRAcetam  IVPB 1000 milliGRAM(s) IV Intermittent every 12 hours  metoprolol tartrate 25 milliGRAM(s) Oral every 12 hours  pantoprazole  Injectable 40 milliGRAM(s) IV Push daily  potassium chloride    Tablet ER 40 milliEquivalent(s) Oral every 4 hours  sacubitril 49 mG/valsartan 51 mG 1 Tablet(s) Oral two times a day  sodium bicarbonate 650 milliGRAM(s) Oral three times a day    MEDICATIONS  (PRN):  acetaminophen     Tablet .. 650 milliGRAM(s) Oral every 6 hours PRN Temp greater or equal to 38C (100.4F), Mild Pain (1 - 3)  dextrose Oral Gel 15 Gram(s) Oral once PRN Blood Glucose LESS THAN 70 milliGRAM(s)/deciliter    Pertinent Labs:  @ 04:39: Na 148<H>, BUN 10, Cr 0.6<L>, <H>, K+ 3.2<L>, Phos --, Mg --, Alk Phos 91, ALT/SGPT 22, AST/SGOT 30, HbA1c --    Finger Sticks:  POCT Blood Glucose.: 196 mg/dL ( @ 07:20)  POCT Blood Glucose.: 304 mg/dL ( @ 00:49)  POCT Blood Glucose.: 287 mg/dL ( @ 00:48)  POCT Blood Glucose.: 278 mg/dL ( @ 18:05)  POCT Blood Glucose.: 209 mg/dL ( @ 12:29)    Physical Findings:  - Appearance: alert  - GI function: last BM on   - Tubes: no tube feeding  - Oral/Mouth cavity: tolerating diet texture/consistency well  - Skin: intact; no pressure injuries noted  - Edema: edema 1+ to left foot; right foot     Nutrition Requirements:  Weight Used: Using ABW  -- with consideration for age, BMI, extubated 8/15, critical care setting    Estimated Energy Needs    Continue [x]  Adjust []  ENERGY: PSE 1442.44 (Tmax 38.9 C, Ve 4.58) vs. 1398-0438 kcal/day (20-25 kcal/kg) -- USE kcal/kg d/t extubation     Estimated Protein Needs    Continue [x]  Adjust []  PROTEIN: 74-86 g/day (1.2-1.4 g/kg)     Estimated Fluid Needs        Continue [x]  Adjust []  FLUID: 1543 mL/day (25 mL/kg)     Nutrient Intake: Consumed % of meals on ; meeting >75% of estimated needs    [x] Previous Nutrition Diagnosis: Inadequate Energy Intake              [x] Ongoing  -- improving, extubated        [] Resolved    [] No active nutrition diagnosis identified at this time     Goal/Expected Outcome: Pt to meet >75% of estimated needs within 5-7 days     Indicator/Monitoring: Diet order, PO intake, weights, labs, NFPF, body composition, BM    Recommendation:  1. Continue with current diet order; if appetite declines, recommend to ADD Ensure Plus HP (350 kcal, 20g pro per carton)  2. Encourage PO intake and assist during meals prn  3. Adjust insulin regimen prn    Pt is at moderate nutrition risk; will f/u in 5-7 day or prn.    RD to remain available: Martina Justice [phone] or TEAMS

## 2023-08-18 NOTE — CONSULT NOTE ADULT - ASSESSMENT
68 yr old female with hx of DM type 2, HTN, DLD, obesity, lateral epicondylitis presenting to the ED 8/13 w/left arm pain. This arm started about two months ago and has continued to progress but in the last few days became severe.     # Systolic Crdiomyopathy   # Type 2 MI  # HTN / HLD / DM2  # Seizre   # Aspiration PNA - resolved    - hemodynamically stable  - chest pain resolved at encounter  - tele: no events   - trop: 0.43 --> 0.48 --> 0.67 --> no need to trend  - EKG: NSR, no acute ST changes; LBBB  - Echo (08/14/23): EF 30 to 35%; Moderately to severely decreased global left ventricular systolic function.    - CCTA: Ca score 130; Proximal to mid LAD as well as the proximal second diagonal branch poorly opacified likely related to motion artifact. Significant   (moderate/severe) narrowing therefore cannot be entirely excluded and further evaluation is recommended  - A1C: 8.2  - LDL: 112  - c/w ASA, metoprolol, entresto  - increase to Atorvastatin 80mg dialy   - c/w therapeutic lovenox   - get nuclear stress test  68 yr old female with hx of DM type 2, HTN, DLD, obesity, lateral epicondylitis presenting to the ED 8/13 w/left arm pain. This arm started about two months ago and has continued to progress but in the last few days became severe.     # Systolic Cardiomyopathy   # Type 2 MI  # HTN / HLD / DM2  # Seizure   # Aspiration PNA - resolved    - hemodynamically stable  - chest pain resolved at encounter  - tele: no events   - trop: 0.43 --> 0.48 --> 0.67 --> no need to trend  - EKG: NSR, no acute ST changes; LBBB  - Echo (08/14/23): EF 30 to 35%; Moderately to severely decreased global left ventricular systolic function.    - CCTA: Ca score 130; Proximal to mid LAD as well as the proximal second diagonal branch poorly opacified likely related to motion artifact. Significant (moderate/severe) narrowing therefore cannot be entirely excluded and further evaluation is recommended  - A1C: 8.2  - LDL: 112  - c/w ASA, metoprolol, Entresto  - increase to Atorvastatin 80mg daily   - c/w therapeutic Lovenox   - get nuclear stress test  68 yr old female with hx of DM type 2, HTN, DLD, obesity, lateral epicondylitis presenting to the ED 8/13 w/left arm pain. This arm started about two months ago and has continued to progress but in the last few days became severe.     # Systolic Cardiomyopathy   # Type 2 MI  # HTN / HLD / DM2  # Acute DVT   # Seizure   # Aspiration PNA - resolved    - hemodynamically stable  - chest pain resolved at encounter  - tele: no events   - trop: 0.43 --> 0.48 --> 0.67 --> no need to trend  - EKG: NSR, no acute ST changes; LBBB  - Echo (08/14/23): EF 30 to 35%; Moderately to severely decreased global left ventricular systolic function.    - CCTA: Ca score 130; Proximal to mid LAD as well as the proximal second diagonal branch poorly opacified likely related to motion artifact. Significant (moderate/severe) narrowing therefore cannot be entirely excluded and further evaluation is recommended  - A1C: 8.2  - LDL: 112  - c/w ASA, metoprolol, Entresto  - increase to Atorvastatin 80mg daily   - c/w therapeutic Lovenox   - cath on Monday  68 yr old female with hx of DM type 2, HTN, DLD, obesity, lateral epicondylitis presenting to the ED 8/13 w/left arm pain. This arm started about two months ago and has continued to progress but in the last few days became severe.     # Systolic Cardiomyopathy   # Type 2 MI  # HTN / HLD / DM2  # Acute DVT   # Aspiration PNA - resolved  # Seizure   - hemodynamically stable  - denies any chest pain at encounter   - tele: no events   - trop: 0.43 --> 0.48 --> 0.67 --> no need to trend  - EKG: NSR, no acute ST changes; LBBB  - Echo (08/14/23): EF 30 to 35%; Moderately to severely decreased global left ventricular systolic function.    - CCTA: Ca score 130; Proximal to mid LAD as well as the proximal second diagonal branch poorly opacified likely related to motion artifact. Significant (moderate/severe) narrowing therefore cannot be entirely excluded and further evaluation is recommended  - A1C: 8.2  - LDL: 112  - c/w ASA, metoprolol, Entresto  - increase to Atorvastatin 80mg daily   - c/w therapeutic Lovenox   - cath on Monday  68 yr old female with hx of DM type 2, HTN, DLD, obesity, lateral epicondylitis presenting to the ED 8/13 w/left arm pain. This arm started about two months ago and has continued to progress but in the last few days became severe.     # Cardiomyopathy   # Type 2 MI  # HTN / HLD / DM2  # Acute DVT   # Aspiration PNA - resolved  # Seizure   - hemodynamically stable  - denies any chest pain at encounter   - tele: no events   - trop: 0.43 --> 0.48 --> 0.67 --> no need to trend  - EKG: NSR, no acute ST changes; LBBB  - Echo (08/14/23): EF 30 to 35%; Moderately to severely decreased global left ventricular systolic function.    - CCTA: Ca score 130; Proximal to mid LAD as well as the proximal second diagonal branch poorly opacified likely related to motion artifact. Significant (moderate/severe) narrowing therefore cannot be entirely excluded and further evaluation is recommended  - A1C: 8.2  - LDL: 112  - c/w ASA, metoprolol, Entresto  - increase to Atorvastatin 80mg daily   - c/w therapeutic Lovenox   - cath on Monday  68 yr old female with hx of DM type 2, HTN, DLD, obesity, lateral epicondylitis presenting to the ED 8/13 w/left arm pain. This arm started about two months ago and has continued to progress but in the last few days became severe.     # Cardiomyopathy   # Type 2 MI  # HTN / HLD / DM2  # Acute DVT   # Aspiration PNA - resolved  # Seizure   - hemodynamically stable  - denies any chest pain at encounter   - tele: no events   - trop: 0.43 --> 0.48 --> 0.67  - EKG: NSR, no acute ST changes; LBBB  - Echo (08/14/23): EF 30 to 35%; Moderately to severely decreased global left ventricular systolic function.    - CCTA: Ca score 130; Proximal to mid LAD as well as the proximal second diagonal branch poorly opacified likely related to motion artifact. Significant (moderate/severe) narrowing therefore cannot be entirely excluded and further evaluation is recommended  - A1C: 8.2  - LDL: 112  - c/w ASA, metoprolol, Entresto  - increase to Atorvastatin 80mg daily   - c/w therapeutic Lovenox   - cath on Monday

## 2023-08-18 NOTE — PROGRESS NOTE ADULT - SUBJECTIVE AND OBJECTIVE BOX
Patient is a 68y old  Female who presents with a chief complaint of Arm Pain, AMS (17 Aug 2023 14:38)        Over Night Events:    No events   Low grade temp noted   On room air   S/P MR brain       ROS:  See HPI    PHYSICAL EXAM    ICU Vital Signs Last 24 Hrs  T(C): 37.2 (18 Aug 2023 04:00), Max: 37.9 (17 Aug 2023 18:00)  T(F): 98.9 (18 Aug 2023 04:00), Max: 100.2 (17 Aug 2023 18:00)  HR: 82 (18 Aug 2023 06:00) (81 - 111)  BP: 119/56 (18 Aug 2023 06:00) (106/48 - 149/65)  BP(mean): 81 (18 Aug 2023 06:00) (69 - 100)  ABP: --  ABP(mean): --  RR: 22 (18 Aug 2023 06:00) (19 - 34)  SpO2: 94% (18 Aug 2023 06:00) (94% - 99%)    O2 Parameters below as of 18 Aug 2023 06:00  Patient On (Oxygen Delivery Method): room air            General: NAD  HEENT: EPIFANIO             Lymphatic system: No cervical LN   Lungs: Bilateral BS; clear anteriorly   Cardiovascular: Regular   Gastrointestinal: Soft, Positive BS  Extremities: No clubbing.  Moves extremities.  Full Range of motion   Skin: Warm, intact  Neurological: No motor or sensory deficit; AAO        08-17-23 @ 07:01  -  08-18-23 @ 07:00  --------------------------------------------------------  IN:    Oral Fluid: 540 mL  Total IN: 540 mL    OUT:    dextrose 5%: 0 mL    Indwelling Catheter - Urethral (mL): 2230 mL    Insulin: 0 mL  Total OUT: 2230 mL    Total NET: -1690 mL          LABS:                            8.5    7.29  )-----------( 196      ( 18 Aug 2023 04:39 )             28.2                                               08-18    148<H>  |  112<H>  |  10  ----------------------------<  196<H>  3.2<L>   |  23  |  0.6<L>    Ca    8.5      18 Aug 2023 04:39    TPro  6.1  /  Alb  3.2<L>  /  TBili  0.3  /  DBili  x   /  AST  30  /  ALT  22  /  AlkPhos  91  08-18      PTT - ( 16 Aug 2023 11:39 )  PTT:100.9 sec                                       Urinalysis Basic - ( 18 Aug 2023 04:39 )    Color: x / Appearance: x / SG: x / pH: x  Gluc: 196 mg/dL / Ketone: x  / Bili: x / Urobili: x   Blood: x / Protein: x / Nitrite: x   Leuk Esterase: x / RBC: x / WBC x   Sq Epi: x / Non Sq Epi: x / Bacteria: x        CARDIAC MARKERS ( 17 Aug 2023 11:52 )  x     / 0.48 ng/mL / x     / x     / x      CARDIAC MARKERS ( 17 Aug 2023 05:13 )  x     / 0.43 ng/mL / x     / x     / x      CARDIAC MARKERS ( 16 Aug 2023 11:35 )  x     / 0.38 ng/mL / x     / x     / x                                                LIVER FUNCTIONS - ( 18 Aug 2023 04:39 )  Alb: 3.2 g/dL / Pro: 6.1 g/dL / ALK PHOS: 91 U/L / ALT: 22 U/L / AST: 30 U/L / GGT: x                                                  Culture - Blood (collected 15 Aug 2023 18:33)  Source: .Blood Blood  Preliminary Report (18 Aug 2023 02:02):    No growth at 48 Hours    Culture - Blood (collected 15 Aug 2023 18:33)  Source: .Blood Blood  Preliminary Report (18 Aug 2023 02:02):    No growth at 48 Hours                                                                                           MEDICATIONS  (STANDING):  albuterol/ipratropium for Nebulization 3 milliLiter(s) Nebulizer every 6 hours  aspirin  chewable 81 milliGRAM(s) Oral daily  atorvastatin 40 milliGRAM(s) Oral at bedtime  chlorhexidine 2% Cloths 1 Application(s) Topical <User Schedule>  dextrose 5%. 1000 milliLiter(s) (50 mL/Hr) IV Continuous <Continuous>  dextrose 50% Injectable 25 Gram(s) IV Push once  enoxaparin Injectable 60 milliGRAM(s) SubCutaneous every 12 hours  glucagon  Injectable 1 milliGRAM(s) IntraMuscular once  insulin glargine Injectable (LANTUS) 22 Unit(s) SubCutaneous at bedtime  insulin lispro (ADMELOG) corrective regimen sliding scale   SubCutaneous three times a day before meals  insulin lispro Injectable (ADMELOG) 8 Unit(s) SubCutaneous three times a day before meals  levETIRAcetam  IVPB 1000 milliGRAM(s) IV Intermittent every 12 hours  metoprolol succinate  milliGRAM(s) Oral daily  metoprolol tartrate 25 milliGRAM(s) Oral every 12 hours  pantoprazole  Injectable 40 milliGRAM(s) IV Push daily  potassium chloride    Tablet ER 40 milliEquivalent(s) Oral every 4 hours  sacubitril 49 mG/valsartan 51 mG 1 Tablet(s) Oral two times a day  sodium bicarbonate 650 milliGRAM(s) Oral three times a day    MEDICATIONS  (PRN):  acetaminophen     Tablet .. 650 milliGRAM(s) Oral every 6 hours PRN Temp greater or equal to 38C (100.4F), Mild Pain (1 - 3)  dextrose Oral Gel 15 Gram(s) Oral once PRN Blood Glucose LESS THAN 70 milliGRAM(s)/deciliter      Xrays: mild infiltrates                                                                                    ECHO

## 2023-08-19 LAB
ALBUMIN SERPL ELPH-MCNC: 2.9 G/DL — LOW (ref 3.5–5.2)
ALP SERPL-CCNC: 79 U/L — SIGNIFICANT CHANGE UP (ref 30–115)
ALT FLD-CCNC: 22 U/L — SIGNIFICANT CHANGE UP (ref 0–41)
ANION GAP SERPL CALC-SCNC: 12 MMOL/L — SIGNIFICANT CHANGE UP (ref 7–14)
AST SERPL-CCNC: 23 U/L — SIGNIFICANT CHANGE UP (ref 0–41)
B BURGDOR DNA SPEC QL NAA+PROBE: NEGATIVE — SIGNIFICANT CHANGE UP
BILIRUB SERPL-MCNC: 0.3 MG/DL — SIGNIFICANT CHANGE UP (ref 0.2–1.2)
BUN SERPL-MCNC: 8 MG/DL — LOW (ref 10–20)
CALCIUM SERPL-MCNC: 8.4 MG/DL — SIGNIFICANT CHANGE UP (ref 8.4–10.4)
CHLORIDE SERPL-SCNC: 109 MMOL/L — SIGNIFICANT CHANGE UP (ref 98–110)
CO2 SERPL-SCNC: 24 MMOL/L — SIGNIFICANT CHANGE UP (ref 17–32)
CREAT SERPL-MCNC: 0.5 MG/DL — LOW (ref 0.7–1.5)
CULTURE RESULTS: SIGNIFICANT CHANGE UP
EGFR: 101 ML/MIN/1.73M2 — SIGNIFICANT CHANGE UP
GLUCOSE BLDC GLUCOMTR-MCNC: 152 MG/DL — HIGH (ref 70–99)
GLUCOSE BLDC GLUCOMTR-MCNC: 179 MG/DL — HIGH (ref 70–99)
GLUCOSE BLDC GLUCOMTR-MCNC: 197 MG/DL — HIGH (ref 70–99)
GLUCOSE BLDC GLUCOMTR-MCNC: 249 MG/DL — HIGH (ref 70–99)
GLUCOSE SERPL-MCNC: 135 MG/DL — HIGH (ref 70–99)
HCT VFR BLD CALC: 26.9 % — LOW (ref 37–47)
HGB BLD-MCNC: 8 G/DL — LOW (ref 12–16)
MCHC RBC-ENTMCNC: 23 PG — LOW (ref 27–31)
MCHC RBC-ENTMCNC: 29.7 G/DL — LOW (ref 32–37)
MCV RBC AUTO: 77.3 FL — LOW (ref 81–99)
NRBC # BLD: 0 /100 WBCS — SIGNIFICANT CHANGE UP (ref 0–0)
PLATELET # BLD AUTO: 184 K/UL — SIGNIFICANT CHANGE UP (ref 130–400)
PMV BLD: 10.9 FL — HIGH (ref 7.4–10.4)
POTASSIUM SERPL-MCNC: 3.3 MMOL/L — LOW (ref 3.5–5)
POTASSIUM SERPL-SCNC: 3.3 MMOL/L — LOW (ref 3.5–5)
PROT SERPL-MCNC: 5.7 G/DL — LOW (ref 6–8)
RBC # BLD: 3.48 M/UL — LOW (ref 4.2–5.4)
RBC # FLD: 19 % — HIGH (ref 11.5–14.5)
SODIUM SERPL-SCNC: 145 MMOL/L — SIGNIFICANT CHANGE UP (ref 135–146)
SPECIMEN SOURCE: SIGNIFICANT CHANGE UP
TROPONIN T SERPL-MCNC: 0.85 NG/ML — CRITICAL HIGH
VDRL CSF-TITR: SIGNIFICANT CHANGE UP
WBC # BLD: 6.88 K/UL — SIGNIFICANT CHANGE UP (ref 4.8–10.8)
WBC # FLD AUTO: 6.88 K/UL — SIGNIFICANT CHANGE UP (ref 4.8–10.8)

## 2023-08-19 PROCEDURE — 71045 X-RAY EXAM CHEST 1 VIEW: CPT | Mod: 26

## 2023-08-19 PROCEDURE — 99233 SBSQ HOSP IP/OBS HIGH 50: CPT

## 2023-08-19 RX ORDER — POTASSIUM CHLORIDE 20 MEQ
40 PACKET (EA) ORAL ONCE
Refills: 0 | Status: COMPLETED | OUTPATIENT
Start: 2023-08-19 | End: 2023-08-19

## 2023-08-19 RX ADMIN — Medication 25 MILLIGRAM(S): at 17:24

## 2023-08-19 RX ADMIN — Medication 81 MILLIGRAM(S): at 11:48

## 2023-08-19 RX ADMIN — Medication 8 UNIT(S): at 11:47

## 2023-08-19 RX ADMIN — INSULIN GLARGINE 22 UNIT(S): 100 INJECTION, SOLUTION SUBCUTANEOUS at 22:24

## 2023-08-19 RX ADMIN — Medication 8 UNIT(S): at 07:47

## 2023-08-19 RX ADMIN — ENOXAPARIN SODIUM 60 MILLIGRAM(S): 100 INJECTION SUBCUTANEOUS at 05:18

## 2023-08-19 RX ADMIN — Medication 650 MILLIGRAM(S): at 05:18

## 2023-08-19 RX ADMIN — Medication 2: at 11:48

## 2023-08-19 RX ADMIN — Medication 650 MILLIGRAM(S): at 13:25

## 2023-08-19 RX ADMIN — Medication 1: at 17:24

## 2023-08-19 RX ADMIN — Medication 3 MILLILITER(S): at 08:31

## 2023-08-19 RX ADMIN — Medication 40 MILLIEQUIVALENT(S): at 07:47

## 2023-08-19 RX ADMIN — Medication 25 MILLIGRAM(S): at 05:17

## 2023-08-19 RX ADMIN — LEVETIRACETAM 400 MILLIGRAM(S): 250 TABLET, FILM COATED ORAL at 05:18

## 2023-08-19 RX ADMIN — PANTOPRAZOLE SODIUM 40 MILLIGRAM(S): 20 TABLET, DELAYED RELEASE ORAL at 11:48

## 2023-08-19 RX ADMIN — Medication 8 UNIT(S): at 17:23

## 2023-08-19 RX ADMIN — ATORVASTATIN CALCIUM 40 MILLIGRAM(S): 80 TABLET, FILM COATED ORAL at 22:24

## 2023-08-19 RX ADMIN — Medication 650 MILLIGRAM(S): at 22:24

## 2023-08-19 RX ADMIN — CHLORHEXIDINE GLUCONATE 1 APPLICATION(S): 213 SOLUTION TOPICAL at 05:18

## 2023-08-19 RX ADMIN — Medication 1: at 07:47

## 2023-08-19 RX ADMIN — SACUBITRIL AND VALSARTAN 1 TABLET(S): 24; 26 TABLET, FILM COATED ORAL at 17:24

## 2023-08-19 RX ADMIN — ENOXAPARIN SODIUM 60 MILLIGRAM(S): 100 INJECTION SUBCUTANEOUS at 17:25

## 2023-08-19 RX ADMIN — LEVETIRACETAM 400 MILLIGRAM(S): 250 TABLET, FILM COATED ORAL at 17:23

## 2023-08-19 RX ADMIN — SACUBITRIL AND VALSARTAN 1 TABLET(S): 24; 26 TABLET, FILM COATED ORAL at 05:18

## 2023-08-19 NOTE — PROGRESS NOTE ADULT - ASSESSMENT
Impression:     Seizures   Altered mental status  Acute hypoxemic respiratory failure   Lactic acidosis   DVT   NSTEMI     PLAN:    CNS: No sedation. No seizure on EEG. On Keppra BID. MR brain done. Neurology following.     HEENT: Oral care.     PULMONARY:  HOB @ 45 degrees.  On room air.     CARDIOVASCULAR: Not on pressors. Keep equal balance. Trop noted; trend; CCTA ordered; EF 30-35%; moderate systolic dysfunction. On Metoprolol and Entresto. Cardiology following.     GI: GI prophylaxis.  Oral diet. Increase free water intake.     RENAL:  Follow up lytes.  Correct as needed. Voiding trial again. Check Mg and correct K.     INFECTIOUS DISEASE: Culture: All cultures negative. Observe off abx.     HEMATOLOGICAL:  On therapeutic Lovenox. Hgb stable. May transition to oral DOAC.     ENDOCRINE:  Follow up FS.  Insulin protocol if needed.    MUSCULOSKELETAL: Out of bed and PT OT.     CODE STATUS: Full code.     Transfer to Telemetry.            Impression:     Seizures   Altered mental status  Acute hypoxemic respiratory failure   Lactic acidosis   DVT   NSTEMI     PLAN:    CNS: No sedation. No seizure on EEG. On Keppra BID. MR brain done. Neurology following.     HEENT: Oral care.     PULMONARY:  HOB @ 45 degrees.  On room air.     CARDIOVASCULAR: Not on pressors. Keep equal balance. Trop noted; trend; CCTA ordered; EF 30-35%; moderate systolic dysfunction. On Metoprolol and Entresto. Cardiology following.   possible cath on monday   GI: GI prophylaxis.  Oral diet. Increase free water intake.     RENAL:  Follow up lytes.  Correct as needed. Voiding trial again. Check Mg and correct K.     INFECTIOUS DISEASE: Culture: All cultures negative. Observe off abx.     HEMATOLOGICAL:  On therapeutic Lovenox. Hgb stable. May transition to oral DOAC.     ENDOCRINE:  Follow up FS.  Insulin protocol if needed.    MUSCULOSKELETAL: Out of bed and PT OT.     CODE STATUS: Full code.     Transfer to Telemetry.            Impression:     Seizures   Altered mental status  Acute hypoxemic respiratory failure   Lactic acidosis   DVT   NSTEMI   PE on ct scan   PLAN:    CNS: No sedation. No seizure on EEG. On Keppra BID. MR brain done. Neurology following.     HEENT: Oral care.     PULMONARY:  HOB @ 45 degrees.  On room air.     CARDIOVASCULAR: Not on pressors. Keep equal balance. Trop noted; trend; CCTA ordered; EF 30-35%; moderate systolic dysfunction. On Metoprolol and Entresto. Cardiology following.   possible cath on monday   GI: GI prophylaxis.  Oral diet. Increase free water intake.     RENAL:  Follow up lytes.  Correct as needed. Voiding trial again. Check Mg and correct K.     INFECTIOUS DISEASE: Culture: All cultures negative. Observe off abx.     HEMATOLOGICAL:  On therapeutic Lovenox. Hgb stable. May transition to oral DOAC.   follow h/h repeat 11 am   ENDOCRINE:  Follow up FS.  Insulin protocol if needed.    MUSCULOSKELETAL: Out of bed and PT OT.     CODE STATUS: Full code.     Transfer to Telemetry.

## 2023-08-19 NOTE — PROGRESS NOTE ADULT - ASSESSMENT
69yo F w/a PMH of Type II DM, HTN, DLD, obesity, lateral epicondylitis, asthma presenting to the ED 8/13 w/left arm pain w findings of LBBB on ECG (unsure if new or not). Code STEMI was called and then canceled because troponin negative and no chest pain. Patient was then noted to have acute mental status change, SOB, lactate elevation, and acidosis on abg. Patient had two seizure episodes and was treated with benzodiazepine. Intubated by ER for airway protection, extubated  8/15. found to have DVTs. s/p CCTA for NSTEMI. possible cath on Monday     # Seizures   # Altered mental status: resolved  # Lactic acidosis   - s/p neuro eval and video EEG  - Keppra 1g BID   - lactic acidosis was most likely  s/p seizure and was ultimately resolved.  - had fevers: s/p infectious work up and empiric ABx course: currently off ABx     # Acute hypoxemic respiratory failure s/p intubation and extubation: on room air     # DVT/PE : - c/w therapeutic Lovenox, will switch to DOAC prior to discharge      # HFrEF/ NSTEMI :   - s/p CCTA, cardio eval noted, for cath on Monday   - Echo 30-35% EF  - on Metoprolol, Entresto, ASA, statin   - trops trending up     # DM: c/w insulin regimen, adjust accordingly    # Microcytic anemia:  - required transfusion during admission with no over bleeding   - recommends iron studies     dvt ppx: on Lovenox  gi ppx: on PPI

## 2023-08-19 NOTE — PROGRESS NOTE ADULT - SUBJECTIVE AND OBJECTIVE BOX
SUBJECTIVE:    Patient is a 68y old Female who presents with a chief complaint of Arm Pain, AMS (19 Aug 2023 07:21)  Currently admitted to medicine with the primary diagnosis of Seizure  Today is hospital day 6d. This morning she is resting comfortably in bed and reports no new issues or overnight events.     PAST MEDICAL & SURGICAL HISTORY  Diabetes      SOCIAL HISTORY:  Negative for smoking/alcohol/drug use.     ALLERGIES:  No Known Allergies    MEDICATIONS:  STANDING MEDICATIONS  albuterol/ipratropium for Nebulization 3 milliLiter(s) Nebulizer every 6 hours  aspirin  chewable 81 milliGRAM(s) Oral daily  atorvastatin 40 milliGRAM(s) Oral at bedtime  chlorhexidine 2% Cloths 1 Application(s) Topical <User Schedule>  dextrose 5%. 1000 milliLiter(s) IV Continuous <Continuous>  dextrose 50% Injectable 25 Gram(s) IV Push once  enoxaparin Injectable 60 milliGRAM(s) SubCutaneous every 12 hours  glucagon  Injectable 1 milliGRAM(s) IntraMuscular once  insulin glargine Injectable (LANTUS) 22 Unit(s) SubCutaneous at bedtime  insulin lispro (ADMELOG) corrective regimen sliding scale   SubCutaneous three times a day before meals  insulin lispro Injectable (ADMELOG) 8 Unit(s) SubCutaneous three times a day before meals  levETIRAcetam  IVPB 1000 milliGRAM(s) IV Intermittent every 12 hours  metoprolol tartrate 25 milliGRAM(s) Oral every 12 hours  pantoprazole  Injectable 40 milliGRAM(s) IV Push daily  sacubitril 49 mG/valsartan 51 mG 1 Tablet(s) Oral two times a day  sodium bicarbonate 650 milliGRAM(s) Oral three times a day    PRN MEDICATIONS  acetaminophen     Tablet .. 650 milliGRAM(s) Oral every 6 hours PRN  dextrose Oral Gel 15 Gram(s) Oral once PRN    VITALS:   T(F): 98.5  HR: 88  BP: 131/60  RR: 20  SpO2: 94%    LABS:                        8.0    6.88  )-----------( 184      ( 19 Aug 2023 04:59 )             26.9     08-19    145  |  109  |  8<L>  ----------------------------<  135<H>  3.3<L>   |  24  |  0.5<L>    Ca    8.4      19 Aug 2023 04:59    TPro  5.7<L>  /  Alb  2.9<L>  /  TBili  0.3  /  DBili  x   /  AST  23  /  ALT  22  /  AlkPhos  79  08-19      Urinalysis Basic - ( 19 Aug 2023 04:59 )    Color: x / Appearance: x / SG: x / pH: x  Gluc: 135 mg/dL / Ketone: x  / Bili: x / Urobili: x   Blood: x / Protein: x / Nitrite: x   Leuk Esterase: x / RBC: x / WBC x   Sq Epi: x / Non Sq Epi: x / Bacteria: x        Troponin T, Serum: 0.85 ng/mL *HH* (08-19-23 @ 04:59)      CARDIAC MARKERS ( 19 Aug 2023 04:59 )  x     / 0.85 ng/mL / x     / x     / x      CARDIAC MARKERS ( 18 Aug 2023 11:29 )  x     / 0.67 ng/mL / x     / x     / x      CARDIAC MARKERS ( 17 Aug 2023 11:52 )  x     / 0.48 ng/mL / x     / x     / x          RADIOLOGY:    < from: Xray Chest 1 View- PORTABLE-Routine (Xray Chest 1 View- PORTABLE-Routine in AM.) (08.18.23 @ 07:22) >    Impression:    Stable bilateral interstitial lung opacities        < end of copied text >      PHYSICAL EXAM:  GEN: No acute distress, on room air   LUNGS: Clear to auscultation bilaterally   HEART: S1/S2 present. RRR.   ABD: Soft, non-tender, non-distended. Bowel sounds present  EXT: No LE edema   NEURO: [id number]

## 2023-08-19 NOTE — PROGRESS NOTE ADULT - SUBJECTIVE AND OBJECTIVE BOX
Patient is a 68y old  Female who presents with a chief complaint of Arm Pain, AMS (18 Aug 2023 21:16)      Over Night Events:  Patient seen and examined.       ROS:  See HPI    PHYSICAL EXAM    ICU Vital Signs Last 24 Hrs  T(C): 36.8 (19 Aug 2023 00:00), Max: 36.9 (18 Aug 2023 08:00)  T(F): 98.3 (19 Aug 2023 00:00), Max: 98.5 (18 Aug 2023 08:00)  HR: 77 (19 Aug 2023 06:00) (66 - 85)  BP: 128/60 (19 Aug 2023 06:00) (109/53 - 141/64)  BP(mean): 86 (19 Aug 2023 06:00) (77 - 92)  ABP: --  ABP(mean): --  RR: 22 (19 Aug 2023 06:00) (19 - 35)  SpO2: 94% (19 Aug 2023 06:00) (93% - 97%)    O2 Parameters below as of 19 Aug 2023 06:00  Patient On (Oxygen Delivery Method): room air            General:  HEENT:                Lymph Nodes: NO cervical LN   Lungs: Bilateral BS  Cardiovascular: Regular   Abdomen: Soft, Positive BS  Extremities: No clubbing   Skin: warm   Neurological:   Musculoskeletal: move all ext     I&O's Detail    18 Aug 2023 07:01  -  19 Aug 2023 07:00  --------------------------------------------------------  IN:    Oral Fluid: 60 mL  Total IN: 60 mL    OUT:    Indwelling Catheter - Urethral (mL): 250 mL    Voided (mL): 400 mL  Total OUT: 650 mL    Total NET: -590 mL          LABS:                          8.0    6.88  )-----------( 184      ( 19 Aug 2023 04:59 )             26.9         19 Aug 2023 04:59    145    |  109    |  8      ----------------------------<  135    3.3     |  24     |  0.5      Ca    8.4        19 Aug 2023 04:59    TPro  5.7    /  Alb  2.9    /  TBili  0.3    /  DBili  x      /  AST  23     /  ALT  22     /  AlkPhos  79     19 Aug 2023 04:59  Amylase x     lipase x                                                                                        Urinalysis Basic - ( 19 Aug 2023 04:59 )    Color: x / Appearance: x / SG: x / pH: x  Gluc: 135 mg/dL / Ketone: x  / Bili: x / Urobili: x   Blood: x / Protein: x / Nitrite: x   Leuk Esterase: x / RBC: x / WBC x   Sq Epi: x / Non Sq Epi: x / Bacteria: x        Lactate, Blood: 1.0 mmol/L (08-17-23 @ 05:13)  Lactate, Blood: 1.7 mmol/L (08-16-23 @ 11:35)    CARDIAC MARKERS ( 19 Aug 2023 04:59 )  x     / 0.85 ng/mL / x     / x     / x      CARDIAC MARKERS ( 18 Aug 2023 11:29 )  x     / 0.67 ng/mL / x     / x     / x      CARDIAC MARKERS ( 17 Aug 2023 11:52 )  x     / 0.48 ng/mL / x     / x     / x                                                                                                                                                 MEDICATIONS  (STANDING):  albuterol/ipratropium for Nebulization 3 milliLiter(s) Nebulizer every 6 hours  aspirin  chewable 81 milliGRAM(s) Oral daily  atorvastatin 40 milliGRAM(s) Oral at bedtime  chlorhexidine 2% Cloths 1 Application(s) Topical <User Schedule>  dextrose 5%. 1000 milliLiter(s) (50 mL/Hr) IV Continuous <Continuous>  dextrose 50% Injectable 25 Gram(s) IV Push once  enoxaparin Injectable 60 milliGRAM(s) SubCutaneous every 12 hours  glucagon  Injectable 1 milliGRAM(s) IntraMuscular once  insulin glargine Injectable (LANTUS) 22 Unit(s) SubCutaneous at bedtime  insulin lispro (ADMELOG) corrective regimen sliding scale   SubCutaneous three times a day before meals  insulin lispro Injectable (ADMELOG) 8 Unit(s) SubCutaneous three times a day before meals  levETIRAcetam  IVPB 1000 milliGRAM(s) IV Intermittent every 12 hours  metoprolol tartrate 25 milliGRAM(s) Oral every 12 hours  pantoprazole  Injectable 40 milliGRAM(s) IV Push daily  potassium chloride    Tablet ER 40 milliEquivalent(s) Oral once  sacubitril 49 mG/valsartan 51 mG 1 Tablet(s) Oral two times a day  sodium bicarbonate 650 milliGRAM(s) Oral three times a day    MEDICATIONS  (PRN):  acetaminophen     Tablet .. 650 milliGRAM(s) Oral every 6 hours PRN Temp greater or equal to 38C (100.4F), Mild Pain (1 - 3)  dextrose Oral Gel 15 Gram(s) Oral once PRN Blood Glucose LESS THAN 70 milliGRAM(s)/deciliter          Xrays:  TLC:  OG:  ET tube:                                                                                       ECHO:  CAM ICU:    IMPRESSION:      PLAN:    CNS:    HEENT:    PULMONARY:    CARDIOVASCULAR:    GI: GI prophylaxis.  Feeding     RENAL:    INFECTIOUS DISEASE:    HEMATOLOGICAL:  DVT prophylaxis.    ENDOCRINE:  Follow up FS.  Insulin protocol if needed.    MUSCULOSKELETAL:         Patient is a 68y old  Female who presents with a chief complaint of Arm Pain, AMS (18 Aug 2023 21:16)      Over Night Events:  Patient seen and examined.   awake follow command     ROS:  See HPI    PHYSICAL EXAM    ICU Vital Signs Last 24 Hrs  T(C): 36.8 (19 Aug 2023 00:00), Max: 36.9 (18 Aug 2023 08:00)  T(F): 98.3 (19 Aug 2023 00:00), Max: 98.5 (18 Aug 2023 08:00)  HR: 77 (19 Aug 2023 06:00) (66 - 85)  BP: 128/60 (19 Aug 2023 06:00) (109/53 - 141/64)  BP(mean): 86 (19 Aug 2023 06:00) (77 - 92)  ABP: --  ABP(mean): --  RR: 22 (19 Aug 2023 06:00) (19 - 35)  SpO2: 94% (19 Aug 2023 06:00) (93% - 97%)    O2 Parameters below as of 19 Aug 2023 06:00  Patient On (Oxygen Delivery Method): room air            General:awake   HEENT:         gopi       Lymph Nodes: NO cervical LN   Lungs: Bilateral BS  Cardiovascular: Regular   Abdomen: Soft, Positive BS  Extremities: No clubbing   Skin: warm   Neurological: no focal   Musculoskeletal: move all ext     I&O's Detail    18 Aug 2023 07:01  -  19 Aug 2023 07:00  --------------------------------------------------------  IN:    Oral Fluid: 60 mL  Total IN: 60 mL    OUT:    Indwelling Catheter - Urethral (mL): 250 mL    Voided (mL): 400 mL  Total OUT: 650 mL    Total NET: -590 mL          LABS:                          8.0    6.88  )-----------( 184      ( 19 Aug 2023 04:59 )             26.9         19 Aug 2023 04:59    145    |  109    |  8      ----------------------------<  135    3.3     |  24     |  0.5      Ca    8.4        19 Aug 2023 04:59    TPro  5.7    /  Alb  2.9    /  TBili  0.3    /  DBili  x      /  AST  23     /  ALT  22     /  AlkPhos  79     19 Aug 2023 04:59  Amylase x     lipase x                                                                                        Urinalysis Basic - ( 19 Aug 2023 04:59 )    Color: x / Appearance: x / SG: x / pH: x  Gluc: 135 mg/dL / Ketone: x  / Bili: x / Urobili: x   Blood: x / Protein: x / Nitrite: x   Leuk Esterase: x / RBC: x / WBC x   Sq Epi: x / Non Sq Epi: x / Bacteria: x        Lactate, Blood: 1.0 mmol/L (08-17-23 @ 05:13)  Lactate, Blood: 1.7 mmol/L (08-16-23 @ 11:35)    CARDIAC MARKERS ( 19 Aug 2023 04:59 )  x     / 0.85 ng/mL / x     / x     / x      CARDIAC MARKERS ( 18 Aug 2023 11:29 )  x     / 0.67 ng/mL / x     / x     / x      CARDIAC MARKERS ( 17 Aug 2023 11:52 )  x     / 0.48 ng/mL / x     / x     / x                                                                                                                                                 MEDICATIONS  (STANDING):  albuterol/ipratropium for Nebulization 3 milliLiter(s) Nebulizer every 6 hours  aspirin  chewable 81 milliGRAM(s) Oral daily  atorvastatin 40 milliGRAM(s) Oral at bedtime  chlorhexidine 2% Cloths 1 Application(s) Topical <User Schedule>  dextrose 5%. 1000 milliLiter(s) (50 mL/Hr) IV Continuous <Continuous>  dextrose 50% Injectable 25 Gram(s) IV Push once  enoxaparin Injectable 60 milliGRAM(s) SubCutaneous every 12 hours  glucagon  Injectable 1 milliGRAM(s) IntraMuscular once  insulin glargine Injectable (LANTUS) 22 Unit(s) SubCutaneous at bedtime  insulin lispro (ADMELOG) corrective regimen sliding scale   SubCutaneous three times a day before meals  insulin lispro Injectable (ADMELOG) 8 Unit(s) SubCutaneous three times a day before meals  levETIRAcetam  IVPB 1000 milliGRAM(s) IV Intermittent every 12 hours  metoprolol tartrate 25 milliGRAM(s) Oral every 12 hours  pantoprazole  Injectable 40 milliGRAM(s) IV Push daily  potassium chloride    Tablet ER 40 milliEquivalent(s) Oral once  sacubitril 49 mG/valsartan 51 mG 1 Tablet(s) Oral two times a day  sodium bicarbonate 650 milliGRAM(s) Oral three times a day    MEDICATIONS  (PRN):  acetaminophen     Tablet .. 650 milliGRAM(s) Oral every 6 hours PRN Temp greater or equal to 38C (100.4F), Mild Pain (1 - 3)  dextrose Oral Gel 15 Gram(s) Oral once PRN Blood Glucose LESS THAN 70 milliGRAM(s)/deciliter          Xrays:  TLC:  OG:  ET tube:                                                                                       ECHO:  CAM ICU:    IMPRESSION:      PLAN:    CNS:    HEENT:    PULMONARY:    CARDIOVASCULAR:    GI: GI prophylaxis.  Feeding     RENAL:    INFECTIOUS DISEASE:    HEMATOLOGICAL:  DVT prophylaxis.    ENDOCRINE:  Follow up FS.  Insulin protocol if needed.    MUSCULOSKELETAL:

## 2023-08-20 LAB
ALBUMIN SERPL ELPH-MCNC: 2.8 G/DL — LOW (ref 3.5–5.2)
ALBUMIN SERPL ELPH-MCNC: 2.8 G/DL — LOW (ref 3.5–5.2)
ALP SERPL-CCNC: 80 U/L — SIGNIFICANT CHANGE UP (ref 30–115)
ALP SERPL-CCNC: 91 U/L — SIGNIFICANT CHANGE UP (ref 30–115)
ALT FLD-CCNC: 24 U/L — SIGNIFICANT CHANGE UP (ref 0–41)
ALT FLD-CCNC: 25 U/L — SIGNIFICANT CHANGE UP (ref 0–41)
ANION GAP SERPL CALC-SCNC: 10 MMOL/L — SIGNIFICANT CHANGE UP (ref 7–14)
ANION GAP SERPL CALC-SCNC: 12 MMOL/L — SIGNIFICANT CHANGE UP (ref 7–14)
AST SERPL-CCNC: 24 U/L — SIGNIFICANT CHANGE UP (ref 0–41)
AST SERPL-CCNC: 26 U/L — SIGNIFICANT CHANGE UP (ref 0–41)
BILIRUB SERPL-MCNC: 0.3 MG/DL — SIGNIFICANT CHANGE UP (ref 0.2–1.2)
BILIRUB SERPL-MCNC: 0.3 MG/DL — SIGNIFICANT CHANGE UP (ref 0.2–1.2)
BUN SERPL-MCNC: 5 MG/DL — LOW (ref 10–20)
BUN SERPL-MCNC: 5 MG/DL — LOW (ref 10–20)
CALCIUM SERPL-MCNC: 7.9 MG/DL — LOW (ref 8.4–10.4)
CALCIUM SERPL-MCNC: 7.9 MG/DL — LOW (ref 8.4–10.4)
CHLORIDE SERPL-SCNC: 104 MMOL/L — SIGNIFICANT CHANGE UP (ref 98–110)
CHLORIDE SERPL-SCNC: 109 MMOL/L — SIGNIFICANT CHANGE UP (ref 98–110)
CO2 SERPL-SCNC: 26 MMOL/L — SIGNIFICANT CHANGE UP (ref 17–32)
CO2 SERPL-SCNC: 26 MMOL/L — SIGNIFICANT CHANGE UP (ref 17–32)
CREAT SERPL-MCNC: 0.5 MG/DL — LOW (ref 0.7–1.5)
CREAT SERPL-MCNC: 0.5 MG/DL — LOW (ref 0.7–1.5)
EGFR: 101 ML/MIN/1.73M2 — SIGNIFICANT CHANGE UP
EGFR: 101 ML/MIN/1.73M2 — SIGNIFICANT CHANGE UP
GLUCOSE BLDC GLUCOMTR-MCNC: 142 MG/DL — HIGH (ref 70–99)
GLUCOSE BLDC GLUCOMTR-MCNC: 205 MG/DL — HIGH (ref 70–99)
GLUCOSE BLDC GLUCOMTR-MCNC: 238 MG/DL — HIGH (ref 70–99)
GLUCOSE BLDC GLUCOMTR-MCNC: 299 MG/DL — HIGH (ref 70–99)
GLUCOSE SERPL-MCNC: 158 MG/DL — HIGH (ref 70–99)
GLUCOSE SERPL-MCNC: 192 MG/DL — HIGH (ref 70–99)
HCT VFR BLD CALC: 26.1 % — LOW (ref 37–47)
HCT VFR BLD CALC: 27.3 % — LOW (ref 37–47)
HGB BLD-MCNC: 7.9 G/DL — LOW (ref 12–16)
HGB BLD-MCNC: 8.1 G/DL — LOW (ref 12–16)
MCHC RBC-ENTMCNC: 22.8 PG — LOW (ref 27–31)
MCHC RBC-ENTMCNC: 23.2 PG — LOW (ref 27–31)
MCHC RBC-ENTMCNC: 29.7 G/DL — LOW (ref 32–37)
MCHC RBC-ENTMCNC: 30.3 G/DL — LOW (ref 32–37)
MCV RBC AUTO: 76.5 FL — LOW (ref 81–99)
MCV RBC AUTO: 76.9 FL — LOW (ref 81–99)
NRBC # BLD: 0 /100 WBCS — SIGNIFICANT CHANGE UP (ref 0–0)
NRBC # BLD: 0 /100 WBCS — SIGNIFICANT CHANGE UP (ref 0–0)
PLATELET # BLD AUTO: 151 K/UL — SIGNIFICANT CHANGE UP (ref 130–400)
PLATELET # BLD AUTO: 201 K/UL — SIGNIFICANT CHANGE UP (ref 130–400)
PMV BLD: 10.7 FL — HIGH (ref 7.4–10.4)
PMV BLD: 11.5 FL — HIGH (ref 7.4–10.4)
POTASSIUM SERPL-MCNC: 2.9 MMOL/L — LOW (ref 3.5–5)
POTASSIUM SERPL-MCNC: 3.2 MMOL/L — LOW (ref 3.5–5)
POTASSIUM SERPL-SCNC: 2.9 MMOL/L — LOW (ref 3.5–5)
POTASSIUM SERPL-SCNC: 3.2 MMOL/L — LOW (ref 3.5–5)
PROT SERPL-MCNC: 5.4 G/DL — LOW (ref 6–8)
PROT SERPL-MCNC: 5.9 G/DL — LOW (ref 6–8)
RBC # BLD: 3.41 M/UL — LOW (ref 4.2–5.4)
RBC # BLD: 3.55 M/UL — LOW (ref 4.2–5.4)
RBC # FLD: 19.3 % — HIGH (ref 11.5–14.5)
RBC # FLD: 19.6 % — HIGH (ref 11.5–14.5)
SODIUM SERPL-SCNC: 142 MMOL/L — SIGNIFICANT CHANGE UP (ref 135–146)
SODIUM SERPL-SCNC: 145 MMOL/L — SIGNIFICANT CHANGE UP (ref 135–146)
TROPONIN T SERPL-MCNC: 0.76 NG/ML — CRITICAL HIGH
WBC # BLD: 6.99 K/UL — SIGNIFICANT CHANGE UP (ref 4.8–10.8)
WBC # BLD: 7.52 K/UL — SIGNIFICANT CHANGE UP (ref 4.8–10.8)
WBC # FLD AUTO: 6.99 K/UL — SIGNIFICANT CHANGE UP (ref 4.8–10.8)
WBC # FLD AUTO: 7.52 K/UL — SIGNIFICANT CHANGE UP (ref 4.8–10.8)

## 2023-08-20 PROCEDURE — 71045 X-RAY EXAM CHEST 1 VIEW: CPT | Mod: 26

## 2023-08-20 PROCEDURE — 99233 SBSQ HOSP IP/OBS HIGH 50: CPT

## 2023-08-20 RX ORDER — POTASSIUM CHLORIDE 20 MEQ
40 PACKET (EA) ORAL ONCE
Refills: 0 | Status: DISCONTINUED | OUTPATIENT
Start: 2023-08-20 | End: 2023-08-20

## 2023-08-20 RX ORDER — POTASSIUM CHLORIDE 20 MEQ
20 PACKET (EA) ORAL
Refills: 0 | Status: COMPLETED | OUTPATIENT
Start: 2023-08-20 | End: 2023-08-20

## 2023-08-20 RX ORDER — POTASSIUM CHLORIDE 20 MEQ
10 PACKET (EA) ORAL
Refills: 0 | Status: DISCONTINUED | OUTPATIENT
Start: 2023-08-20 | End: 2023-08-20

## 2023-08-20 RX ORDER — POTASSIUM CHLORIDE 20 MEQ
40 PACKET (EA) ORAL ONCE
Refills: 0 | Status: COMPLETED | OUTPATIENT
Start: 2023-08-20 | End: 2023-08-20

## 2023-08-20 RX ORDER — INSULIN LISPRO 100/ML
2 VIAL (ML) SUBCUTANEOUS ONCE
Refills: 0 | Status: COMPLETED | OUTPATIENT
Start: 2023-08-20 | End: 2023-08-20

## 2023-08-20 RX ADMIN — LEVETIRACETAM 400 MILLIGRAM(S): 250 TABLET, FILM COATED ORAL at 05:07

## 2023-08-20 RX ADMIN — Medication 3 MILLILITER(S): at 14:26

## 2023-08-20 RX ADMIN — Medication 2: at 17:25

## 2023-08-20 RX ADMIN — LEVETIRACETAM 400 MILLIGRAM(S): 250 TABLET, FILM COATED ORAL at 17:25

## 2023-08-20 RX ADMIN — Medication 8 UNIT(S): at 09:01

## 2023-08-20 RX ADMIN — SACUBITRIL AND VALSARTAN 1 TABLET(S): 24; 26 TABLET, FILM COATED ORAL at 05:07

## 2023-08-20 RX ADMIN — Medication 650 MILLIGRAM(S): at 05:07

## 2023-08-20 RX ADMIN — Medication 25 MILLIGRAM(S): at 17:25

## 2023-08-20 RX ADMIN — Medication 50 MILLIEQUIVALENT(S): at 06:40

## 2023-08-20 RX ADMIN — Medication 3: at 11:17

## 2023-08-20 RX ADMIN — PANTOPRAZOLE SODIUM 40 MILLIGRAM(S): 20 TABLET, DELAYED RELEASE ORAL at 11:16

## 2023-08-20 RX ADMIN — ATORVASTATIN CALCIUM 40 MILLIGRAM(S): 80 TABLET, FILM COATED ORAL at 21:59

## 2023-08-20 RX ADMIN — ENOXAPARIN SODIUM 60 MILLIGRAM(S): 100 INJECTION SUBCUTANEOUS at 05:07

## 2023-08-20 RX ADMIN — Medication 81 MILLIGRAM(S): at 11:17

## 2023-08-20 RX ADMIN — Medication 50 MILLIEQUIVALENT(S): at 09:01

## 2023-08-20 RX ADMIN — Medication 2 UNIT(S): at 22:42

## 2023-08-20 RX ADMIN — ENOXAPARIN SODIUM 60 MILLIGRAM(S): 100 INJECTION SUBCUTANEOUS at 17:25

## 2023-08-20 RX ADMIN — Medication 650 MILLIGRAM(S): at 14:37

## 2023-08-20 RX ADMIN — Medication 40 MILLIEQUIVALENT(S): at 09:03

## 2023-08-20 RX ADMIN — CHLORHEXIDINE GLUCONATE 1 APPLICATION(S): 213 SOLUTION TOPICAL at 05:23

## 2023-08-20 RX ADMIN — Medication 8 UNIT(S): at 11:17

## 2023-08-20 RX ADMIN — Medication 8 UNIT(S): at 17:25

## 2023-08-20 RX ADMIN — Medication 25 MILLIGRAM(S): at 05:07

## 2023-08-20 RX ADMIN — Medication 50 MILLIEQUIVALENT(S): at 11:16

## 2023-08-20 RX ADMIN — Medication 40 MILLIEQUIVALENT(S): at 20:29

## 2023-08-20 RX ADMIN — SACUBITRIL AND VALSARTAN 1 TABLET(S): 24; 26 TABLET, FILM COATED ORAL at 17:25

## 2023-08-20 RX ADMIN — INSULIN GLARGINE 22 UNIT(S): 100 INJECTION, SOLUTION SUBCUTANEOUS at 21:59

## 2023-08-20 RX ADMIN — Medication 3 MILLILITER(S): at 19:33

## 2023-08-20 NOTE — PROGRESS NOTE ADULT - SUBJECTIVE AND OBJECTIVE BOX
Patient is a 68y old  Female who presents with a chief complaint of Arm Pain, AMS (20 Aug 2023 00:12)      Over Night Events:  Patient seen and examined.   stable  bp stable pending bed on tele   on heparin     ROS:  See HPI    PHYSICAL EXAM    ICU Vital Signs Last 24 Hrs  T(C): 37.1 (20 Aug 2023 04:00), Max: 37.2 (19 Aug 2023 20:00)  T(F): 98.8 (20 Aug 2023 04:00), Max: 99 (20 Aug 2023 00:00)  HR: 79 (20 Aug 2023 06:00) (79 - 97)  BP: 134/63 (20 Aug 2023 06:00) (119/56 - 148/65)  BP(mean): 90 (20 Aug 2023 06:00) (78 - 95)  ABP: --  ABP(mean): --  RR: 24 (20 Aug 2023 06:00) (20 - 26)  SpO2: 96% (20 Aug 2023 06:00) (94% - 98%)    O2 Parameters below as of 20 Aug 2023 06:00  Patient On (Oxygen Delivery Method): room air            General:  HEENT:                Lymph Nodes: NO cervical LN   Lungs: Bilateral BS  Cardiovascular: Regular   Abdomen: Soft, Positive BS  Extremities: No clubbing   Skin: warm   Neurological:   Musculoskeletal: move all ext     I&O's Detail    19 Aug 2023 07:01  -  20 Aug 2023 07:00  --------------------------------------------------------  IN:    IV PiggyBack: 200 mL    Oral Fluid: 318 mL  Total IN: 518 mL    OUT:    Voided (mL): 250 mL  Total OUT: 250 mL    Total NET: 268 mL          LABS:                          7.9    6.99  )-----------( 201      ( 20 Aug 2023 04:29 )             26.1         20 Aug 2023 04:29    145    |  109    |  5      ----------------------------<  158    2.9     |  26     |  0.5      Ca    7.9        20 Aug 2023 04:29    TPro  5.4    /  Alb  2.8    /  TBili  0.3    /  DBili  x      /  AST  26     /  ALT  24     /  AlkPhos  80     20 Aug 2023 04:29  Amylase x     lipase x                                                                                        Urinalysis Basic - ( 20 Aug 2023 04:29 )    Color: x / Appearance: x / SG: x / pH: x  Gluc: 158 mg/dL / Ketone: x  / Bili: x / Urobili: x   Blood: x / Protein: x / Nitrite: x   Leuk Esterase: x / RBC: x / WBC x   Sq Epi: x / Non Sq Epi: x / Bacteria: x          CARDIAC MARKERS ( 20 Aug 2023 04:29 )  x     / 0.76 ng/mL / x     / x     / x      CARDIAC MARKERS ( 19 Aug 2023 04:59 )  x     / 0.85 ng/mL / x     / x     / x      CARDIAC MARKERS ( 18 Aug 2023 11:29 )  x     / 0.67 ng/mL / x     / x     / x                                                                                                                                                 MEDICATIONS  (STANDING):  albuterol/ipratropium for Nebulization 3 milliLiter(s) Nebulizer every 6 hours  aspirin  chewable 81 milliGRAM(s) Oral daily  atorvastatin 40 milliGRAM(s) Oral at bedtime  chlorhexidine 2% Cloths 1 Application(s) Topical <User Schedule>  dextrose 5%. 1000 milliLiter(s) (50 mL/Hr) IV Continuous <Continuous>  dextrose 50% Injectable 25 Gram(s) IV Push once  enoxaparin Injectable 60 milliGRAM(s) SubCutaneous every 12 hours  glucagon  Injectable 1 milliGRAM(s) IntraMuscular once  insulin glargine Injectable (LANTUS) 22 Unit(s) SubCutaneous at bedtime  insulin lispro (ADMELOG) corrective regimen sliding scale   SubCutaneous three times a day before meals  insulin lispro Injectable (ADMELOG) 8 Unit(s) SubCutaneous three times a day before meals  levETIRAcetam  IVPB 1000 milliGRAM(s) IV Intermittent every 12 hours  metoprolol tartrate 25 milliGRAM(s) Oral every 12 hours  pantoprazole  Injectable 40 milliGRAM(s) IV Push daily  potassium chloride   Powder 40 milliEquivalent(s) Oral once  potassium chloride  20 mEq/100 mL IVPB 20 milliEquivalent(s) IV Intermittent every 2 hours  sacubitril 49 mG/valsartan 51 mG 1 Tablet(s) Oral two times a day  sodium bicarbonate 650 milliGRAM(s) Oral three times a day    MEDICATIONS  (PRN):  acetaminophen     Tablet .. 650 milliGRAM(s) Oral every 6 hours PRN Temp greater or equal to 38C (100.4F), Mild Pain (1 - 3)  dextrose Oral Gel 15 Gram(s) Oral once PRN Blood Glucose LESS THAN 70 milliGRAM(s)/deciliter          Xrays:  TLC:  OG:  ET tube:                                                                                       ECHO:  CAM ICU:

## 2023-08-20 NOTE — PROGRESS NOTE ADULT - ASSESSMENT
Impression:     Seizures   Altered mental status  Acute hypoxemic respiratory failure   Lactic acidosis   DVT   NSTEMI   PE on ct scan   PLAN:    CNS: No sedation. No seizure on EEG. On Keppra BID. MR brain done. Neurology following.     HEENT: Oral care.     PULMONARY:  HOB @ 45 degrees.  On room air.     CARDIOVASCULAR: Not on pressors. Keep equal balance. Trop noted; trend; CCTA ordered; EF 30-35%; moderate systolic dysfunction. On Metoprolol and Entresto. Cardiology following.   possible cath on monday   GI: GI prophylaxis.  Oral diet. Increase free water intake.     RENAL:  Follow up lytes.  Correct as needed. Voiding trial again. Check Mg and correct K.     INFECTIOUS DISEASE: Culture: All cultures negative. Observe off abx.     HEMATOLOGICAL:  On therapeutic Lovenox. Hgb stable. May transition to oral DOAC.   follow h/h repeat 11 am   keep hb around 8   ENDOCRINE:  Follow up FS.  Insulin protocol if needed.    MUSCULOSKELETAL: Out of bed and PT OT.     CODE STATUS: Full code.     Transfer to Telemetry.

## 2023-08-20 NOTE — PROGRESS NOTE ADULT - ASSESSMENT
Impression:     Seizures   Altered mental status  Acute hypoxemic respiratory failure   Lactic acidosis   DVT   NSTEMI   PE on ct scan   PLAN:    CNS: No sedation. No seizure on video  EEG. On Keppra BID.   MR brain negative .   f/u neurology     HEENT: Oral care.     PULMONARY:  HOB @ 45 degrees.  On room air.     CARDIOVASCULAR: Not on pressors. Trending trops   CCTA done showed PE ,pt already on lovenox for b/l DVT Cardiology on board   ; EF 30-35%; moderate systolic dysfunction. On Metoprolol and Entresto.  possible cath on monday     GI: GI prophylaxis.  Oral diet. Increase free water intake.     RENAL:  Follow up lytes.  Correct as needed.    INFECTIOUS DISEASE: Culture: All cultures negative. off abx.     HEMATOLOGICAL:  On therapeutic Lovenox. Hgb stable. May transition to oral DOAC.     ENDOCRINE:  Follow up FS.  Insulin protocol if needed.    MUSCULOSKELETAL: Out of bed and PT OT.     CODE STATUS: Full code.     Transfer to Telemetry.

## 2023-08-20 NOTE — PROGRESS NOTE ADULT - SUBJECTIVE AND OBJECTIVE BOX
MARCIANO RODRIGUES 68y Female  MRN#: [id number]     Hospital Day: 7d    Pt is currently admitted with the primary diagnosis of  Convulsions        SUBJECTIVE     Overnight events  None                                              ----------------------------------------------------------  OBJECTIVE  PAST MEDICAL & SURGICAL HISTORY  Diabetes                                              -----------------------------------------------------------  ALLERGIES:  No Known Allergies                                            ------------------------------------------------------------    HOME MEDICATIONS  Home Medications:  aspirin 81 mg oral tablet, chewable: 1 chewed once a day (13 Aug 2023 19:53)  atorvastatin 10 mg oral tablet: 1 orally once a day (at bedtime) (13 Aug 2023 19:52)  Jardiance 10 mg oral tablet: 1 orally once a day (13 Aug 2023 19:53)  MetFORMIN (Eqv-Glumetza) 500 mg oral tablet, extended release: 1 orally 2 times a day (13 Aug 2023 19:51)  pioglitazone 30 mg oral tablet: 1 orally once a day (13 Aug 2023 19:52)                           MEDICATIONS:  STANDING MEDICATIONS  albuterol/ipratropium for Nebulization 3 milliLiter(s) Nebulizer every 6 hours  aspirin  chewable 81 milliGRAM(s) Oral daily  atorvastatin 40 milliGRAM(s) Oral at bedtime  chlorhexidine 2% Cloths 1 Application(s) Topical <User Schedule>  dextrose 5%. 1000 milliLiter(s) IV Continuous <Continuous>  dextrose 50% Injectable 25 Gram(s) IV Push once  enoxaparin Injectable 60 milliGRAM(s) SubCutaneous every 12 hours  glucagon  Injectable 1 milliGRAM(s) IntraMuscular once  insulin glargine Injectable (LANTUS) 22 Unit(s) SubCutaneous at bedtime  insulin lispro (ADMELOG) corrective regimen sliding scale   SubCutaneous three times a day before meals  insulin lispro Injectable (ADMELOG) 8 Unit(s) SubCutaneous three times a day before meals  levETIRAcetam  IVPB 1000 milliGRAM(s) IV Intermittent every 12 hours  metoprolol tartrate 25 milliGRAM(s) Oral every 12 hours  pantoprazole  Injectable 40 milliGRAM(s) IV Push daily  sacubitril 49 mG/valsartan 51 mG 1 Tablet(s) Oral two times a day  sodium bicarbonate 650 milliGRAM(s) Oral three times a day    PRN MEDICATIONS  acetaminophen     Tablet .. 650 milliGRAM(s) Oral every 6 hours PRN  dextrose Oral Gel 15 Gram(s) Oral once PRN                                            ------------------------------------------------------------  VITAL SIGNS: Last 24 Hours  T(C): 37.2 (19 Aug 2023 20:00), Max: 37.2 (19 Aug 2023 20:00)  T(F): 98.9 (19 Aug 2023 20:00), Max: 98.9 (19 Aug 2023 20:00)  HR: 91 (19 Aug 2023 22:00) (77 - 94)  BP: 132/63 (19 Aug 2023 22:00) (109/53 - 148/65)  BP(mean): 90 (19 Aug 2023 22:00) (77 - 93)  RR: 22 (19 Aug 2023 22:00) (19 - 24)  SpO2: 96% (19 Aug 2023 22:00) (93% - 98%)      08-18-23 @ 07:01  -  08-19-23 @ 07:00  --------------------------------------------------------  IN: 60 mL / OUT: 650 mL / NET: -590 mL    08-19-23 @ 07:01  -  08-20-23 @ 00:12  --------------------------------------------------------  IN: 218 mL / OUT: 250 mL / NET: -32 mL                                             --------------------------------------------------------------  LABS:                        8.0    6.88  )-----------( 184      ( 19 Aug 2023 04:59 )             26.9     08-19    145  |  109  |  8<L>  ----------------------------<  135<H>  3.3<L>   |  24  |  0.5<L>    Ca    8.4      19 Aug 2023 04:59    TPro  5.7<L>  /  Alb  2.9<L>  /  TBili  0.3  /  DBili  x   /  AST  23  /  ALT  22  /  AlkPhos  79  08-19      Urinalysis Basic - ( 19 Aug 2023 04:59 )    Color: x / Appearance: x / SG: x / pH: x  Gluc: 135 mg/dL / Ketone: x  / Bili: x / Urobili: x   Blood: x / Protein: x / Nitrite: x   Leuk Esterase: x / RBC: x / WBC x   Sq Epi: x / Non Sq Epi: x / Bacteria: x        Troponin T, Serum: 0.85 ng/mL *HH* (08-19-23 @ 04:59)          CARDIAC MARKERS ( 19 Aug 2023 04:59 )  x     / 0.85 ng/mL / x     / x     / x      CARDIAC MARKERS ( 18 Aug 2023 11:29 )  x     / 0.67 ng/mL / x     / x     / x

## 2023-08-21 LAB
ALBUMIN SERPL ELPH-MCNC: 3.1 G/DL — LOW (ref 3.5–5.2)
ALP SERPL-CCNC: 84 U/L — SIGNIFICANT CHANGE UP (ref 30–115)
ALT FLD-CCNC: 25 U/L — SIGNIFICANT CHANGE UP (ref 0–41)
ANION GAP SERPL CALC-SCNC: 11 MMOL/L — SIGNIFICANT CHANGE UP (ref 7–14)
AST SERPL-CCNC: 23 U/L — SIGNIFICANT CHANGE UP (ref 0–41)
BILIRUB SERPL-MCNC: 0.3 MG/DL — SIGNIFICANT CHANGE UP (ref 0.2–1.2)
BUN SERPL-MCNC: 4 MG/DL — LOW (ref 10–20)
CALCIUM SERPL-MCNC: 8.4 MG/DL — SIGNIFICANT CHANGE UP (ref 8.4–10.5)
CHLORIDE SERPL-SCNC: 106 MMOL/L — SIGNIFICANT CHANGE UP (ref 98–110)
CO2 SERPL-SCNC: 25 MMOL/L — SIGNIFICANT CHANGE UP (ref 17–32)
CREAT SERPL-MCNC: 0.5 MG/DL — LOW (ref 0.7–1.5)
CULTURE RESULTS: SIGNIFICANT CHANGE UP
CULTURE RESULTS: SIGNIFICANT CHANGE UP
EGFR: 101 ML/MIN/1.73M2 — SIGNIFICANT CHANGE UP
FERRITIN SERPL-MCNC: 37 NG/ML — SIGNIFICANT CHANGE UP (ref 13–330)
GLUCOSE BLDC GLUCOMTR-MCNC: 126 MG/DL — HIGH (ref 70–99)
GLUCOSE BLDC GLUCOMTR-MCNC: 151 MG/DL — HIGH (ref 70–99)
GLUCOSE BLDC GLUCOMTR-MCNC: 152 MG/DL — HIGH (ref 70–99)
GLUCOSE BLDC GLUCOMTR-MCNC: 180 MG/DL — HIGH (ref 70–99)
GLUCOSE BLDC GLUCOMTR-MCNC: 184 MG/DL — HIGH (ref 70–99)
GLUCOSE SERPL-MCNC: 165 MG/DL — HIGH (ref 70–99)
HCT VFR BLD CALC: 26.2 % — LOW (ref 37–47)
HCT VFR BLD CALC: 28.2 % — LOW (ref 37–47)
HGB BLD-MCNC: 8 G/DL — LOW (ref 12–16)
HGB BLD-MCNC: 8.2 G/DL — LOW (ref 12–16)
MBP CSF-MCNC: 3.6 NG/ML — SIGNIFICANT CHANGE UP (ref 0–4.7)
MCHC RBC-ENTMCNC: 22.5 PG — LOW (ref 27–31)
MCHC RBC-ENTMCNC: 23.6 PG — LOW (ref 27–31)
MCHC RBC-ENTMCNC: 29.1 G/DL — LOW (ref 32–37)
MCHC RBC-ENTMCNC: 30.5 G/DL — LOW (ref 32–37)
MCV RBC AUTO: 77.3 FL — LOW (ref 81–99)
MCV RBC AUTO: 77.3 FL — LOW (ref 81–99)
NRBC # BLD: 0 /100 WBCS — SIGNIFICANT CHANGE UP (ref 0–0)
NRBC # BLD: 0 /100 WBCS — SIGNIFICANT CHANGE UP (ref 0–0)
OLIGOCLONAL BANDS CSF ELPH-IMP: PRESENT
PLATELET # BLD AUTO: 206 K/UL — SIGNIFICANT CHANGE UP (ref 130–400)
PLATELET # BLD AUTO: 209 K/UL — SIGNIFICANT CHANGE UP (ref 130–400)
PMV BLD: 11.4 FL — HIGH (ref 7.4–10.4)
PMV BLD: 11.6 FL — HIGH (ref 7.4–10.4)
POTASSIUM SERPL-MCNC: 3.7 MMOL/L — SIGNIFICANT CHANGE UP (ref 3.5–5)
POTASSIUM SERPL-SCNC: 3.7 MMOL/L — SIGNIFICANT CHANGE UP (ref 3.5–5)
PROT SERPL-MCNC: 6 G/DL — SIGNIFICANT CHANGE UP (ref 6–8)
RBC # BLD: 3.39 M/UL — LOW (ref 4.2–5.4)
RBC # BLD: 3.65 M/UL — LOW (ref 4.2–5.4)
RBC # FLD: 19.8 % — HIGH (ref 11.5–14.5)
RBC # FLD: 19.9 % — HIGH (ref 11.5–14.5)
SODIUM SERPL-SCNC: 142 MMOL/L — SIGNIFICANT CHANGE UP (ref 135–146)
SPECIMEN SOURCE: SIGNIFICANT CHANGE UP
SPECIMEN SOURCE: SIGNIFICANT CHANGE UP
TROPONIN T SERPL-MCNC: 0.49 NG/ML — CRITICAL HIGH
WBC # BLD: 7.2 K/UL — SIGNIFICANT CHANGE UP (ref 4.8–10.8)
WBC # BLD: 7.87 K/UL — SIGNIFICANT CHANGE UP (ref 4.8–10.8)
WBC # FLD AUTO: 7.2 K/UL — SIGNIFICANT CHANGE UP (ref 4.8–10.8)
WBC # FLD AUTO: 7.87 K/UL — SIGNIFICANT CHANGE UP (ref 4.8–10.8)

## 2023-08-21 PROCEDURE — 99232 SBSQ HOSP IP/OBS MODERATE 35: CPT

## 2023-08-21 PROCEDURE — 99221 1ST HOSP IP/OBS SF/LOW 40: CPT

## 2023-08-21 PROCEDURE — 93458 L HRT ARTERY/VENTRICLE ANGIO: CPT | Mod: 26

## 2023-08-21 PROCEDURE — 71045 X-RAY EXAM CHEST 1 VIEW: CPT | Mod: 26

## 2023-08-21 RX ORDER — SODIUM CHLORIDE 9 MG/ML
1000 INJECTION INTRAMUSCULAR; INTRAVENOUS; SUBCUTANEOUS
Refills: 0 | Status: DISCONTINUED | OUTPATIENT
Start: 2023-08-21 | End: 2023-08-26

## 2023-08-21 RX ORDER — FUROSEMIDE 40 MG
40 TABLET ORAL ONCE
Refills: 0 | Status: COMPLETED | OUTPATIENT
Start: 2023-08-21 | End: 2023-08-21

## 2023-08-21 RX ADMIN — SACUBITRIL AND VALSARTAN 1 TABLET(S): 24; 26 TABLET, FILM COATED ORAL at 05:01

## 2023-08-21 RX ADMIN — Medication 3 MILLILITER(S): at 21:23

## 2023-08-21 RX ADMIN — SACUBITRIL AND VALSARTAN 1 TABLET(S): 24; 26 TABLET, FILM COATED ORAL at 18:59

## 2023-08-21 RX ADMIN — Medication 3 MILLILITER(S): at 02:49

## 2023-08-21 RX ADMIN — Medication 3 MILLILITER(S): at 08:41

## 2023-08-21 RX ADMIN — LEVETIRACETAM 400 MILLIGRAM(S): 250 TABLET, FILM COATED ORAL at 18:59

## 2023-08-21 RX ADMIN — Medication 3 MILLILITER(S): at 14:44

## 2023-08-21 RX ADMIN — Medication 650 MILLIGRAM(S): at 22:00

## 2023-08-21 RX ADMIN — INSULIN GLARGINE 22 UNIT(S): 100 INJECTION, SOLUTION SUBCUTANEOUS at 21:04

## 2023-08-21 RX ADMIN — LEVETIRACETAM 400 MILLIGRAM(S): 250 TABLET, FILM COATED ORAL at 05:02

## 2023-08-21 RX ADMIN — ATORVASTATIN CALCIUM 40 MILLIGRAM(S): 80 TABLET, FILM COATED ORAL at 21:04

## 2023-08-21 RX ADMIN — Medication 81 MILLIGRAM(S): at 12:07

## 2023-08-21 RX ADMIN — ENOXAPARIN SODIUM 60 MILLIGRAM(S): 100 INJECTION SUBCUTANEOUS at 05:01

## 2023-08-21 RX ADMIN — Medication 40 MILLIGRAM(S): at 10:49

## 2023-08-21 RX ADMIN — SODIUM CHLORIDE 100 MILLILITER(S): 9 INJECTION INTRAMUSCULAR; INTRAVENOUS; SUBCUTANEOUS at 18:54

## 2023-08-21 RX ADMIN — Medication 25 MILLIGRAM(S): at 05:01

## 2023-08-21 RX ADMIN — Medication 25 MILLIGRAM(S): at 19:00

## 2023-08-21 NOTE — PROGRESS NOTE ADULT - ASSESSMENT
Assessment:  67yo F w/a PMH of Type II DM, HTN, DLD, obesity, lateral epicondylitis presenting to the ED 8/13 w/left arm pain, found to have LBBB, acute AMS and seizure like episodes in ED, now CCU day 7. Extubated, CT head, MR brain & VEEEG. Pt awaiting Cath following CT angio findings.    Impression:     Seizures   Altered mental status  Acute hypoxemic respiratory failure   Lactic acidosis   DVT   NSTEMI   PE on ct scan     PLAN:  CNS: No sedation. No seizure on EEG. On Keppra BID, possibly titrate off over the coming days, will discuss w/ neurology. MR brain done. Neurology following.   HEENT: Oral care.   PULMONARY:  HOB @ 45 degrees.  On room air.     CARDIOVASCULAR: Not on pressors. Keep equal balance. Trop noted; trend; CCTA ordered; EF 30-35%; moderate systolic dysfunction. On Metoprolol and Entresto. Cardiology following.   possible LHC today.  No diuresis today, but patient slightly positive    GI: GI prophylaxis not indicated.  Oral diet pending procedure. Increase free water intake.   RENAL:  Follow up lytes.  Correct as needed. Voiding trial again. Check Mg and correct K.   INFECTIOUS DISEASE: Culture: All cultures negative. Observe off abx.     HEMATOLOGICAL:  On therapeutic Lovenox. Hgb stable. May transition to oral DOAC.   follow h/h repeat 11 am   keep hb around 8     ENDOCRINE:  Follow up FS.  Insulin protocol if needed.    MUSCULOSKELETAL: Out of bed and PT OT.   CODE STATUS: Full code.   DISPO: Transfer to Telemetry.   LINES: none     Assessment:  67yo F w/a PMH of Type II DM, HTN, DLD, obesity, lateral epicondylitis presenting to the ED 8/13 w/left arm pain, found to have LBBB, acute AMS and seizure like episodes in ED, now CCU day 7. Extubated, CT head, MR brain & VEEEG. Pt awaiting Cath following CT angio findings(CAD-RADS N(proximal/mid LAD)    Impression:     Seizures   Altered mental status  Acute hypoxemic respiratory failure   Lactic acidosis   DVT   NSTEMI   PE on ct scan     PLAN:  CNS: No sedation. No seizure on EEG. On Keppra BID, possibly titrate off over the coming days, will discuss w/ neurology. MR brain done. Neurology following.   HEENT: Oral care.   PULMONARY:  HOB @ 45 degrees.  On room air.     CARDIOVASCULAR: Not on pressors. Keep equal balance. Trop noted downtrending ; CCTA (CAD-RADS N(proximal/mid LAD).); EF 30-35%; moderate systolic dysfunction. On Metoprolol and Entresto. Cardiology following.   possible LHC today.  No diuresis today, but patient slightly positive    GI: GI prophylaxis not indicated.  Oral diet pending procedure. Increase free water intake.   RENAL:  Follow up lytes.  Correct as needed. Voiding trial again. Check Mg.  INFECTIOUS DISEASE: Culture: All cultures negative. Observe off abx.     HEMATOLOGICAL:  On therapeutic Lovenox. Hgb stable. May transition to oral DOAC.   follow h/h repeat 11 am   keep hb around 8     ENDOCRINE:  Follow up FS.  Insulin protocol if needed.    MUSCULOSKELETAL: Out of bed and PT OT.   CODE STATUS: Full code.   DISPO: Transfer to Telemetry.   LINES: none

## 2023-08-21 NOTE — CHART NOTE - NSCHARTNOTEFT_GEN_A_CORE
PREOPERATIVE DAY OF PROCEDURE EVALUATION:  I have personally seen and examined the patient.  I agree with the history and physical which I have reviewed and noted any changes below.     67yo F w/ PMHx of Type II DM, HTN, DLD, obesity, lateral epicondylitis presented to the ED 8/13 w/left arm pain, found to have LBBB, acute AMS and seizure like episodes in ED, now in CCU day 7. Extubated, CT head, MR brain & VEEEG --> no seizure activity.  Pt was found to have positive trop on admission, as well as PE on CT scan, and +DVT, started on therapeutic Lovenox.  CCT angio findings(CAD-RADS N(proximal/mid LAD).  Pt is now referred for cardiac cath with possible intervention if clinically indicated.     Bleeding Risk Score:   4.8%  No IVF due to EF 30-35%  -on ASA 81mg, received today  -on Lovenox 60mg q12 for PE (last dose today @ 5am)  -on BB, statin, Entresto        (Signed electronically by __________)  08-21-23 @ 17:18

## 2023-08-21 NOTE — CONSULT NOTE ADULT - SUBJECTIVE AND OBJECTIVE BOX
Surgeon: Dr. Fernandez/ Jaymie / Hola    Consult requesting by:    HISTORY OF PRESENT ILLNESS:  68y Female  Home Medications:  aspirin 81 mg oral tablet, chewable: 1 chewed once a day (13 Aug 2023 19:53)  atorvastatin 10 mg oral tablet: 1 orally once a day (at bedtime) (13 Aug 2023 19:52)  Jardiance 10 mg oral tablet: 1 orally once a day (13 Aug 2023 19:53)  MetFORMIN (Eqv-Glumetza) 500 mg oral tablet, extended release: 1 orally 2 times a day (13 Aug 2023 19:51)  pioglitazone 30 mg oral tablet: 1 orally once a day (13 Aug 2023 19:52)        NYHA functional class    [ ] Class I (no limitation) [ ] Class II (slight limitation) [ ] Class III (marked limitation) [ ] Class IV (symptoms at rest)    [place] [employer] Society grading of angina pectoris  [  ]  Class I	Angina only during strenuous or prolonged physical activity  [ ]  Class II	Slight limitation, with angina only during vigorous physical activity  [ ]  Class III	Symptoms with everyday living activities, ie, moderate limitation  [ ]  Class IV	Inability to perform any activity without angina or angina at rest, ie, severe limitation      PAST MEDICAL & SURGICAL HISTORY:  Diabetes          MEDICATIONS  (STANDING):  albuterol/ipratropium for Nebulization 3 milliLiter(s) Nebulizer every 6 hours  aspirin  chewable 81 milliGRAM(s) Oral daily  atorvastatin 40 milliGRAM(s) Oral at bedtime  chlorhexidine 2% Cloths 1 Application(s) Topical <User Schedule>  dextrose 5%. 1000 milliLiter(s) (50 mL/Hr) IV Continuous <Continuous>  dextrose 50% Injectable 25 Gram(s) IV Push once  enoxaparin Injectable 60 milliGRAM(s) SubCutaneous every 12 hours  glucagon  Injectable 1 milliGRAM(s) IntraMuscular once  insulin glargine Injectable (LANTUS) 22 Unit(s) SubCutaneous at bedtime  insulin lispro (ADMELOG) corrective regimen sliding scale   SubCutaneous three times a day before meals  insulin lispro Injectable (ADMELOG) 8 Unit(s) SubCutaneous three times a day before meals  levETIRAcetam  IVPB 1000 milliGRAM(s) IV Intermittent every 12 hours  metoprolol tartrate 25 milliGRAM(s) Oral every 12 hours  sacubitril 49 mG/valsartan 51 mG 1 Tablet(s) Oral two times a day  sodium chloride 0.9%. 1000 milliLiter(s) (100 mL/Hr) IV Continuous <Continuous>    MEDICATIONS  (PRN):  acetaminophen     Tablet .. 650 milliGRAM(s) Oral every 6 hours PRN Temp greater or equal to 38C (100.4F), Mild Pain (1 - 3)  dextrose Oral Gel 15 Gram(s) Oral once PRN Blood Glucose LESS THAN 70 milliGRAM(s)/deciliter    Antiplatelet therapy:                           Last dose/amt:    Allergies    No Known Allergies    Intolerances        SOCIAL HISTORY:  Smoker: [ ] Yes  [ ] No        PACK YEARS:                         WHEN QUIT?  ETOH use: [ ] Yes  [ ] No              FREQUENCY / QUANTITY:  Ilicit Drug use:  [ ] Yes  [ ] No  Occupation:  Lives with:  Assisted device use:    FAMILY HISTORY:      Review of Systems  CONSTITUTIONAL: no fever, chills or night sweats]   NEURO:  denies seizures, paralysis or paresthesias                                                                                EYES: wears glasses, no double vision, no blurry vision                                                                          ENMT: no difficulty hearing, vertigo, dysphagia, epistaxis recent dental work [ ]                                      CV:  denies chest pain, PURDY or palpitations at current time                                                                                            RESPIRATORY:  no cough or hemoptysis                                                                 GI:  no nausea, vomiting, constipation or diarrhea   : denies dysuria, hematuria, incontinence or retention                                                                                           MUSKULOSKELETAL:  denies joint swelling or muscle weakness  PSYCH:  denies dementia, depression, anxiety   ENDOCRINE:  cold intolerance[ ] heat intolerance[ ] polydipsia[ ]                                                                                                                                                                                                PHYSICAL EXAM  Vital Signs Last 24 Hrs  T(C): 37 (21 Aug 2023 16:00), Max: 37.4 (21 Aug 2023 08:00)  T(F): 98.6 (21 Aug 2023 16:00), Max: 99.4 (21 Aug 2023 08:00)  HR: 97 (21 Aug 2023 18:50) (85 - 105)  BP: 113/86 (21 Aug 2023 18:50) (113/86 - 142/64)  BP(mean): 95 (21 Aug 2023 18:50) (83 - 95)  RR: 39 (21 Aug 2023 18:50) (22 - 39)  SpO2: 95% (21 Aug 2023 18:50) (95% - 100%)    Parameters below as of 21 Aug 2023 18:50  Patient On (Oxygen Delivery Method): room air      Right arm bp: Left arm bp;    CONSTITUTIONAL:    well nourished, well developed, overweight in NAD                                                                       Neuro: oriented to person/place & time with no focal motor or sensory  deficits     Eyes: PERRLA, EOMI, no nystagmus, sclera anicteric  ENT:  nasal/oral mucosa with absence of cyanosis, fair dentition  Neck: no jugular vein distention, trachea midline, no goiter   Chest: bilateral breath sounds with good air movement absence of wheezes, rales, or rhonchi                                                                           CV:  RSR, S1S2, no significant murmur appreciated  Carotids: No Bruits  GI:  soft, non-tender non-distended, + bowel sounds                                                                                                          Extremities:  no evidence of cyanosis or deformity, no pedal edema   Extremity Pulses: right / left:  femorals 2+/ 2+; DP's 2+/2+; radials 2+/2+    negative Allens  SKIN : no rashes                                                          LABS:                        8.2    7.20  )-----------( 206      ( 21 Aug 2023 04:19 )             28.2     08-21    142  |  106  |  4<L>  ----------------------------<  165<H>  3.7   |  25  |  0.5<L>    Ca    8.4      21 Aug 2023 03:10    TPro  6.0  /  Alb  3.1<L>  /  TBili  0.3  /  DBili  x   /  AST  23  /  ALT  25  /  AlkPhos  84  08-21      Urinalysis Basic - ( 21 Aug 2023 03:10 )    Color: x / Appearance: x / SG: x / pH: x  Gluc: 165 mg/dL / Ketone: x  / Bili: x / Urobili: x   Blood: x / Protein: x / Nitrite: x   Leuk Esterase: x / RBC: x / WBC x   Sq Epi: x / Non Sq Epi: x / Bacteria: x      CARDIAC MARKERS ( 21 Aug 2023 03:10 )  x     / 0.49 ng/mL / x     / x     / x      CARDIAC MARKERS ( 20 Aug 2023 04:29 )  x     / 0.76 ng/mL / x     / x     / x            COVID-19:     Cardiac Cath:    TTE / RAJIV:      STS Score:       STS score and results discussed with patient.        Assessment/ Plan:                 Surgeon: Dr. Fernandez/ Jaymie / Hola    Consult requesting by: Kyle    HISTORY OF PRESENT ILLNESS:  68y Female  Home Medications:  aspirin 81 mg oral tablet, chewable: 1 chewed once a day (13 Aug 2023 19:53)  atorvastatin 10 mg oral tablet: 1 orally once a day (at bedtime) (13 Aug 2023 19:52)  Jardiance 10 mg oral tablet: 1 orally once a day (13 Aug 2023 19:53)  MetFORMIN (Eqv-Glumetza) 500 mg oral tablet, extended release: 1 orally 2 times a day (13 Aug 2023 19:51)  pioglitazone 30 mg oral tablet: 1 orally once a day (13 Aug 2023 19:52)    NYHA functional class    [ ] Class I (no limitation) [X ] Class II (slight limitation) [ ] Class III (marked limitation) [ ] Class IV (symptoms at rest)    [place] [employer] Society grading of angina pectoris  [  ]  Class I	Angina only during strenuous or prolonged physical activity  [ ]  Class II	Slight limitation, with angina only during vigorous physical activity  [ X ]  Class III	Symptoms with everyday living activities, ie, moderate limitation  [ ]  Class IV	Inability to perform any activity without angina or angina at rest, ie, severe limitation      PAST MEDICAL & SURGICAL HISTORY:  Diabetes    MEDICATIONS  (STANDING):  albuterol/ipratropium for Nebulization 3 milliLiter(s) Nebulizer every 6 hours  aspirin  chewable 81 milliGRAM(s) Oral daily  atorvastatin 40 milliGRAM(s) Oral at bedtime  chlorhexidine 2% Cloths 1 Application(s) Topical <User Schedule>  dextrose 5%. 1000 milliLiter(s) (50 mL/Hr) IV Continuous <Continuous>  dextrose 50% Injectable 25 Gram(s) IV Push once  enoxaparin Injectable 60 milliGRAM(s) SubCutaneous every 12 hours  glucagon  Injectable 1 milliGRAM(s) IntraMuscular once  insulin glargine Injectable (LANTUS) 22 Unit(s) SubCutaneous at bedtime  insulin lispro (ADMELOG) corrective regimen sliding scale   SubCutaneous three times a day before meals  insulin lispro Injectable (ADMELOG) 8 Unit(s) SubCutaneous three times a day before meals  levETIRAcetam  IVPB 1000 milliGRAM(s) IV Intermittent every 12 hours  metoprolol tartrate 25 milliGRAM(s) Oral every 12 hours  sacubitril 49 mG/valsartan 51 mG 1 Tablet(s) Oral two times a day  sodium chloride 0.9%. 1000 milliLiter(s) (100 mL/Hr) IV Continuous <Continuous>    MEDICATIONS  (PRN):  acetaminophen     Tablet .. 650 milliGRAM(s) Oral every 6 hours PRN Temp greater or equal to 38C (100.4F), Mild Pain (1 - 3)  dextrose Oral Gel 15 Gram(s) Oral once PRN Blood Glucose LESS THAN 70 milliGRAM(s)/deciliter    Antiplatelet therapy:      none                     Last dose/amt:    Allergies    No Known Allergies    Intolerances    SOCIAL HISTORY:  Smoker: [ ] Yes past  ETOH use:  [ ] No                Ilicit Drug use:   No  Occupation: unemployed  Lives with: son  Assisted device use: none prior to admission    FAMILY HISTORY: no early CAD in 1st degree relatives       Review of Systems  CONSTITUTIONAL: no fever, chills or night sweats]   NEURO:  denies seizures, paralysis or paresthesias                                                                                EYES: wears glasses, no double vision, no blurry vision                                                                          ENMT: no difficulty hearing, vertigo, dysphagia, epistaxis recent dental work [ ]                                      CV:  denies chest pain,  palpitations at current time, + dyspnea                                                                                      RESPIRATORY:  no cough or hemoptysis                                                                 GI:  no nausea, vomiting, constipation or diarrhea   : denies dysuria, hematuria, incontinence or retention                                                                                           MUSKULOSKELETAL:  denies joint swelling or muscle weakness  PSYCH:  denies dementia, depression, anxiety   ENDOCRINE:  cold intolerance[ ] heat intolerance[ ] polydipsia[ ]                                                                                                                                                                                                PHYSICAL EXAM  Vital Signs Last 24 Hrs  T(C): 37 (21 Aug 2023 16:00), Max: 37.4 (21 Aug 2023 08:00)  T(F): 98.6 (21 Aug 2023 16:00), Max: 99.4 (21 Aug 2023 08:00)  HR: 97 (21 Aug 2023 18:50) (85 - 105)  BP: 113/86 (21 Aug 2023 18:50) (113/86 - 142/64)  BP(mean): 95 (21 Aug 2023 18:50) (83 - 95)  RR: 39 (21 Aug 2023 18:50) (22 - 39)  SpO2: 95% (21 Aug 2023 18:50) (95% - 100%)    CONSTITUTIONAL:    well nourished, well developed, overweight in NAD but dyspneic when speaking                                                                       Neuro: oriented to person/place & time with no focal motor or sensory  deficits     Eyes: PERRLA, EOMI, no nystagmus, sclera anicteric  ENT:  nasal/oral mucosa with absence of cyanosis, poor dentition  Neck: no jugular vein distention, trachea midline, no goiter   Chest: bilateral breath sounds with fair air movement absence of wheezes, rales, or rhonchi                                                                           CV:  RSR, S1S2, no significant murmur appreciated  Carotids: No Bruits  GI:  soft, non-tender non-distended, + bowel sounds, umbiilcal hernia                                                                                                         Extremities:  no evidence of cyanosis or deformity, no pedal edema   Extremity Pulses: right / left:  femorals 2+/ 2+; DP's 2+/2+; radials pressure dressing/2+    negative Allens  SKIN : no rashes                                                          LABS:                        8.2    7.20  )-----------( 206      ( 21 Aug 2023 04:19 )             28.2     08-21    142  |  106  |  4<L>  ----------------------------<  165<H>  3.7   |  25  |  0.5<L>    Ca    8.4      21 Aug 2023 03:10    TPro  6.0  /  Alb  3.1<L>  /  TBili  0.3  /  DBili  x   /  AST  23  /  ALT  25  /  AlkPhos  84  08-21      Urinalysis Basic - ( 21 Aug 2023 03:10 )  CARDIAC MARKERS ( 21 Aug 2023 03:10 )  x     / 0.49 ng/mL / x     / x     / x      CARDIAC MARKERS ( 20 Aug 2023 04:29 )  x     / 0.76 ng/mL / x     / x     / x          Cardiac Cath:  FINDINGS:   Coronary Dominance: Right  LM: Distal segment with 50% stenosis  LAD: Ostium with 80% stenosis  CX: Ostium with 80% stenosis   RCA: Ostium with 90% stenosis< from: Xray Chest 1 View- PORTABLE-Urgent (Xray Chest 1 View- PORTABLE-Urgent .) (08.21.23 @ 03:45) >  Stable bilateral opacities.    < end of copied text >  < from: CT Angio Heart and Coronaries w/ IV Cont (08.18.23 @ 15:10) >    IMPRESSION:  ---Long segment of calcified plaque within the proximal LAD with   indeterminant level of narrowing due to suboptimal vasodilatation and   blooming artifact.    Proximal to mid LAD as well as the proximal second diagonal branch poorly   opacified likely related to motion artifact. Significant   (moderate/severe) narrowing therefore cannot be entirely excluded and   further evaluation is recommended    The total Agatston coronary artery calcium score equals 130, which   corresponds to 76th percentile for age, gender and ethnicity.    CAD-RADS N(proximal/mid LAD).    ---Filling defects within the segmental pulmonary arteries bilaterally   compatible pulmonary embolism.    ---Diffuse bronchial wall thickening and opacity/debris. Trace pleural   effusion. Right basilar nodular opacities likely of infectious or   inflammatory etiology    < end of copied text >  < from: MR Head No Cont (08.17.23 @ 17:19) >  No acute infarct, intracranial hemorrhage, or midline shift.    < end of copied text >  < from: [facility] Duplex Lower Ext Vein Scan, Bilat (08.15.23 @ 18:34) >  FINDINGS:    RIGHT:  Normal compressibility of the RIGHT common femoral, femoral and popliteal   veins.  Doppler examination shows normal spontaneous and phasic flow.  Acute DVT is visualized in peroneal vein    LEFT:  Normal compressibility of the LEFT common femoral, and popliteal veins.  Doppler examination shows normal spontaneous and phasic flow.  Acute DVT is visualized in mid femoral vein.  Acute DVT is visualized in posterior tibial and peroneal veins.    IMPRESSION:  Right-acute appearing DVT is visualized in peroneal vein.  Left-acute appearing DVT is visualized in mid femoral, posterior tibial,   peroneal veins.    < end of copied text >  < from: CT Abdomen and Pelvis No Cont (08.13.23 @ 22:35) >  No evidence of acute abdominal pathology.    < end of copied text >  < from: CT Angio Neck Stroke Protocol w/ IV Cont (08.13.23 @ 13:18) >  IMPRESSION:  CT brain perfusion: No evidence of acute infarct or ischemia.    CTA neck: No stenosis. No dissection.    CTA brain: No stenosis or aneurysm.    COMMENT:  Reference per NASCET criteria for degree of stenosis: Mild: less than 50%   stenosis. Moderate: 50-69% stenosis. Severe: 70-94% stenosis. Near   occlusion: 95-99% stenosis.    Per RAPID assessment for acute infarct, threshold for ischemic tissue   volume is Tmax > 6 sec. Threshold for infarct core volume estimate is CBF   < 30 percent. Threshold for poor collateral flow or severe delayed   perfusion is Tmax > 10 sec.    < end of copied text >  < from: CT Brain Stroke Protocol (08.13.23 @ 13:04) >  No evidence of acute transcortical infarct, hydrocephalus, acute   intracranial hemorrhage, or mass effect.    Findings were discussed with readback confirmation withDr. Holman by   radiology resident Faustino Nunez on 8/13/2023 at 1:12 PM.    < end of copied text >  < from: 12 Lead ECG (08.18.23 @ 18:41) >  Ventricular Rate 75 BPM    Atrial Rate 75 BPM    P-R Interval 140 ms    QRS Duration 160 ms    Q-T Interval 422 ms    QTC Calculation(Bazett) 471 ms    P Axis 67 degrees    R Axis -39 degrees    T Axis 130 degrees    Diagnosis Line Normal sinus rhythm  Left axis deviation  Left bundle branch block  Abnormal ECG    < end of copied text >  < from: TTE Echo Complete w/o Contrast w/ Doppler (08.14.23 @ 08:23) >  Summary:   1. Left ventricular ejection fraction, by visual estimation, is 30 to   35%.   2. Moderately to severely decreased global left ventricular systolic   function.   3. The left ventricular diastolic function could not be assessed in this   study.   4. Left atrial enlargement.   5. Mild mitral regurgitation.   6. Adequate TR velocity was not obtained to accurately assess RVSP.      STS Score:       STS score and results discussed with patient.        Assessment/ Plan:                 Surgeon: Dr. Fernandez/ Jaymie / Hola    Consult requesting by: Kyle    Obtained from chart and  # [id number]  ([place])     HISTORY OF PRESENT ILLNESS:    67yo F w/a PMH of Type II DM, HTN, DLD, obesity, lateral epicondylitis presented to the ED w/ Left Arm pain.  This arm started about two months ago and has continued to progress but in the last few days became severe. She went to the ED a few days prior to admission and, was given pain medications then sent home. Per son, pain still continued to progress after this. No fevers, chills, nausea, vomiting, diarrhea, constipation, SOB, CP.   On  this admission she again presented with severe left arm pain w findings of LBBB on ECG (unsure if new or not). Code STEMI was called and then canceled because troponin negative and no chest pain. Patient was then noted to have acute mental status change, SOB, lactate elevation, and acidosis on abg.. Patient had two seizure type episodes and was treated with benzodiazepine. Intubated by ER for airway protection.     Stoke code called -  no CVA but patient developed elevated troponins and TTE revealed EF of 30% with CCTA revealing score of 134 and LAD disease. Patient went for Cardiac cath that revealed multi-vessel CAD. Hospital course thus far required treatment for Cardiomyopathy (EF 30%), uncontrolled DM (A1c 8.2), acute DVT (R peroneal L femoral/posterior tibial / peroneal veins)  , acute PE (b/l segmental), ? seizures but VEEG & MRI negative, anemia (? etiology    and aspiration pneumonia (resolved). CTS consulted for myocardial revascularization recommendation    Home Medications:  aspirin 81 mg oral tablet, chewable: 1 chewed once a day (13 Aug 2023 19:53)  atorvastatin 10 mg oral tablet: 1 orally once a day (at bedtime) (13 Aug 2023 19:52)  Jardiance 10 mg oral tablet: 1 orally once a day (13 Aug 2023 19:53)  MetFORMIN (Eqv-Glumetza) 500 mg oral tablet, extended release: 1 orally 2 times a day (13 Aug 2023 19:51)  pioglitazone 30 mg oral tablet: 1 orally once a day (13 Aug 2023 19:52)    NYHA functional class    [ ] Class I (no limitation) [X ] Class II (slight limitation) [ ] Class III (marked limitation) [ ] Class IV (symptoms at rest)    [place] Cardiovascular Society grading of angina pectoris  [  ]  Class I	Angina only during strenuous or prolonged physical activity  [ ]  Class II	Slight limitation, with angina only during vigorous physical activity  [ X ]  Class III	Symptoms with everyday living activities, ie, moderate limitation  [ ]  Class IV	Inability to perform any activity without angina or angina at rest, ie, severe limitation      PAST MEDICAL & SURGICAL HISTORY:  Diabetes    MEDICATIONS  (STANDING):  albuterol/ipratropium for Nebulization 3 milliLiter(s) Nebulizer every 6 hours  aspirin  chewable 81 milliGRAM(s) Oral daily  atorvastatin 40 milliGRAM(s) Oral at bedtime  chlorhexidine 2% Cloths 1 Application(s) Topical <User Schedule>  dextrose 5%. 1000 milliLiter(s) (50 mL/Hr) IV Continuous <Continuous>  dextrose 50% Injectable 25 Gram(s) IV Push once  enoxaparin Injectable 60 milliGRAM(s) SubCutaneous every 12 hours  glucagon  Injectable 1 milliGRAM(s) IntraMuscular once  insulin glargine Injectable (LANTUS) 22 Unit(s) SubCutaneous at bedtime  insulin lispro (ADMELOG) corrective regimen sliding scale   SubCutaneous three times a day before meals  insulin lispro Injectable (ADMELOG) 8 Unit(s) SubCutaneous three times a day before meals  levETIRAcetam  IVPB 1000 milliGRAM(s) IV Intermittent every 12 hours  metoprolol tartrate 25 milliGRAM(s) Oral every 12 hours  sacubitril 49 mG/valsartan 51 mG 1 Tablet(s) Oral two times a day  sodium chloride 0.9%. 1000 milliLiter(s) (100 mL/Hr) IV Continuous <Continuous>    MEDICATIONS  (PRN):  acetaminophen     Tablet .. 650 milliGRAM(s) Oral every 6 hours PRN Temp greater or equal to 38C (100.4F), Mild Pain (1 - 3)  dextrose Oral Gel 15 Gram(s) Oral once PRN Blood Glucose LESS THAN 70 milliGRAM(s)/deciliter    Antiplatelet therapy:      none                     Last dose/amt:    Allergies    No Known Allergies    Intolerances    SOCIAL HISTORY:  Smoker: [ ] Yes past  ETOH use:  [ ] No                Ilicit Drug use:   No  Occupation: unemployed  Lives with: son  Assisted device use: none prior to admission    FAMILY HISTORY: no early CAD in 1st degree relatives       Review of Systems  CONSTITUTIONAL: no fever, chills or night sweats]   NEURO:  denies seizures, paralysis or paresthesias                                                                                EYES: wears glasses, no double vision, no blurry vision                                                                          ENMT: no difficulty hearing, vertigo, dysphagia, epistaxis recent dental work [ ]                                      CV:  denies chest pain,  palpitations at current time, + dyspnea                                                                                      RESPIRATORY:  no cough or hemoptysis                                                                 GI:  no nausea, vomiting, constipation or diarrhea   : denies dysuria, hematuria, incontinence or retention                                                                                           MUSKULOSKELETAL:  denies joint swelling or muscle weakness  PSYCH:  denies dementia, depression, anxiety   ENDOCRINE:  cold intolerance[ ] heat intolerance[ ] polydipsia[ ]                                                                                                                                                                                                PHYSICAL EXAM  Vital Signs Last 24 Hrs  T(C): 37 (21 Aug 2023 16:00), Max: 37.4 (21 Aug 2023 08:00)  T(F): 98.6 (21 Aug 2023 16:00), Max: 99.4 (21 Aug 2023 08:00)  HR: 97 (21 Aug 2023 18:50) (85 - 105)  BP: 113/86 (21 Aug 2023 18:50) (113/86 - 142/64)  BP(mean): 95 (21 Aug 2023 18:50) (83 - 95)  RR: 39 (21 Aug 2023 18:50) (22 - 39)  SpO2: 95% (21 Aug 2023 18:50) (95% - 100%)    CONSTITUTIONAL:    well nourished, well developed, overweight in NAD but dyspneic when speaking                                                                       Neuro: oriented to person/place & time with no focal motor or sensory  deficits     Eyes: PERRLA, EOMI, no nystagmus, sclera anicteric  ENT:  nasal/oral mucosa with absence of cyanosis, poor dentition  Neck: no jugular vein distention, trachea midline, no goiter   Chest: bilateral breath sounds with fair air movement absence of wheezes, rales, or rhonchi                                                                           CV:  RSR, S1S2, no significant murmur appreciated  Carotids: No Bruits  GI:  soft, non-tender non-distended, + bowel sounds, umbiilcal hernia                                                                                                         Extremities:  no evidence of cyanosis or deformity, no pedal edema   Extremity Pulses: right / left:  femorals 2+/ 2+; DP's 2+/2+; radials pressure dressing/2+    negative Allens  SKIN : no rashes                                                          LABS:                        8.2    7.20  )-----------( 206      ( 21 Aug 2023 04:19 )             28.2     08-21    142  |  106  |  4<L>  ----------------------------<  165<H>  3.7   |  25  |  0.5<L>    Ca    8.4      21 Aug 2023 03:10    TPro  6.0  /  Alb  3.1<L>  /  TBili  0.3  /  DBili  x   /  AST  23  /  ALT  25  /  AlkPhos  84  08-21      Urinalysis Basic - ( 21 Aug 2023 03:10 )  CARDIAC MARKERS ( 21 Aug 2023 03:10 )  x     / 0.49 ng/mL / x     / x     / x      CARDIAC MARKERS ( 20 Aug 2023 04:29 )  x     / 0.76 ng/mL / x     / x     / x          Cardiac Cath:  FINDINGS:   Coronary Dominance: Right  LM: Distal segment with 50% stenosis  LAD: Ostium with 80% stenosis  CX: Ostium with 80% stenosis   RCA: Ostium with 90% stenosis< from: Xray Chest 1 View- PORTABLE-Urgent (Xray Chest 1 View- PORTABLE-Urgent .) (08.21.23 @ 03:45) >  Stable bilateral opacities.    < end of copied text >  < from: CT Angio Heart and Coronaries w/ IV Cont (08.18.23 @ 15:10) >    IMPRESSION:  ---Long segment of calcified plaque within the proximal LAD with   indeterminant level of narrowing due to suboptimal vasodilatation and   blooming artifact.    Proximal to mid LAD as well as the proximal second diagonal branch poorly   opacified likely related to motion artifact. Significant   (moderate/severe) narrowing therefore cannot be entirely excluded and   further evaluation is recommended    The total Agatston coronary artery calcium score equals 130, which   corresponds to 76th percentile for age, gender and ethnicity.    CAD-RADS N(proximal/mid LAD).    ---Filling defects within the segmental pulmonary arteries bilaterally   compatible pulmonary embolism.    ---Diffuse bronchial wall thickening and opacity/debris. Trace pleural   effusion. Right basilar nodular opacities likely of infectious or   inflammatory etiology    < end of copied text >  < from: MR Head No Cont (08.17.23 @ 17:19) >  No acute infarct, intracranial hemorrhage, or midline shift.    < end of copied text >  < from: VA Duplex Lower Ext Vein Scan, Bilat (08.15.23 @ 18:34) >  FINDINGS:    RIGHT:  Normal compressibility of the RIGHT common femoral, femoral and popliteal   veins.  Doppler examination shows normal spontaneous and phasic flow.  Acute DVT is visualized in peroneal vein    LEFT:  Normal compressibility of the LEFT common femoral, and popliteal veins.  Doppler examination shows normal spontaneous and phasic flow.  Acute DVT is visualized in mid femoral vein.  Acute DVT is visualized in posterior tibial and peroneal veins.    IMPRESSION:  Right-acute appearing DVT is visualized in peroneal vein.  Left-acute appearing DVT is visualized in mid femoral, posterior tibial,   peroneal veins.    < end of copied text >  < from: CT Abdomen and Pelvis No Cont (08.13.23 @ 22:35) >  No evidence of acute abdominal pathology.    < end of copied text >  < from: CT Angio Neck Stroke Protocol w/ IV Cont (08.13.23 @ 13:18) >  IMPRESSION:  CT brain perfusion: No evidence of acute infarct or ischemia.    CTA neck: No stenosis. No dissection.    CTA brain: No stenosis or aneurysm.    COMMENT:  Reference per NASCET criteria for degree of stenosis: Mild: less than 50%   stenosis. Moderate: 50-69% stenosis. Severe: 70-94% stenosis. Near   occlusion: 95-99% stenosis.    Per RAPID assessment for acute infarct, threshold for ischemic tissue   volume is Tmax > 6 sec. Threshold for infarct core volume estimate is CBF   < 30 percent. Threshold for poor collateral flow or severe delayed   perfusion is Tmax > 10 sec.    < end of copied text >  < from: CT Brain Stroke Protocol (08.13.23 @ 13:04) >  No evidence of acute transcortical infarct, hydrocephalus, acute   intracranial hemorrhage, or mass effect.    Findings were discussed with readback confirmation Aston Holman by   radiology resident Faustino Nunez on 8/13/2023 at 1:12 PM.    < end of copied text >  < from: 12 Lead ECG (08.18.23 @ 18:41) >  Ventricular Rate 75 BPM    Atrial Rate 75 BPM    P-R Interval 140 ms    QRS Duration 160 ms    Q-T Interval 422 ms    QTC Calculation(Bazett) 471 ms    P Axis 67 degrees    R Axis -39 degrees    T Axis 130 degrees    Diagnosis Line Normal sinus rhythm  Left axis deviation  Left bundle branch block  Abnormal ECG    < end of copied text >  < from: TTE Echo Complete w/o Contrast w/ Doppler (08.14.23 @ 08:23) >  Summary:   1. Left ventricular ejection fraction, by visual estimation, is 30 to   35%.   2. Moderately to severely decreased global left ventricular systolic   function.   3. The left ventricular diastolic function could not be assessed in this   study.   4. Left atrial enlargement.   5. Mild mitral regurgitation.   6. Adequate TR velocity was not obtained to accurately assess RVSP.      STS Score:   Isolated CABG  Perioperative Outcome	Estimate %  Operative Mortality	4.65%  Morbidity & Mortality	21.3%  Stroke	2.42%  Renal Failure	4.14%  Reoperation	4.96%  Prolonged Ventilation	12.9%  Deep Sternal Wound Infection	0.566%  Long Hospital Stay (>14 days)	19.9%  Short Hospital Stay (<6 days)*	13.3%  *higher values reflect a better outcome  Clinical Summary  Planned Surgery:	Isolated CABG, Urgent, First cardiovascular surgery  Demographics:	68 year old, , female, 62kg, 153cm, BMI: 26.5 kg/m², BSA: 1.59 m²  Lab Values:	Creatinine: 0.5 mg/dL, Hematocrit: 28.2%, WBC Count: 7.2 10³/µL, Platelet Count: 452445 cells/µL  PreOp Medications:	Insulin diabetes control  Substance Abuse:	Former smoker  Risk Factors / Comorbidities:	Insulin-dependent Diabetes Mellitus, Hypertension  Pulmonary RF:	Moderate CLD  Cardiac Status:	Acute heart failure, NYHA Class II, Ejection Fraction = 30%  Coronary Artery Disease:	3 vessels diseased, Proximal LAD Stenosis = 70%, Non-ST Elevation MI, MI: 1 to 7 Days      Assessment/ Plan:  67yo F w/a PMH of Type II DM, HTN, DLD, obesity, lateral epicondylitis presented to the ED w/ Left Arm pain.  + LBBB on ECG (unsure if new or not). Code STEMI canceled followed by acute mental status change, SOB, lactate elevation, and acidosis and two seizure type episodes and was treated with benzodiazepine Intubation.     Stoke code called -  no CVA but patient developed elevated troponins and TTE revealed EF of 30% with CCTA revealing score of 134 and LAD disease. Patient went for Cardiac cath that revealed multi-vessel CAD. Hospital course thus far required treatment for Cardiomyopathy (EF 30%), uncontrolled DM (A1c 8.2), acute DVT (R peroneal L femoral/posterior tibial / peroneal veins)  , acute PE (b/l segmental), ? seizures but VEEG & MRI negative, anemia (? etiology    and aspiration pneumonia (resolved). CTS consulted for myocardial revascularization recommendation               Surgeon: Dr. Fernandez/ Jaymie / Hola    Consult requesting by: Kyle    Obtained from chart and  # [id number]  ([place])     HISTORY OF PRESENT ILLNESS:    69yo F w/a PMH of Type II DM, HTN, DLD, obesity, lateral epicondylitis presented to the ED w/ Left Arm pain.  This arm started about two months ago and has continued to progress but in the last few days became severe. She went to the ED a few days prior to admission and, was given pain medications then sent home. Per son, pain still continued to progress after this. No fevers, chills, nausea, vomiting, diarrhea, constipation, SOB, CP.   On  this admission she again presented with severe left arm pain w findings of LBBB on ECG (unsure if new or not). Code STEMI was called and then canceled because troponin negative and no chest pain. Patient was then noted to have acute mental status change, SOB, lactate elevation, and acidosis on abg.. Patient had two seizure type episodes and was treated with benzodiazepine. Intubated by ER for airway protection.     Stoke code called -  no CVA but patient developed elevated troponins and TTE revealed EF of 30% with CCTA revealing score of 134 and LAD disease. Patient went for Cardiac cath that revealed multi-vessel CAD. Hospital course thus far required treatment for Cardiomyopathy (EF 30%), uncontrolled DM (A1c 8.2), acute DVT (R peroneal L femoral/posterior tibial / peroneal veins)  , acute PE (b/l segmental), ? seizures but VEEG & MRI negative, anemia (? etiology    and aspiration pneumonia (resolved). CTS consulted for myocardial revascularization recommendation    Home Medications:  aspirin 81 mg oral tablet, chewable: 1 chewed once a day (13 Aug 2023 19:53)  atorvastatin 10 mg oral tablet: 1 orally once a day (at bedtime) (13 Aug 2023 19:52)  Jardiance 10 mg oral tablet: 1 orally once a day (13 Aug 2023 19:53)  MetFORMIN (Eqv-Glumetza) 500 mg oral tablet, extended release: 1 orally 2 times a day (13 Aug 2023 19:51)  pioglitazone 30 mg oral tablet: 1 orally once a day (13 Aug 2023 19:52)    NYHA functional class    [ ] Class I (no limitation) [X ] Class II (slight limitation) [ ] Class III (marked limitation) [ ] Class IV (symptoms at rest)    [place] [employer] grading of angina pectoris  [  ]  Class I	Angina only during strenuous or prolonged physical activity  [ ]  Class II	Slight limitation, with angina only during vigorous physical activity  [ X ]  Class III	Symptoms with everyday living activities, ie, moderate limitation  [ ]  Class IV	Inability to perform any activity without angina or angina at rest, ie, severe limitation      PAST MEDICAL & SURGICAL HISTORY:  Diabetes    MEDICATIONS  (STANDING):  albuterol/ipratropium for Nebulization 3 milliLiter(s) Nebulizer every 6 hours  aspirin  chewable 81 milliGRAM(s) Oral daily  atorvastatin 40 milliGRAM(s) Oral at bedtime  chlorhexidine 2% Cloths 1 Application(s) Topical <User Schedule>  dextrose 5%. 1000 milliLiter(s) (50 mL/Hr) IV Continuous <Continuous>  dextrose 50% Injectable 25 Gram(s) IV Push once  enoxaparin Injectable 60 milliGRAM(s) SubCutaneous every 12 hours  glucagon  Injectable 1 milliGRAM(s) IntraMuscular once  insulin glargine Injectable (LANTUS) 22 Unit(s) SubCutaneous at bedtime  insulin lispro (ADMELOG) corrective regimen sliding scale   SubCutaneous three times a day before meals  insulin lispro Injectable (ADMELOG) 8 Unit(s) SubCutaneous three times a day before meals  levETIRAcetam  IVPB 1000 milliGRAM(s) IV Intermittent every 12 hours  metoprolol tartrate 25 milliGRAM(s) Oral every 12 hours  sacubitril 49 mG/valsartan 51 mG 1 Tablet(s) Oral two times a day  sodium chloride 0.9%. 1000 milliLiter(s) (100 mL/Hr) IV Continuous <Continuous>    MEDICATIONS  (PRN):  acetaminophen     Tablet .. 650 milliGRAM(s) Oral every 6 hours PRN Temp greater or equal to 38C (100.4F), Mild Pain (1 - 3)  dextrose Oral Gel 15 Gram(s) Oral once PRN Blood Glucose LESS THAN 70 milliGRAM(s)/deciliter    Antiplatelet therapy:      none                     Last dose/amt:    Allergies    No Known Allergies    Intolerances    SOCIAL HISTORY:  Smoker: [ ] Yes past  ETOH use:  [ ] No                Ilicit Drug use:   No  Occupation: unemployed  Lives with: son  Assisted device use: none prior to admission    FAMILY HISTORY: no early CAD in 1st degree relatives       Review of Systems  CONSTITUTIONAL: no fever, chills or night sweats]   NEURO:  denies seizures, paralysis or paresthesias                                                                                EYES: wears glasses, no double vision, no blurry vision                                                                          ENMT: no difficulty hearing, vertigo, dysphagia, epistaxis recent dental work [ ]                                      CV:  denies chest pain,  palpitations at current time, + dyspnea                                                                                      RESPIRATORY:  no cough or hemoptysis                                                                 GI:  no nausea, vomiting, constipation or diarrhea   : denies dysuria, hematuria, incontinence or retention                                                                                           MUSKULOSKELETAL:  denies joint swelling or muscle weakness  PSYCH:  denies dementia, depression, anxiety   ENDOCRINE:  cold intolerance[ ] heat intolerance[ ] polydipsia[ ]                                                                                                                                                                                                PHYSICAL EXAM  Vital Signs Last 24 Hrs  T(C): 37 (21 Aug 2023 16:00), Max: 37.4 (21 Aug 2023 08:00)  T(F): 98.6 (21 Aug 2023 16:00), Max: 99.4 (21 Aug 2023 08:00)  HR: 97 (21 Aug 2023 18:50) (85 - 105)  BP: 113/86 (21 Aug 2023 18:50) (113/86 - 142/64)  BP(mean): 95 (21 Aug 2023 18:50) (83 - 95)  RR: 39 (21 Aug 2023 18:50) (22 - 39)  SpO2: 95% (21 Aug 2023 18:50) (95% - 100%)    CONSTITUTIONAL:    well nourished, well developed, overweight in NAD but dyspneic when speaking                                                                       Neuro: oriented to person/place & time with no focal motor or sensory  deficits     Eyes: PERRLA, EOMI, no nystagmus, sclera anicteric  ENT:  nasal/oral mucosa with absence of cyanosis, poor dentition  Neck: no jugular vein distention, trachea midline, no goiter   Chest: bilateral breath sounds with fair air movement absence of wheezes, rales, or rhonchi                                                                           CV:  RSR, S1S2, no significant murmur appreciated  Carotids: No Bruits  GI:  soft, non-tender non-distended, + bowel sounds, umbiilcal hernia                                                                                                         Extremities:  no evidence of cyanosis or deformity, no pedal edema   Extremity Pulses: right / left:  femorals 2+/ 2+; DP's 2+/2+; radials pressure dressing/2+    negative Allens  SKIN : no rashes                                                          LABS:                        8.2    7.20  )-----------( 206      ( 21 Aug 2023 04:19 )             28.2     08-21    142  |  106  |  4<L>  ----------------------------<  165<H>  3.7   |  25  |  0.5<L>    Ca    8.4      21 Aug 2023 03:10    TPro  6.0  /  Alb  3.1<L>  /  TBili  0.3  /  DBili  x   /  AST  23  /  ALT  25  /  AlkPhos  84  08-21      Urinalysis Basic - ( 21 Aug 2023 03:10 )  CARDIAC MARKERS ( 21 Aug 2023 03:10 )  x     / 0.49 ng/mL / x     / x     / x      CARDIAC MARKERS ( 20 Aug 2023 04:29 )  x     / 0.76 ng/mL / x     / x     / x          Cardiac Cath:  FINDINGS:   Coronary Dominance: Right  LM: Distal segment with 50% stenosis  LAD: Ostium with 80% stenosis  CX: Ostium with 80% stenosis   RCA: Ostium with 90% stenosis< from: Xray Chest 1 View- PORTABLE-Urgent (Xray Chest 1 View- PORTABLE-Urgent .) (08.21.23 @ 03:45) >  Stable bilateral opacities.    < end of copied text >  < from: CT Angio Heart and Coronaries w/ IV Cont (08.18.23 @ 15:10) >    IMPRESSION:  ---Long segment of calcified plaque within the proximal LAD with   indeterminant level of narrowing due to suboptimal vasodilatation and   blooming artifact.    Proximal to mid LAD as well as the proximal second diagonal branch poorly   opacified likely related to motion artifact. Significant   (moderate/severe) narrowing therefore cannot be entirely excluded and   further evaluation is recommended    The total Agatston coronary artery calcium score equals 130, which   corresponds to 76th percentile for age, gender and ethnicity.    CAD-RADS N(proximal/mid LAD).    ---Filling defects within the segmental pulmonary arteries bilaterally   compatible pulmonary embolism.    ---Diffuse bronchial wall thickening and opacity/debris. Trace pleural   effusion. Right basilar nodular opacities likely of infectious or   inflammatory etiology    < end of copied text >  < from: MR Head No Cont (08.17.23 @ 17:19) >  No acute infarct, intracranial hemorrhage, or midline shift.    < end of copied text >  < from: VA Duplex Lower Ext Vein Scan, Bilat (08.15.23 @ 18:34) >  FINDINGS:    RIGHT:  Normal compressibility of the RIGHT common femoral, femoral and popliteal   veins.  Doppler examination shows normal spontaneous and phasic flow.  Acute DVT is visualized in peroneal vein    LEFT:  Normal compressibility of the LEFT common femoral, and popliteal veins.  Doppler examination shows normal spontaneous and phasic flow.  Acute DVT is visualized in mid femoral vein.  Acute DVT is visualized in posterior tibial and peroneal veins.    IMPRESSION:  Right-acute appearing DVT is visualized in peroneal vein.  Left-acute appearing DVT is visualized in mid femoral, posterior tibial,   peroneal veins.    < end of copied text >  < from: CT Abdomen and Pelvis No Cont (08.13.23 @ 22:35) >  No evidence of acute abdominal pathology.    < end of copied text >  < from: CT Angio Neck Stroke Protocol w/ IV Cont (08.13.23 @ 13:18) >  IMPRESSION:  CT brain perfusion: No evidence of acute infarct or ischemia.    CTA neck: No stenosis. No dissection.    CTA brain: No stenosis or aneurysm.    COMMENT:  Reference per NASCET criteria for degree of stenosis: Mild: less than 50%   stenosis. Moderate: 50-69% stenosis. Severe: 70-94% stenosis. Near   occlusion: 95-99% stenosis.    Per RAPID assessment for acute infarct, threshold for ischemic tissue   volume is Tmax > 6 sec. Threshold for infarct core volume estimate is CBF   < 30 percent. Threshold for poor collateral flow or severe delayed   perfusion is Tmax > 10 sec.    < end of copied text >  < from: CT Brain Stroke Protocol (08.13.23 @ 13:04) >  No evidence of acute transcortical infarct, hydrocephalus, acute   intracranial hemorrhage, or mass effect.    Findings were discussed with readback confirmation Aston Holman by   radiology resident Faustino Nunez on 8/13/2023 at 1:12 PM.    < end of copied text >  < from: 12 Lead ECG (08.18.23 @ 18:41) >  Ventricular Rate 75 BPM    Atrial Rate 75 BPM    P-R Interval 140 ms    QRS Duration 160 ms    Q-T Interval 422 ms    QTC Calculation(Bazett) 471 ms    P Axis 67 degrees    R Axis -39 degrees    T Axis 130 degrees    Diagnosis Line Normal sinus rhythm  Left axis deviation  Left bundle branch block  Abnormal ECG    < end of copied text >  < from: TTE Echo Complete w/o Contrast w/ Doppler (08.14.23 @ 08:23) >  Summary:   1. Left ventricular ejection fraction, by visual estimation, is 30 to   35%.   2. Moderately to severely decreased global left ventricular systolic   function.   3. The left ventricular diastolic function could not be assessed in this   study.   4. Left atrial enlargement.   5. Mild mitral regurgitation.   6. Adequate TR velocity was not obtained to accurately assess RVSP.      STS Score:   Isolated CABG  Perioperative Outcome	Estimate %  Operative Mortality	4.65%  Morbidity & Mortality	21.3%  Stroke	2.42%  Renal Failure	4.14%  Reoperation	4.96%  Prolonged Ventilation	12.9%  Deep Sternal Wound Infection	0.566%  Long Hospital Stay (>14 days)	19.9%  Short Hospital Stay (<6 days)*	13.3%  *higher values reflect a better outcome  Clinical Summary  Planned Surgery:	Isolated CABG, Urgent, First cardiovascular surgery  Demographics:	68 year old, , female, 62kg, 153cm, BMI: 26.5 kg/m², BSA: 1.59 m²  Lab Values:	Creatinine: 0.5 mg/dL, Hematocrit: 28.2%, WBC Count: 7.2 10³/µL, Platelet Count: 475277 cells/µL  PreOp Medications:	Insulin diabetes control  Substance Abuse:	Former smoker  Risk Factors / Comorbidities:	Insulin-dependent Diabetes Mellitus, Hypertension  Pulmonary RF:	Moderate CLD  Cardiac Status:	Acute heart failure, NYHA Class II, Ejection Fraction = 30%  Coronary Artery Disease:	3 vessels diseased, Proximal LAD Stenosis = 70%, Non-ST Elevation MI, MI: 1 to 7 Days      Assessment/ Plan:  69yo F w/a PMH of Type II DM, HTN, DLD, obesity, lateral epicondylitis presented to the ED w/ Left Arm pain.  + LBBB on ECG (unsure if new or not). Code STEMI canceled followed by acute mental status change, SOB, lactate elevation, and acidosis and two seizure type episodes and was treated with benzodiazepine Intubation.     Stoke code called -  no CVA but patient developed elevated troponins and TTE revealed EF of 30% with CCTA revealing score of 134 and LAD disease. Cardiac cath that revealed multi-vessel CAD. Hospital course thus far required treatment for Cardiomyopathy (EF 30%), uncontrolled DM (A1c 8.2), acute DVT (R peroneal L femoral/posterior tibial / peroneal veins)  , acute PE (b/l segmental), ? seizures but VEEG & MRI negative, anemia (? etiology    and aspiration pneumonia (resolved). CTS consulted for myocardial revascularization recommendation.    CAD - would benefit from myocardial revascularization - currently in significantly deconditioned state with obvious dyspnea when speaking,   surgery vs high risk PCI needs to be determined  will discuss at cath conference  Patient appears to be shying away from surgery, son coming in today to speak to Dr. Fernandez  will hold up on surgical work-up till plan determined - continue medical therapy                  Surgeon: Dr. Fernandez/ Jaymie / Hola    Consult requesting by: Kyle    Obtained from chart and  # [id number]  ([place])     HISTORY OF PRESENT ILLNESS:    69yo F w/a PMH of Type II DM, HTN, DLD, obesity, lateral epicondylitis presented to the ED w/ Left Arm pain.  This arm started about two months ago and has continued to progress but in the last few days became severe. She went to the ED a few days prior to admission and, was given pain medications then sent home. Per son, pain still continued to progress after this. No fevers, chills, nausea, vomiting, diarrhea, constipation, SOB, CP.   On  this admission she again presented with severe left arm pain w findings of LBBB on ECG (unsure if new or not). Code STEMI was called and then canceled because troponin negative and no chest pain. Patient was then noted to have acute mental status change, SOB, lactate elevation, and acidosis on abg.. Patient had two seizure type episodes and was treated with benzodiazepine. Intubated by ER for airway protection.     Stoke code called -  no CVA but patient developed elevated troponins and TTE revealed EF of 30% with CCTA revealing score of 134 and LAD disease. Patient went for Cardiac cath that revealed multi-vessel CAD. Hospital course thus far required treatment for Cardiomyopathy (EF 30%), uncontrolled DM (A1c 8.2), acute DVT (R peroneal L femoral/posterior tibial / peroneal veins)  , acute PE (b/l segmental), ? seizures but VEEG & MRI negative, anemia (? etiology    and aspiration pneumonia (resolved). CTS consulted for myocardial revascularization recommendation    Home Medications:  aspirin 81 mg oral tablet, chewable: 1 chewed once a day (13 Aug 2023 19:53)  atorvastatin 10 mg oral tablet: 1 orally once a day (at bedtime) (13 Aug 2023 19:52)  Jardiance 10 mg oral tablet: 1 orally once a day (13 Aug 2023 19:53)  MetFORMIN (Eqv-Glumetza) 500 mg oral tablet, extended release: 1 orally 2 times a day (13 Aug 2023 19:51)  pioglitazone 30 mg oral tablet: 1 orally once a day (13 Aug 2023 19:52)    NYHA functional class    [ ] Class I (no limitation) [X ] Class II (slight limitation) [ ] Class III (marked limitation) [ ] Class IV (symptoms at rest)    [place] [employer] Society grading of angina pectoris  [  ]  Class I	Angina only during strenuous or prolonged physical activity  [ ]  Class II	Slight limitation, with angina only during vigorous physical activity  [ X ]  Class III	Symptoms with everyday living activities, ie, moderate limitation  [ ]  Class IV	Inability to perform any activity without angina or angina at rest, ie, severe limitation      PAST MEDICAL & SURGICAL HISTORY:  Diabetes    MEDICATIONS  (STANDING):  albuterol/ipratropium for Nebulization 3 milliLiter(s) Nebulizer every 6 hours  aspirin  chewable 81 milliGRAM(s) Oral daily  atorvastatin 40 milliGRAM(s) Oral at bedtime  chlorhexidine 2% Cloths 1 Application(s) Topical <User Schedule>  dextrose 5%. 1000 milliLiter(s) (50 mL/Hr) IV Continuous <Continuous>  dextrose 50% Injectable 25 Gram(s) IV Push once  enoxaparin Injectable 60 milliGRAM(s) SubCutaneous every 12 hours  glucagon  Injectable 1 milliGRAM(s) IntraMuscular once  insulin glargine Injectable (LANTUS) 22 Unit(s) SubCutaneous at bedtime  insulin lispro (ADMELOG) corrective regimen sliding scale   SubCutaneous three times a day before meals  insulin lispro Injectable (ADMELOG) 8 Unit(s) SubCutaneous three times a day before meals  levETIRAcetam  IVPB 1000 milliGRAM(s) IV Intermittent every 12 hours  metoprolol tartrate 25 milliGRAM(s) Oral every 12 hours  sacubitril 49 mG/valsartan 51 mG 1 Tablet(s) Oral two times a day  sodium chloride 0.9%. 1000 milliLiter(s) (100 mL/Hr) IV Continuous <Continuous>    MEDICATIONS  (PRN):  acetaminophen     Tablet .. 650 milliGRAM(s) Oral every 6 hours PRN Temp greater or equal to 38C (100.4F), Mild Pain (1 - 3)  dextrose Oral Gel 15 Gram(s) Oral once PRN Blood Glucose LESS THAN 70 milliGRAM(s)/deciliter    Antiplatelet therapy:      none                     Last dose/amt:    Allergies    No Known Allergies    Intolerances    SOCIAL HISTORY:  Smoker: [ ] Yes past  ETOH use:  [ ] No                Ilicit Drug use:   No  Occupation: unemployed  Lives with: son  Assisted device use: none prior to admission    FAMILY HISTORY: no early CAD in 1st degree relatives       Review of Systems  CONSTITUTIONAL: no fever, chills or night sweats]   NEURO:  denies seizures, paralysis or paresthesias                                                                                EYES: wears glasses, no double vision, no blurry vision                                                                          ENMT: no difficulty hearing, vertigo, dysphagia, epistaxis recent dental work [ ]                                      CV:  denies chest pain,  palpitations at current time, + dyspnea                                                                                      RESPIRATORY:  no cough or hemoptysis                                                                 GI:  no nausea, vomiting, constipation or diarrhea   : denies dysuria, hematuria, incontinence or retention                                                                                           MUSKULOSKELETAL:  denies joint swelling or muscle weakness  PSYCH:  denies dementia, depression, anxiety   ENDOCRINE:  cold intolerance[ ] heat intolerance[ ] polydipsia[ ]                                                                                                                                                                                                PHYSICAL EXAM  Vital Signs Last 24 Hrs  T(C): 37 (21 Aug 2023 16:00), Max: 37.4 (21 Aug 2023 08:00)  T(F): 98.6 (21 Aug 2023 16:00), Max: 99.4 (21 Aug 2023 08:00)  HR: 97 (21 Aug 2023 18:50) (85 - 105)  BP: 113/86 (21 Aug 2023 18:50) (113/86 - 142/64)  BP(mean): 95 (21 Aug 2023 18:50) (83 - 95)  RR: 39 (21 Aug 2023 18:50) (22 - 39)  SpO2: 95% (21 Aug 2023 18:50) (95% - 100%)    CONSTITUTIONAL:    well nourished, well developed, overweight in NAD but dyspneic when speaking                                                                       Neuro: oriented to person/place & time with no focal motor or sensory  deficits     Eyes: PERRLA, EOMI, no nystagmus, sclera anicteric  ENT:  nasal/oral mucosa with absence of cyanosis, poor dentition  Neck: no jugular vein distention, trachea midline, no goiter   Chest: bilateral breath sounds with fair air movement absence of wheezes, rales, or rhonchi                                                                           CV:  RSR, S1S2, no significant murmur appreciated  Carotids: No Bruits  GI:  soft, non-tender non-distended, + bowel sounds, umbiilcal hernia                                                                                                         Extremities:  no evidence of cyanosis or deformity, no pedal edema   Extremity Pulses: right / left:  femorals 2+/ 2+; DP's 2+/2+; radials pressure dressing/2+    negative Allens  SKIN : no rashes                                                          LABS:                        8.2    7.20  )-----------( 206      ( 21 Aug 2023 04:19 )             28.2     08-21    142  |  106  |  4<L>  ----------------------------<  165<H>  3.7   |  25  |  0.5<L>    Ca    8.4      21 Aug 2023 03:10    TPro  6.0  /  Alb  3.1<L>  /  TBili  0.3  /  DBili  x   /  AST  23  /  ALT  25  /  AlkPhos  84  08-21      Urinalysis Basic - ( 21 Aug 2023 03:10 )  CARDIAC MARKERS ( 21 Aug 2023 03:10 )  x     / 0.49 ng/mL / x     / x     / x      CARDIAC MARKERS ( 20 Aug 2023 04:29 )  x     / 0.76 ng/mL / x     / x     / x          Cardiac Cath:  FINDINGS:   Coronary Dominance: Right  LM: Distal segment with 50% stenosis  LAD: Ostium with 80% stenosis  CX: Ostium with 80% stenosis   RCA: Ostium with 90% stenosis< from: Xray Chest 1 View- PORTABLE-Urgent (Xray Chest 1 View- PORTABLE-Urgent .) (08.21.23 @ 03:45) >  Stable bilateral opacities.    < end of copied text >  < from: CT Angio Heart and Coronaries w/ IV Cont (08.18.23 @ 15:10) >    IMPRESSION:  ---Long segment of calcified plaque within the proximal LAD with   indeterminant level of narrowing due to suboptimal vasodilatation and   blooming artifact.    Proximal to mid LAD as well as the proximal second diagonal branch poorly   opacified likely related to motion artifact. Significant   (moderate/severe) narrowing therefore cannot be entirely excluded and   further evaluation is recommended    The total Agatston coronary artery calcium score equals 130, which   corresponds to 76th percentile for age, gender and ethnicity.    CAD-RADS N(proximal/mid LAD).    ---Filling defects within the segmental pulmonary arteries bilaterally   compatible pulmonary embolism.    ---Diffuse bronchial wall thickening and opacity/debris. Trace pleural   effusion. Right basilar nodular opacities likely of infectious or   inflammatory etiology    < end of copied text >  < from: MR Head No Cont (08.17.23 @ 17:19) >  No acute infarct, intracranial hemorrhage, or midline shift.    < end of copied text >  < from: VA Duplex Lower Ext Vein Scan, Bilat (08.15.23 @ 18:34) >  FINDINGS:    RIGHT:  Normal compressibility of the RIGHT common femoral, femoral and popliteal   veins.  Doppler examination shows normal spontaneous and phasic flow.  Acute DVT is visualized in peroneal vein    LEFT:  Normal compressibility of the LEFT common femoral, and popliteal veins.  Doppler examination shows normal spontaneous and phasic flow.  Acute DVT is visualized in mid femoral vein.  Acute DVT is visualized in posterior tibial and peroneal veins.    IMPRESSION:  Right-acute appearing DVT is visualized in peroneal vein.  Left-acute appearing DVT is visualized in mid femoral, posterior tibial,   peroneal veins.    < end of copied text >  < from: CT Abdomen and Pelvis No Cont (08.13.23 @ 22:35) >  No evidence of acute abdominal pathology.    < end of copied text >  < from: CT Angio Neck Stroke Protocol w/ IV Cont (08.13.23 @ 13:18) >  IMPRESSION:  CT brain perfusion: No evidence of acute infarct or ischemia.    CTA neck: No stenosis. No dissection.    CTA brain: No stenosis or aneurysm.    COMMENT:  Reference per NASCET criteria for degree of stenosis: Mild: less than 50%   stenosis. Moderate: 50-69% stenosis. Severe: 70-94% stenosis. Near   occlusion: 95-99% stenosis.    Per RAPID assessment for acute infarct, threshold for ischemic tissue   volume is Tmax > 6 sec. Threshold for infarct core volume estimate is CBF   < 30 percent. Threshold for poor collateral flow or severe delayed   perfusion is Tmax > 10 sec.    < end of copied text >  < from: CT Brain Stroke Protocol (08.13.23 @ 13:04) >  No evidence of acute transcortical infarct, hydrocephalus, acute   intracranial hemorrhage, or mass effect.    Findings were discussed with readback confirmation Aston Holman by   radiology resident Faustino Nunez on 8/13/2023 at 1:12 PM.    < end of copied text >  < from: 12 Lead ECG (08.18.23 @ 18:41) >  Ventricular Rate 75 BPM    Atrial Rate 75 BPM    P-R Interval 140 ms    QRS Duration 160 ms    Q-T Interval 422 ms    QTC Calculation(Bazett) 471 ms    P Axis 67 degrees    R Axis -39 degrees    T Axis 130 degrees    Diagnosis Line Normal sinus rhythm  Left axis deviation  Left bundle branch block  Abnormal ECG    < end of copied text >  < from: TTE Echo Complete w/o Contrast w/ Doppler (08.14.23 @ 08:23) >  Summary:   1. Left ventricular ejection fraction, by visual estimation, is 30 to   35%.   2. Moderately to severely decreased global left ventricular systolic   function.   3. The left ventricular diastolic function could not be assessed in this   study.   4. Left atrial enlargement.   5. Mild mitral regurgitation.   6. Adequate TR velocity was not obtained to accurately assess RVSP.      STS Score:   Isolated CABG  Perioperative Outcome	Estimate %  Operative Mortality	4.65%  Morbidity & Mortality	21.3%  Stroke	2.42%  Renal Failure	4.14%  Reoperation	4.96%  Prolonged Ventilation	12.9%  Deep Sternal Wound Infection	0.566%  Long Hospital Stay (>14 days)	19.9%  Short Hospital Stay (<6 days)*	13.3%  *higher values reflect a better outcome  Clinical Summary  Planned Surgery:	Isolated CABG, Urgent, First cardiovascular surgery  Demographics:	68 year old, , female, 62kg, 153cm, BMI: 26.5 kg/m², BSA: 1.59 m²  Lab Values:	Creatinine: 0.5 mg/dL, Hematocrit: 28.2%, WBC Count: 7.2 10³/µL, Platelet Count: 993894 cells/µL  PreOp Medications:	Insulin diabetes control  Substance Abuse:	Former smoker  Risk Factors / Comorbidities:	Insulin-dependent Diabetes Mellitus, Hypertension  Pulmonary RF:	Moderate CLD  Cardiac Status:	Acute heart failure, NYHA Class II, Ejection Fraction = 30%  Coronary Artery Disease:	3 vessels diseased, Proximal LAD Stenosis = 70%, Non-ST Elevation MI, MI: 1 to 7 Days      Assessment/ Plan:  69yo F w/a PMH of Type II DM, HTN, DLD, obesity, lateral epicondylitis presented to the ED w/ Left Arm pain.  + LBBB on ECG (unsure if new or not). Code STEMI canceled followed by acute mental status change, SOB, lactate elevation, and acidosis and two seizure type episodes and was treated with benzodiazepine Intubation.     Stoke code called -  no CVA but patient developed elevated troponins and TTE revealed EF of 30% with CCTA revealing score of 134 and LAD disease. Cardiac cath that revealed multi-vessel CAD. Hospital course thus far required treatment for Cardiomyopathy (EF 30%), uncontrolled DM (A1c 8.2), acute DVT (R peroneal L femoral/posterior tibial / peroneal veins)  , acute PE (b/l segmental), ? seizures but VEEG & MRI negative, anemia (? etiology    and aspiration pneumonia (resolved). CTS consulted for myocardial revascularization recommendation.    CAD - would benefit from myocardial revascularization - currently in significantly deconditioned state with obvious dyspnea when speaking,   surgery vs high risk PCI needs to be determined  will discuss at cath conference  Patient appears to be shying away from surgery, son coming in today ( 8/22/23) to speak to Dr. Fernandez  will continue surgical work-up till plan determined - continue medical therapy

## 2023-08-21 NOTE — PROGRESS NOTE ADULT - ASSESSMENT
Impression:     Seizures   Altered mental status  Acute hypoxemic respiratory failure   Lactic acidosis   DVT   NSTEMI   PE on ct scan   PLAN:    CNS: No sedation. No seizure on EEG. On Keppra BID, possibly titrate off over the coming days, will discuss w/ neurology. MR brain done. Neurology following.     HEENT: Oral care.     PULMONARY:  HOB @ 45 degrees.  On room air.     CARDIOVASCULAR: Not on pressors. Keep equal balance. Trop noted; trend; CCTA ordered; EF 30-35%; moderate systolic dysfunction. On Metoprolol and Entresto. Cardiology following.   possible LHC today.  No diuresis today, but patient slightly positive    GI: GI prophylaxis not indicated.  Oral diet pending procedure. Increase free water intake.     RENAL:  Follow up lytes.  Correct as needed. Voiding trial again. Check Mg and correct K.     INFECTIOUS DISEASE: Culture: All cultures negative. Observe off abx.     HEMATOLOGICAL:  On therapeutic Lovenox. Hgb stable. May transition to oral DOAC.   follow h/h repeat 11 am   keep hb around 8     ENDOCRINE:  Follow up FS.  Insulin protocol if needed.    MUSCULOSKELETAL: Out of bed and PT OT.     CODE STATUS: Full code.   DISPO: Transfer to Telemetry.   LINES: none

## 2023-08-21 NOTE — CONSULT NOTE ADULT - NS ATTEND AMEND GEN_ALL_CORE FT
Patient was seen and examined along with my physician assistant as described above    I initially saw the patient last night and used the  services over the computer and spent almost 45 minutes at the patient's bedside and then followed up with the visit this morning again with the patient's 2 sons, Deana at the bedside.    In summary  The patient is a 68-year-old lady  Recently immigrated from [facility]  Poorly controlled diabetes  History of COPD with asthma for the last 50 years requiring multiple inhalers and multiple acute exacerbation attacks  Hypertension and hypercholesterolemia  Chronic anemia  Obesity      She was admitted with what looks to be I arrhythmogenic event which she decompensated from and was intubated and was in the ICU for a few days.  She also had a significant GI bleed and dropped her hemoglobin to 6.5 and required transfusions for the same.    The patient and her family are very poor historians so it extremely difficult to get a good grasp on the patient's medical history.    The patient appears to have been significantly deconditioned over the last couple of months but apparently was much better a few months ago.    Currently she is short of breath and on her full work-up    She was found to have significant triple-vessel coronary artery disease with a  low ejection fraction and a cardiomyopathy.    I had a long and detailed discussion with the patient and her family regarding high risk open heart surgery with coronary artery bypass grafting.    We also discussed her care at the heart team approach earlier this morning where we had a big team of cardiologist and cardiac surgeons involved and it was the opinion of the group that we should probably hold off on surgery for the time being and see how she recovers and see her functional status.    Also concerning is the fact that she had bilateral pulmonary emboli on the CT scan of her chest and she has bilateral new DVTs.  In the setting of severe anemia this is concerning for an underlying malignancy also.    I had to spend a significant amount of time at the patient's bedside as she and her family had multiple questions and these were all answered.

## 2023-08-21 NOTE — CHART NOTE - NSCHARTNOTEFT_GEN_A_CORE
PRE-OP DIAGNOSIS:    NSTEMI    PROCEDURE:   [x ] Coronary Angiogram   [x ] LHC   [ ] LVG   [ ] RHC   [ ] Intervention (see below)       PHYSICIAN:  Dr. Wright  FELLOW: Marissa Dickerson    PROCEDURE DESCRIPTION:     Consent:    [x] Patient   [] Family Member   []  Used      Anesthesia:   [ ] General   [X] Sedation   [X] Local     Access & Closure:   [x ]  6  Fr R   Radial Artery  -> TR Band   [ ]   Fr R    L    Femoral Artery -> Manual compression    Perclose    Angioseal  [ ]   Fr R    L   Femoral Vein -> Manual compression  [ ]   Fr R    L    Brachial Vein -> Manual compression    IV Contrast: 50 mL      FINDINGS:   Coronary Dominance: Right  LM: Distal segment with 50% stenosis  LAD: Ostium with 80% stenosis  CX: Ostium with 60% stenosis   RCA: Ostium with 90% stenosis    Syntax Score: 30    LVEDP:  13 mmHg     ESTIMATED BLOOD LOSS: < 10 mL      CONDITION:   [x] Good   [ ] Fair   [ ] Critical     SPECIMEN REMOVED: N/A     POST-OP DIAGNOSIS:    [ ] Normal Coronary Angiogram   [ ] Mild Coronary Artery Disease (< 50% stenosis)   [x] Left Main + 3 Vessel Coronary Artery Disease      PLAN OF CARE:   [ ] D/C Home Today   [x ] Return to In-patient bed   [ ] Admit for observation   [ ] Return for Staged Procedure in 4-6 weeks   [ ] CT Surgery Consult   [X] Medications: ASA, statin  [X] IV Fluids: NS @ 100cc/h x 4 hours  [ ] EP Consult  [x] Remove TR band and Hold manual pressure if signs of hematoma or bleeding over radial access site PRE-OP DIAGNOSIS:    NSTEMI    PROCEDURE:   [x ] Coronary Angiogram   [x ] LHC   [ ] LVG   [ ] RHC   [ ] Intervention (see below)       PHYSICIAN:  Dr. Wright  FELLOW: Marissa Dickerson    PROCEDURE DESCRIPTION:     Consent:    [x] Patient   [] Family Member   []  Used      Anesthesia:   [ ] General   [X] Sedation   [X] Local     Access & Closure:   [x ]  6  Fr R   Radial Artery  -> TR Band   [ ]   Fr R    L    Femoral Artery -> Manual compression    Perclose    Angioseal  [ ]   Fr R    L   Femoral Vein -> Manual compression  [ ]   Fr R    L    Brachial Vein -> Manual compression    IV Contrast: 50 mL      FINDINGS:   Coronary Dominance: Right  LM: Distal segment with 50% stenosis  LAD: Ostium with 80% stenosis  CX: Ostium with 80% stenosis   RCA: Ostium with 90% stenosis    Syntax Score: 30    LVEDP:  13 mmHg     ESTIMATED BLOOD LOSS: < 10 mL      CONDITION:   [x] Good   [ ] Fair   [ ] Critical     SPECIMEN REMOVED: N/A     POST-OP DIAGNOSIS:    [ ] Normal Coronary Angiogram   [ ] Mild Coronary Artery Disease (< 50% stenosis)   [x] Left Main + 3 Vessel Coronary Artery Disease      PLAN OF CARE:   [ ] D/C Home Today   [x ] Return to In-patient bed   [ ] Admit for observation   [ ] Return for Staged Procedure in 4-6 weeks   [ ] CT Surgery Consult   [X] Medications: ASA, statin  [X] IV Fluids: NS @ 100cc/h x 4 hours  [ ] EP Consult  [x] Remove TR band and Hold manual pressure if signs of hematoma or bleeding over radial access site

## 2023-08-21 NOTE — PROGRESS NOTE ADULT - SUBJECTIVE AND OBJECTIVE BOX
Patient is a 68y old  Female who presents with a chief complaint of Arm Pain, AMS (21 Aug 2023 08:03)    HPI:  67yo F w/a PMH of Type II DM, HTN, DLD, obesity, lateral epicondylitis presenting to the ED  w/left arm pain. This arm started about two months ago and has continued to progress for a few days prior to admission became severe. Pt was previously seen for this pain in the ED on , was given pain medications then sent home. Per son, pain still continued to progress after this. No fevers, chills, nausea, vomiting, diarrhea, constipation, SOB, CP.     On admission to the ED, she presented with severe left arm pain w findings of LBBB on ECG (unsure if new or not). Code STEMI was called and then canceled because troponin negative and no chest pain. Patient was then noted to have acute mental status change, SOB, lactate elevation, and acidosis on abg. Patient had two seizure episodes and was treated with benzodiazepine. Intubated by ER for airway protection, extubated on 8/15    On Admission  Vitals: /64, HR 75, RR 20, T 98.5, satting 99 percent on RA   Labs: WBC 14.86, Hgb 10, , Na 139, K 5.4, BUN 12, Cr .6, Trop <.01 --> .13 on repeat  Imaging:   CXR -  Patchy bibasilar opacities, right greater than left.  CT Brain Stroke protocol - No evidence of acute transcortical infarct, hydrocephalus, acute intracranial hemorrhage, or mass effect.  CT brain perfusion: No evidence of acute infarct or ischemia.  CTA neck: No stenosis. No dissection.  CTA brain: No stenosis or aneurysm.  CTA Neck: The distal internal carotid arteries are patent. The [place] is normal in appearance. The visualized cerebral arteries are unremarkable.The vertebrobasilar junction and basilar artery are normal.    In the ED, given Levaquin. Keppra, started on propofol  Admitted to MICU for airway monitoring  (13 Aug 2023 19:09)       INTERVAL HPI/OVERNIGHT EVENTS:   No overnight events   Afebrile, hemodynamically stable     Subjective:    ICU Vital Signs Last 24 Hrs  T(C): 37.4 (21 Aug 2023 08:00), Max: 37.4 (21 Aug 2023 08:00)  T(F): 99.4 (21 Aug 2023 08:00), Max: 99.4 (21 Aug 2023 08:00)  HR: 91 (21 Aug 2023 08:00) (82 - 105)  BP: 132/61 (21 Aug 2023 08:00) (118/56 - 143/61)  BP(mean): 85 (21 Aug 2023 08:00) (80 - 101)  ABP: --  ABP(mean): --  RR: 33 (21 Aug 2023 08:00) (20 - 33)  SpO2: 99% (21 Aug 2023 08:00) (96% - 100%)    O2 Parameters below as of 21 Aug 2023 08:00  Patient On (Oxygen Delivery Method): room air    I&O's Summary    20 Aug 2023 07:01  -  21 Aug 2023 07:00  --------------------------------------------------------  IN: 768 mL / OUT: 350 mL / NET: 418 mL          Daily     Daily Weight in k (21 Aug 2023 04:00)    EKG/Telemetry Events: No cardiac telemetry events overnight. Pt remains in Sinus rhythm.    MEDICATIONS  (STANDING):  albuterol/ipratropium for Nebulization 3 milliLiter(s) Nebulizer every 6 hours  aspirin  chewable 81 milliGRAM(s) Oral daily  atorvastatin 40 milliGRAM(s) Oral at bedtime  chlorhexidine 2% Cloths 1 Application(s) Topical <User Schedule>  dextrose 5%. 1000 milliLiter(s) (50 mL/Hr) IV Continuous <Continuous>  dextrose 50% Injectable 25 Gram(s) IV Push once  enoxaparin Injectable 60 milliGRAM(s) SubCutaneous every 12 hours  furosemide   Injectable 40 milliGRAM(s) IV Push once  glucagon  Injectable 1 milliGRAM(s) IntraMuscular once  insulin glargine Injectable (LANTUS) 22 Unit(s) SubCutaneous at bedtime  insulin lispro (ADMELOG) corrective regimen sliding scale   SubCutaneous three times a day before meals  insulin lispro Injectable (ADMELOG) 8 Unit(s) SubCutaneous three times a day before meals  levETIRAcetam  IVPB 1000 milliGRAM(s) IV Intermittent every 12 hours  metoprolol tartrate 25 milliGRAM(s) Oral every 12 hours  sacubitril 49 mG/valsartan 51 mG 1 Tablet(s) Oral two times a day    MEDICATIONS  (PRN):  acetaminophen     Tablet .. 650 milliGRAM(s) Oral every 6 hours PRN Temp greater or equal to 38C (100.4F), Mild Pain (1 - 3)  dextrose Oral Gel 15 Gram(s) Oral once PRN Blood Glucose LESS THAN 70 milliGRAM(s)/deciliter      PHYSICAL EXAM:  GENERAL: Pt sitting in chair this AM, NAD.  HEAD:  Atraumatic, Normocephalic  NERVOUS SYSTEM:  Alert & Awake.   CHEST/LUNG: B/L good air entry; No rales, rhonchi, or wheezing  HEART: S1S2 normal, no S3, Regular rate and rhythm; No murmurs  ABDOMEN: Soft, Nontender, Nondistended; Bowel sounds present  EXTREMITIES:  2+ Peripheral Pulses, No clubbing, cyanosis, or edema    LABS:                        8.2    7.20  )-----------( 206      ( 21 Aug 2023 04:19 )             28.2     08-    142  |  106  |  4<L>  ----------------------------<  165<H>  3.7   |  25  |  0.5<L>    Ca    8.4      21 Aug 2023 03:10    TPro  6.0  /  Alb  3.1<L>  /  TBili  0.3  /  DBili  x   /  AST  23  /  ALT  25  /  AlkPhos  84  -    LIVER FUNCTIONS - ( 21 Aug 2023 03:10 )  Alb: 3.1 g/dL / Pro: 6.0 g/dL / ALK PHOS: 84 U/L / ALT: 25 U/L / AST: 23 U/L / GGT: x             CAPILLARY BLOOD GLUCOSE      POCT Blood Glucose.: 151 mg/dL (21 Aug 2023 07:20)  POCT Blood Glucose.: 180 mg/dL (21 Aug 2023 02:10)  POCT Blood Glucose.: 238 mg/dL (20 Aug 2023 21:54)  POCT Blood Glucose.: 205 mg/dL (20 Aug 2023 17:23)  POCT Blood Glucose.: 299 mg/dL (20 Aug 2023 11:13)      Troponin T, Serum: 0.49 ng/mL ( @ 03:10)    CARDIAC MARKERS ( 21 Aug 2023 03:10 )  x     / 0.49 ng/mL / x     / x     / x      CARDIAC MARKERS ( 20 Aug 2023 04:29 )  x     / 0.76 ng/mL / x     / x     / x          Urinalysis Basic - ( 21 Aug 2023 03:10 )    Color: x / Appearance: x / SG: x / pH: x  Gluc: 165 mg/dL / Ketone: x  / Bili: x / Urobili: x   Blood: x / Protein: x / Nitrite: x   Leuk Esterase: x / RBC: x / WBC x   Sq Epi: x / Non Sq Epi: x / Bacteria: x          RADIOLOGY & ADDITIONAL TESTS:  CXR: ACC: [id number] EXAM:  XR CHEST PORTABLE URGENT 1V   ORDERED BY: GAUDENCIO MURPHY     PROCEDURE DATE:  2023    INTERPRETATION:  CLINICAL HISTORY: RL infiltrate.    COMPARISON: 2023.    TECHNIQUE: Portable frontal chest radiograph. Adequate positioning.    FINDINGS:  Support devices: None.  Cardiac/mediastinum/hilum: Stable.  Lung parenchyma/Pleura: Stable bilateral opacities  Skeleton/soft tissues: Stable.  IMPRESSION:  Stable bilateral opacities.  --- End of Report ---  LYUBOV CARDENAS MD; Attending Radiologist  This document has been electronically signed. Aug 21 2023  8:15AM

## 2023-08-22 LAB
ALBUMIN SERPL ELPH-MCNC: 3.1 G/DL — LOW (ref 3.5–5.2)
ALP SERPL-CCNC: 88 U/L — SIGNIFICANT CHANGE UP (ref 30–115)
ALT FLD-CCNC: 21 U/L — SIGNIFICANT CHANGE UP (ref 0–41)
ANION GAP SERPL CALC-SCNC: 11 MMOL/L — SIGNIFICANT CHANGE UP (ref 7–14)
AST SERPL-CCNC: 20 U/L — SIGNIFICANT CHANGE UP (ref 0–41)
BASOPHILS # BLD AUTO: 0.02 K/UL — SIGNIFICANT CHANGE UP (ref 0–0.2)
BASOPHILS NFR BLD AUTO: 0.3 % — SIGNIFICANT CHANGE UP (ref 0–1)
BILIRUB SERPL-MCNC: 0.4 MG/DL — SIGNIFICANT CHANGE UP (ref 0.2–1.2)
BLD GP AB SCN SERPL QL: SIGNIFICANT CHANGE UP
BUN SERPL-MCNC: 6 MG/DL — LOW (ref 10–20)
CALCIUM SERPL-MCNC: 8.5 MG/DL — SIGNIFICANT CHANGE UP (ref 8.4–10.5)
CHLORIDE SERPL-SCNC: 103 MMOL/L — SIGNIFICANT CHANGE UP (ref 98–110)
CO2 SERPL-SCNC: 31 MMOL/L — SIGNIFICANT CHANGE UP (ref 17–32)
CREAT SERPL-MCNC: <0.5 MG/DL — LOW (ref 0.7–1.5)
EGFR: 106 ML/MIN/1.73M2 — SIGNIFICANT CHANGE UP
EOSINOPHIL # BLD AUTO: 0.15 K/UL — SIGNIFICANT CHANGE UP (ref 0–0.7)
EOSINOPHIL NFR BLD AUTO: 2.6 % — SIGNIFICANT CHANGE UP (ref 0–8)
GLUCOSE BLDC GLUCOMTR-MCNC: 146 MG/DL — HIGH (ref 70–99)
GLUCOSE BLDC GLUCOMTR-MCNC: 154 MG/DL — HIGH (ref 70–99)
GLUCOSE BLDC GLUCOMTR-MCNC: 226 MG/DL — HIGH (ref 70–99)
GLUCOSE BLDC GLUCOMTR-MCNC: 237 MG/DL — HIGH (ref 70–99)
GLUCOSE SERPL-MCNC: 121 MG/DL — HIGH (ref 70–99)
HCT VFR BLD CALC: 28.3 % — LOW (ref 37–47)
HGB BLD-MCNC: 8.3 G/DL — LOW (ref 12–16)
IMM GRANULOCYTES NFR BLD AUTO: 0.5 % — HIGH (ref 0.1–0.3)
LYMPHOCYTES # BLD AUTO: 1.61 K/UL — SIGNIFICANT CHANGE UP (ref 1.2–3.4)
LYMPHOCYTES # BLD AUTO: 27.9 % — SIGNIFICANT CHANGE UP (ref 20.5–51.1)
MAGNESIUM SERPL-MCNC: 2 MG/DL — SIGNIFICANT CHANGE UP (ref 1.8–2.4)
MCHC RBC-ENTMCNC: 23.6 PG — LOW (ref 27–31)
MCHC RBC-ENTMCNC: 29.3 G/DL — LOW (ref 32–37)
MCV RBC AUTO: 80.4 FL — LOW (ref 81–99)
MONOCYTES # BLD AUTO: 0.34 K/UL — SIGNIFICANT CHANGE UP (ref 0.1–0.6)
MONOCYTES NFR BLD AUTO: 5.9 % — SIGNIFICANT CHANGE UP (ref 1.7–9.3)
NEUTROPHILS # BLD AUTO: 3.63 K/UL — SIGNIFICANT CHANGE UP (ref 1.4–6.5)
NEUTROPHILS NFR BLD AUTO: 62.8 % — SIGNIFICANT CHANGE UP (ref 42.2–75.2)
NRBC # BLD: 0 /100 WBCS — SIGNIFICANT CHANGE UP (ref 0–0)
NT-PROBNP SERPL-SCNC: 2880 PG/ML — HIGH (ref 0–300)
PLATELET # BLD AUTO: 216 K/UL — SIGNIFICANT CHANGE UP (ref 130–400)
PMV BLD: 10.9 FL — HIGH (ref 7.4–10.4)
POTASSIUM SERPL-MCNC: 3.1 MMOL/L — LOW (ref 3.5–5)
POTASSIUM SERPL-SCNC: 3.1 MMOL/L — LOW (ref 3.5–5)
PROT SERPL-MCNC: 5.9 G/DL — LOW (ref 6–8)
RBC # BLD: 3.52 M/UL — LOW (ref 4.2–5.4)
RBC # FLD: 20.5 % — HIGH (ref 11.5–14.5)
SODIUM SERPL-SCNC: 145 MMOL/L — SIGNIFICANT CHANGE UP (ref 135–146)
WBC # BLD: 5.78 K/UL — SIGNIFICANT CHANGE UP (ref 4.8–10.8)
WBC # FLD AUTO: 5.78 K/UL — SIGNIFICANT CHANGE UP (ref 4.8–10.8)

## 2023-08-22 PROCEDURE — 71045 X-RAY EXAM CHEST 1 VIEW: CPT | Mod: 26,77

## 2023-08-22 PROCEDURE — 71250 CT THORAX DX C-: CPT | Mod: 26

## 2023-08-22 PROCEDURE — 99223 1ST HOSP IP/OBS HIGH 75: CPT

## 2023-08-22 PROCEDURE — 71045 X-RAY EXAM CHEST 1 VIEW: CPT | Mod: 26

## 2023-08-22 PROCEDURE — 99233 SBSQ HOSP IP/OBS HIGH 50: CPT

## 2023-08-22 RX ORDER — FUROSEMIDE 40 MG
40 TABLET ORAL ONCE
Refills: 0 | Status: COMPLETED | OUTPATIENT
Start: 2023-08-22 | End: 2023-08-22

## 2023-08-22 RX ORDER — PANTOPRAZOLE SODIUM 20 MG/1
40 TABLET, DELAYED RELEASE ORAL
Refills: 0 | Status: DISCONTINUED | OUTPATIENT
Start: 2023-08-22 | End: 2023-08-30

## 2023-08-22 RX ORDER — ATORVASTATIN CALCIUM 80 MG/1
80 TABLET, FILM COATED ORAL AT BEDTIME
Refills: 0 | Status: DISCONTINUED | OUTPATIENT
Start: 2023-08-22 | End: 2023-08-30

## 2023-08-22 RX ORDER — POTASSIUM CHLORIDE 20 MEQ
20 PACKET (EA) ORAL ONCE
Refills: 0 | Status: COMPLETED | OUTPATIENT
Start: 2023-08-22 | End: 2023-08-22

## 2023-08-22 RX ORDER — POTASSIUM CHLORIDE 20 MEQ
40 PACKET (EA) ORAL EVERY 4 HOURS
Refills: 0 | Status: DISCONTINUED | OUTPATIENT
Start: 2023-08-22 | End: 2023-08-22

## 2023-08-22 RX ORDER — IRON SUCROSE 20 MG/ML
200 INJECTION, SOLUTION INTRAVENOUS EVERY 24 HOURS
Refills: 0 | Status: COMPLETED | OUTPATIENT
Start: 2023-08-22 | End: 2023-08-26

## 2023-08-22 RX ORDER — POTASSIUM CHLORIDE 20 MEQ
40 PACKET (EA) ORAL ONCE
Refills: 0 | Status: COMPLETED | OUTPATIENT
Start: 2023-08-22 | End: 2023-08-22

## 2023-08-22 RX ORDER — LEVETIRACETAM 250 MG/1
1000 TABLET, FILM COATED ORAL
Refills: 0 | Status: DISCONTINUED | OUTPATIENT
Start: 2023-08-22 | End: 2023-08-22

## 2023-08-22 RX ADMIN — Medication 25 MILLIGRAM(S): at 17:39

## 2023-08-22 RX ADMIN — Medication 25 MILLIGRAM(S): at 05:21

## 2023-08-22 RX ADMIN — INSULIN GLARGINE 22 UNIT(S): 100 INJECTION, SOLUTION SUBCUTANEOUS at 21:31

## 2023-08-22 RX ADMIN — SACUBITRIL AND VALSARTAN 1 TABLET(S): 24; 26 TABLET, FILM COATED ORAL at 18:56

## 2023-08-22 RX ADMIN — Medication 3 MILLILITER(S): at 13:55

## 2023-08-22 RX ADMIN — Medication 2: at 11:57

## 2023-08-22 RX ADMIN — Medication 3 MILLILITER(S): at 08:48

## 2023-08-22 RX ADMIN — ATORVASTATIN CALCIUM 80 MILLIGRAM(S): 80 TABLET, FILM COATED ORAL at 21:31

## 2023-08-22 RX ADMIN — CHLORHEXIDINE GLUCONATE 1 APPLICATION(S): 213 SOLUTION TOPICAL at 05:24

## 2023-08-22 RX ADMIN — ENOXAPARIN SODIUM 60 MILLIGRAM(S): 100 INJECTION SUBCUTANEOUS at 17:39

## 2023-08-22 RX ADMIN — Medication 3 MILLILITER(S): at 19:41

## 2023-08-22 RX ADMIN — LEVETIRACETAM 400 MILLIGRAM(S): 250 TABLET, FILM COATED ORAL at 05:21

## 2023-08-22 RX ADMIN — ENOXAPARIN SODIUM 60 MILLIGRAM(S): 100 INJECTION SUBCUTANEOUS at 05:21

## 2023-08-22 RX ADMIN — Medication 81 MILLIGRAM(S): at 11:05

## 2023-08-22 RX ADMIN — Medication 40 MILLIEQUIVALENT(S): at 11:05

## 2023-08-22 RX ADMIN — Medication 20 MILLIEQUIVALENT(S): at 14:17

## 2023-08-22 RX ADMIN — Medication 8 UNIT(S): at 11:58

## 2023-08-22 RX ADMIN — SACUBITRIL AND VALSARTAN 1 TABLET(S): 24; 26 TABLET, FILM COATED ORAL at 05:21

## 2023-08-22 RX ADMIN — Medication 40 MILLIGRAM(S): at 14:17

## 2023-08-22 RX ADMIN — IRON SUCROSE 110 MILLIGRAM(S): 20 INJECTION, SOLUTION INTRAVENOUS at 09:35

## 2023-08-22 NOTE — CHART NOTE - NSCHARTNOTEFT_GEN_A_CORE
I was called bedside for saturation of 89% on RA when patient is asleep, patient reported improvement in her SOB, in a setting of pulmonary edema and PE, patient put on 1L NC, CT earlier noted, repeat cxr done. I was called bedside for saturation of 89% on RA when patient is asleep, patient reported improvement in her SOB, in a setting of pulmonary edema and PE, patient put on 1L NC, CT earlier noted, repeat cxr done.lasix given today, UO 7a-7pm not accurate, 7pm until 10 pm 600ml , will follow.

## 2023-08-22 NOTE — CONSULT NOTE ADULT - ASSESSMENT
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)  #Hemorrhoids  - Hgb 10 on admission - dropped as low as 6.4 requiring 1u pRBC - hgb now stable in 8s  - Iron panel noted: Tiron 37 w TIBC 13%  - ANTONOI w mostly brown stools w some dark particles likely from hemorrhoids    plan:  - patient will need EGD/Colonoscopy once medically optimized  - dose iron per medical team   69yo F w h/o Type II DM, HTN, DLD, obesity, admitted for acute hypoxic resp failure 2/2 suspected pna s/p intubation and extubation, w hospitalization complicated by seizure-like activity s/p EEG & LP, PE/DVT requiring pt be started on A/C, blood loss anemia requiring blood transfusion, Triple vessel CAD on ischemic workup after patient was found to have rising trops and decreased EF on echo, and euglycemic DKA that ultimately resolved. 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)  #Hemorrhoids  - Hgb 10 on admission - dropped as low as 6.4 requiring 1u pRBC - hgb now stable in 8s  - Iron panel noted: Tiron 37 w TIBC 13%  - ANTONIO w brown stool, no melena    plan:  - patient will need EGD/Colonoscopy once medically optimized  - dose iron per medical team   67yo F w h/o Type II DM, HTN, DLD, obesity, admitted for acute hypoxic resp failure 2/2 suspected pna s/p intubation and extubation, w hospitalization complicated by seizure-like activity s/p EEG & LP, PE/DVT requiring pt be started on A/C, blood loss anemia requiring blood transfusion, Triple vessel CAD on ischemic workup after patient was found to have rising trops and decreased EF on echo, and euglycemic DKA that ultimately resolved. 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)  #Hemorrhoids  - Hgb 10 on admission - dropped as low as 6.4 requiring 1u pRBC - hgb now stable in 8s  - Iron panel noted: Tiron 37 w TIBC 13%  - ANTONIO w brown stool, no melena    plan:  - patient will need EGD/Colonoscopy if within GOC once medically optimized - will also need cardio risk stratification  - otherwise dose iron per medical team, monitor cbc and monitor for any new signs of bleeding   69yo F w h/o Type II DM, HTN, DLD, obesity, admitted for acute hypoxic resp failure 2/2 suspected pna s/p intubation and extubation, w hospitalization complicated by seizure-like activity s/p EEG & LP, PE/DVT requiring pt be started on A/C, blood loss anemia requiring blood transfusion, Triple vessel CAD on ischemic workup after patient was found to have rising trops and decreased EF on echo, and euglycemic DKA that ultimately resolved. 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    Discussed with cardio and primary team   69yo F w h/o Type II DM, HTN, DLD, obesity, admitted for acute hypoxic resp failure 2/2 suspected pna s/p intubation and extubation, w hospitalization complicated by seizure-like activity s/p EEG & LP, PE/DVT requiring pt be started on A/C, blood loss anemia requiring blood transfusion, Triple vessel CAD on ischemic workup after patient was found to have rising trops and decreased EF on echo, and euglycemic DKA that ultimately resolved. 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    Discussed with cardio and primary team   69yo F w h/o Type II DM, HTN, DLD, obesity, admitted for acute hypoxic resp failure 2/2 suspected pna s/p intubation and extubation, w hospitalization complicated by seizure-like activity s/p EEG & LP, PE/DVT requiring pt be started on A/C, blood loss anemia requiring blood transfusion, Triple vessel CAD on ischemic workup after patient was found to have rising trops and decreased EF on echo, and euglycemic DKA that ultimately resolved. 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    Discussed with cardio and primary team

## 2023-08-22 NOTE — CONSULT NOTE ADULT - SUBJECTIVE AND OBJECTIVE BOX
Gastroenterology Consultation:    Patient is a 68y old  Female who presents with a chief complaint of Arm Pain, AMS, acidosis (22 Aug 2023 13:06)        Admitted on: 08-13-23      HPI:  67yo F w/a PMH of Type II DM, HTN, DLD, obesity, lateral epicondylitis presenting to the ED w/ Left Arm pain. At time of my exam, patient intubated so spoke with the son at bedside. This arm started about two months ago and has continued to progress but in the last few days became severe. She went to the ED a few days ago, was given pain medications then sent home. Per son, pain still continued to progress after this. No fevers, chills, nausea, vomiting, diarrhea, constipation, SOB, CP.   On admission to the ED today, she presented with severe left arm pain w findings of LBBB on ECG (unsure if new or not). Code STEMI was called and then canceled because troponin negative and no chest pain. Patient was then noted to have acute mental status change, SOB, lactate elevation, and acidosis on abg..Patient had two seizure episodes and was treated with benzodiazepine. Intubated by ER for airway protection.     On Admission  Vitals: /64, HR 75, RR 20, T 98.5, satting 99 percent on RA   Labs: WBC 14.86, Hgb 10, , Na 139, K 5.4, BUN 12, Cr .6, Trop <.01 --> .13 on repeat  Imaging:   CXR -  Patchy bibasilar opacities, right greater than left.  CT Brain Stroke protocol - No evidence of acute transcortical infarct, hydrocephalus, acute intracranial hemorrhage, or mass effect.  CT brain perfusion: No evidence of acute infarct or ischemia.  CTA neck: No stenosis. No dissection.  CTA brain: No stenosis or aneurysm.  CTA Neck: The distal internal carotid arteries are patent. The [place] of [place] is normal in appearance. The visualized cerebral arteries are unremarkable.The vertebrobasilar junction and basilar artery are normal.    In the ED, given Levaquin. Keppra, started on propofol  Admitted to MICU for airway monitoring  (13 Aug 2023 19:09)    GI called to evaluate for iron deficiency. Primary team reports melena. Per patient's son, patient has history of SHAD (b/l Hgb 10) for which she is prescribed iron tabs, however, patient does not take d/t side effect (reflux). Patient denies ever having EGD/Colonoscopy. Today patient complaints of abd pain and endorses having black stool - ANTONIO w brown stool.     Prior EGD: none    Prior Colonoscopy: none      PAST MEDICAL & SURGICAL HISTORY:  Diabetes            FAMILY HISTORY:      Social History:  Tobacco:  Alcohol:  Drugs:    Home Medications:  aspirin 81 mg oral tablet, chewable: 1 chewed once a day (13 Aug 2023 19:53)  atorvastatin 10 mg oral tablet: 1 orally once a day (at bedtime) (13 Aug 2023 19:52)  Jardiance 10 mg oral tablet: 1 orally once a day (13 Aug 2023 19:53)  MetFORMIN (Eqv-Glumetza) 500 mg oral tablet, extended release: 1 orally 2 times a day (13 Aug 2023 19:51)  pioglitazone 30 mg oral tablet: 1 orally once a day (13 Aug 2023 19:52)        MEDICATIONS  (STANDING):  albuterol/ipratropium for Nebulization 3 milliLiter(s) Nebulizer every 6 hours  aspirin  chewable 81 milliGRAM(s) Oral daily  atorvastatin 80 milliGRAM(s) Oral at bedtime  chlorhexidine 2% Cloths 1 Application(s) Topical <User Schedule>  dextrose 5%. 1000 milliLiter(s) (50 mL/Hr) IV Continuous <Continuous>  dextrose 50% Injectable 25 Gram(s) IV Push once  enoxaparin Injectable 60 milliGRAM(s) SubCutaneous every 12 hours  furosemide   Injectable 40 milliGRAM(s) IV Push once  glucagon  Injectable 1 milliGRAM(s) IntraMuscular once  insulin glargine Injectable (LANTUS) 22 Unit(s) SubCutaneous at bedtime  insulin lispro (ADMELOG) corrective regimen sliding scale   SubCutaneous three times a day before meals  insulin lispro Injectable (ADMELOG) 8 Unit(s) SubCutaneous three times a day before meals  iron sucrose IVPB 200 milliGRAM(s) IV Intermittent every 24 hours  metoprolol tartrate 25 milliGRAM(s) Oral every 12 hours  pantoprazole    Tablet 40 milliGRAM(s) Oral before breakfast  potassium chloride   Powder 20 milliEquivalent(s) Oral once  sacubitril 49 mG/valsartan 51 mG 1 Tablet(s) Oral two times a day  sodium chloride 0.9%. 1000 milliLiter(s) (100 mL/Hr) IV Continuous <Continuous>    MEDICATIONS  (PRN):  acetaminophen     Tablet .. 650 milliGRAM(s) Oral every 6 hours PRN Temp greater or equal to 38C (100.4F), Mild Pain (1 - 3)  dextrose Oral Gel 15 Gram(s) Oral once PRN Blood Glucose LESS THAN 70 milliGRAM(s)/deciliter      Allergies  No Known Allergies      Physical Examination:  T(C): 36.4 (08-22-23 @ 12:54), Max: 37.1 (08-21-23 @ 20:00)  HR: 95 (08-22-23 @ 12:54) (82 - 101)  BP: 123/66 (08-22-23 @ 12:54) (113/86 - 136/64)  RR: 18 (08-22-23 @ 12:54) (18 - 39)  SpO2: 95% (08-22-23 @ 05:07) (95% - 96%)      08-20-23 @ 07:01  -  08-21-23 @ 07:00  --------------------------------------------------------  IN: 768 mL / OUT: 350 mL / NET: 418 mL    08-21-23 @ 07:01  -  08-22-23 @ 07:00  --------------------------------------------------------  IN: 607 mL / OUT: 450 mL / NET: 157 mL      GENERAL: awake and alert, no acute distress.  HEAD:  Atraumatic, Normocephalic  EYES: conjunctiva and sclera clear  CHEST/LUNG: + BS b/l  HEART: Regular rate and rhythm  ABDOMEN: Soft, nontender, nondistended  SKIN: Intact, no jaundice    Data:                        8.3    5.78  )-----------( 216      ( 22 Aug 2023 07:06 )             28.3     Hgb Trend:  8.3  08-22-23 @ 07:06  8.2  08-21-23 @ 04:19  8.0  08-21-23 @ 03:10  8.1  08-20-23 @ 11:11  7.9  08-20-23 @ 04:29    08-22    145  |  103  |  6<L>  ----------------------------<  121<H>  3.1<L>   |  31  |  <0.5<L>    Ca    8.5      22 Aug 2023 07:06  Mg     2.0     08-22    TPro  5.9<L>  /  Alb  3.1<L>  /  TBili  0.4  /  DBili  x   /  AST  20  /  ALT  21  /  AlkPhos  88  08-22    Liver panel trend:  TBili 0.4   /   AST 20   /   ALT 21   /   AlkP 88   /   Tptn 5.9   /   Alb 3.1    /   DBili --      08-22  TBili 0.3   /   AST 23   /   ALT 25   /   AlkP 84   /   Tptn 6.0   /   Alb 3.1    /   DBili --      08-21  TBili 0.3   /   AST 24   /   ALT 25   /   AlkP 91   /   Tptn 5.9   /   Alb 2.8    /   DBili --      08-20  TBili 0.3   /   AST 26   /   ALT 24   /   AlkP 80   /   Tptn 5.4   /   Alb 2.8    /   DBili --      08-20  TBili 0.3   /   AST 23   /   ALT 22   /   AlkP 79   /   Tptn 5.7   /   Alb 2.9    /   DBili --      08-19  TBili 0.4   /   AST 27   /   ALT 23   /   AlkP 88   /   Tptn 6.2   /   Alb 3.2    /   DBili --      08-18  TBili 0.3   /   AST 30   /   ALT 22   /   AlkP 91   /   Tptn 6.1   /   Alb 3.2    /   DBili --      08-18  TBili 0.3   /   AST 43   /   ALT 21   /   AlkP 83   /   Tptn 5.9   /   Alb 3.1    /   DBili --      08-17  TBili 0.3   /   AST 52   /   ALT 23   /   AlkP 92   /   Tptn 6.1   /   Alb 3.3    /   DBili --      08-17  TBili 0.8   /   AST 56   /   ALT 23   /   AlkP 94   /   Tptn 6.5   /   Alb 3.4    /   DBili --      08-16  TBili 0.4   /   AST 65   /   ALT 24   /   AlkP 92   /   Tptn 6.6   /   Alb 3.6    /   DBili --      08-16  TBili 0.3   /   AST 19   /   ALT 14   /   AlkP 83   /   Tptn 6.1   /   Alb 3.3    /   DBili --      08-15  TBili --   /   AST --   /   ALT --   /   AlkP --   /   Tptn --   /   Alb 3087    /   DBili --      08-14  TBili 0.3   /   AST 33   /   ALT 20   /   AlkP 97   /   Tptn 7.0   /   Alb 4.1    /   DBili --      08-14  TBili 0.2   /   AST 46   /   ALT 22   /   AlkP 98   /   Tptn 7.0   /   Alb 3.9    /   DBili --      08-14  TBili 0.2   /   AST 50   /   ALT 23   /   AlkP 96   /   Tptn 6.9   /   Alb 3.9    /   DBili --      08-13  TBili <0.2   /   AST 18   /   ALT 15   /   AlkP 116   /   Tptn 8.1   /   Alb 4.6    /   DBili --      08-13              Radiology:       Gastroenterology Consultation:    Patient is a 68y old  Female who presents with a chief complaint of Arm Pain, AMS, acidosis (22 Aug 2023 13:06)        Admitted on: 08-13-23      HPI:  69yo F w/a PMH of Type II DM, HTN, DLD, obesity, lateral epicondylitis presenting to the ED w/ Left Arm pain. At time of my exam, patient intubated so spoke with the son at bedside. This arm started about two months ago and has continued to progress but in the last few days became severe. She went to the ED a few days ago, was given pain medications then sent home. Per son, pain still continued to progress after this. No fevers, chills, nausea, vomiting, diarrhea, constipation, SOB, CP.   On admission to the ED today, she presented with severe left arm pain w findings of LBBB on ECG (unsure if new or not). Code STEMI was called and then canceled because troponin negative and no chest pain. Patient was then noted to have acute mental status change, SOB, lactate elevation, and acidosis on abg. Patient had two seizure episodes and was treated with benzodiazepine. Intubated by ER for airway protection.     On Admission  Vitals: /64, HR 75, RR 20, T 98.5, satting 99 percent on RA   Labs: WBC 14.86, Hgb 10, , Na 139, K 5.4, BUN 12, Cr .6, Trop <.01 --> .13 on repeat  Imaging:   CXR -  Patchy bibasilar opacities, right greater than left.  CT Brain Stroke protocol - No evidence of acute transcortical infarct, hydrocephalus, acute intracranial hemorrhage, or mass effect.  CT brain perfusion: No evidence of acute infarct or ischemia.  CTA neck: No stenosis. No dissection.  CTA brain: No stenosis or aneurysm.  CTA Neck: The distal internal carotid arteries are patent. The [place] of [place] is normal in appearance. The visualized cerebral arteries are unremarkable.The vertebrobasilar junction and basilar artery are normal.    In the ED, given Levaquin. Keppra, started on propofol  Admitted to MICU for airway monitoring  (13 Aug 2023 19:09)    GI called to evaluate for iron deficiency. Primary team reports melena. Per patient's son, patient has history of SHAD (b/l Hgb 10) for which she is prescribed iron tabs, however, patient does not take d/t side effect (reflux). Patient denies ever having EGD/Colonoscopy. Denies having family GI history. Today patient complaints of abd pain and endorses having black stool - ANTONIO w brown stool.     Prior EGD: none    Prior Colonoscopy: none      PAST MEDICAL & SURGICAL HISTORY:  Diabetes  HTN    FAMILY HISTORY:  No pertinent family history    SOCIAL HISTORY: nonpertinent    Home Medications:  aspirin 81 mg oral tablet, chewable: 1 chewed once a day (13 Aug 2023 19:53)  atorvastatin 10 mg oral tablet: 1 orally once a day (at bedtime) (13 Aug 2023 19:52)  Jardiance 10 mg oral tablet: 1 orally once a day (13 Aug 2023 19:53)  MetFORMIN (Eqv-Glumetza) 500 mg oral tablet, extended release: 1 orally 2 times a day (13 Aug 2023 19:51)  pioglitazone 30 mg oral tablet: 1 orally once a day (13 Aug 2023 19:52)        MEDICATIONS  (STANDING):  albuterol/ipratropium for Nebulization 3 milliLiter(s) Nebulizer every 6 hours  aspirin  chewable 81 milliGRAM(s) Oral daily  atorvastatin 80 milliGRAM(s) Oral at bedtime  chlorhexidine 2% Cloths 1 Application(s) Topical <User Schedule>  dextrose 5%. 1000 milliLiter(s) (50 mL/Hr) IV Continuous <Continuous>  dextrose 50% Injectable 25 Gram(s) IV Push once  enoxaparin Injectable 60 milliGRAM(s) SubCutaneous every 12 hours  furosemide   Injectable 40 milliGRAM(s) IV Push once  glucagon  Injectable 1 milliGRAM(s) IntraMuscular once  insulin glargine Injectable (LANTUS) 22 Unit(s) SubCutaneous at bedtime  insulin lispro (ADMELOG) corrective regimen sliding scale   SubCutaneous three times a day before meals  insulin lispro Injectable (ADMELOG) 8 Unit(s) SubCutaneous three times a day before meals  iron sucrose IVPB 200 milliGRAM(s) IV Intermittent every 24 hours  metoprolol tartrate 25 milliGRAM(s) Oral every 12 hours  pantoprazole    Tablet 40 milliGRAM(s) Oral before breakfast  potassium chloride   Powder 20 milliEquivalent(s) Oral once  sacubitril 49 mG/valsartan 51 mG 1 Tablet(s) Oral two times a day  sodium chloride 0.9%. 1000 milliLiter(s) (100 mL/Hr) IV Continuous <Continuous>    MEDICATIONS  (PRN):  acetaminophen     Tablet .. 650 milliGRAM(s) Oral every 6 hours PRN Temp greater or equal to 38C (100.4F), Mild Pain (1 - 3)  dextrose Oral Gel 15 Gram(s) Oral once PRN Blood Glucose LESS THAN 70 milliGRAM(s)/deciliter      Allergies  No Known Allergies      Physical Examination:  T(C): 36.4 (08-22-23 @ 12:54), Max: 37.1 (08-21-23 @ 20:00)  HR: 95 (08-22-23 @ 12:54) (82 - 101)  BP: 123/66 (08-22-23 @ 12:54) (113/86 - 136/64)  RR: 18 (08-22-23 @ 12:54) (18 - 39)  SpO2: 95% (08-22-23 @ 05:07) (95% - 96%)      08-20-23 @ 07:01  -  08-21-23 @ 07:00  --------------------------------------------------------  IN: 768 mL / OUT: 350 mL / NET: 418 mL    08-21-23 @ 07:01  -  08-22-23 @ 07:00  --------------------------------------------------------  IN: 607 mL / OUT: 450 mL / NET: 157 mL      GENERAL: awake and alert, no acute distress.  HEAD:  Atraumatic, Normocephalic  EYES: conjunctiva and sclera clear  CHEST/LUNG: + BS b/l  HEART: Regular rate and rhythm  ABDOMEN: Soft, nontender, nondistended  SKIN: Intact, no jaundice    Data:                        8.3    5.78  )-----------( 216      ( 22 Aug 2023 07:06 )             28.3     Hgb Trend:  8.3  08-22-23 @ 07:06  8.2  08-21-23 @ 04:19  8.0  08-21-23 @ 03:10  8.1  08-20-23 @ 11:11  7.9  08-20-23 @ 04:29    08-22    145  |  103  |  6<L>  ----------------------------<  121<H>  3.1<L>   |  31  |  <0.5<L>    Ca    8.5      22 Aug 2023 07:06  Mg     2.0     08-22    TPro  5.9<L>  /  Alb  3.1<L>  /  TBili  0.4  /  DBili  x   /  AST  20  /  ALT  21  /  AlkPhos  88  08-22    Liver panel trend:  TBili 0.4   /   AST 20   /   ALT 21   /   AlkP 88   /   Tptn 5.9   /   Alb 3.1    /   DBili --      08-22  TBili 0.3   /   AST 23   /   ALT 25   /   AlkP 84   /   Tptn 6.0   /   Alb 3.1    /   DBili --      08-21  TBili 0.3   /   AST 24   /   ALT 25   /   AlkP 91   /   Tptn 5.9   /   Alb 2.8    /   DBili --      08-20  TBili 0.3   /   AST 26   /   ALT 24   /   AlkP 80   /   Tptn 5.4   /   Alb 2.8    /   DBili --      08-20  TBili 0.3   /   AST 23   /   ALT 22   /   AlkP 79   /   Tptn 5.7   /   Alb 2.9    /   DBili --      08-19  TBili 0.4   /   AST 27   /   ALT 23   /   AlkP 88   /   Tptn 6.2   /   Alb 3.2    /   DBili --      08-18  TBili 0.3   /   AST 30   /   ALT 22   /   AlkP 91   /   Tptn 6.1   /   Alb 3.2    /   DBili --      08-18  TBili 0.3   /   AST 43   /   ALT 21   /   AlkP 83   /   Tptn 5.9   /   Alb 3.1    /   DBili --      08-17  TBili 0.3   /   AST 52   /   ALT 23   /   AlkP 92   /   Tptn 6.1   /   Alb 3.3    /   DBili --      08-17  TBili 0.8   /   AST 56   /   ALT 23   /   AlkP 94   /   Tptn 6.5   /   Alb 3.4    /   DBili --      08-16  TBili 0.4   /   AST 65   /   ALT 24   /   AlkP 92   /   Tptn 6.6   /   Alb 3.6    /   DBili --      08-16  TBili 0.3   /   AST 19   /   ALT 14   /   AlkP 83   /   Tptn 6.1   /   Alb 3.3    /   DBili --      08-15  TBili --   /   AST --   /   ALT --   /   AlkP --   /   Tptn --   /   Alb 3087    /   DBili --      08-14  TBili 0.3   /   AST 33   /   ALT 20   /   AlkP 97   /   Tptn 7.0   /   Alb 4.1    /   DBili --      08-14  TBili 0.2   /   AST 46   /   ALT 22   /   AlkP 98   /   Tptn 7.0   /   Alb 3.9    /   DBili --      08-14  TBili 0.2   /   AST 50   /   ALT 23   /   AlkP 96   /   Tptn 6.9   /   Alb 3.9    /   DBili --      08-13  TBili <0.2   /   AST 18   /   ALT 15   /   AlkP 116   /   Tptn 8.1   /   Alb 4.6    /   DBili --      08-13              Radiology:

## 2023-08-22 NOTE — CONSULT NOTE ADULT - ATTENDING COMMENTS
Pt w/ recurrent LOC in setting of hypoxia/tachypnea/tachycardia with negative VEEG during episodes suspect cardiopulmonary cause for LOC.  Continue VEEG for now.  Recommend further workup for cardiopulmonary cause of syncope.  If VEEG (-) next 24 hr, can d/c and await MRI.
67yo female presenting with left arm pain complicated by sob and seizure activity asked to evaluate for anemia and possible dark stools. Small amount of bright blood mixed with stool on exam, no ongoing/overt GI bleeding. Pt currently being managed for DVT/PE and triple vessel CAD therefore risks of endoscopy outweigh potential benefits currently. If has overt GI bleeding with drop in Hb will consider more urgent endoscopy at that time otherwise plan for EGD/colonoscopy when clinically optimized.
Briefly, 68 year old woman for whom cardiology is consulted for new HFrEF. She is euvolemic on exam currently. Medical management as above. CCTA was non-diagnostic so patient will need cardiac catheterization on Monday. Continue to trend troponins until peak and continue therapeutic a/c.   The procedure was discussed in detail with the patient, including the risks, benefits and alternatives. The patient verbalized understanding and wishes to proceed.

## 2023-08-22 NOTE — PHARMACOTHERAPY INTERVENTION NOTE - COMMENTS
Patient was intubated now s/p extubation. Now possible concern for GI bleed could benefit from GI prophylaxis- recommended starting pantoprazole 40 mg po daily.

## 2023-08-22 NOTE — PROGRESS NOTE ADULT - ASSESSMENT
67yo F w/a PMH of Type II DM, HTN, DLD, obesity, lateral epicondylitis presenting to the ED w/ Left Arm pain, had AMS seizure? intubated s/p successful extubation. Also noted to have DVT/Pulmonary embolism and NSTEMI with cardiomyopathy EF 30% s/p cath which showed LM and 3VD. Pending heart team discussion for PCI vs CABG. Hospital course also significant for SHAD with low Hb requiring 2 units of PRBCs.    IMPRESSION  NSTEMI   Ischemic cardiomyopathy with EF 30%  s/p cath with 50% LM, ostial LAD, ostial Cx and Ostial RCA disease  AMS s/p intubation successfully extubated seizure?  DVT - Pulmonary embolism on Lovenox  Anemia, iron deficiency, hx of melena and BRBPR per CCU staff  HTN, HLD, DM  METS < 4  RCRI = 3, Class IV > 15% risk of MACE    RECOMMENDATIONS  telemonitoring  c/w aspirin and lipitor  Increase lopressor to 50mg BID, switch to Toprol XL 100mg qd if tolerating  c/w Entresto 49/51 BID  c/w Lovenox therapeutic dose for anticoagulation. Consider IVC filter if unable to tolerate anticoagulation. Age appropriate malignancy w/up  Euvolemic, hold diuretics, monitor lytes, replete potassium  CT Sx f/u  pt will benefit from Life vest if discharged without revascularization  pt is at high risk of MACE for low risk procedure EGD/Colonoscopy given poor functional status, comorbidities as noted above, recent NSTEMI, unvascularized ischemic cardiomyopathy and Pulmonary embolism. Consider cardiac anesthesia for the procedure  recall prn 67yo F w/a PMH of Type II DM, HTN, DLD, obesity, lateral epicondylitis presenting to the ED w/ Left Arm pain, had AMS seizure? intubated s/p successful extubation. Also noted to have DVT/Pulmonary embolism and NSTEMI with cardiomyopathy EF 30% s/p cath which showed LM and 3VD. Pending heart team discussion for PCI vs CABG. Hospital course also significant for SHAD with low Hb requiring 2 units of PRBCs.    IMPRESSION  NSTEMI   Ischemic cardiomyopathy with EF 30%  s/p cath with 50% LM, ostial LAD, ostial Cx and Ostial RCA disease  AMS s/p intubation successfully extubated seizure?  DVT - Pulmonary embolism on Lovenox  Anemia, iron deficiency, hx of melena and BRBPR per CCU staff  HTN, HLD, DM  METS < 4  RCRI = 3, Class IV > 15% risk of MACE    RECOMMENDATIONS  telemonitoring  c/w aspirin and lipitor  Increase lopressor to 50mg BID, switch to Toprol XL 100mg qd if tolerating  c/w Entresto 49/51 BID  c/w Lovenox therapeutic dose for anticoagulation. Consider IVC filter if unable to tolerate anticoagulation. Age appropriate malignancy w/up  Euvolemic, hold diuretics, monitor lytes, replete potassium  CT Sx f/u  pt will benefit from Life vest if discharged without revascularization  pt is at high risk of MACE for low risk procedure EGD/Colonoscopy given poor functional status, comorbidities as noted above, recent NSTEMI, unvascularized ischemic cardiomyopathy and Pulmonary embolism.   Currently hemodynamically stable, no evidence of acute decompensated heart failure or malignant arrhythmia, can proceed with needed GI/colono (low risk procedures) given recent active bleeding and need for transfucions Consider cardiac anesthesia if possible for the procedure

## 2023-08-22 NOTE — PHARMACOTHERAPY INTERVENTION NOTE - COMMENTS
Patient currently receiving levetiracetam 1000 mg IV q12h for new onset seizures. Patient now extubated with PO access recommended changing levetiracetam to PO at same dose of 1000 mg PO q12h.

## 2023-08-22 NOTE — PROGRESS NOTE ADULT - SUBJECTIVE AND OBJECTIVE BOX
T H I S   I S    N O  T   A    F I N A L I Z E D   N O T JAMIA RODRIGUES, MARCIANO  68y, Female  Allergy: No Known Allergies    Hospital Day: 9d    Patient seen and examined earlier today.     PMH/PSH:    Diabetes    LAST 24-Hr EVENTS:      VITALS:  T(F): 98.2 (08-22-23 @ 05:07), Max: 98.9 (08-21-23 @ 12:00)  HR: 82 (08-22-23 @ 05:07)  BP: 119/56 (08-22-23 @ 05:07) (113/86 - 142/64)  RR: 19 (08-21-23 @ 23:45)  SpO2: 95% (08-22-23 @ 05:07)      TESTS & MEASUREMENTS:  Weight/Height/BMI  Height (cm): 152.4 (08-15-23 @ 12:14)  Weight (kg): 61.7 (08-15-23 @ 12:14)  BMI (kg/m2): 26.6 (08-15-23 @ 12:14)    08-20-23 @ 07:01  -  08-21-23 @ 07:00  --------------------------------------------------------  IN: 768 mL / OUT: 350 mL / NET: 418 mL    08-21-23 @ 07:01  -  08-22-23 @ 07:00  --------------------------------------------------------  IN: 607 mL / OUT: 450 mL / NET: 157 mL                            8.2    7.20  )-----------( 206      ( 21 Aug 2023 04:19 )             28.2         08-21    142  |  106  |  4<L>  ----------------------------<  165<H>  3.7   |  25  |  0.5<L>    Ca    8.4      21 Aug 2023 03:10    TPro  6.0  /  Alb  3.1<L>  /  TBili  0.3  /  DBili  x   /  AST  23  /  ALT  25  /  AlkPhos  84  08-21    LIVER FUNCTIONS - ( 21 Aug 2023 03:10 )  Alb: 3.1 g/dL / Pro: 6.0 g/dL / ALK PHOS: 84 U/L / ALT: 25 U/L / AST: 23 U/L / GGT: x           CARDIAC MARKERS ( 21 Aug 2023 03:10 )  x     / 0.49 ng/mL / x     / x     / x            Culture - Blood (collected 08-15-23 @ 18:33)  Source: .Blood Blood  Final Report (08-21-23 @ 02:00):    No growth at 5 days    Culture - Blood (collected 08-15-23 @ 18:33)  Source: .Blood Blood  Final Report (08-21-23 @ 02:00):    No growth at 5 days      Urinalysis Basic - ( 21 Aug 2023 03:10 )    Color: x / Appearance: x / SG: x / pH: x  Gluc: 165 mg/dL / Ketone: x  / Bili: x / Urobili: x   Blood: x / Protein: x / Nitrite: x   Leuk Esterase: x / RBC: x / WBC x   Sq Epi: x / Non Sq Epi: x / Bacteria: x      Procalcitonin, Serum: 2.92 ng/mL (08-14-23 @ 12:16)  Procalcitonin, Serum: 4.82 ng/mL (08-14-23 @ 00:20)    D-Dimer Assay, Quantitative: 1064 ng/mL DDU (08-15-23 @ 17:18)    Ferritin: 37 ng/mL (08-21-23 @ 03:10)        A1C with Estimated Average Glucose Result: 8.2 % (08-14-23 @ 04:40)      Indwelling Urethral Catheter:     Connect To:  Straight Drainage/Starr    Indication:  Urinary Retention / Obstruction (08-17-23 @ 06:54) (not performed)        RADIOLOGY, ECG, & ADDITIONAL TESTS:  12 Lead ECG:   Ventricular Rate 75 BPM  Normal sinus rhythm  Left axis deviation  Left bundle branch block  Abnormal ECG    Confirmed by Adrien Child ([id number]) on 8/19/2023 9:13:26 AM (08-18-23 @ 18:41)    CT Abdomen and Pelvis No Cont:   ACC: [id number] EXAM:  CT ABDOMEN AND PELVIS   ORDERED BY: MAXIMINO GUIDO     PROCEDURE DATE:  08/13/2023    INTERPRETATION:  CLINICAL STATEMENT: Abdominal distention. Lactic   acidosis.    TECHNIQUE: Contiguous axial CT images were obtained from the lower chest   to the pubic symphysis without intravenous contrast.  Oral contrast was   not administered.  Reformatted images in the coronal and sagittal planes   were acquired.    Comparison made with CT abdomen and pelvis    IMPRESSION:  No evidence of acute abdominal pathology.    --- End of Report ---    LONG HUNTLEY MD; Attending Radiologist  This document has been electronically signed. Aug 13 2023 10:57PM (08-13-23 @ 22:35)        MEDICATIONS:  MEDICATIONS  (STANDING):  albuterol/ipratropium for Nebulization 3 milliLiter(s) Nebulizer every 6 hours  aspirin  chewable 81 milliGRAM(s) Oral daily  atorvastatin 80 milliGRAM(s) Oral at bedtime  chlorhexidine 2% Cloths 1 Application(s) Topical <User Schedule>  dextrose 5%. 1000 milliLiter(s) (50 mL/Hr) IV Continuous <Continuous>  dextrose 50% Injectable 25 Gram(s) IV Push once  enoxaparin Injectable 60 milliGRAM(s) SubCutaneous every 12 hours  glucagon  Injectable 1 milliGRAM(s) IntraMuscular once  insulin glargine Injectable (LANTUS) 22 Unit(s) SubCutaneous at bedtime  insulin lispro (ADMELOG) corrective regimen sliding scale   SubCutaneous three times a day before meals  insulin lispro Injectable (ADMELOG) 8 Unit(s) SubCutaneous three times a day before meals  iron sucrose IVPB 200 milliGRAM(s) IV Intermittent every 24 hours  levETIRAcetam 1000 milliGRAM(s) Oral two times a day  metoprolol tartrate 25 milliGRAM(s) Oral every 12 hours  pantoprazole    Tablet 40 milliGRAM(s) Oral before breakfast  sacubitril 49 mG/valsartan 51 mG 1 Tablet(s) Oral two times a day  sodium chloride 0.9%. 1000 milliLiter(s) (100 mL/Hr) IV Continuous <Continuous>    MEDICATIONS  (PRN):  acetaminophen     Tablet .. 650 milliGRAM(s) Oral every 6 hours PRN Temp greater or equal to 38C (100.4F), Mild Pain (1 - 3)  dextrose Oral Gel 15 Gram(s) Oral once PRN Blood Glucose LESS THAN 70 milliGRAM(s)/deciliter      HOME MEDICATIONS (as per chart review):  aspirin 81 mg oral tablet, chewable (08-13)  atorvastatin 10 mg oral tablet (08-13)  Jardiance 10 mg oral tablet (08-13)  MetFORMIN (Eqv-Glumetza) 500 mg oral tablet, extended release (08-13)  pioglitazone 30 mg oral tablet (08-13)      PHYSICAL EXAM:  GENERAL:   CHEST/LUNG:   HEART:   ABDOMEN:   EXTREMITIES:         EVELIA trans ID#[id number]      RAVI, MARCIANO  68y, Female  Allergy: No Known Allergies    Hospital Day: 9d    Patient seen and examined earlier today.     PMH/PSH:    Diabetes    LAST 24-Hr EVENTS:  no events reported   Complaining of dyspnea while laying flat and some non productive cough   VITALS:  T(F): 98.2 (08-22-23 @ 05:07), Max: 98.9 (08-21-23 @ 12:00)  HR: 82 (08-22-23 @ 05:07)  BP: 119/56 (08-22-23 @ 05:07) (113/86 - 142/64)  RR: 19 (08-21-23 @ 23:45)  SpO2: 95% (08-22-23 @ 05:07)      TESTS & MEASUREMENTS:  Weight/Height/BMI  Height (cm): 152.4 (08-15-23 @ 12:14)  Weight (kg): 61.7 (08-15-23 @ 12:14)  BMI (kg/m2): 26.6 (08-15-23 @ 12:14)    08-20-23 @ 07:01  -  08-21-23 @ 07:00  --------------------------------------------------------  IN: 768 mL / OUT: 350 mL / NET: 418 mL    08-21-23 @ 07:01  -  08-22-23 @ 07:00  --------------------------------------------------------  IN: 607 mL / OUT: 450 mL / NET: 157 mL                            8.2    7.20  )-----------( 206      ( 21 Aug 2023 04:19 )             28.2     Hemoglobin Trend:  Hemoglobin: 8.3 g/dL (08-22 @ 07:06)  Hemoglobin: 8.2 g/dL (08-21 @ 04:19)  Hemoglobin: 8.0 g/dL (08-21 @ 03:10)      08-21    142  |  106  |  4<L>  ----------------------------<  165<H>  3.7   |  25  |  0.5<L>    Ca    8.4      21 Aug 2023 03:10    TPro  6.0  /  Alb  3.1<L>  /  TBili  0.3  /  DBili  x   /  AST  23  /  ALT  25  /  AlkPhos  84  08-21    LIVER FUNCTIONS - ( 21 Aug 2023 03:10 )  Alb: 3.1 g/dL / Pro: 6.0 g/dL / ALK PHOS: 84 U/L / ALT: 25 U/L / AST: 23 U/L / GGT: x           CARDIAC MARKERS ( 21 Aug 2023 03:10 )  x     / 0.49 ng/mL / x     / x     / x            Culture - Blood (collected 08-15-23 @ 18:33)  Source: .Blood Blood  Final Report (08-21-23 @ 02:00):    No growth at 5 days    Culture - Blood (collected 08-15-23 @ 18:33)  Source: .Blood Blood  Final Report (08-21-23 @ 02:00):    No growth at 5 days      Urinalysis Basic - ( 21 Aug 2023 03:10 )    Color: x / Appearance: x / SG: x / pH: x  Gluc: 165 mg/dL / Ketone: x  / Bili: x / Urobili: x   Blood: x / Protein: x / Nitrite: x   Leuk Esterase: x / RBC: x / WBC x   Sq Epi: x / Non Sq Epi: x / Bacteria: x      Procalcitonin, Serum: 2.92 ng/mL (08-14-23 @ 12:16)  Procalcitonin, Serum: 4.82 ng/mL (08-14-23 @ 00:20)    D-Dimer Assay, Quantitative: 1064 ng/mL DDU (08-15-23 @ 17:18)    Ferritin: 37 ng/mL (08-21-23 @ 03:10)    A1C with Estimated Average Glucose Result: 8.2 % (08-14-23 @ 04:40)      RADIOLOGY, ECG, & ADDITIONAL TESTS:  12 Lead ECG:   Ventricular Rate 75 BPM  Normal sinus rhythm  Left axis deviation  Left bundle branch block  Abnormal ECG    Confirmed by Adrien Child ([id number]) on 8/19/2023 9:13:26 AM (08-18-23 @ 18:41)    CT Abdomen and Pelvis No Cont:   ACC: [id number] EXAM:  CT ABDOMEN AND PELVIS   ORDERED BY: MAXIMINO VARGASFIK     PROCEDURE DATE:  08/13/2023    IMPRESSION:  No evidence of acute abdominal pathology.    --- End of Report ---    LONG HUNTLEY MD; Attending Radiologist  This document has been electronically signed. Aug 13 2023 10:57PM (08-13-23 @ 22:35)        MEDICATIONS:  MEDICATIONS  (STANDING):  albuterol/ipratropium for Nebulization 3 milliLiter(s) Nebulizer every 6 hours  aspirin  chewable 81 milliGRAM(s) Oral daily  atorvastatin 80 milliGRAM(s) Oral at bedtime  chlorhexidine 2% Cloths 1 Application(s) Topical <User Schedule>  dextrose 5%. 1000 milliLiter(s) (50 mL/Hr) IV Continuous <Continuous>  dextrose 50% Injectable 25 Gram(s) IV Push once  enoxaparin Injectable 60 milliGRAM(s) SubCutaneous every 12 hours  glucagon  Injectable 1 milliGRAM(s) IntraMuscular once  insulin glargine Injectable (LANTUS) 22 Unit(s) SubCutaneous at bedtime  insulin lispro (ADMELOG) corrective regimen sliding scale   SubCutaneous three times a day before meals  insulin lispro Injectable (ADMELOG) 8 Unit(s) SubCutaneous three times a day before meals  iron sucrose IVPB 200 milliGRAM(s) IV Intermittent every 24 hours  levETIRAcetam 1000 milliGRAM(s) Oral two times a day  metoprolol tartrate 25 milliGRAM(s) Oral every 12 hours  pantoprazole    Tablet 40 milliGRAM(s) Oral before breakfast  sacubitril 49 mG/valsartan 51 mG 1 Tablet(s) Oral two times a day  sodium chloride 0.9%. 1000 milliLiter(s) (100 mL/Hr) IV Continuous <Continuous>    MEDICATIONS  (PRN):  acetaminophen     Tablet .. 650 milliGRAM(s) Oral every 6 hours PRN Temp greater or equal to 38C (100.4F), Mild Pain (1 - 3)  dextrose Oral Gel 15 Gram(s) Oral once PRN Blood Glucose LESS THAN 70 milliGRAM(s)/deciliter      HOME MEDICATIONS (as per chart review):  aspirin 81 mg oral tablet, chewable (08-13)  atorvastatin 10 mg oral tablet (08-13)  Jardiance 10 mg oral tablet (08-13)  MetFORMIN (Eqv-Glumetza) 500 mg oral tablet, extended release (08-13)  pioglitazone 30 mg oral tablet (08-13)      PHYSICAL EXAM:  GENERAL: In NAD/P  CHEST/LUNG: Mild crackles and late insp wheezing at lung bases bilaterally   HEART: S1S2  ABDOMEN: soft   EXTREMITIES:  chronic skin changes

## 2023-08-22 NOTE — PROGRESS NOTE ADULT - ASSESSMENT
69yo F w/a PMH of Type II DM, HTN, DLD, obesity, lateral epicondylitis presenting to the ED 8/13 w/left arm pain. This arm started about two months ago and has continued to progress for a few days prior to admission became severe. Pt was previously seen for this pain in the ED on 8/8, was given pain medications then sent home. Per son, pain still continued to progress after this. No fevers, chills, nausea, vomiting, diarrhea, constipation, SOB, CP.     On admission to the ED, she presented with severe left arm pain w findings of LBBB on ECG (unsure if new or not). Code STEMI was called and then canceled because troponin negative and no chest pain. Patient was then noted to have acute mental status change, SOB, lactate elevation, and acidosis on abg. Patient had two seizure episodes and was treated with benzodiazepine. Intubated by ER for airway protection, extubated on 8/15      #CAD and NSTEMI, s/p cath with left main and 3Vx disease findings  -Currently CTS team evaluating safety of CABG   -Systolic dysfunction on ECHO with EF 30 to 35%.  -c/w Aspirin and enoxaparin therapeutic dose.  AND GDMT ( Metoprolol + Entresto )   -CT chest ordered by CTS     #Mild lung edema in the setting of low EF  - will give one dose of lasix 40 mg given orthopnea   - reassess volume status daily and f/u KFT       #Questionable Seizures  -s/p Keppra 1000 Q12 I.V for 8 days   -no further need for AED for now as per discussion with neuro team (normal EEG)     #Acute cognitive dysfunction (resolved), likely underlying ischemic event  #PE evidence on CT , on Low-Molecular Weight Heparin (Lovenox) at therapeutic dose       #Anemia, iron deficiency suspected, possible GI loss (episode of melena reported while in CCU)   -start on IV venofer   -consult GI  -start Protonix      ·#Lactic acidosis - resolved  -likely due transient ischemia (PE and NSTEMI)     #Type-2 diabetes mellitus  -on OAD as outpatient   -on Lantus 22 I.U and Lispro 8/8/8 in-patient     #activity :   OOBT   PT eval requested by CTS     #DVT prophylaxis : on  Lovenox therapeutic   #GI prophlyaxis : on PPI's   #Diet : DASH/TLC         .f/u with CTS team regarding CABG   ·Close BUN/creatinine follow-up   ·CBC Follow up

## 2023-08-22 NOTE — PROGRESS NOTE ADULT - ASSESSMENT
·	CAD and NSTEMI, s/p cath with left main and 3Vx disease findings  ·	Seizures   ·	Acute cognitive dysfunction   ·	PE evidence on CT   ·	Lactic acidosis  ·	CAD and NSTEMI, s/p cath with left main and 3Vx disease findings  ·	Currently CTS team evaluating safety of CABG   ·	Mild lung edema  ·	Seizures, on AED (can be switched to oral)   ·	Acute cognitive dysfunction (resolved), likely underlying ischemic event.  ·	PE evidence on CT , on Low-Molecular Weight Heparin (Lovenox) at therapeutic dose   ·	Anemia, iron deficiency suspected, possible GI loss   ·	Lactic acidosis , resolved, likely due transient ischemia (PE and NSTEMI)   ·	Type-2 diabetes mellitus on OAD as outpatient     REC   ·	iron infusion as scheduled   ·	Will need LASIX intravenous today (and PRN) as mild symptoms of lung edema (orthopnea)   ·	CBC Follow up   ·	keep Low-Molecular Weight Heparin (Lovenox) at therapeutic dose unless signs of acute blood loss   ·	GI eval re: anemia   ·	CTS and Cardio finalized opinion re: PCI vs CABG   ·	PT eval requested by CTS team   ·	CT chest requested by CTS team     Discussed with CTS team       ·	CAD and NSTEMI, s/p cath with left main and 3Vx disease findings  ·	Currently CTS team evaluating safety of CABG   ·	Systolic dysfunction on ECHO  ·	Mild lung edema  ·	Seizures, on AED (can be switched to oral)   ·	Acute cognitive dysfunction (resolved), likely underlying ischemic event.  ·	PE evidence on CT , on Low-Molecular Weight Heparin (Lovenox) at therapeutic dose   ·	Anemia, iron deficiency suspected, possible GI loss   ·	Lactic acidosis , resolved, likely due transient ischemia (PE and NSTEMI)   ·	Type-2 diabetes mellitus on OAD as outpatient     REC   ·	iron infusion as scheduled (monitor for volume overload)   ·	Will need LASIX intravenous today (and PRN) as mild symptoms of lung edema (orthopnea)   ·	Close BUN/creatinine follow-up   ·	CBC Follow up   ·	keep Low-Molecular Weight Heparin (Lovenox) at therapeutic dose unless signs of acute blood loss   ·	GI eval re: anemia   ·	CTS and Cardio finalized opinion re: PCI vs CABG   ·	PT eval requested by CTS team   ·	CT chest requested by CTS team   ·	cardiac telemonitoring     Discussed with CTS team  Patient overall at high risk for events like bleeding, thrombosis, recurrent ischemia; despite all care, due to multiple comorbidities      ·	CAD and NSTEMI, s/p cath with left main and 3Vx disease findings  ·	Currently CTS team evaluating safety of CABG   ·	Systolic dysfunction on ECHO  ·	Mild lung edema  ·	Seizures, no need for AED for now as per discussion with neuro team (normal EEG)   ·	Acute cognitive dysfunction (resolved), likely underlying ischemic event.  ·	PE evidence on CT , on Low-Molecular Weight Heparin (Lovenox) at therapeutic dose   ·	Anemia, iron deficiency suspected, possible GI loss (episode of melena reported while in CCU)   ·	Lactic acidosis , resolved, likely due transient ischemia (PE and NSTEMI)   ·	Type-2 diabetes mellitus on OAD as outpatient     REC   ·	iron infusion as scheduled (monitor for volume overload)   ·	Will need LASIX intravenous today (and PRN) as mild symptoms of lung edema (orthopnea)   ·	Close BUN/creatinine follow-up   ·	CBC Follow up   ·	keep Low-Molecular Weight Heparin (Lovenox) at therapeutic dose unless signs of acute blood loss   ·	GI eval re: anemia   ·	CTS and Cardio finalized opinion re: PCI vs CABG   ·	PT eval requested by CTS team   ·	CT chest requested by CTS team   ·	cardiac telemonitoring     Discussed with CTS team  Patient overall at high risk for events like bleeding, thrombosis, recurrent ischemia; despite all care, due to multiple comorbidities      ·	CAD and NSTEMI, s/p cath with left main and 3Vx disease findings  ·	Currently CTS team evaluating safety of CABG   ·	Systolic dysfunction on ECHO  ·	Mild lung edema  ·	Questionable Seizures, no need for AED for now as per discussion with neuro team (normal EEG)   ·	Acute cognitive dysfunction (resolved), likely underlying ischemic event.  ·	PE evidence on CT , on Low-Molecular Weight Heparin (Lovenox) at therapeutic dose   ·	Anemia, iron deficiency suspected, possible GI loss (episode of melena reported while in CCU)   ·	Lactic acidosis , resolved, likely due transient ischemia (PE and NSTEMI)   ·	Type-2 diabetes mellitus on OAD as outpatient     REC   ·	iron infusion as scheduled (monitor for volume overload)   ·	Will need LASIX intravenous today (and PRN) as mild symptoms of lung edema (orthopnea)   ·	Close BUN/creatinine follow-up   ·	CBC Follow up   ·	keep Low-Molecular Weight Heparin (Lovenox) at therapeutic dose unless signs of acute blood loss   ·	GI eval re: anemia   ·	CTS and Cardio finalized opinion re: PCI vs CABG   ·	PT eval requested by CTS team   ·	CT chest requested by CTS team   ·	cardiac telemonitoring     Discussed with CTS team  Patient overall at high risk for events like bleeding, thrombosis, recurrent ischemia; despite all care, due to multiple comorbidities

## 2023-08-22 NOTE — PROGRESS NOTE ADULT - SUBJECTIVE AND OBJECTIVE BOX
SUBJECTIVE:  HPI:  67yo F w/a PMH of Type II DM, HTN, DLD, obesity, lateral epicondylitis presenting to the ED w/ Left Arm pain. At time of my exam, patient intubated so spoke with the son at bedside. This arm started about two months ago and has continued to progress but in the last few days became severe. She went to the ED a few days ago, was given pain medications then sent home. Per son, pain still continued to progress after this. No fevers, chills, nausea, vomiting, diarrhea, constipation, SOB, CP.   On admission to the ED today, she presented with severe left arm pain w findings of LBBB on ECG (unsure if new or not). Code STEMI was called and then canceled because troponin negative and no chest pain. Patient was then noted to have acute mental status change, SOB, lactate elevation, and acidosis on abg..Patient had two seizure episodes and was treated with benzodiazepine. Intubated by ER for airway protection.     On Admission  Vitals: /64, HR 75, RR 20, T 98.5, satting 99 percent on RA   Labs: WBC 14.86, Hgb 10, , Na 139, K 5.4, BUN 12, Cr .6, Trop <.01 --> .13 on repeat  Imaging:   CXR -  Patchy bibasilar opacities, right greater than left.  CT Brain Stroke protocol - No evidence of acute transcortical infarct, hydrocephalus, acute intracranial hemorrhage, or mass effect.  CT brain perfusion: No evidence of acute infarct or ischemia.  CTA neck: No stenosis. No dissection.  CTA brain: No stenosis or aneurysm.  CTA Neck: The distal internal carotid arteries are patent. The [place] is normal in appearance. The visualized cerebral arteries are unremarkable.The vertebrobasilar junction and basilar artery are normal.    In the ED, given Levaquin. Keppra, started on propofol  Admitted to MICU for airway monitoring  (13 Aug 2023 19:09)      Patient is a 68y old Female who presents with a chief complaint of Arm Pain, AMS (22 Aug 2023 08:55)    Currently admitted to medicine with the primary diagnosis of Seizure       Today is hospital day 9d.     PAST MEDICAL & SURGICAL HISTORY  Diabetes        ALLERGIES:  No Known Allergies    MEDICATIONS:  ACTIVE MEDICATIONS  acetaminophen     Tablet .. 650 milliGRAM(s) Oral every 6 hours PRN  albuterol/ipratropium for Nebulization 3 milliLiter(s) Nebulizer every 6 hours  aspirin  chewable 81 milliGRAM(s) Oral daily  atorvastatin 80 milliGRAM(s) Oral at bedtime  chlorhexidine 2% Cloths 1 Application(s) Topical <User Schedule>  dextrose 5%. 1000 milliLiter(s) IV Continuous <Continuous>  dextrose 50% Injectable 25 Gram(s) IV Push once  dextrose Oral Gel 15 Gram(s) Oral once PRN  enoxaparin Injectable 60 milliGRAM(s) SubCutaneous every 12 hours  furosemide   Injectable 40 milliGRAM(s) IV Push once  glucagon  Injectable 1 milliGRAM(s) IntraMuscular once  insulin glargine Injectable (LANTUS) 22 Unit(s) SubCutaneous at bedtime  insulin lispro (ADMELOG) corrective regimen sliding scale   SubCutaneous three times a day before meals  insulin lispro Injectable (ADMELOG) 8 Unit(s) SubCutaneous three times a day before meals  iron sucrose IVPB 200 milliGRAM(s) IV Intermittent every 24 hours  metoprolol tartrate 25 milliGRAM(s) Oral every 12 hours  pantoprazole    Tablet 40 milliGRAM(s) Oral before breakfast  potassium chloride   Powder 20 milliEquivalent(s) Oral once  sacubitril 49 mG/valsartan 51 mG 1 Tablet(s) Oral two times a day  sodium chloride 0.9%. 1000 milliLiter(s) IV Continuous <Continuous>      VITALS:   T(F): 97.6  HR: 95  BP: 123/66  RR: 18  SpO2: 95%    LABS:                        8.3    5.78  )-----------( 216      ( 22 Aug 2023 07:06 )             28.3     08-22    145  |  103  |  6<L>  ----------------------------<  121<H>  3.1<L>   |  31  |  <0.5<L>    Ca    8.5      22 Aug 2023 07:06  Mg     2.0     08-22    TPro  5.9<L>  /  Alb  3.1<L>  /  TBili  0.4  /  DBili  x   /  AST  20  /  ALT  21  /  AlkPhos  88  08-22      Urinalysis Basic - ( 22 Aug 2023 07:06 )    Color: x / Appearance: x / SG: x / pH: x  Gluc: 121 mg/dL / Ketone: x  / Bili: x / Urobili: x   Blood: x / Protein: x / Nitrite: x   Leuk Esterase: x / RBC: x / WBC x   Sq Epi: x / Non Sq Epi: x / Bacteria: x            CARDIAC MARKERS ( 21 Aug 2023 03:10 )  x     / 0.49 ng/mL / x     / x     / x              PHYSICAL EXAM:  GEN: No acute distress  LUNGS: mild crackles and wheezing at lung bases bilaterally   HEART: S1/S2 present.    ABD: Soft, non-tender, non-distended.   EXT: No pedal edema  NEURO: AAOX3

## 2023-08-22 NOTE — PROGRESS NOTE ADULT - SUBJECTIVE AND OBJECTIVE BOX
HPI:  69yo F w/a PMH of Type II DM, HTN, DLD, obesity, lateral epicondylitis presenting to the ED w/ Left Arm pain. At time of my exam, patient intubated so spoke with the son at bedside. This arm started about two months ago and has continued to progress but in the last few days became severe. She went to the ED a few days ago, was given pain medications then sent home. Per son, pain still continued to progress after this. No fevers, chills, nausea, vomiting, diarrhea, constipation, SOB, CP.   On admission to the ED today, she presented with severe left arm pain w findings of LBBB on ECG (unsure if new or not). Code STEMI was called and then canceled because troponin negative and no chest pain. Patient was then noted to have acute mental status change, SOB, lactate elevation, and acidosis on abg..Patient had two seizure episodes and was treated with benzodiazepine. Intubated by ER for airway protection.     On Admission  Vitals: /64, HR 75, RR 20, T 98.5, satting 99 percent on RA   Labs: WBC 14.86, Hgb 10, , Na 139, K 5.4, BUN 12, Cr .6, Trop <.01 --> .13 on repeat  Imaging:   CXR -  Patchy bibasilar opacities, right greater than left.  CT Brain Stroke protocol - No evidence of acute transcortical infarct, hydrocephalus, acute intracranial hemorrhage, or mass effect.  CT brain perfusion: No evidence of acute infarct or ischemia.  CTA neck: No stenosis. No dissection.  CTA brain: No stenosis or aneurysm.  CTA Neck: The distal internal carotid arteries are patent. The [place] is normal in appearance. The visualized cerebral arteries are unremarkable.The vertebrobasilar junction and basilar artery are normal.    In the ED, given Levaquin. Keppra, started on propofol  Admitted to MICU for airway monitoring  (13 Aug 2023 19:09)        PAST MEDICAL & SURGICAL HISTORY  Diabetes        FAMILY HISTORY:  FAMILY HISTORY:      SOCIAL HISTORY:  Social History:  Per son, no smoking, alcohol use, drug use (13 Aug 2023 19:09)      ALLERGIES:  No Known Allergies      MEDICATIONS:  albuterol/ipratropium for Nebulization 3 milliLiter(s) Nebulizer every 6 hours  aspirin  chewable 81 milliGRAM(s) Oral daily  atorvastatin 80 milliGRAM(s) Oral at bedtime  chlorhexidine 2% Cloths 1 Application(s) Topical <User Schedule>  dextrose 5%. 1000 milliLiter(s) (50 mL/Hr) IV Continuous <Continuous>  dextrose 50% Injectable 25 Gram(s) IV Push once  enoxaparin Injectable 60 milliGRAM(s) SubCutaneous every 12 hours  glucagon  Injectable 1 milliGRAM(s) IntraMuscular once  insulin glargine Injectable (LANTUS) 22 Unit(s) SubCutaneous at bedtime  insulin lispro (ADMELOG) corrective regimen sliding scale   SubCutaneous three times a day before meals  insulin lispro Injectable (ADMELOG) 8 Unit(s) SubCutaneous three times a day before meals  iron sucrose IVPB 200 milliGRAM(s) IV Intermittent every 24 hours  metoprolol tartrate 25 milliGRAM(s) Oral every 12 hours  pantoprazole    Tablet 40 milliGRAM(s) Oral before breakfast  sacubitril 49 mG/valsartan 51 mG 1 Tablet(s) Oral two times a day  sodium chloride 0.9%. 1000 milliLiter(s) (100 mL/Hr) IV Continuous <Continuous>    PRN:  acetaminophen     Tablet .. 650 milliGRAM(s) Oral every 6 hours PRN  dextrose Oral Gel 15 Gram(s) Oral once PRN      HOME MEDICATIONS:  Home Medications:  aspirin 81 mg oral tablet, chewable: 1 chewed once a day (13 Aug 2023 19:53)  atorvastatin 10 mg oral tablet: 1 orally once a day (at bedtime) (13 Aug 2023 19:52)  Jardiance 10 mg oral tablet: 1 orally once a day (13 Aug 2023 19:53)  MetFORMIN (Eqv-Glumetza) 500 mg oral tablet, extended release: 1 orally 2 times a day (13 Aug 2023 19:51)  pioglitazone 30 mg oral tablet: 1 orally once a day (13 Aug 2023 19:52)      VITALS:   T(F): 97.6 (08-22 @ 12:54), Max: 99.4 (08-21 @ 08:00)  HR: 95 (08-22 @ 12:54) (79 - 105)  BP: 123/66 (08-22 @ 12:54) (113/86 - 152/67)  BP(mean): 88 (08-21 @ 22:00) (78 - 101)  RR: 18 (08-22 @ 12:54) (18 - 39)  SpO2: 95% (08-22 @ 05:07) (94% - 100%)    I&O's Summary    21 Aug 2023 07:01  -  22 Aug 2023 07:00  --------------------------------------------------------  IN: 607 mL / OUT: 450 mL / NET: 157 mL    22 Aug 2023 07:01  -  22 Aug 2023 15:15  --------------------------------------------------------  IN: 504 mL / OUT: 300 mL / NET: 204 mL        REVIEW OF SYSTEMS:  CONSTITUTIONAL: generalized weakness  HEENT: No visual changes, neck/ear pain  RESPIRATORY: No cough   CARDIOVASCULAR: See HPI  GASTROINTESTINAL: No abdominal pain. No nausea, vomiting, diarrhea   GENITOURINARY: No dysuria, frequency or hematuria  NEUROLOGICAL: No new focal deficits. AMS  SKIN: No new rashes    PHYSICAL EXAM:  General: Not in distress.   HEENT: EOMI  Cardio: regular, S1, S2   Pulm: B/L BS.     Abdomen: Soft, non-tender, non-distended. Normoactive bowel sounds  Extremities: No edema b/l le  Neuro: A&O x 3. No focal deficits    LABS:                        8.3    5.78  )-----------( 216      ( 22 Aug 2023 07:06 )             28.3     08-22    145  |  103  |  6<L>  ----------------------------<  121<H>  3.1<L>   |  31  |  <0.5<L>    Ca    8.5      22 Aug 2023 07:06  Mg     2.0     08-22    TPro  5.9<L>  /  Alb  3.1<L>  /  TBili  0.4  /  DBili  x   /  AST  20  /  ALT  21  /  AlkPhos  88  08-22        CARDIAC MARKERS ( 21 Aug 2023 03:10 )  x     / 0.49 ng/mL / x     / x     / x            Troponin trend:      08-14 Chol 194 LDL -- HDL 55 Trig 132  SARS-CoV-2: NotDetec (14 Aug 2023 09:10)      RADIOLOGY:  < from: TTE Echo Complete w/o Contrast w/ Doppler (08.14.23 @ 08:23) >      Summary:   1. Left ventricular ejection fraction, by visual estimation, is 30 to   35%.   2. Moderately to severely decreased global left ventricular systolic   function.   3. The left ventricular diastolic function could not be assessed in this   study.   4. Left atrial enlargement.   5. Mild mitral regurgitation.   6. Adequate TR velocity was not obtained to accurately assess RVSP.    PHYSICIAN INTERPRETATION:  Left Ventricle: The left ventricular internal cavity size is moderately   increased. Global LV systolic function was moderately to severely   decreased. Left ventricular ejection fraction, by visual estimation, is   30 to 35%. The left ventriculardiastolic function could not be assessed   in this study.  Right Ventricle: Normal right ventricular size and function.  Left Atrium: Left atrial enlargement.  Right Atrium: Normal right atrial size.  Pericardium: There is no evidence of pericardial effusion.  Mitral Valve: Mild thickening of the anterior and posterior mitral valve   leaflets. No evidence of mitral stenosis. Mild mitral regurgitation.  Tricuspid Valve: The tricuspid valve is not well visualized. No tricuspid   regurgitation visualized. Adequate TR velocity was not obtained to   accurately assess RVSP.  Aortic Valve: The aortic valve was not well visualized. No evidence of   aortic stenosis. No aortic regurgitation.  Pulmonic Valve: The pulmonic valve was not well visualized.  Aorta: The aortic root and ascending aorta are normal in size.  Venous: The inferior vena cava size is normal.    < end of copied text >       from: CT Angio Heart and Coronaries w/ IV Cont (08.18.23 @ 15:10) >  IMPRESSION:  ---Long segment of calcified plaque within the proximal LAD with   indeterminant level of narrowing due to suboptimal vasodilatation and   blooming artifact.    Proximal to mid LAD as well as the proximal second diagonal branch poorly   opacified likely related to motion artifact. Significant   (moderate/severe) narrowing therefore cannot be entirely excluded and   further evaluation is recommended    The total Agatston coronary artery calcium score equals 130, which   corresponds to 76th percentile for age, gender and ethnicity.    CAD-RADS N(proximal/mid LAD).    ---Filling defects within the segmental pulmonary arteries bilaterally   compatible pulmonary embolism.    ---Diffuse bronchial wall thickening and opacity/debris. Trace pleural   effusion. Right basilar nodular opacities likely of infectious or   inflammatory etiology    Callback request submitted at time of dictation.    < end of copied text >    -CATHETERIZATION: FINDINGS:   Coronary Dominance: Right  LM: Distal segment with 50% stenosis  LAD: Ostium with 80% stenosis  CX: Ostium with 80% stenosis   RCA: Ostium with 90% stenosis    Syntax Score: 30    LVEDP:  13 mmHg   -OTHER:  ECG:  Sinus LBBB    TELEMETRY EVENTS: No events noted

## 2023-08-23 LAB
ALBUMIN SERPL ELPH-MCNC: 3 G/DL — LOW (ref 3.5–5.2)
ALP SERPL-CCNC: 85 U/L — SIGNIFICANT CHANGE UP (ref 30–115)
ALT FLD-CCNC: 21 U/L — SIGNIFICANT CHANGE UP (ref 0–41)
ANION GAP SERPL CALC-SCNC: 8 MMOL/L — SIGNIFICANT CHANGE UP (ref 7–14)
AST SERPL-CCNC: 21 U/L — SIGNIFICANT CHANGE UP (ref 0–41)
B BURGDOR AB CSF-ACNC: NEGATIVE — SIGNIFICANT CHANGE UP
BASOPHILS # BLD AUTO: 0.02 K/UL — SIGNIFICANT CHANGE UP (ref 0–0.2)
BASOPHILS NFR BLD AUTO: 0.4 % — SIGNIFICANT CHANGE UP (ref 0–1)
BILIRUB SERPL-MCNC: 0.3 MG/DL — SIGNIFICANT CHANGE UP (ref 0.2–1.2)
BUN SERPL-MCNC: 4 MG/DL — LOW (ref 10–20)
CALCIUM SERPL-MCNC: 8.6 MG/DL — SIGNIFICANT CHANGE UP (ref 8.4–10.4)
CHLORIDE SERPL-SCNC: 107 MMOL/L — SIGNIFICANT CHANGE UP (ref 98–110)
CO2 SERPL-SCNC: 28 MMOL/L — SIGNIFICANT CHANGE UP (ref 17–32)
CREAT SERPL-MCNC: 0.5 MG/DL — LOW (ref 0.7–1.5)
EGFR: 101 ML/MIN/1.73M2 — SIGNIFICANT CHANGE UP
EOSINOPHIL # BLD AUTO: 0.12 K/UL — SIGNIFICANT CHANGE UP (ref 0–0.7)
EOSINOPHIL NFR BLD AUTO: 2.2 % — SIGNIFICANT CHANGE UP (ref 0–8)
GLUCOSE BLDC GLUCOMTR-MCNC: 141 MG/DL — HIGH (ref 70–99)
GLUCOSE BLDC GLUCOMTR-MCNC: 191 MG/DL — HIGH (ref 70–99)
GLUCOSE BLDC GLUCOMTR-MCNC: 192 MG/DL — HIGH (ref 70–99)
GLUCOSE BLDC GLUCOMTR-MCNC: 247 MG/DL — HIGH (ref 70–99)
GLUCOSE SERPL-MCNC: 144 MG/DL — HIGH (ref 70–99)
HCT VFR BLD CALC: 27.5 % — LOW (ref 37–47)
HGB BLD-MCNC: 8.1 G/DL — LOW (ref 12–16)
IMM GRANULOCYTES NFR BLD AUTO: 0.9 % — HIGH (ref 0.1–0.3)
LYMPHOCYTES # BLD AUTO: 1.68 K/UL — SIGNIFICANT CHANGE UP (ref 1.2–3.4)
LYMPHOCYTES # BLD AUTO: 31.3 % — SIGNIFICANT CHANGE UP (ref 20.5–51.1)
MAGNESIUM SERPL-MCNC: 1.9 MG/DL — SIGNIFICANT CHANGE UP (ref 1.8–2.4)
MCHC RBC-ENTMCNC: 23.5 PG — LOW (ref 27–31)
MCHC RBC-ENTMCNC: 29.5 G/DL — LOW (ref 32–37)
MCV RBC AUTO: 79.9 FL — LOW (ref 81–99)
MOG AB CSF QL CBA IFA: NEGATIVE — SIGNIFICANT CHANGE UP
MONOCYTES # BLD AUTO: 0.38 K/UL — SIGNIFICANT CHANGE UP (ref 0.1–0.6)
MONOCYTES NFR BLD AUTO: 7.1 % — SIGNIFICANT CHANGE UP (ref 1.7–9.3)
MRSA PCR RESULT.: NEGATIVE — SIGNIFICANT CHANGE UP
NEUTROPHILS # BLD AUTO: 3.11 K/UL — SIGNIFICANT CHANGE UP (ref 1.4–6.5)
NEUTROPHILS NFR BLD AUTO: 58.1 % — SIGNIFICANT CHANGE UP (ref 42.2–75.2)
NRBC # BLD: 0 /100 WBCS — SIGNIFICANT CHANGE UP (ref 0–0)
PLATELET # BLD AUTO: 262 K/UL — SIGNIFICANT CHANGE UP (ref 130–400)
PMV BLD: 10.8 FL — HIGH (ref 7.4–10.4)
POTASSIUM SERPL-MCNC: 3.7 MMOL/L — SIGNIFICANT CHANGE UP (ref 3.5–5)
POTASSIUM SERPL-SCNC: 3.7 MMOL/L — SIGNIFICANT CHANGE UP (ref 3.5–5)
PROT SERPL-MCNC: 6.1 G/DL — SIGNIFICANT CHANGE UP (ref 6–8)
RBC # BLD: 3.44 M/UL — LOW (ref 4.2–5.4)
RBC # FLD: 20.9 % — HIGH (ref 11.5–14.5)
SODIUM SERPL-SCNC: 143 MMOL/L — SIGNIFICANT CHANGE UP (ref 135–146)
WBC # BLD: 5.36 K/UL — SIGNIFICANT CHANGE UP (ref 4.8–10.8)
WBC # FLD AUTO: 5.36 K/UL — SIGNIFICANT CHANGE UP (ref 4.8–10.8)

## 2023-08-23 PROCEDURE — 99233 SBSQ HOSP IP/OBS HIGH 50: CPT

## 2023-08-23 PROCEDURE — 99232 SBSQ HOSP IP/OBS MODERATE 35: CPT

## 2023-08-23 RX ORDER — METOPROLOL TARTRATE 50 MG
50 TABLET ORAL ONCE
Refills: 0 | Status: COMPLETED | OUTPATIENT
Start: 2023-08-23 | End: 2023-08-23

## 2023-08-23 RX ORDER — FUROSEMIDE 40 MG
40 TABLET ORAL ONCE
Refills: 0 | Status: COMPLETED | OUTPATIENT
Start: 2023-08-23 | End: 2023-08-23

## 2023-08-23 RX ORDER — INSULIN LISPRO 100/ML
10 VIAL (ML) SUBCUTANEOUS
Refills: 0 | Status: DISCONTINUED | OUTPATIENT
Start: 2023-08-23 | End: 2023-08-26

## 2023-08-23 RX ORDER — POTASSIUM CHLORIDE 20 MEQ
40 PACKET (EA) ORAL ONCE
Refills: 0 | Status: COMPLETED | OUTPATIENT
Start: 2023-08-23 | End: 2023-08-23

## 2023-08-23 RX ORDER — METOPROLOL TARTRATE 50 MG
50 TABLET ORAL EVERY 12 HOURS
Refills: 0 | Status: DISCONTINUED | OUTPATIENT
Start: 2023-08-24 | End: 2023-08-30

## 2023-08-23 RX ADMIN — ENOXAPARIN SODIUM 60 MILLIGRAM(S): 100 INJECTION SUBCUTANEOUS at 18:12

## 2023-08-23 RX ADMIN — ENOXAPARIN SODIUM 60 MILLIGRAM(S): 100 INJECTION SUBCUTANEOUS at 05:41

## 2023-08-23 RX ADMIN — SACUBITRIL AND VALSARTAN 1 TABLET(S): 24; 26 TABLET, FILM COATED ORAL at 18:13

## 2023-08-23 RX ADMIN — Medication 8 UNIT(S): at 12:01

## 2023-08-23 RX ADMIN — Medication 3 MILLILITER(S): at 19:35

## 2023-08-23 RX ADMIN — ATORVASTATIN CALCIUM 80 MILLIGRAM(S): 80 TABLET, FILM COATED ORAL at 21:52

## 2023-08-23 RX ADMIN — Medication 25 MILLIGRAM(S): at 05:41

## 2023-08-23 RX ADMIN — Medication 10 UNIT(S): at 16:50

## 2023-08-23 RX ADMIN — Medication 40 MILLIEQUIVALENT(S): at 11:24

## 2023-08-23 RX ADMIN — PANTOPRAZOLE SODIUM 40 MILLIGRAM(S): 20 TABLET, DELAYED RELEASE ORAL at 05:42

## 2023-08-23 RX ADMIN — Medication 40 MILLIGRAM(S): at 11:45

## 2023-08-23 RX ADMIN — IRON SUCROSE 110 MILLIGRAM(S): 20 INJECTION, SOLUTION INTRAVENOUS at 11:24

## 2023-08-23 RX ADMIN — INSULIN GLARGINE 22 UNIT(S): 100 INJECTION, SOLUTION SUBCUTANEOUS at 21:52

## 2023-08-23 RX ADMIN — SACUBITRIL AND VALSARTAN 1 TABLET(S): 24; 26 TABLET, FILM COATED ORAL at 05:41

## 2023-08-23 RX ADMIN — CHLORHEXIDINE GLUCONATE 1 APPLICATION(S): 213 SOLUTION TOPICAL at 05:45

## 2023-08-23 RX ADMIN — Medication 2: at 12:00

## 2023-08-23 RX ADMIN — Medication 1: at 16:50

## 2023-08-23 RX ADMIN — Medication 50 MILLIGRAM(S): at 18:13

## 2023-08-23 RX ADMIN — Medication 81 MILLIGRAM(S): at 11:24

## 2023-08-23 RX ADMIN — Medication 3 MILLILITER(S): at 08:40

## 2023-08-23 NOTE — PHARMACOTHERAPY INTERVENTION NOTE - COMMENTS
Patient with HFrEF (35%) per admission med rec patient takes pioglitazone 30 mg po daily. Pioglitazone is not recommended for patients with HF due to risk of edema and worsening of cardiac failure. On discharge consider discontinuation and alternative treatment for diabetes.

## 2023-08-23 NOTE — PROGRESS NOTE ADULT - ATTENDING COMMENTS
Agree with above  cardiac pre GI procedure evaluation as outlined above (note edited)
 ID# [id number]    Patient seen and examined independently.     PAST MEDICAL & SURGICAL HISTORY:  Diabetes    Vitals:  T(F): 99.1 (08-23-23 @ 13:34)  HR: 103 (08-23-23 @ 13:34)  BP: 139/63 (08-23-23 @ 13:34)  RR: 18 (08-23-23 @ 13:34)      TESTS & MEASUREMENTS:                        8.1    5.36  )-----------( 262      ( 23 Aug 2023 06:49 )             27.5   Hemoglobin Trend:  Hemoglobin: 8.1 g/dL (08-23 @ 06:49)  Hemoglobin: 8.3 g/dL (08-22 @ 07:06)  Hemoglobin: 8.2 g/dL (08-21 @ 04:19)      08-23    143  |  107  |  4<L>  ----------------------------<  144<H>  3.7   |  28  |  0.5<L>    Ca    8.6      23 Aug 2023 06:49  Mg     1.9     08-23    TPro  6.1  /  Alb  3.0<L>  /  TBili  0.3  /  DBili  x   /  AST  21  /  ALT  21  /  AlkPhos  85  08-23    LIVER FUNCTIONS - ( 23 Aug 2023 06:49 )  Alb: 3.0 g/dL / Pro: 6.1 g/dL / ALK PHOS: 85 U/L / ALT: 21 U/L / AST: 21 U/L / GGT: x             In summary:  HEALTH ISSUES - PROBLEM Dx:  ·CAD and NSTEMI, s/p cath with left main and 3Vx disease findings  ·Currently CTS team with ongoing pre-operative work-up  ·Systolic dysfunction on ECHO  ·Mild lung edema with symptomatic orthopnea, will need PRN lasix   ·Questionable Seizures, no need for AED for now as per discussion with neuro team (normal EEG)   ·Incidental PE evidence on CT , on Low-Molecular Weight Heparin (Lovenox) at therapeutic dose   ·Anemia, iron deficiency suspected, possible GI loss (episode of melena reported while in CCU)   ·Lactic acidosis , resolved, likely due transient ischemia (PE and NSTEMI)   ·Type-2 diabetes mellitus on OAD as outpatient     REC   ·iron infusion as scheduled (monitor for volume overload)   ·Will need LASIX intravenous again today (and PRN) as symptoms of lung edema (orthopnea)   ·Close BUN/creatinine follow-up   ·CBC Follow up , GI input noted  ·keep Low-Molecular Weight Heparin (Lovenox) at therapeutic dose unless signs of acute blood loss    ·PT eval requested by CTS team   ·CT chest and spiromtery requested by CTS team   ·cardiac telemonitoring   .Due to recently diagnosed systolic dysfunction, patient would benefit from discontinuing PIOGLITAZONE upon discharge (higher risk for fluid retention)      Discussed with CTS team  Patient overall at high risk for events like bleeding, thrombosis, recurrent ischemia; despite all care, due to multiple comorbidities
Pt w/ recurrent LOC in setting of hypoxia/tachypnea/tachycardia with negative VEEG during episodes suspect cardiopulmonary cause for LOC.  No focal neurologic deficits.  Recommend d/c VEEG and continue medical w/u.  Call back as needed.
Recommend EGD/colonoscopy however currently risks outweigh potential benefits in setting of underlying cardiopulmonary disease. Can reassess when clinically optimized. Continue PPI. If pt has overt GI bleeding with drop in Hb please notify GI for possible more urgent endoscopic intervention.

## 2023-08-23 NOTE — PROGRESS NOTE ADULT - ASSESSMENT
1yo F w/a PMH of Type II DM, HTN, DLD, obesity, lateral epicondylitis presenting to the ED 8/13 w/left arm pain. This arm started about two months ago and has continued to progress for a few days prior to admission became severe. Pt was previously seen for this pain in the ED on 8/8, was given pain medications then sent home. Per son, pain still continued to progress after this. No fevers, chills, nausea, vomiting, diarrhea, constipation, SOB, CP.     On admission to the ED, she presented with severe left arm pain w findings of LBBB on ECG (unsure if new or not). Code STEMI was called and then canceled because troponin negative and no chest pain. Patient was then noted to have acute mental status change, SOB, lactate elevation, and acidosis on abg. Patient had two seizure episodes and was treated with benzodiazepine. Intubated by ER for airway protection, extubated on 8/15      #CAD and NSTEMI, s/p cath with left main and 3Vx disease findings  -Currently CTS team evaluating safety of CABG , probably will hold off given co-morbidities and high risk surgery , will be discussed further.   -Systolic dysfunction on ECHO with EF 30 to 35%.  -c/w Aspirin and enoxaparin therapeutic dose.  AND GDMT ( Metoprolol + Entresto )   -CT chest ordered by CTS as part of preparation if they want to proceed with surgery   -Cardiology following , advice increase Lopressor to 50 BID and if tolerated ,  switch to Toprol XL 100mg once daily,   -Euvolemic, hold diuretics  -will need life vest if not revascularized   -IVC filter consideration if AC not tolerated     #Mild lung edema in the setting of low EF  - reassess volume status daily and f/u KFT   - currently Euvolemic, but orthopnic , had drop in o2 sat to 89% while sleeping last night.  , will assess need for lasix       #Questionable Seizures  -s/p Keppra 1000 Q12 I.V for 8 days   -no further need for AED for now as per discussion with neuro team (normal EEG)     #Acute cognitive dysfunction (resolved), likely underlying ischemic event  #PE evidence on CT , on Low-Molecular Weight Heparin (Lovenox) at therapeutic dose       #Anemia, iron deficiency suspected, possible GI loss (episode of melena reported while in CCU)   -started on IV venofer   -consult GI for possible EGD/ lower Endoscopy  - GI saying patient is currently high risk and  risks outweigh benefits at this time for endoscopic intervention, will need cardio risk stratification if warranted   - c/w iron supplementation  - monitor H/H  - maintain active type and screen  -c/w Protonix   - Cardiology risk stratification done      ·#Lactic acidosis - resolved  -likely due transient ischemia (PE and NSTEMI)     #Type-2 diabetes mellitus  -on OAD as outpatient   -on Lantus 22 I.U and Lispro 8/8/8 in-patient     #Hypokalemia   replenished     #activity :   OOBT   PT eval requested by CTS     #DVT prophylaxis : on  Lovenox therapeutic   #GI prophlyaxis : on PPI's   #Diet : DASH/TLC         .f/u with CTS team regarding CABG   ·Close BUN/creatinine follow-up   ·CBC Follow up    1yo F w/a PMH of Type II DM, HTN, DLD, obesity, lateral epicondylitis presenting to the ED 8/13 w/left arm pain. This arm started about two months ago and has continued to progress for a few days prior to admission became severe. Pt was previously seen for this pain in the ED on 8/8, was given pain medications then sent home. Per son, pain still continued to progress after this. No fevers, chills, nausea, vomiting, diarrhea, constipation, SOB, CP.     On admission to the ED, she presented with severe left arm pain w findings of LBBB on ECG (unsure if new or not). Code STEMI was called and then canceled because troponin negative and no chest pain. Patient was then noted to have acute mental status change, SOB, lactate elevation, and acidosis on abg. Patient had two seizure episodes and was treated with benzodiazepine. Intubated by ER for airway protection, extubated on 8/15      #CAD and NSTEMI, s/p cath with left main and 3Vx disease findings  -Currently CTS team evaluating safety of CABG , probably will hold off given co-morbidities and high risk surgery , will be discussed further.   -Systolic dysfunction on ECHO with EF 30 to 35%.  -c/w Aspirin and enoxaparin therapeutic dose.  AND GDMT ( Metoprolol + Entresto )   -CT chest ordered by CTS as part of preparation if they want to proceed with surgery   -Cardiology following , advice increase Lopressor to 50 BID and if tolerated ,  switch to Toprol XL 100mg once daily,   -Euvolemic, but orthopneic , will give another 40 lasix   -will need life vest if not revascularized       #Mild lung edema in the setting of low EF  - reassess volume status daily and f/u KFT   - currently Euvolemic, but orthopnic , had drop in o2 sat to 89% while sleeping last night.  , will assess need for lasix       #Questionable Seizures  -s/p Keppra 1000 Q12 I.V for 8 days   -no further need for AED for now as per discussion with neuro team (normal EEG)     #Acute cognitive dysfunction (resolved), likely underlying ischemic event  #PE evidence on CT , on Low-Molecular Weight Heparin (Lovenox) at therapeutic dose       #Anemia, iron deficiency suspected, possible GI loss (episode of melena reported while in CCU)   -started on IV venofer   -consult GI for possible EGD/ lower Endoscopy  - GI saying patient is currently high risk and  risks outweigh benefits at this time for endoscopic intervention, will need cardio risk stratification if warranted   - c/w iron supplementation  - monitor H/H  - maintain active type and screen  -c/w Protonix   - Cardiology risk stratification done      ·#Lactic acidosis - resolved  -likely due transient ischemia (PE and NSTEMI)     #Type-2 diabetes mellitus  -on OAD as outpatient   -on Lantus 22 I.U and Lispro 8/8/8 in-patient     #Hypokalemia   replenished     #activity :   OOBT   PT eval requested by CTS     #DVT prophylaxis : on  Lovenox therapeutic   #GI prophlyaxis : on PPI's   #Diet : DASH/TLC         .f/u with CTS team regarding CABG   ·Close BUN/creatinine follow-up   ·CBC Follow up

## 2023-08-23 NOTE — PROGRESS NOTE ADULT - ASSESSMENT
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     #acute on chronic anemia ,  SHAD w blood loss anemia during hospitalization complicated by acute PE/DVT (on therapeutic lovenox) & Triple Vessel CAD (on ASA) - no ongoing/overt bleeding   - Hgb 10 on admission - dropped as low as 6.4 requiring 1u pRBC> 8.1  - Iron panel noted: T iron 37 w TIBC 13%  - ANTONIO with some bright blood mixed with stool  - no scopes in the past    plan:  - risks outweigh benefits for endoscopic intervention at this time, though, will perform only for urgent life-threatening measures, such has overt bleeding w drop in hgb - otherwise plan for EGD/colonoscopy when clinically optimized.  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     #acute on chronic anemia ,  SHAD w blood loss anemia during hospitalization complicated by acute PE/DVT (on therapeutic lovenox) & Triple Vessel CAD (on ASA) - no ongoing/overt bleeding   - Hgb 10 on admission - dropped as low as 6.4 requiring 1u pRBC> 8.1  - Iron panel noted: T iron 37 w TIBC 13%  - ANTONIO with some bright blood mixed with stool  - no scopes in the past    plan:  - risks outweigh benefits for endoscopic intervention at this time, though, will perform only for urgent life-threatening unstable bleed - otherwise plan for EGD/colonoscopy when clinically optimized.  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     #acute on chronic anemia ,  SHAD w blood loss anemia during hospitalization complicated by acute PE/DVT (on therapeutic lovenox) & Triple Vessel CAD (on ASA) - no ongoing/overt bleeding   - Hgb 10 on admission - dropped as low as 6.4 requiring 1u pRBC> 8.1  - Iron panel noted: T iron 37 w TIBC 13%  - ANTONIO with some bright blood mixed with stool  - no scopes in the past    plan:  - risks outweigh benefits for endoscopic intervention at this time, though, will perform only for urgent life-threatening unstable bleed - otherwise plan for EGD/colonoscopy when clinically optimized considering ongoing cardiopulmonary management  - c/w iron supplementation  - PPI daily  - monitor H/H  - maintain active type and screen  - call GI STAT for any unstable bleed, recall prn

## 2023-08-23 NOTE — PROGRESS NOTE ADULT - SUBJECTIVE AND OBJECTIVE BOX
Gastroenterology progress note:     Patient is a 68y old  Female who presents with a chief complaint of Arm Pain, AMS (23 Aug 2023 10:41)       Admitted on: 08-13-23    GI called to evaluate for iron deficiency. Primary team reports melena. Per patient's son, patient has history of SHAD (b/l Hgb 10) for which she is prescribed iron tabs, however, patient does not take d/t side effect (reflux). Patient denies ever having EGD/Colonoscopy. Denies having family GI history.     Prior EGD: none    Prior Colonoscopy: none     Interval History: no melena or bleeding; hgb stable    PAST MEDICAL & SURGICAL HISTORY:  Diabetes    MEDICATIONS  (STANDING):  albuterol/ipratropium for Nebulization 3 milliLiter(s) Nebulizer every 6 hours  aspirin  chewable 81 milliGRAM(s) Oral daily  atorvastatin 80 milliGRAM(s) Oral at bedtime  chlorhexidine 2% Cloths 1 Application(s) Topical <User Schedule>  dextrose 5%. 1000 milliLiter(s) (50 mL/Hr) IV Continuous <Continuous>  dextrose 50% Injectable 25 Gram(s) IV Push once  enoxaparin Injectable 60 milliGRAM(s) SubCutaneous every 12 hours  glucagon  Injectable 1 milliGRAM(s) IntraMuscular once  insulin glargine Injectable (LANTUS) 22 Unit(s) SubCutaneous at bedtime  insulin lispro (ADMELOG) corrective regimen sliding scale   SubCutaneous three times a day before meals  insulin lispro Injectable (ADMELOG) 10 Unit(s) SubCutaneous three times a day before meals  iron sucrose IVPB 200 milliGRAM(s) IV Intermittent every 24 hours  metoprolol tartrate 50 milliGRAM(s) Oral once  pantoprazole    Tablet 40 milliGRAM(s) Oral before breakfast  sacubitril 49 mG/valsartan 51 mG 1 Tablet(s) Oral two times a day  sodium chloride 0.9%. 1000 milliLiter(s) (100 mL/Hr) IV Continuous <Continuous>    MEDICATIONS  (PRN):  acetaminophen     Tablet .. 650 milliGRAM(s) Oral every 6 hours PRN Temp greater or equal to 38C (100.4F), Mild Pain (1 - 3)  dextrose Oral Gel 15 Gram(s) Oral once PRN Blood Glucose LESS THAN 70 milliGRAM(s)/deciliter      Allergies  No Known Allergies      Review of Systems:   Cardiovascular:  No Chest Pain, No Palpitations  Respiratory:  No Cough, No Dyspnea  Gastrointestinal:  As described in HPI  Skin:  No Skin Lesions, No Jaundice  Neuro:  No Syncope, No Dizziness    Physical Examination:  T(C): 37.3 (08-23-23 @ 13:34), Max: 37.6 (08-22-23 @ 20:30)  HR: 103 (08-23-23 @ 13:34) (95 - 105)  BP: 139/63 (08-23-23 @ 13:34) (126/61 - 139/63)  RR: 18 (08-23-23 @ 13:34) (18 - 18)  SpO2: --      08-22-23 @ 07:01  -  08-23-23 @ 07:00  --------------------------------------------------------  IN: 859 mL / OUT: 900 mL / NET: -41 mL    08-23-23 @ 07:01  -  08-23-23 @ 14:55  --------------------------------------------------------  IN: 358 mL / OUT: 0 mL / NET: 358 mL    GENERAL: awake and alert, no acute distress.  HEAD:  Atraumatic, Normocephalic  EYES: conjunctiva and sclera clear  CHEST/LUNG: + BS b/l  HEART: Regular rate and rhythm  ABDOMEN: Soft, nontender, nondistended  SKIN: Intact, no jaundice    Data:                        8.1    5.36  )-----------( 262      ( 23 Aug 2023 06:49 )             27.5     Hgb trend:  8.1  08-23-23 @ 06:49  8.3  08-22-23 @ 07:06  8.2  08-21-23 @ 04:19  8.0  08-21-23 @ 03:10    08-23    143  |  107  |  4<L>  ----------------------------<  144<H>  3.7   |  28  |  0.5<L>    Ca    8.6      23 Aug 2023 06:49  Mg     1.9     08-23    TPro  6.1  /  Alb  3.0<L>  /  TBili  0.3  /  DBili  x   /  AST  21  /  ALT  21  /  AlkPhos  85  08-23    Liver panel trend:  TBili 0.3   /   AST 21   /   ALT 21   /   AlkP 85   /   Tptn 6.1   /   Alb 3.0    /   DBili --      08-23  TBili 0.4   /   AST 20   /   ALT 21   /   AlkP 88   /   Tptn 5.9   /   Alb 3.1    /   DBili --      08-22  TBili 0.3   /   AST 23   /   ALT 25   /   AlkP 84   /   Tptn 6.0   /   Alb 3.1    /   DBili --      08-21  TBili 0.3   /   AST 24   /   ALT 25   /   AlkP 91   /   Tptn 5.9   /   Alb 2.8    /   DBili --      08-20  TBili 0.3   /   AST 26   /   ALT 24   /   AlkP 80   /   Tptn 5.4   /   Alb 2.8    /   DBili --      08-20  TBili 0.3   /   AST 23   /   ALT 22   /   AlkP 79   /   Tptn 5.7   /   Alb 2.9    /   DBili --      08-19  TBili 0.4   /   AST 27   /   ALT 23   /   AlkP 88   /   Tptn 6.2   /   Alb 3.2    /   DBili --      08-18  TBili 0.3   /   AST 30   /   ALT 22   /   AlkP 91   /   Tptn 6.1   /   Alb 3.2    /   DBili --      08-18  TBili 0.3   /   AST 43   /   ALT 21   /   AlkP 83   /   Tptn 5.9   /   Alb 3.1    /   DBili --      08-17  TBili 0.3   /   AST 52   /   ALT 23   /   AlkP 92   /   Tptn 6.1   /   Alb 3.3    /   DBili --      08-17  TBili 0.8   /   AST 56   /   ALT 23   /   AlkP 94   /   Tptn 6.5   /   Alb 3.4    /   DBili --      08-16  TBili 0.4   /   AST 65   /   ALT 24   /   AlkP 92   /   Tptn 6.6   /   Alb 3.6    /   DBili --      08-16  TBili 0.3   /   AST 19   /   ALT 14   /   AlkP 83   /   Tptn 6.1   /   Alb 3.3    /   DBili --      08-15  TBili --   /   AST --   /   ALT --   /   AlkP --   /   Tptn --   /   Alb 3087    /   DBili --      08-14  TBili 0.3   /   AST 33   /   ALT 20   /   AlkP 97   /   Tptn 7.0   /   Alb 4.1    /   DBili --      08-14  TBili 0.2   /   AST 46   /   ALT 22   /   AlkP 98   /   Tptn 7.0   /   Alb 3.9    /   DBili --      08-14  TBili 0.2   /   AST 50   /   ALT 23   /   AlkP 96   /   Tptn 6.9   /   Alb 3.9    /   DBili --      08-13             Radiology:

## 2023-08-23 NOTE — PROGRESS NOTE ADULT - SUBJECTIVE AND OBJECTIVE BOX
PREOPERATIVE PROCEDURE(s): CABG               HD #  10                     SURGEON(s): CHRIS Fernandez      SUBJECTIVE ASSESSMENT:68yFemale patient seen and examined at bedside.    Vital Signs Last 24 Hrs  T(F): 99 (23 Aug 2023 04:00), Max: 99.6 (22 Aug 2023 20:30)  HR: 95 (23 Aug 2023 04:00) (95 - 105)  BP: 128/62 (23 Aug 2023 04:00) (123/66 - 134/59)  RR: 18 (22 Aug 2023 20:30) (18 - 18)  SpO2: --    I&O's Detail    22 Aug 2023 07:01  -  23 Aug 2023 07:00  --------------------------------------------------------  IN:    Oral Fluid: 859 mL  Total IN: 859 mL    OUT:    Voided (mL): 900 mL  Total OUT: 900 mL        Net: I&O's Detail    21 Aug 2023 07:01  -  22 Aug 2023 07:00  --------------------------------------------------------  Total NET: 157 mL      22 Aug 2023 07:01  -  23 Aug 2023 07:00  --------------------------------------------------------  Total NET: -41 mL        CAPILLARY BLOOD GLUCOSE      POCT Blood Glucose.: 141 mg/dL (23 Aug 2023 07:48)  POCT Blood Glucose.: 237 mg/dL (22 Aug 2023 21:16)  POCT Blood Glucose.: 154 mg/dL (22 Aug 2023 16:36)  POCT Blood Glucose.: 226 mg/dL (22 Aug 2023 11:49)    A1C with Estimated Average Glucose Result: 8.2 % (08-14-23 @ 04:40)      Physical Exam:  General: NAD; A&Ox3  Cardiac: S1/S2, RRR, no murmur, no rubs  Lungs: unlabored respirations, CTA b/l, no wheeze, no rales, no crackles  Abdomen: Soft/NT/ND; positive bowel sounds x 4  Incisions: Incisions clean/dry/intact  Extremities: No edema b/l lower extremities; good capillary refill; no cyanosis; palpable 1+ pedal pulses b/l      BOWEL MOVEMENT:  [] YES [] NO, If No, Timing since last BM Day #         LABS:                        8.1<L>  5.36  )-----------( 262      ( 23 Aug 2023 06:49 )             27.5<L>                        8.3<L>  5.78  )-----------( 216      ( 22 Aug 2023 07:06 )             28.3<L>    08-23    143  |  107  |  4<L>  ----------------------------<  144<H>  3.7   |  28  |  0.5<L>  08-22    145  |  103  |  6<L>  ----------------------------<  121<H>  3.1<L>   |  31  |  <0.5<L>    Ca    8.6      23 Aug 2023 06:49  Mg     1.9     08-23    TPro  6.1 [6.0 - 8.0]  /  Alb  3.0<L> [3.5 - 5.2]  /  TBili  0.3 [0.2 - 1.2]  /  DBili  x   /  AST  21 [0 - 41]  /  ALT  21 [0 - 41]  /  AlkPhos  85 [30 - 115]  08-23      Urinalysis Basic - ( 23 Aug 2023 06:49 )    Color: x / Appearance: x / SG: x / pH: x  Gluc: 144 mg/dL / Ketone: x  / Bili: x / Urobili: x   Blood: x / Protein: x / Nitrite: x   Leuk Esterase: x / RBC: x / WBC x   Sq Epi: x / Non Sq Epi: x / Bacteria: x        RADIOLOGY & ADDITIONAL TESTS:  CXR:   EKG:    Allergies    No Known Allergies    Intolerances      MEDICATIONS  (STANDING):  albuterol/ipratropium for Nebulization 3 milliLiter(s) Nebulizer every 6 hours  aspirin  chewable 81 milliGRAM(s) Oral daily  atorvastatin 80 milliGRAM(s) Oral at bedtime  chlorhexidine 2% Cloths 1 Application(s) Topical <User Schedule>  dextrose 5%. 1000 milliLiter(s) (50 mL/Hr) IV Continuous <Continuous>  dextrose 50% Injectable 25 Gram(s) IV Push once  enoxaparin Injectable 60 milliGRAM(s) SubCutaneous every 12 hours  glucagon  Injectable 1 milliGRAM(s) IntraMuscular once  insulin glargine Injectable (LANTUS) 22 Unit(s) SubCutaneous at bedtime  insulin lispro (ADMELOG) corrective regimen sliding scale   SubCutaneous three times a day before meals  insulin lispro Injectable (ADMELOG) 8 Unit(s) SubCutaneous three times a day before meals  iron sucrose IVPB 200 milliGRAM(s) IV Intermittent every 24 hours  metoprolol tartrate 50 milliGRAM(s) Oral once  pantoprazole    Tablet 40 milliGRAM(s) Oral before breakfast  potassium chloride   Powder 40 milliEquivalent(s) Oral once  sacubitril 49 mG/valsartan 51 mG 1 Tablet(s) Oral two times a day  sodium chloride 0.9%. 1000 milliLiter(s) (100 mL/Hr) IV Continuous <Continuous>    MEDICATIONS  (PRN):  acetaminophen     Tablet .. 650 milliGRAM(s) Oral every 6 hours PRN Temp greater or equal to 38C (100.4F), Mild Pain (1 - 3)  dextrose Oral Gel 15 Gram(s) Oral once PRN Blood Glucose LESS THAN 70 milliGRAM(s)/deciliter    Home Medications:  aspirin 81 mg oral tablet, chewable: 1 chewed once a day (13 Aug 2023 19:53)  atorvastatin 10 mg oral tablet: 1 orally once a day (at bedtime) (13 Aug 2023 19:52)  Jardiance 10 mg oral tablet: 1 orally once a day (13 Aug 2023 19:53)  MetFORMIN (Eqv-Glumetza) 500 mg oral tablet, extended release: 1 orally 2 times a day (13 Aug 2023 19:51)  pioglitazone 30 mg oral tablet: 1 orally once a day (13 Aug 2023 19:52)      Pharmacologic DVT Prophylaxis [] YES, [] NO: Contraindication:  SCD's: YES b/l    GI Prophylaxis:     Pre-Op Beta-Blockers: [] Yes, [] No: contraindication:  Pre-Op CCB: [] Yes, [] No: contraindication:  Pre-Op Aspirin:  [] Yes, [] No: contraindication:  Pre-Op Statin:  [] Yes, [] No: contraindication:    Ambulation/Activity Status: ambulate with assistance    Assessment/Plan:  68y Female pre-op CABG  - Case and plan discussed with CTU Intensivist and CT Surgeon - Dr. Simental/Jaymie/Jim/Reshma  - Continue CTU supportive care and ongoing plan of care as per medicine  - Continue DVT/GI prophylaxis  - Incentive Spirometry 10 times an hour  - Continue to advance physical activity as tolerated and continue PT/OT as directed  1. CAD s/p CABG: Continue ASA, statin, BB  2. HTN:   3. Post-op A. Fib ppx:   4. COPD/Hypoxia:   5. DM/Glucose Control:      PREOPERATIVE PROCEDURE(s): CABG               HD #  10                     SURGEON(s): CHRIS Fernandez      SUBJECTIVE ASSESSMENT:68yFemale patient seen and examined at bedside.    Vital Signs Last 24 Hrs  T(F): 99 (23 Aug 2023 04:00), Max: 99.6 (22 Aug 2023 20:30)  HR: 95 (23 Aug 2023 04:00) (95 - 105)  BP: 128/62 (23 Aug 2023 04:00) (123/66 - 134/59)  RR: 18 (22 Aug 2023 20:30) (18 - 18)  SpO2: --    I&O's Detail    22 Aug 2023 07:01  -  23 Aug 2023 07:00  --------------------------------------------------------  IN:    Oral Fluid: 859 mL  Total IN: 859 mL    OUT:    Voided (mL): 900 mL  Total OUT: 900 mL        Net: I&O's Detail    21 Aug 2023 07:01  -  22 Aug 2023 07:00  --------------------------------------------------------  Total NET: 157 mL      22 Aug 2023 07:01  -  23 Aug 2023 07:00  --------------------------------------------------------  Total NET: -41 mL        CAPILLARY BLOOD GLUCOSE      POCT Blood Glucose.: 141 mg/dL (23 Aug 2023 07:48)  POCT Blood Glucose.: 237 mg/dL (22 Aug 2023 21:16)  POCT Blood Glucose.: 154 mg/dL (22 Aug 2023 16:36)  POCT Blood Glucose.: 226 mg/dL (22 Aug 2023 11:49)    A1C with Estimated Average Glucose Result: 8.2 % (08-14-23 @ 04:40)      Physical Exam:  General: NAD; A&Ox3  Cardiac: S1/S2, RRR, no murmur, no rubs  Lungs: unlabored respirations, CTA b/l, no wheeze, no rales, no crackles  Abdomen: Soft/NT/ND; positive bowel sounds x 4  Incisions: Incisions clean/dry/intact  Extremities: No edema b/l lower extremities; good capillary refill; no cyanosis; palpable 1+ pedal pulses b/l      BOWEL MOVEMENT:  [] YES [X] NO, If No, Timing since last BM Day #1         LABS:                        8.1<L>  5.36  )-----------( 262      ( 23 Aug 2023 06:49 )             27.5<L>                        8.3<L>  5.78  )-----------( 216      ( 22 Aug 2023 07:06 )             28.3<L>    08-23    143  |  107  |  4<L>  ----------------------------<  144<H>  3.7   |  28  |  0.5<L>  08-22    145  |  103  |  6<L>  ----------------------------<  121<H>  3.1<L>   |  31  |  <0.5<L>    Ca    8.6      23 Aug 2023 06:49  Mg     1.9     08-23    TPro  6.1 [6.0 - 8.0]  /  Alb  3.0<L> [3.5 - 5.2]  /  TBili  0.3 [0.2 - 1.2]  /  DBili  x   /  AST  21 [0 - 41]  /  ALT  21 [0 - 41]  /  AlkPhos  85 [30 - 115]  08-23      Urinalysis Basic - ( 23 Aug 2023 06:49 )    Color: x / Appearance: x / SG: x / pH: x  Gluc: 144 mg/dL / Ketone: x  / Bili: x / Urobili: x   Blood: x / Protein: x / Nitrite: x   Leuk Esterase: x / RBC: x / WBC x   Sq Epi: x / Non Sq Epi: x / Bacteria: x        RADIOLOGY & ADDITIONAL TESTS:  CXR:   < from: Xray Chest 1 View- PORTABLE-Urgent (Xray Chest 1 View- PORTABLE-Urgent .) (08.22.23 @ 23:24) >  NTERPRETATION:  Clinical History / Reason for exam: Hypoxia    Comparison : Chest radiograph earlier the same day.    Technique/Positioning: Frontal portable.    Findings:    Support devices: Telemetry leads    Cardiac/mediastinum/hilum: Cardiomegaly    Lung parenchyma/Pleura: Bilateral opacifications    Skeleton/soft tissues: Unchanged    Impression:    CHF without difference.        EKG:  < from: 12 Lead ECG (08.18.23 @ 18:41) >  Ventricular Rate 75 BPM    Atrial Rate 75 BPM    P-R Interval 140 ms    QRS Duration 160 ms    Q-T Interval 422 ms    QTC Calculation(Bazett) 471 ms    P Axis 67 degrees    R Axis -39 degrees    T Axis 130 degrees    Diagnosis Line Normal sinus rhythm  Left axis deviation  Left bundle branch block  Abnormal ECG        Allergies    No Known Allergies    Intolerances      MEDICATIONS  (STANDING):  albuterol/ipratropium for Nebulization 3 milliLiter(s) Nebulizer every 6 hours  aspirin  chewable 81 milliGRAM(s) Oral daily  atorvastatin 80 milliGRAM(s) Oral at bedtime  chlorhexidine 2% Cloths 1 Application(s) Topical <User Schedule>  dextrose 5%. 1000 milliLiter(s) (50 mL/Hr) IV Continuous <Continuous>  dextrose 50% Injectable 25 Gram(s) IV Push once  enoxaparin Injectable 60 milliGRAM(s) SubCutaneous every 12 hours  glucagon  Injectable 1 milliGRAM(s) IntraMuscular once  insulin glargine Injectable (LANTUS) 22 Unit(s) SubCutaneous at bedtime  insulin lispro (ADMELOG) corrective regimen sliding scale   SubCutaneous three times a day before meals  insulin lispro Injectable (ADMELOG) 8 Unit(s) SubCutaneous three times a day before meals  iron sucrose IVPB 200 milliGRAM(s) IV Intermittent every 24 hours  metoprolol tartrate 50 milliGRAM(s) Oral once  pantoprazole    Tablet 40 milliGRAM(s) Oral before breakfast  potassium chloride   Powder 40 milliEquivalent(s) Oral once  sacubitril 49 mG/valsartan 51 mG 1 Tablet(s) Oral two times a day  sodium chloride 0.9%. 1000 milliLiter(s) (100 mL/Hr) IV Continuous <Continuous>    MEDICATIONS  (PRN):  acetaminophen     Tablet .. 650 milliGRAM(s) Oral every 6 hours PRN Temp greater or equal to 38C (100.4F), Mild Pain (1 - 3)  dextrose Oral Gel 15 Gram(s) Oral once PRN Blood Glucose LESS THAN 70 milliGRAM(s)/deciliter    Home Medications:  aspirin 81 mg oral tablet, chewable: 1 chewed once a day (13 Aug 2023 19:53)  atorvastatin 10 mg oral tablet: 1 orally once a day (at bedtime) (13 Aug 2023 19:52)  Jardiance 10 mg oral tablet: 1 orally once a day (13 Aug 2023 19:53)  MetFORMIN (Eqv-Glumetza) 500 mg oral tablet, extended release: 1 orally 2 times a day (13 Aug 2023 19:51)  pioglitazone 30 mg oral tablet: 1 orally once a day (13 Aug 2023 19:52)      Pharmacologic DVT Prophylaxis [X] YES, [] NO: Contraindication:  SCD's: YES b/l    GI Prophylaxis: protonix    Pre-Op Beta-Blockers: [X] Yes, [] No: contraindication:  Pre-Op Aspirin:  [X] Yes, [] No: contraindication:  Pre-Op Statin:  [X] Yes, [] No: contraindication:    Ambulation/Activity Status: ambulate with assistance    Assessment/Plan:  68y Female pre-op CABG  - Case and plan discussed with CTU Intensivist and CT Surgeon - Dr. Simental/Jaymie/Jim/Reshma  - Continue CTU supportive care and ongoing plan of care as per medicine  - Continue DVT/GI prophylaxis  - Incentive Spirometry 10 times an hour  - Continue to advance physical activity as tolerated and continue PT/OT as directed  1. CAD pre-op CABG: Continue ASA, statin, BB  2. HTN:   3. Post-op A. Fib ppx:   4. COPD/Hypoxia:   5. DM/Glucose Control:      PREOPERATIVE PROCEDURE(s): CABG               HD #  10                     SURGEON(s): CHRIS Fernandez      SUBJECTIVE ASSESSMENT:68yFemale patient seen and examined at bedside.    Vital Signs Last 24 Hrs  T(F): 99 (23 Aug 2023 04:00), Max: 99.6 (22 Aug 2023 20:30)  HR: 95 (23 Aug 2023 04:00) (95 - 105)  BP: 128/62 (23 Aug 2023 04:00) (123/66 - 134/59)  RR: 18 (22 Aug 2023 20:30) (18 - 18)  SpO2: 95%    I&O's Detail    22 Aug 2023 07:01  -  23 Aug 2023 07:00  --------------------------------------------------------  IN:    Oral Fluid: 859 mL  Total IN: 859 mL    OUT:    Voided (mL): 900 mL  Total OUT: 900 mL        Net: I&O's Detail    21 Aug 2023 07:01  -  22 Aug 2023 07:00  --------------------------------------------------------  Total NET: 157 mL      22 Aug 2023 07:01  -  23 Aug 2023 07:00  --------------------------------------------------------  Total NET: -41 mL        CAPILLARY BLOOD GLUCOSE      POCT Blood Glucose.: 141 mg/dL (23 Aug 2023 07:48)  POCT Blood Glucose.: 237 mg/dL (22 Aug 2023 21:16)  POCT Blood Glucose.: 154 mg/dL (22 Aug 2023 16:36)  POCT Blood Glucose.: 226 mg/dL (22 Aug 2023 11:49)    A1C with Estimated Average Glucose Result: 8.2 % (08-14-23 @ 04:40)      Physical Exam:  General: NAD; A&Ox3  Cardiac: S1/S2, RRR, no murmur, no rubs  Lungs: unlabored respirations, CTA b/l, no wheeze, no rales, no crackles  Abdomen: Soft/NT/ND; positive bowel sounds x 4  Incisions: Incisions clean/dry/intact  Extremities: No edema b/l lower extremities; good capillary refill; no cyanosis; palpable 1+ pedal pulses b/l      BOWEL MOVEMENT:  [] YES [X] NO, If No, Timing since last BM Day #1         LABS:                        8.1<L>  5.36  )-----------( 262      ( 23 Aug 2023 06:49 )             27.5<L>                        8.3<L>  5.78  )-----------( 216      ( 22 Aug 2023 07:06 )             28.3<L>    08-23    143  |  107  |  4<L>  ----------------------------<  144<H>  3.7   |  28  |  0.5<L>  08-22    145  |  103  |  6<L>  ----------------------------<  121<H>  3.1<L>   |  31  |  <0.5<L>    Ca    8.6      23 Aug 2023 06:49  Mg     1.9     08-23    TPro  6.1 [6.0 - 8.0]  /  Alb  3.0<L> [3.5 - 5.2]  /  TBili  0.3 [0.2 - 1.2]  /  DBili  x   /  AST  21 [0 - 41]  /  ALT  21 [0 - 41]  /  AlkPhos  85 [30 - 115]  08-23      Urinalysis Basic - ( 23 Aug 2023 06:49 )    Color: x / Appearance: x / SG: x / pH: x  Gluc: 144 mg/dL / Ketone: x  / Bili: x / Urobili: x   Blood: x / Protein: x / Nitrite: x   Leuk Esterase: x / RBC: x / WBC x   Sq Epi: x / Non Sq Epi: x / Bacteria: x        RADIOLOGY & ADDITIONAL TESTS:  CXR:   < from: Xray Chest 1 View- PORTABLE-Urgent (Xray Chest 1 View- PORTABLE-Urgent .) (08.22.23 @ 23:24) >  NTERPRETATION:  Clinical History / Reason for exam: Hypoxia    Comparison : Chest radiograph earlier the same day.    Technique/Positioning: Frontal portable.    Findings:    Support devices: Telemetry leads    Cardiac/mediastinum/hilum: Cardiomegaly    Lung parenchyma/Pleura: Bilateral opacifications    Skeleton/soft tissues: Unchanged    Impression:    CHF without difference.        EKG:  < from: 12 Lead ECG (08.18.23 @ 18:41) >  Ventricular Rate 75 BPM    Atrial Rate 75 BPM    P-R Interval 140 ms    QRS Duration 160 ms    Q-T Interval 422 ms    QTC Calculation(Bazett) 471 ms    P Axis 67 degrees    R Axis -39 degrees    T Axis 130 degrees    Diagnosis Line Normal sinus rhythm  Left axis deviation  Left bundle branch block  Abnormal ECG        Allergies    No Known Allergies    Intolerances      MEDICATIONS  (STANDING):  albuterol/ipratropium for Nebulization 3 milliLiter(s) Nebulizer every 6 hours  aspirin  chewable 81 milliGRAM(s) Oral daily  atorvastatin 80 milliGRAM(s) Oral at bedtime  chlorhexidine 2% Cloths 1 Application(s) Topical <User Schedule>  dextrose 5%. 1000 milliLiter(s) (50 mL/Hr) IV Continuous <Continuous>  dextrose 50% Injectable 25 Gram(s) IV Push once  enoxaparin Injectable 60 milliGRAM(s) SubCutaneous every 12 hours  glucagon  Injectable 1 milliGRAM(s) IntraMuscular once  insulin glargine Injectable (LANTUS) 22 Unit(s) SubCutaneous at bedtime  insulin lispro (ADMELOG) corrective regimen sliding scale   SubCutaneous three times a day before meals  insulin lispro Injectable (ADMELOG) 8 Unit(s) SubCutaneous three times a day before meals  iron sucrose IVPB 200 milliGRAM(s) IV Intermittent every 24 hours  metoprolol tartrate 50 milliGRAM(s) Oral once  pantoprazole    Tablet 40 milliGRAM(s) Oral before breakfast  potassium chloride   Powder 40 milliEquivalent(s) Oral once  sacubitril 49 mG/valsartan 51 mG 1 Tablet(s) Oral two times a day  sodium chloride 0.9%. 1000 milliLiter(s) (100 mL/Hr) IV Continuous <Continuous>    MEDICATIONS  (PRN):  acetaminophen     Tablet .. 650 milliGRAM(s) Oral every 6 hours PRN Temp greater or equal to 38C (100.4F), Mild Pain (1 - 3)  dextrose Oral Gel 15 Gram(s) Oral once PRN Blood Glucose LESS THAN 70 milliGRAM(s)/deciliter    Home Medications:  aspirin 81 mg oral tablet, chewable: 1 chewed once a day (13 Aug 2023 19:53)  atorvastatin 10 mg oral tablet: 1 orally once a day (at bedtime) (13 Aug 2023 19:52)  Jardiance 10 mg oral tablet: 1 orally once a day (13 Aug 2023 19:53)  MetFORMIN (Eqv-Glumetza) 500 mg oral tablet, extended release: 1 orally 2 times a day (13 Aug 2023 19:51)  pioglitazone 30 mg oral tablet: 1 orally once a day (13 Aug 2023 19:52)      Pharmacologic DVT Prophylaxis [X] YES, [] NO: Contraindication:  SCD's: YES b/l    GI Prophylaxis: protonix    Pre-Op Beta-Blockers: [X] Yes, [] No: contraindication:  Pre-Op Aspirin:  [X] Yes, [] No: contraindication:  Pre-Op Statin:  [X] Yes, [] No: contraindication:    Ambulation/Activity Status: ambulate with assistance    Assessment/Plan:  68y Female pre-op CABG  - Case and plan discussed with CTU Intensivist and CT Surgeon - Dr. Simental/Jaymie/Jim/Reshma  - Continue CTU supportive care and ongoing plan of care as per medicine  - Continue DVT/GI prophylaxis  - Incentive Spirometry 10 times an hour  - Continue to advance physical activity as tolerated and continue PT/OT as directed  1. CAD pre-op CABG: Continue ASA, statin, BB.   2. HTN:   3. Post-op A. Fib ppx:   4. COPD/Hypoxia:   5. DM/Glucose Control:      PREOPERATIVE PROCEDURE(s): CABG               HD #  10                     SURGEON(s): CHRIS Fernandez      SUBJECTIVE ASSESSMENT:68yFemale patient seen and examined at bedside. Portuguese speaking, spoke with patient though verified  services.    Vital Signs Last 24 Hrs  T(F): 99 (23 Aug 2023 04:00), Max: 99.6 (22 Aug 2023 20:30)  HR: 95 (23 Aug 2023 04:00) (95 - 105)  BP: 128/62 (23 Aug 2023 04:00) (123/66 - 134/59)  RR: 18 (22 Aug 2023 20:30) (18 - 18)  SpO2: 95%    I&O's Detail    22 Aug 2023 07:01  -  23 Aug 2023 07:00  --------------------------------------------------------  IN:    Oral Fluid: 859 mL  Total IN: 859 mL    OUT:    Voided (mL): 900 mL  Total OUT: 900 mL        Net: I&O's Detail    21 Aug 2023 07:01  -  22 Aug 2023 07:00  --------------------------------------------------------  Total NET: 157 mL      22 Aug 2023 07:01  -  23 Aug 2023 07:00  --------------------------------------------------------  Total NET: -41 mL        CAPILLARY BLOOD GLUCOSE      POCT Blood Glucose.: 141 mg/dL (23 Aug 2023 07:48)  POCT Blood Glucose.: 237 mg/dL (22 Aug 2023 21:16)  POCT Blood Glucose.: 154 mg/dL (22 Aug 2023 16:36)  POCT Blood Glucose.: 226 mg/dL (22 Aug 2023 11:49)    A1C with Estimated Average Glucose Result: 8.2 % (08-14-23 @ 04:40)      Physical Exam:  General: NAD; A&Ox3  Cardiac: S1/S2, RRR, no murmur, no rubs  Lungs: unlabored respirations, CTA b/l, no wheeze, no rales, no crackles  Abdomen: Soft/NT/ND; positive bowel sounds x 4  Incisions: Incisions clean/dry/intact  Extremities: No edema b/l lower extremities; good capillary refill; no cyanosis; palpable 1+ pedal pulses b/l      BOWEL MOVEMENT:  [] YES [X] NO, If No, Timing since last BM Day #1         LABS:                        8.1<L>  5.36  )-----------( 262      ( 23 Aug 2023 06:49 )             27.5<L>                        8.3<L>  5.78  )-----------( 216      ( 22 Aug 2023 07:06 )             28.3<L>    08-23    143  |  107  |  4<L>  ----------------------------<  144<H>  3.7   |  28  |  0.5<L>  08-22    145  |  103  |  6<L>  ----------------------------<  121<H>  3.1<L>   |  31  |  <0.5<L>    Ca    8.6      23 Aug 2023 06:49  Mg     1.9     08-23    TPro  6.1 [6.0 - 8.0]  /  Alb  3.0<L> [3.5 - 5.2]  /  TBili  0.3 [0.2 - 1.2]  /  DBili  x   /  AST  21 [0 - 41]  /  ALT  21 [0 - 41]  /  AlkPhos  85 [30 - 115]  08-23      Urinalysis Basic - ( 23 Aug 2023 06:49 )    Color: x / Appearance: x / SG: x / pH: x  Gluc: 144 mg/dL / Ketone: x  / Bili: x / Urobili: x   Blood: x / Protein: x / Nitrite: x   Leuk Esterase: x / RBC: x / WBC x   Sq Epi: x / Non Sq Epi: x / Bacteria: x        RADIOLOGY & ADDITIONAL TESTS:  CXR:   < from: Xray Chest 1 View- PORTABLE-Urgent (Xray Chest 1 View- PORTABLE-Urgent .) (08.22.23 @ 23:24) >  NTERPRETATION:  Clinical History / Reason for exam: Hypoxia    Comparison : Chest radiograph earlier the same day.    Technique/Positioning: Frontal portable.    Findings:    Support devices: Telemetry leads    Cardiac/mediastinum/hilum: Cardiomegaly    Lung parenchyma/Pleura: Bilateral opacifications    Skeleton/soft tissues: Unchanged    Impression:    CHF without difference.        EKG:  < from: 12 Lead ECG (08.18.23 @ 18:41) >  Ventricular Rate 75 BPM    Atrial Rate 75 BPM    P-R Interval 140 ms    QRS Duration 160 ms    Q-T Interval 422 ms    QTC Calculation(Bazett) 471 ms    P Axis 67 degrees    R Axis -39 degrees    T Axis 130 degrees    Diagnosis Line Normal sinus rhythm  Left axis deviation  Left bundle branch block  Abnormal ECG        Allergies    No Known Allergies    Intolerances      MEDICATIONS  (STANDING):  albuterol/ipratropium for Nebulization 3 milliLiter(s) Nebulizer every 6 hours  aspirin  chewable 81 milliGRAM(s) Oral daily  atorvastatin 80 milliGRAM(s) Oral at bedtime  chlorhexidine 2% Cloths 1 Application(s) Topical <User Schedule>  dextrose 5%. 1000 milliLiter(s) (50 mL/Hr) IV Continuous <Continuous>  dextrose 50% Injectable 25 Gram(s) IV Push once  enoxaparin Injectable 60 milliGRAM(s) SubCutaneous every 12 hours  glucagon  Injectable 1 milliGRAM(s) IntraMuscular once  insulin glargine Injectable (LANTUS) 22 Unit(s) SubCutaneous at bedtime  insulin lispro (ADMELOG) corrective regimen sliding scale   SubCutaneous three times a day before meals  insulin lispro Injectable (ADMELOG) 8 Unit(s) SubCutaneous three times a day before meals  iron sucrose IVPB 200 milliGRAM(s) IV Intermittent every 24 hours  metoprolol tartrate 50 milliGRAM(s) Oral once  pantoprazole    Tablet 40 milliGRAM(s) Oral before breakfast  potassium chloride   Powder 40 milliEquivalent(s) Oral once  sacubitril 49 mG/valsartan 51 mG 1 Tablet(s) Oral two times a day  sodium chloride 0.9%. 1000 milliLiter(s) (100 mL/Hr) IV Continuous <Continuous>    MEDICATIONS  (PRN):  acetaminophen     Tablet .. 650 milliGRAM(s) Oral every 6 hours PRN Temp greater or equal to 38C (100.4F), Mild Pain (1 - 3)  dextrose Oral Gel 15 Gram(s) Oral once PRN Blood Glucose LESS THAN 70 milliGRAM(s)/deciliter    Home Medications:  aspirin 81 mg oral tablet, chewable: 1 chewed once a day (13 Aug 2023 19:53)  atorvastatin 10 mg oral tablet: 1 orally once a day (at bedtime) (13 Aug 2023 19:52)  Jardiance 10 mg oral tablet: 1 orally once a day (13 Aug 2023 19:53)  MetFORMIN (Eqv-Glumetza) 500 mg oral tablet, extended release: 1 orally 2 times a day (13 Aug 2023 19:51)  pioglitazone 30 mg oral tablet: 1 orally once a day (13 Aug 2023 19:52)      Pharmacologic DVT Prophylaxis [X] YES, [] NO: Contraindication:  SCD's: YES b/l    GI Prophylaxis: protonix    Pre-Op Beta-Blockers: [X] Yes, [] No: contraindication:  Pre-Op Aspirin:  [X] Yes, [] No: contraindication:  Pre-Op Statin:  [X] Yes, [] No: contraindication:    Ambulation/Activity Status: ambulate with assistance    Assessment/Plan:  68y Female pre-op CABG  - Case and plan discussed with CTU Intensivist and CT Surgeon - Dr. Simental/Jaymie/Jim/Reshma  - Continue CTU supportive care and ongoing plan of care as per medicine  - Continue DVT/GI prophylaxis  - Incentive Spirometry 10 times an hour  - Continue to advance physical activity as tolerated and continue PT/OT as directed  1. CAD pre-op CABG: Continue ASA, statin, BB. Pre-op work-up in progress. PFTs completed, 26% predicted.   2. HTN: continue current medications   - Spoke with patient and son, Derrick at bedside. The patient and family are apprehensive about open heart surgery, would like to speak with cardiology regarding cardiac stents.

## 2023-08-23 NOTE — PROGRESS NOTE ADULT - SUBJECTIVE AND OBJECTIVE BOX
SUBJECTIVE:  HPI:  67yo F w/a PMH of Type II DM, HTN, DLD, obesity, lateral epicondylitis presenting to the ED w/ Left Arm pain. At time of my exam, patient intubated so spoke with the son at bedside. This arm started about two months ago and has continued to progress but in the last few days became severe. She went to the ED a few days ago, was given pain medications then sent home. Per son, pain still continued to progress after this. No fevers, chills, nausea, vomiting, diarrhea, constipation, SOB, CP.   On admission to the ED today, she presented with severe left arm pain w findings of LBBB on ECG (unsure if new or not). Code STEMI was called and then canceled because troponin negative and no chest pain. Patient was then noted to have acute mental status change, SOB, lactate elevation, and acidosis on abg..Patient had two seizure episodes and was treated with benzodiazepine. Intubated by ER for airway protection.     On Admission  Vitals: /64, HR 75, RR 20, T 98.5, satting 99 percent on RA   Labs: WBC 14.86, Hgb 10, , Na 139, K 5.4, BUN 12, Cr .6, Trop <.01 --> .13 on repeat  Imaging:   CXR -  Patchy bibasilar opacities, right greater than left.  CT Brain Stroke protocol - No evidence of acute transcortical infarct, hydrocephalus, acute intracranial hemorrhage, or mass effect.  CT brain perfusion: No evidence of acute infarct or ischemia.  CTA neck: No stenosis. No dissection.  CTA brain: No stenosis or aneurysm.  CTA Neck: The distal internal carotid arteries are patent. The [place] of [place] is normal in appearance. The visualized cerebral arteries are unremarkable.The vertebrobasilar junction and basilar artery are normal.    In the ED, given Levaquin. Keppra, started on propofol  Admitted to MICU for airway monitoring  (13 Aug 2023 19:09)      Patient is a 68y old Female who presents with a chief complaint of Arm Pain, AMS (22 Aug 2023 15:14)    Currently admitted to medicine with the primary diagnosis of Seizure       Today is hospital day 10d.     Overnight :   the MAGALY was called was called for saturation of 89% on RA when patient is asleep, patient reported improvement in her SOB, in a setting of pulmonary edema and PE, patient put on 1L NC, CT earlier noted, repeat cxr done. patient is currently on RA       PAST MEDICAL & SURGICAL HISTORY  Diabetes        ALLERGIES:  No Known Allergies    MEDICATIONS:  ACTIVE MEDICATIONS  acetaminophen     Tablet .. 650 milliGRAM(s) Oral every 6 hours PRN  albuterol/ipratropium for Nebulization 3 milliLiter(s) Nebulizer every 6 hours  aspirin  chewable 81 milliGRAM(s) Oral daily  atorvastatin 80 milliGRAM(s) Oral at bedtime  chlorhexidine 2% Cloths 1 Application(s) Topical <User Schedule>  dextrose 5%. 1000 milliLiter(s) IV Continuous <Continuous>  dextrose 50% Injectable 25 Gram(s) IV Push once  dextrose Oral Gel 15 Gram(s) Oral once PRN  enoxaparin Injectable 60 milliGRAM(s) SubCutaneous every 12 hours  glucagon  Injectable 1 milliGRAM(s) IntraMuscular once  insulin glargine Injectable (LANTUS) 22 Unit(s) SubCutaneous at bedtime  insulin lispro (ADMELOG) corrective regimen sliding scale   SubCutaneous three times a day before meals  insulin lispro Injectable (ADMELOG) 8 Unit(s) SubCutaneous three times a day before meals  iron sucrose IVPB 200 milliGRAM(s) IV Intermittent every 24 hours  metoprolol tartrate 50 milliGRAM(s) Oral once  pantoprazole    Tablet 40 milliGRAM(s) Oral before breakfast  potassium chloride   Powder 40 milliEquivalent(s) Oral once  sacubitril 49 mG/valsartan 51 mG 1 Tablet(s) Oral two times a day  sodium chloride 0.9%. 1000 milliLiter(s) IV Continuous <Continuous>      VITALS:   T(F): 99  HR: 95  BP: 128/62  RR: 18  SpO2: --    LABS:                        8.1    5.36  )-----------( 262      ( 23 Aug 2023 06:49 )             27.5     08-23    143  |  107  |  4<L>  ----------------------------<  144<H>  3.7   |  28  |  0.5<L>    Ca    8.6      23 Aug 2023 06:49  Mg     1.9     08-23    TPro  6.1  /  Alb  3.0<L>  /  TBili  0.3  /  DBili  x   /  AST  21  /  ALT  21  /  AlkPhos  85  08-23      Urinalysis Basic - ( 23 Aug 2023 06:49 )    Color: x / Appearance: x / SG: x / pH: x  Gluc: 144 mg/dL / Ketone: x  / Bili: x / Urobili: x   Blood: x / Protein: x / Nitrite: x   Leuk Esterase: x / RBC: x / WBC x   Sq Epi: x / Non Sq Epi: x / Bacteria: x                    PHYSICAL EXAM:

## 2023-08-24 LAB
ALBUMIN SERPL ELPH-MCNC: 3.1 G/DL — LOW (ref 3.5–5.2)
ALP SERPL-CCNC: 89 U/L — SIGNIFICANT CHANGE UP (ref 30–115)
ALT FLD-CCNC: 22 U/L — SIGNIFICANT CHANGE UP (ref 0–41)
ANION GAP SERPL CALC-SCNC: 14 MMOL/L — SIGNIFICANT CHANGE UP (ref 7–14)
AST SERPL-CCNC: 26 U/L — SIGNIFICANT CHANGE UP (ref 0–41)
BASOPHILS # BLD AUTO: 0.03 K/UL — SIGNIFICANT CHANGE UP (ref 0–0.2)
BASOPHILS NFR BLD AUTO: 0.4 % — SIGNIFICANT CHANGE UP (ref 0–1)
BILIRUB SERPL-MCNC: 0.3 MG/DL — SIGNIFICANT CHANGE UP (ref 0.2–1.2)
BUN SERPL-MCNC: 5 MG/DL — LOW (ref 10–20)
CALCIUM SERPL-MCNC: 8.5 MG/DL — SIGNIFICANT CHANGE UP (ref 8.4–10.4)
CHLORIDE SERPL-SCNC: 107 MMOL/L — SIGNIFICANT CHANGE UP (ref 98–110)
CO2 SERPL-SCNC: 24 MMOL/L — SIGNIFICANT CHANGE UP (ref 17–32)
CREAT SERPL-MCNC: <0.5 MG/DL — LOW (ref 0.7–1.5)
EGFR: 106 ML/MIN/1.73M2 — SIGNIFICANT CHANGE UP
EOSINOPHIL # BLD AUTO: 0.1 K/UL — SIGNIFICANT CHANGE UP (ref 0–0.7)
EOSINOPHIL NFR BLD AUTO: 1.4 % — SIGNIFICANT CHANGE UP (ref 0–8)
GLUCOSE BLDC GLUCOMTR-MCNC: 163 MG/DL — HIGH (ref 70–99)
GLUCOSE BLDC GLUCOMTR-MCNC: 175 MG/DL — HIGH (ref 70–99)
GLUCOSE BLDC GLUCOMTR-MCNC: 179 MG/DL — HIGH (ref 70–99)
GLUCOSE BLDC GLUCOMTR-MCNC: 230 MG/DL — HIGH (ref 70–99)
GLUCOSE SERPL-MCNC: 150 MG/DL — HIGH (ref 70–99)
HCT VFR BLD CALC: 28.6 % — LOW (ref 37–47)
HGB BLD-MCNC: 8.3 G/DL — LOW (ref 12–16)
IMM GRANULOCYTES NFR BLD AUTO: 1.4 % — HIGH (ref 0.1–0.3)
LYMPHOCYTES # BLD AUTO: 1.91 K/UL — SIGNIFICANT CHANGE UP (ref 1.2–3.4)
LYMPHOCYTES # BLD AUTO: 27.4 % — SIGNIFICANT CHANGE UP (ref 20.5–51.1)
MAGNESIUM SERPL-MCNC: 1.9 MG/DL — SIGNIFICANT CHANGE UP (ref 1.8–2.4)
MCHC RBC-ENTMCNC: 23.3 PG — LOW (ref 27–31)
MCHC RBC-ENTMCNC: 29 G/DL — LOW (ref 32–37)
MCV RBC AUTO: 80.3 FL — LOW (ref 81–99)
MONOCYTES # BLD AUTO: 0.43 K/UL — SIGNIFICANT CHANGE UP (ref 0.1–0.6)
MONOCYTES NFR BLD AUTO: 6.2 % — SIGNIFICANT CHANGE UP (ref 1.7–9.3)
NEUTROPHILS # BLD AUTO: 4.41 K/UL — SIGNIFICANT CHANGE UP (ref 1.4–6.5)
NEUTROPHILS NFR BLD AUTO: 63.2 % — SIGNIFICANT CHANGE UP (ref 42.2–75.2)
NRBC # BLD: 0 /100 WBCS — SIGNIFICANT CHANGE UP (ref 0–0)
PLATELET # BLD AUTO: 269 K/UL — SIGNIFICANT CHANGE UP (ref 130–400)
PMV BLD: 10.4 FL — SIGNIFICANT CHANGE UP (ref 7.4–10.4)
POTASSIUM SERPL-MCNC: 3.5 MMOL/L — SIGNIFICANT CHANGE UP (ref 3.5–5)
POTASSIUM SERPL-SCNC: 3.5 MMOL/L — SIGNIFICANT CHANGE UP (ref 3.5–5)
PROT SERPL-MCNC: 6.4 G/DL — SIGNIFICANT CHANGE UP (ref 6–8)
RBC # BLD: 3.56 M/UL — LOW (ref 4.2–5.4)
RBC # FLD: 21.5 % — HIGH (ref 11.5–14.5)
SODIUM SERPL-SCNC: 145 MMOL/L — SIGNIFICANT CHANGE UP (ref 135–146)
WBC # BLD: 6.98 K/UL — SIGNIFICANT CHANGE UP (ref 4.8–10.8)
WBC # FLD AUTO: 6.98 K/UL — SIGNIFICANT CHANGE UP (ref 4.8–10.8)

## 2023-08-24 PROCEDURE — 71045 X-RAY EXAM CHEST 1 VIEW: CPT | Mod: 26

## 2023-08-24 PROCEDURE — 93306 TTE W/DOPPLER COMPLETE: CPT | Mod: 26

## 2023-08-24 PROCEDURE — 99232 SBSQ HOSP IP/OBS MODERATE 35: CPT

## 2023-08-24 PROCEDURE — 93970 EXTREMITY STUDY: CPT | Mod: 26

## 2023-08-24 PROCEDURE — 99233 SBSQ HOSP IP/OBS HIGH 50: CPT

## 2023-08-24 RX ORDER — BUDESONIDE AND FORMOTEROL FUMARATE DIHYDRATE 160; 4.5 UG/1; UG/1
2 AEROSOL RESPIRATORY (INHALATION)
Refills: 0 | Status: DISCONTINUED | OUTPATIENT
Start: 2023-08-24 | End: 2023-08-30

## 2023-08-24 RX ORDER — FUROSEMIDE 40 MG
40 TABLET ORAL
Refills: 0 | Status: DISCONTINUED | OUTPATIENT
Start: 2023-08-24 | End: 2023-08-25

## 2023-08-24 RX ORDER — MAGNESIUM SULFATE 500 MG/ML
1 VIAL (ML) INJECTION ONCE
Refills: 0 | Status: COMPLETED | OUTPATIENT
Start: 2023-08-24 | End: 2023-08-24

## 2023-08-24 RX ORDER — POTASSIUM CHLORIDE 20 MEQ
40 PACKET (EA) ORAL ONCE
Refills: 0 | Status: COMPLETED | OUTPATIENT
Start: 2023-08-24 | End: 2023-08-24

## 2023-08-24 RX ORDER — FUROSEMIDE 40 MG
40 TABLET ORAL ONCE
Refills: 0 | Status: COMPLETED | OUTPATIENT
Start: 2023-08-24 | End: 2023-08-24

## 2023-08-24 RX ADMIN — Medication 3 MILLILITER(S): at 14:10

## 2023-08-24 RX ADMIN — Medication 40 MILLIGRAM(S): at 13:10

## 2023-08-24 RX ADMIN — INSULIN GLARGINE 22 UNIT(S): 100 INJECTION, SOLUTION SUBCUTANEOUS at 22:09

## 2023-08-24 RX ADMIN — Medication 2: at 17:13

## 2023-08-24 RX ADMIN — ATORVASTATIN CALCIUM 80 MILLIGRAM(S): 80 TABLET, FILM COATED ORAL at 22:09

## 2023-08-24 RX ADMIN — Medication 3 MILLILITER(S): at 09:07

## 2023-08-24 RX ADMIN — SACUBITRIL AND VALSARTAN 1 TABLET(S): 24; 26 TABLET, FILM COATED ORAL at 17:12

## 2023-08-24 RX ADMIN — Medication 100 GRAM(S): at 09:25

## 2023-08-24 RX ADMIN — Medication 1: at 12:06

## 2023-08-24 RX ADMIN — CHLORHEXIDINE GLUCONATE 1 APPLICATION(S): 213 SOLUTION TOPICAL at 06:33

## 2023-08-24 RX ADMIN — Medication 1: at 07:51

## 2023-08-24 RX ADMIN — Medication 40 MILLIEQUIVALENT(S): at 09:25

## 2023-08-24 RX ADMIN — Medication 10 UNIT(S): at 07:52

## 2023-08-24 RX ADMIN — Medication 3 MILLILITER(S): at 19:27

## 2023-08-24 RX ADMIN — Medication 81 MILLIGRAM(S): at 11:33

## 2023-08-24 RX ADMIN — IRON SUCROSE 110 MILLIGRAM(S): 20 INJECTION, SOLUTION INTRAVENOUS at 09:17

## 2023-08-24 RX ADMIN — Medication 50 MILLIGRAM(S): at 06:32

## 2023-08-24 RX ADMIN — ENOXAPARIN SODIUM 60 MILLIGRAM(S): 100 INJECTION SUBCUTANEOUS at 06:56

## 2023-08-24 RX ADMIN — Medication 40 MILLIGRAM(S): at 09:26

## 2023-08-24 RX ADMIN — Medication 10 UNIT(S): at 17:13

## 2023-08-24 RX ADMIN — ENOXAPARIN SODIUM 60 MILLIGRAM(S): 100 INJECTION SUBCUTANEOUS at 17:12

## 2023-08-24 RX ADMIN — SACUBITRIL AND VALSARTAN 1 TABLET(S): 24; 26 TABLET, FILM COATED ORAL at 06:32

## 2023-08-24 RX ADMIN — Medication 10 UNIT(S): at 12:07

## 2023-08-24 RX ADMIN — Medication 50 MILLIGRAM(S): at 17:13

## 2023-08-24 NOTE — PROGRESS NOTE ADULT - SUBJECTIVE AND OBJECTIVE BOX
PREOPERATIVE PROCEDURE(s): CABG               HD # 11                      SURGEON(s): Jim/Hola/Jaymie      SUBJECTIVE ASSESSMENT:68yFemale patient seen and examined at bedside. No complaints at this time.     Vital Signs Last 24 Hrs  T(F): 97.9 (24 Aug 2023 04:00), Max: 99.1 (23 Aug 2023 13:34)  HR: 92 (24 Aug 2023 04:00) (92 - 109)  BP: 138/64 (24 Aug 2023 04:00) (122/56 - 139/63)  BP(mean): 92 (24 Aug 2023 04:00) (92 - 92)  RR: 18 (24 Aug 2023 04:00) (18 - 18)  SpO2: 96% (23 Aug 2023 17:40) (96% - 96%)      I&O's Detail    23 Aug 2023 07:01  -  24 Aug 2023 07:00  --------------------------------------------------------  IN:    Oral Fluid: 358 mL  Total IN: 358 mL    OUT:    Voided (mL): 521 mL  Total OUT: 521 mL        Net: I&O's Detail    22 Aug 2023 07:01  -  23 Aug 2023 07:00  --------------------------------------------------------  Total NET: -41 mL      23 Aug 2023 07:01  -  24 Aug 2023 07:00  --------------------------------------------------------  Total NET: -163 mL        CAPILLARY BLOOD GLUCOSE      POCT Blood Glucose.: 163 mg/dL (24 Aug 2023 07:43)  POCT Blood Glucose.: 191 mg/dL (23 Aug 2023 21:05)  POCT Blood Glucose.: 192 mg/dL (23 Aug 2023 16:31)  POCT Blood Glucose.: 247 mg/dL (23 Aug 2023 11:54)    A1C with Estimated Average Glucose Result: 8.2 % (08-14-23 @ 04:40)      Physical Exam:  General: NAD; A&Ox3  Cardiac: S1/S2, RRR, no murmur, no rubs  Lungs: unlabored respirations, CTA b/l, no wheeze, no rales, no crackles  Abdomen: Soft/NT/ND; positive bowel sounds x 4  Incisions: Incisions clean/dry/intact  Extremities: No edema b/l lower extremities; good capillary refill; no cyanosis; palpable 1+ pedal pulses b/l    BOWEL MOVEMENT:  [] YES [] NO, If No, Timing since last BM Day #        LABS:                        8.3<L>  6.98  )-----------( 269      ( 24 Aug 2023 06:48 )             28.6<L>                        8.1<L>  5.36  )-----------( 262      ( 23 Aug 2023 06:49 )             27.5<L>    08-24    145  |  107  |  5<L>  ----------------------------<  150<H>  3.5   |  24  |  <0.5<L>  08-23    143  |  107  |  4<L>  ----------------------------<  144<H>  3.7   |  28  |  0.5<L>    Ca    8.5      24 Aug 2023 06:48  Mg     1.9     08-24    TPro  6.4 [6.0 - 8.0]  /  Alb  3.1<L> [3.5 - 5.2]  /  TBili  0.3 [0.2 - 1.2]  /  DBili  x   /  AST  26 [0 - 41]  /  ALT  22 [0 - 41]  /  AlkPhos  89 [30 - 115]  08-24      Urinalysis Basic - ( 24 Aug 2023 06:48 )    Color: x / Appearance: x / SG: x / pH: x  Gluc: 150 mg/dL / Ketone: x  / Bili: x / Urobili: x   Blood: x / Protein: x / Nitrite: x   Leuk Esterase: x / RBC: x / WBC x   Sq Epi: x / Non Sq Epi: x / Bacteria: x        RADIOLOGY & ADDITIONAL TESTS:  CXR:   < from: Xray Chest 1 View- PORTABLE-Urgent (Xray Chest 1 View- PORTABLE-Urgent .) (08.24.23 @ 09:05) >  PROCEDURE DATE:  08/24/2023          INTERPRETATION:  Clinical History/Reason for Exam:  Shortness of Breath    Technique:  Frontal view the chest.    Comparison: Chest x-ray 8/22/2023.    Findings:    Support devices:  External monitoring device overlies thorax.    Cardiac/mediastinum/hilum: Unremarkable    Lung parenchyma/ Pleura: Left basilar opacity with blunted costophrenic   angle, unchanged. No pneumothorax. Mild congestion      Skeleton/soft tissues: No focal skeletal lesions are identified.      Impression: Stable left basilar opacity with blunted costophrenic angle.   Mild congestion.      Allergies    No Known Allergies    Intolerances      MEDICATIONS  (STANDING):  albuterol/ipratropium for Nebulization 3 milliLiter(s) Nebulizer every 6 hours  aspirin  chewable 81 milliGRAM(s) Oral daily  atorvastatin 80 milliGRAM(s) Oral at bedtime  chlorhexidine 2% Cloths 1 Application(s) Topical <User Schedule>  dextrose 5%. 1000 milliLiter(s) (50 mL/Hr) IV Continuous <Continuous>  dextrose 50% Injectable 25 Gram(s) IV Push once  enoxaparin Injectable 60 milliGRAM(s) SubCutaneous every 12 hours  furosemide   Injectable 40 milliGRAM(s) IV Push two times a day  glucagon  Injectable 1 milliGRAM(s) IntraMuscular once  insulin glargine Injectable (LANTUS) 22 Unit(s) SubCutaneous at bedtime  insulin lispro (ADMELOG) corrective regimen sliding scale   SubCutaneous three times a day before meals  insulin lispro Injectable (ADMELOG) 10 Unit(s) SubCutaneous three times a day before meals  iron sucrose IVPB 200 milliGRAM(s) IV Intermittent every 24 hours  metoprolol tartrate 50 milliGRAM(s) Oral every 12 hours  pantoprazole    Tablet 40 milliGRAM(s) Oral before breakfast  sacubitril 49 mG/valsartan 51 mG 1 Tablet(s) Oral two times a day  sodium chloride 0.9%. 1000 milliLiter(s) (100 mL/Hr) IV Continuous <Continuous>    MEDICATIONS  (PRN):  acetaminophen     Tablet .. 650 milliGRAM(s) Oral every 6 hours PRN Temp greater or equal to 38C (100.4F), Mild Pain (1 - 3)  dextrose Oral Gel 15 Gram(s) Oral once PRN Blood Glucose LESS THAN 70 milliGRAM(s)/deciliter    Home Medications:  aspirin 81 mg oral tablet, chewable: 1 chewed once a day (13 Aug 2023 19:53)  atorvastatin 10 mg oral tablet: 1 orally once a day (at bedtime) (13 Aug 2023 19:52)  Jardiance 10 mg oral tablet: 1 orally once a day (13 Aug 2023 19:53)  MetFORMIN (Eqv-Glumetza) 500 mg oral tablet, extended release: 1 orally 2 times a day (13 Aug 2023 19:51)  pioglitazone 30 mg oral tablet: 1 orally once a day (13 Aug 2023 19:52)      Pharmacologic DVT Prophylaxis [] YES, [] NO: Contraindication:  SCD's: YES b/l    GI Prophylaxis: protonix    Pre-Op Beta-Blockers: [X] Yes, [] No: contraindication:  Pre-Op Aspirin:  [X] Yes, [] No: contraindication:  Pre-Op Statin:  [X] Yes, [] No: contraindication:    Ambulation/Activity Status: ambulate with assistance    Assessment/Plan:  68y Female pre-op CABG  - Case and plan discussed with CTU Intensivist and CT Surgeon - Dr. Simental/Jaymie/Jim/Reshma  - Continue CTU supportive care and ongoing plan of care as per continuing CTU rounds.   - Continue DVT/GI prophylaxis  - Incentive Spirometry 10 times an hour  - Continue to advance physical activity as tolerated and continue PT/OT as directed  1. CAD pre-op CABG: Continue ASA, statin, BB. Pre-op work-up in progress. PFTs completed, 26% predicted.   2. HTN: continue current medications   - Pre-op work-up in progress, patient and family apprehensive about surgery, recommend cardiology f/u for cardiac stents

## 2023-08-24 NOTE — PROGRESS NOTE ADULT - SUBJECTIVE AND OBJECTIVE BOX
MARCIANO RODRIGUES  68y Female    CHIEF COMPLAINT:    Patient is a 68y old  Female who presents with a chief complaint of Arm Pain, AMS (24 Aug 2023 10:36)      INTERVAL HPI/OVERNIGHT EVENTS:    Patient seen and examined.    ROS: All other systems are negative.    Vital Signs:    T(F): 97.8 (23 @ 12:55), Max: 97.9 (23 @ 04:00)  HR: 90 (23 @ 12:55) (90 - 109)  BP: 135/63 (23 @ 12:55) (122/56 - 138/64)  RR: 18 (23 @ 12:55) (18 - 18)  SpO2: 96% (23 @ 17:40) (96% - 96%)  I&O's Summary    23 Aug 2023 07:01  -  24 Aug 2023 07:00  --------------------------------------------------------  IN: 358 mL / OUT: 521 mL / NET: -163 mL    24 Aug 2023 07:01  -  24 Aug 2023 15:56  --------------------------------------------------------  IN: 355 mL / OUT: 400 mL / NET: -45 mL      Daily     Daily Weight in k (24 Aug 2023 04:00)  CAPILLARY BLOOD GLUCOSE      POCT Blood Glucose.: 179 mg/dL (24 Aug 2023 12:01)  POCT Blood Glucose.: 163 mg/dL (24 Aug 2023 07:43)  POCT Blood Glucose.: 191 mg/dL (23 Aug 2023 21:05)  POCT Blood Glucose.: 192 mg/dL (23 Aug 2023 16:31)      PHYSICAL EXAM:    GENERAL:  NAD  SKIN: No rashes or lesions  HENT: Atraumatic. Normocephalic. PERRL. Moist membranes.  NECK: Supple, No JVD. No lymphadenopathy.  PULMONARY: CTA B/L. No wheezing. No rales  CVS: Normal S1, S2. Rate and Rhythm are regular. No murmurs.  ABDOMEN/GI: Soft, Nontender, Nondistended; BS present  EXTREMITIES: Peripheral pulses intact. No edema B/L LE.  NEUROLOGIC:  No motor or sensory deficit.  PSYCH: Alert & oriented x 3    Consultant(s) Notes Reviewed:  [x ] YES  [ ] NO  Care Discussed with Consultants/Other Providers [ x] YES  [ ] NO    EKG reviewed  Telemetry reviewed    LABS:                        8.3    6.98  )-----------( 269      ( 24 Aug 2023 06:48 )             28.6     08-24    145  |  107  |  5<L>  ----------------------------<  150<H>  3.5   |  24  |  <0.5<L>    Ca    8.5      24 Aug 2023 06:48  Mg     1.9     08-24    TPro  6.4  /  Alb  3.1<L>  /  TBili  0.3  /  DBili  x   /  AST  26  /  ALT  22  /  AlkPhos  89  08-24              RADIOLOGY & ADDITIONAL TESTS:      Imaging or report Personally Reviewed:  [ ] YES  [ ] NO    Medications:  Standing  albuterol/ipratropium for Nebulization 3 milliLiter(s) Nebulizer every 6 hours  aspirin  chewable 81 milliGRAM(s) Oral daily  atorvastatin 80 milliGRAM(s) Oral at bedtime  chlorhexidine 2% Cloths 1 Application(s) Topical <User Schedule>  dextrose 5%. 1000 milliLiter(s) IV Continuous <Continuous>  dextrose 50% Injectable 25 Gram(s) IV Push once  enoxaparin Injectable 60 milliGRAM(s) SubCutaneous every 12 hours  furosemide   Injectable 40 milliGRAM(s) IV Push two times a day  glucagon  Injectable 1 milliGRAM(s) IntraMuscular once  insulin glargine Injectable (LANTUS) 22 Unit(s) SubCutaneous at bedtime  insulin lispro (ADMELOG) corrective regimen sliding scale   SubCutaneous three times a day before meals  insulin lispro Injectable (ADMELOG) 10 Unit(s) SubCutaneous three times a day before meals  iron sucrose IVPB 200 milliGRAM(s) IV Intermittent every 24 hours  metoprolol tartrate 50 milliGRAM(s) Oral every 12 hours  pantoprazole    Tablet 40 milliGRAM(s) Oral before breakfast  sacubitril 49 mG/valsartan 51 mG 1 Tablet(s) Oral two times a day  sodium chloride 0.9%. 1000 milliLiter(s) IV Continuous <Continuous>    PRN Meds  acetaminophen     Tablet .. 650 milliGRAM(s) Oral every 6 hours PRN  dextrose Oral Gel 15 Gram(s) Oral once PRN      Case discussed with resident    Care discussed with pt/family

## 2023-08-24 NOTE — PROGRESS NOTE ADULT - ASSESSMENT
67yo F w/a PMH of Type II DM, HTN, DLD, obesity, lateral epicondylitis  presented with severe left arm pain w findings of LBBB on ECG (unsure if new or not). Code STEMI was called and then canceled because troponin negative and no chest pain. Patient was then noted to have acute mental status change, SOB, lactate elevation, and acidosis on abg. Patient had two seizure episodes and was treated with benzodiazepine. Intubated by ER for airway protection, extubated on 8/15. Was admitted to CCU and was ruled in for MI.     Also noted to have DVT/Pulmonary embolism and NSTEMI with cardiomyopathy EF 30% s/p cath which showed LM and 3VD. Hospital course also significant for melena and SHAD with low Hb requiring 2 units of PRBCs.    NSTEMI  Ischemic cardiomyopathy with EF 30%  Acute HFrEF  S/P cath with 50% LM, ostial LAD, ostial Cx and Ostial RCA disease  Toxic / Metabolic encephalopathy  Asthma exacerbation  Acute DVT/PE  Iron deficiency anemia / Melena  DM-2 / HTN / DL                 PLAN:    ·	Cont tele  ·	CTS on the case. Care d/w the CTS. Recommended to optimize the medical treatment for Asthma and CHF prior to surgery  ·	Preop w/u in progress.   ·	On ASA, Metoprolol, Entresto and Lipitor  ·	O/E pt is fluid overload. Started her on Lasix 40 mg iv q 12h  ·	Check i's and o's and daily wt.   ·	Low salt diet and water restriction to 1.5 L/D  ·	Cont Alb/Atrovent neb treatment q 6h and prn  ·	Add Symbicort twice a day.  ·	Cont therapeutic dose of Lovenox  ·	Monitor H/H. Hb is stable. No further w/u as per GI. Keep Hb >8  ·	Monitor FS. On Insulin  ·	Plan of care d/w the son on bedsie.     Progress Note Handoff    Pending (specify):  Consults_________, Tests________, Test Results_______, Other__3-VCAD, Fluid overlaod_______  Family discussion:  Disposition: Home___/SNF___/Other________/Unknown at this time________    Rafal Stephens MD  Spectra: [phone]
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
## 2023-08-24 NOTE — PROGRESS NOTE ADULT - SUBJECTIVE AND OBJECTIVE BOX
SUBJECTIVE:  HPI:  69yo F w/a PMH of Type II DM, HTN, DLD, obesity, lateral epicondylitis presenting to the ED w/ Left Arm pain. At time of my exam, patient intubated so spoke with the son at bedside. This arm started about two months ago and has continued to progress but in the last few days became severe. She went to the ED a few days ago, was given pain medications then sent home. Per son, pain still continued to progress after this. No fevers, chills, nausea, vomiting, diarrhea, constipation, SOB, CP.   On admission to the ED today, she presented with severe left arm pain w findings of LBBB on ECG (unsure if new or not). Code STEMI was called and then canceled because troponin negative and no chest pain. Patient was then noted to have acute mental status change, SOB, lactate elevation, and acidosis on abg..Patient had two seizure episodes and was treated with benzodiazepine. Intubated by ER for airway protection.     On Admission  Vitals: /64, HR 75, RR 20, T 98.5, satting 99 percent on RA   Labs: WBC 14.86, Hgb 10, , Na 139, K 5.4, BUN 12, Cr .6, Trop <.01 --> .13 on repeat  Imaging:   CXR -  Patchy bibasilar opacities, right greater than left.  CT Brain Stroke protocol - No evidence of acute transcortical infarct, hydrocephalus, acute intracranial hemorrhage, or mass effect.  CT brain perfusion: No evidence of acute infarct or ischemia.  CTA neck: No stenosis. No dissection.  CTA brain: No stenosis or aneurysm.  CTA Neck: The distal internal carotid arteries are patent. The [place] of [place] is normal in appearance. The visualized cerebral arteries are unremarkable.The vertebrobasilar junction and basilar artery are normal.    In the ED, given Levaquin. Keppra, started on propofol  Admitted to MICU for airway monitoring  (13 Aug 2023 19:09)      Patient is a 68y old Female who presents with a chief complaint of Arm Pain, AMS (23 Aug 2023 14:55)    Currently admitted to medicine with the primary diagnosis of Seizure       Today is hospital day 11d.     PAST MEDICAL & SURGICAL HISTORY  Diabetes        ALLERGIES:  No Known Allergies    MEDICATIONS:  ACTIVE MEDICATIONS  acetaminophen     Tablet .. 650 milliGRAM(s) Oral every 6 hours PRN  albuterol/ipratropium for Nebulization 3 milliLiter(s) Nebulizer every 6 hours  aspirin  chewable 81 milliGRAM(s) Oral daily  atorvastatin 80 milliGRAM(s) Oral at bedtime  chlorhexidine 2% Cloths 1 Application(s) Topical <User Schedule>  dextrose 5%. 1000 milliLiter(s) IV Continuous <Continuous>  dextrose 50% Injectable 25 Gram(s) IV Push once  dextrose Oral Gel 15 Gram(s) Oral once PRN  enoxaparin Injectable 60 milliGRAM(s) SubCutaneous every 12 hours  furosemide   Injectable 40 milliGRAM(s) IV Push two times a day  glucagon  Injectable 1 milliGRAM(s) IntraMuscular once  insulin glargine Injectable (LANTUS) 22 Unit(s) SubCutaneous at bedtime  insulin lispro (ADMELOG) corrective regimen sliding scale   SubCutaneous three times a day before meals  insulin lispro Injectable (ADMELOG) 10 Unit(s) SubCutaneous three times a day before meals  iron sucrose IVPB 200 milliGRAM(s) IV Intermittent every 24 hours  metoprolol tartrate 50 milliGRAM(s) Oral every 12 hours  pantoprazole    Tablet 40 milliGRAM(s) Oral before breakfast  sacubitril 49 mG/valsartan 51 mG 1 Tablet(s) Oral two times a day  sodium chloride 0.9%. 1000 milliLiter(s) IV Continuous <Continuous>      VITALS:   T(F): 97.9  HR: 92  BP: 138/64  RR: 18  SpO2: 96%    LABS:                        8.3    6.98  )-----------( 269      ( 24 Aug 2023 06:48 )             28.6     08-24    145  |  107  |  5<L>  ----------------------------<  150<H>  3.5   |  24  |  <0.5<L>    Ca    8.5      24 Aug 2023 06:48  Mg     1.9     08-24    TPro  6.4  /  Alb  3.1<L>  /  TBili  0.3  /  DBili  x   /  AST  26  /  ALT  22  /  AlkPhos  89  08-24      Urinalysis Basic - ( 24 Aug 2023 06:48 )    Color: x / Appearance: x / SG: x / pH: x  Gluc: 150 mg/dL / Ketone: x  / Bili: x / Urobili: x   Blood: x / Protein: x / Nitrite: x   Leuk Esterase: x / RBC: x / WBC x   Sq Epi: x / Non Sq Epi: x / Bacteria: x                    PHYSICAL EXAM:  GEN: No acute distress  LUNGS: mild crackles and wheezing at lung bases bilaterally   HEART: S1/S2 present.    ABD: Soft, non-tender, non-distended.   EXT: No pedal edema  NEURO: AAOX3

## 2023-08-24 NOTE — PROGRESS NOTE ADULT - ASSESSMENT
89yo F w/a PMH of Type II DM, HTN, DLD, obesity, lateral epicondylitis presenting to the ED 8/13 w/left arm pain. This arm started about two months ago and has continued to progress for a few days prior to admission became severe. Pt was previously seen for this pain in the ED on 8/8, was given pain medications then sent home. Per son, pain still continued to progress after this. No fevers, chills, nausea, vomiting, diarrhea, constipation, SOB, CP.     On admission to the ED, she presented with severe left arm pain w findings of LBBB on ECG (unsure if new or not). Code STEMI was called and then canceled because troponin negative and no chest pain. Patient was then noted to have acute mental status change, SOB, lactate elevation, and acidosis on abg. Patient had two seizure episodes and was treated with benzodiazepine. Intubated by ER for airway protection, extubated on 8/15      #CAD and NSTEMI, s/p cath with left main and 3Vx disease findings  -Currently CTS team evaluating safety of CABG , probably will hold off given co-morbidities and high risk surgery , will be discussed further.   -Systolic dysfunction on ECHO with EF 30 to 35%.  -c/w Aspirin and enoxaparin therapeutic dose.  AND GDMT ( Metoprolol + Entresto )   -CT chest / spirometry / repeat echo  ordered by CTS as part of preparation if they want to proceed with surgery   -Cardiology following , advice increase Lopressor to 50 BID and if tolerated ,  switch to Toprol XL 100mg once daily,   -Crackles on examination , will start Lasix 40 BID I.V standing    -will need life vest if not revascularized   -family and patient interested in stents , will re-discuss with cardiology       #Mild lung edema in the setting of low EF  - reassess volume status daily and f/u KFT   - start lasix 40 IV q12       #Questionable Seizures  -s/p Keppra 1000 Q12 I.V for 8 days   -no further need for AED for now as per discussion with neuro team (normal EEG)     #Acute cognitive dysfunction (resolved), likely underlying ischemic event  #PE evidence on CT , on Low-Molecular Weight Heparin (Lovenox) at therapeutic dose       #Anemia, iron deficiency suspected, possible GI loss (episode of melena reported while in CCU)   -started on IV venofer   -consult GI for possible EGD/ lower Endoscopy  - GI saying patient is currently high risk and  risks outweigh benefits at this time for endoscopic intervention, will need cardio risk stratification if warranted   - c/w iron supplementation  - monitor H/H  - maintain active type and screen  -c/w Protonix   - Cardiology risk stratification done  ( high risk for low risk procedure )     ·#Lactic acidosis - resolved  -likely due transient ischemia (PE and NSTEMI)     #Type-2 diabetes mellitus  -on OAD as outpatient   -on Lantus 22 I.U and Lispro 8/8/8 in-patient     #Hypokalemia   replenished     #activity :   OOBT   PT eval appreciated    #DVT prophylaxis : on  Lovenox therapeutic   #GI prophlyaxis : on PPI's   #Diet : DASH/TLC         .f/u with CTS team regarding CABG  and cardiology   ·Close BUN/creatinine follow-up   .consider starting toprol 100 mg xl if tolerating lopressor   .diurese with lasix   ·CBC Follow up    89yo F w/a PMH of Type II DM, HTN, DLD, obesity, lateral epicondylitis presenting to the ED 8/13 w/left arm pain. This arm started about two months ago and has continued to progress for a few days prior to admission became severe. Pt was previously seen for this pain in the ED on 8/8, was given pain medications then sent home. Per son, pain still continued to progress after this. No fevers, chills, nausea, vomiting, diarrhea, constipation, SOB, CP.     On admission to the ED, she presented with severe left arm pain w findings of LBBB on ECG (unsure if new or not). Code STEMI was called and then canceled because troponin negative and no chest pain. Patient was then noted to have acute mental status change, SOB, lactate elevation, and acidosis on abg. Patient had two seizure episodes and was treated with benzodiazepine. Intubated by ER for airway protection, extubated on 8/15      #CAD and NSTEMI, s/p cath with left main and 3Vx disease findings  -Currently CTS team evaluating safety of CABG , probably will hold off given co-morbidities and high risk surgery , will be discussed further.   -Systolic dysfunction on ECHO with EF 30 to 35%.  -c/w Aspirin and enoxaparin therapeutic dose.  AND GDMT ( Metoprolol + Entresto )   -CT chest / spirometry / repeat echo  ordered by CTS as part of preparation if they want to proceed with surgery   -Cardiology following , advice increase Lopressor to 50 BID and if tolerated ,  switch to Toprol XL 100mg once daily,   -Crackles on examination , will start Lasix 40 BID I.V standing    -will need life vest if not revascularized   -family and patient interested in stents , re-discussed with cardiology fellow , patient is high risk for bleeding and would defer PCI for now and follow up as OP       #Mild lung edema in the setting of low EF  - reassess volume status daily and f/u KFT   - start lasix 40 IV q12       #Questionable Seizures  -s/p Keppra 1000 Q12 I.V for 8 days   -no further need for AED for now as per discussion with neuro team (normal EEG)     #Acute cognitive dysfunction (resolved), likely underlying ischemic event  #PE evidence on CT , on Low-Molecular Weight Heparin (Lovenox) at therapeutic dose       #Anemia, iron deficiency suspected, possible GI loss (episode of melena reported while in CCU)   -started on IV venofer   -consult GI for possible EGD/ lower Endoscopy  - GI saying patient is currently high risk and  risks outweigh benefits at this time for endoscopic intervention, will need cardio risk stratification if warranted   - c/w iron supplementation  - monitor H/H  - maintain active type and screen  -c/w Protonix   - Cardiology risk stratification done  ( high risk for low risk procedure )     ·#Lactic acidosis - resolved  -likely due transient ischemia (PE and NSTEMI)     #Type-2 diabetes mellitus  -on OAD as outpatient   -on Lantus 22 I.U and Lispro 8/8/8 in-patient     #Hypokalemia   replenished     #activity :   OOBT   PT eval appreciated    #DVT prophylaxis : on  Lovenox therapeutic   #GI prophlyaxis : on PPI's   #Diet : DASH/TLC         .f/u with CTS team regarding CABG  and cardiology   ·Close BUN/creatinine follow-up   .consider starting toprol 100 mg xl if tolerating lopressor   .diurese with lasix   ·CBC Follow up

## 2023-08-25 LAB
ALBUMIN SERPL ELPH-MCNC: 3.2 G/DL — LOW (ref 3.5–5.2)
ALP SERPL-CCNC: 86 U/L — SIGNIFICANT CHANGE UP (ref 30–115)
ALT FLD-CCNC: 26 U/L — SIGNIFICANT CHANGE UP (ref 0–41)
ANION GAP SERPL CALC-SCNC: 12 MMOL/L — SIGNIFICANT CHANGE UP (ref 7–14)
AST SERPL-CCNC: 32 U/L — SIGNIFICANT CHANGE UP (ref 0–41)
BASOPHILS # BLD AUTO: 0.03 K/UL — SIGNIFICANT CHANGE UP (ref 0–0.2)
BASOPHILS NFR BLD AUTO: 0.4 % — SIGNIFICANT CHANGE UP (ref 0–1)
BILIRUB SERPL-MCNC: 0.3 MG/DL — SIGNIFICANT CHANGE UP (ref 0.2–1.2)
BUN SERPL-MCNC: 6 MG/DL — LOW (ref 10–20)
CALCIUM SERPL-MCNC: 8.4 MG/DL — SIGNIFICANT CHANGE UP (ref 8.4–10.5)
CHLORIDE SERPL-SCNC: 107 MMOL/L — SIGNIFICANT CHANGE UP (ref 98–110)
CO2 SERPL-SCNC: 25 MMOL/L — SIGNIFICANT CHANGE UP (ref 17–32)
CREAT SERPL-MCNC: <0.5 MG/DL — LOW (ref 0.7–1.5)
EGFR: 106 ML/MIN/1.73M2 — SIGNIFICANT CHANGE UP
EOSINOPHIL # BLD AUTO: 0.07 K/UL — SIGNIFICANT CHANGE UP (ref 0–0.7)
EOSINOPHIL NFR BLD AUTO: 0.9 % — SIGNIFICANT CHANGE UP (ref 0–8)
GLUCOSE BLDC GLUCOMTR-MCNC: 182 MG/DL — HIGH (ref 70–99)
GLUCOSE BLDC GLUCOMTR-MCNC: 237 MG/DL — HIGH (ref 70–99)
GLUCOSE BLDC GLUCOMTR-MCNC: 253 MG/DL — HIGH (ref 70–99)
GLUCOSE BLDC GLUCOMTR-MCNC: 265 MG/DL — HIGH (ref 70–99)
GLUCOSE SERPL-MCNC: 148 MG/DL — HIGH (ref 70–99)
HCT VFR BLD CALC: 28.8 % — LOW (ref 37–47)
HGB BLD-MCNC: 8.3 G/DL — LOW (ref 12–16)
IMM GRANULOCYTES NFR BLD AUTO: 0.9 % — HIGH (ref 0.1–0.3)
LYMPHOCYTES # BLD AUTO: 2.11 K/UL — SIGNIFICANT CHANGE UP (ref 1.2–3.4)
LYMPHOCYTES # BLD AUTO: 27.8 % — SIGNIFICANT CHANGE UP (ref 20.5–51.1)
MAGNESIUM SERPL-MCNC: 2.1 MG/DL — SIGNIFICANT CHANGE UP (ref 1.8–2.4)
MCHC RBC-ENTMCNC: 23.4 PG — LOW (ref 27–31)
MCHC RBC-ENTMCNC: 28.8 G/DL — LOW (ref 32–37)
MCV RBC AUTO: 81.4 FL — SIGNIFICANT CHANGE UP (ref 81–99)
MONOCYTES # BLD AUTO: 0.42 K/UL — SIGNIFICANT CHANGE UP (ref 0.1–0.6)
MONOCYTES NFR BLD AUTO: 5.5 % — SIGNIFICANT CHANGE UP (ref 1.7–9.3)
NEUTROPHILS # BLD AUTO: 4.9 K/UL — SIGNIFICANT CHANGE UP (ref 1.4–6.5)
NEUTROPHILS NFR BLD AUTO: 64.5 % — SIGNIFICANT CHANGE UP (ref 42.2–75.2)
NRBC # BLD: 0 /100 WBCS — SIGNIFICANT CHANGE UP (ref 0–0)
PLATELET # BLD AUTO: 307 K/UL — SIGNIFICANT CHANGE UP (ref 130–400)
PMV BLD: 10.5 FL — HIGH (ref 7.4–10.4)
POTASSIUM SERPL-MCNC: 3.8 MMOL/L — SIGNIFICANT CHANGE UP (ref 3.5–5)
POTASSIUM SERPL-SCNC: 3.8 MMOL/L — SIGNIFICANT CHANGE UP (ref 3.5–5)
PROT SERPL-MCNC: 6.3 G/DL — SIGNIFICANT CHANGE UP (ref 6–8)
RBC # BLD: 3.54 M/UL — LOW (ref 4.2–5.4)
RBC # FLD: 22.1 % — HIGH (ref 11.5–14.5)
SODIUM SERPL-SCNC: 144 MMOL/L — SIGNIFICANT CHANGE UP (ref 135–146)
WBC # BLD: 7.6 K/UL — SIGNIFICANT CHANGE UP (ref 4.8–10.8)
WBC # FLD AUTO: 7.6 K/UL — SIGNIFICANT CHANGE UP (ref 4.8–10.8)

## 2023-08-25 PROCEDURE — 93970 EXTREMITY STUDY: CPT | Mod: 26

## 2023-08-25 PROCEDURE — 93880 EXTRACRANIAL BILAT STUDY: CPT | Mod: 26

## 2023-08-25 PROCEDURE — 99233 SBSQ HOSP IP/OBS HIGH 50: CPT

## 2023-08-25 RX ORDER — HEPARIN SODIUM 5000 [USP'U]/ML
5000 INJECTION INTRAVENOUS; SUBCUTANEOUS EVERY 12 HOURS
Refills: 0 | Status: DISCONTINUED | OUTPATIENT
Start: 2023-08-26 | End: 2023-08-29

## 2023-08-25 RX ORDER — ENOXAPARIN SODIUM 100 MG/ML
60 INJECTION SUBCUTANEOUS ONCE
Refills: 0 | Status: COMPLETED | OUTPATIENT
Start: 2023-08-25 | End: 2023-08-25

## 2023-08-25 RX ORDER — BUMETANIDE 0.25 MG/ML
2 INJECTION INTRAMUSCULAR; INTRAVENOUS EVERY 12 HOURS
Refills: 0 | Status: DISCONTINUED | OUTPATIENT
Start: 2023-08-25 | End: 2023-08-26

## 2023-08-25 RX ORDER — POTASSIUM CHLORIDE 20 MEQ
20 PACKET (EA) ORAL ONCE
Refills: 0 | Status: COMPLETED | OUTPATIENT
Start: 2023-08-25 | End: 2023-08-25

## 2023-08-25 RX ADMIN — Medication 10 UNIT(S): at 11:51

## 2023-08-25 RX ADMIN — SACUBITRIL AND VALSARTAN 1 TABLET(S): 24; 26 TABLET, FILM COATED ORAL at 05:57

## 2023-08-25 RX ADMIN — Medication 2: at 08:21

## 2023-08-25 RX ADMIN — BUDESONIDE AND FORMOTEROL FUMARATE DIHYDRATE 2 PUFF(S): 160; 4.5 AEROSOL RESPIRATORY (INHALATION) at 09:02

## 2023-08-25 RX ADMIN — ENOXAPARIN SODIUM 60 MILLIGRAM(S): 100 INJECTION SUBCUTANEOUS at 17:01

## 2023-08-25 RX ADMIN — ATORVASTATIN CALCIUM 80 MILLIGRAM(S): 80 TABLET, FILM COATED ORAL at 22:26

## 2023-08-25 RX ADMIN — Medication 10 UNIT(S): at 08:21

## 2023-08-25 RX ADMIN — Medication 81 MILLIGRAM(S): at 12:24

## 2023-08-25 RX ADMIN — IRON SUCROSE 110 MILLIGRAM(S): 20 INJECTION, SOLUTION INTRAVENOUS at 11:15

## 2023-08-25 RX ADMIN — INSULIN GLARGINE 22 UNIT(S): 100 INJECTION, SOLUTION SUBCUTANEOUS at 22:25

## 2023-08-25 RX ADMIN — CHLORHEXIDINE GLUCONATE 1 APPLICATION(S): 213 SOLUTION TOPICAL at 05:58

## 2023-08-25 RX ADMIN — Medication 3 MILLILITER(S): at 20:14

## 2023-08-25 RX ADMIN — Medication 1: at 12:24

## 2023-08-25 RX ADMIN — Medication 50 MILLIGRAM(S): at 17:02

## 2023-08-25 RX ADMIN — PANTOPRAZOLE SODIUM 40 MILLIGRAM(S): 20 TABLET, DELAYED RELEASE ORAL at 05:58

## 2023-08-25 RX ADMIN — Medication 10 UNIT(S): at 16:54

## 2023-08-25 RX ADMIN — Medication 3: at 16:54

## 2023-08-25 RX ADMIN — Medication 40 MILLIGRAM(S): at 05:57

## 2023-08-25 RX ADMIN — BUMETANIDE 2 MILLIGRAM(S): 0.25 INJECTION INTRAMUSCULAR; INTRAVENOUS at 17:01

## 2023-08-25 RX ADMIN — Medication 50 MILLIGRAM(S): at 05:58

## 2023-08-25 RX ADMIN — ENOXAPARIN SODIUM 60 MILLIGRAM(S): 100 INJECTION SUBCUTANEOUS at 05:58

## 2023-08-25 RX ADMIN — Medication 20 MILLIEQUIVALENT(S): at 12:24

## 2023-08-25 RX ADMIN — BUMETANIDE 2 MILLIGRAM(S): 0.25 INJECTION INTRAMUSCULAR; INTRAVENOUS at 09:07

## 2023-08-25 RX ADMIN — SACUBITRIL AND VALSARTAN 1 TABLET(S): 24; 26 TABLET, FILM COATED ORAL at 17:02

## 2023-08-25 NOTE — PROGRESS NOTE ADULT - ASSESSMENT
89yo F w/a PMH of Type II DM, HTN, DLD, obesity, lateral epicondylitis presenting to the ED 8/13 w/left arm pain. This arm started about two months ago and has continued to progress for a few days prior to admission became severe. Pt was previously seen for this pain in the ED on 8/8, was given pain medications then sent home. Per son, pain still continued to progress after this. No fevers, chills, nausea, vomiting, diarrhea, constipation, SOB, CP.     On admission to the ED, she presented with severe left arm pain w findings of LBBB on ECG (unsure if new or not). Code STEMI was called and then canceled because troponin negative and no chest pain. Patient was then noted to have acute mental status change, SOB, lactate elevation, and acidosis on abg. Patient had two seizure episodes and was treated with benzodiazepine. Intubated by ER for airway protection, extubated on 8/15      #CAD and NSTEMI, s/p cath with left main and 3Vx disease findings  -Currently CTS team evaluating safety of CABG , probably will hold off given co-morbidities and high risk surgery , will be discussed further.   -Systolic dysfunction on ECHO with EF 30 to 35%. , new echo showed same findings   -c/w Aspirin and enoxaparin therapeutic dose.  AND GDMT ( Metoprolol + Entresto )   -CT chest / spirometry / repeat echo  ordered by CTS as part of preparation if they want to proceed with surgery   -Cardiology following , increased Lopressor to 50 BID and if tolerated ,  switch to Toprol XL 100mg once daily,   -orthopneic still having Crackles on examination , will switch lasix to bumex 2 mg BID I.V   -will need life vest if not revascularized   -family and patient interested in stents , re-discussed with cardiology fellow , patient is high risk for bleeding and would defer PCI for now and follow up as OP       #Mild lung edema in the setting of low EF  - reassess volume status daily and f/u KFT   - start bumex 2 mg BID I.V       #Questionable Seizures  -s/p Keppra 1000 Q12 I.V for 8 days   -no further need for AED for now as per discussion with neuro team (normal EEG)     #Acute cognitive dysfunction (resolved), likely underlying ischemic event  #PE evidence on CT , on Low-Molecular Weight Heparin (Lovenox) at therapeutic dose       #Anemia, iron deficiency suspected, possible GI loss (episode of melena reported while in CCU)   -started on IV venofer   -consult GI for possible EGD/ lower Endoscopy  - GI saying patient is currently high risk and  risks outweigh benefits at this time for endoscopic intervention, will need cardio risk stratification if warranted   - c/w iron supplementation  - monitor H/H  - maintain active type and screen  -c/w Protonix   - Cardiology risk stratification done  ( high risk for low risk procedure )     ·#Lactic acidosis - resolved  -likely due transient ischemia (PE and NSTEMI)     #Type-2 diabetes mellitus  -on OAD as outpatient   -on Lantus 22 I.U and Lispro 8/8/8 in-patient     #Hypokalemia   replenished     #activity :   OOBT   PT eval appreciated    #DVT prophylaxis : on  Lovenox therapeutic   #GI prophlyaxis : on PPI's   #Diet : DASH/TLC         .f/u with CTS team regarding CABG  and cardiology   ·Close BUN/creatinine follow-up   .consider starting toprol 100 mg xl tomorrow if still tolerating lopressor   .diurese with bumex          87yo F w/a PMH of Type II DM, HTN, DLD, obesity, lateral epicondylitis presenting to the ED 8/13 w/left arm pain. This arm started about two months ago and has continued to progress for a few days prior to admission became severe. Pt was previously seen for this pain in the ED on 8/8, was given pain medications then sent home. Per son, pain still continued to progress after this. No fevers, chills, nausea, vomiting, diarrhea, constipation, SOB, CP.     On admission to the ED, she presented with severe left arm pain w findings of LBBB on ECG (unsure if new or not). Code STEMI was called and then canceled because troponin negative and no chest pain. Patient was then noted to have acute mental status change, SOB, lactate elevation, and acidosis on abg. Patient had two seizure episodes and was treated with benzodiazepine. Intubated by ER for airway protection, extubated on 8/15      #CAD and NSTEMI, s/p cath with left main and 3Vx disease findings  -Currently CTS team evaluating safety of CABG , anticipated date for CABG is Tuesday 8/29! if patient agrees  -Systolic dysfunction on ECHO with EF 30 to 35%. , new echo showed same findings   -c/w Aspirin and enoxaparin therapeutic dose.  AND GDMT ( Metoprolol + Entresto )   -CT chest / spirometry / repeat echo  ordered by CTS as part of preparation if they want to proceed with surgery   -Cardiology following , increased Lopressor to 50 BID and if tolerated ,  switch to Toprol XL 100mg once daily,   -orthopneic still having Crackles on examination , will switch lasix to bumex 2 mg BID I.V   -will need life vest if not revascularized   -family and patient interested in stents , re-discussed with cardiology fellow , patient is high risk for bleeding and would defer PCI for now and follow up as OP         #Mild lung edema in the setting of low EF  - reassess volume status daily and f/u KFT   - start bumex 2 mg BID I.V       #Questionable Seizures  -s/p Keppra 1000 Q12 I.V for 8 days   -no further need for AED for now as per discussion with neuro team (normal EEG)     #Acute cognitive dysfunction (resolved), likely underlying ischemic event  #PE evidence on CT , on Low-Molecular Weight Heparin (Lovenox) at therapeutic dose       #Anemia, iron deficiency suspected, possible GI loss (episode of melena reported while in CCU)   -started on IV venofer   -consult GI for possible EGD/ lower Endoscopy  - GI saying patient is currently high risk and  risks outweigh benefits at this time for endoscopic intervention, will need cardio risk stratification if warranted   - c/w iron supplementation  - monitor H/H  - maintain active type and screen  -c/w Protonix   - Cardiology risk stratification done  ( high risk for low risk procedure )     ·#Lactic acidosis - resolved  -likely due transient ischemia (PE and NSTEMI)     #Type-2 diabetes mellitus  -on OAD as outpatient   -on Lantus 22 I.U and Lispro 8/8/8 in-patient     #Hypokalemia   replenished     #activity :   OOBT   PT eval appreciated    #DVT prophylaxis : on  Lovenox therapeutic   #GI prophlyaxis : on PPI's   #Diet : DASH/TLC        , anticipated date for CABG is Tuesday 8/29! if patient agrees  .f/u with CTS team regarding CABG  and cardiology   ·Close BUN/creatinine follow-up   .consider starting toprol 100 mg xl tomorrow if still tolerating lopressor   .diurese with bumex

## 2023-08-25 NOTE — CHART NOTE - NSCHARTNOTEFT_GEN_A_CORE
I was called by radiology to be informed that patient still has evidence of bilateral DVT, patient on therapeutic lovenox.

## 2023-08-25 NOTE — PROGRESS NOTE ADULT - SUBJECTIVE AND OBJECTIVE BOX
PREOPERATIVE PROCEDURE(s): CABG               HD # 12                      SURGEON(s): CHRIS Fernandez    SUBJECTIVE ASSESSMENT:68yFemale patient seen and examined at bedside. No complaints at this time, spoke with patient via Nepali .     Vital Signs Last 24 Hrs  T(F): 97.9 (25 Aug 2023 12:42), Max: 98.5 (25 Aug 2023 05:13)  HR: 93 (25 Aug 2023 12:42) (86 - 102)  BP: 121/58 (25 Aug 2023 12:42) (121/58 - 140/61)  RR: 18 (25 Aug 2023 12:42) (18 - 18)  SpO2: 95% (25 Aug 2023 05:13) (95% - 95%)      I&O's Detail    24 Aug 2023 07:01  -  25 Aug 2023 07:00  --------------------------------------------------------  IN:    Oral Fluid: 795 mL  Total IN: 795 mL    OUT:    Voided (mL): 950 mL  Total OUT: 950 mL        Net: I&O's Detail    23 Aug 2023 07:01  -  24 Aug 2023 07:00  --------------------------------------------------------  Total NET: -163 mL      24 Aug 2023 07:01  -  25 Aug 2023 07:00  --------------------------------------------------------  Total NET: -155 mL        CAPILLARY BLOOD GLUCOSE      POCT Blood Glucose.: 182 mg/dL (25 Aug 2023 11:51)  POCT Blood Glucose.: 237 mg/dL (25 Aug 2023 08:06)  POCT Blood Glucose.: 175 mg/dL (24 Aug 2023 21:21)  POCT Blood Glucose.: 230 mg/dL (24 Aug 2023 17:00)    A1C with Estimated Average Glucose Result: 8.2 % (08-14-23 @ 04:40)      Physical Exam:  General: NAD; A&Ox3  Cardiac: S1/S2, RRR, no murmur, no rubs  Lungs: unlabored respirations, CTA b/l, no wheeze, no rales, no crackles  Abdomen: Soft/NT/ND; positive bowel sounds x 4  Incisions: Incisions clean/dry/intact  Extremities: No edema b/l lower extremities; good capillary refill; no cyanosis; palpable 1+ pedal pulses b/l    BOWEL MOVEMENT:  [] YES [X] NO, If No, Timing since last BM Day # 1         LABS:                        8.3<L>  7.60  )-----------( 307      ( 25 Aug 2023 05:56 )             28.8<L>                        8.3<L>  6.98  )-----------( 269      ( 24 Aug 2023 06:48 )             28.6<L>    08-25    144  |  107  |  6<L>  ----------------------------<  148<H>  3.8   |  25  |  <0.5<L>  08-24    145  |  107  |  5<L>  ----------------------------<  150<H>  3.5   |  24  |  <0.5<L>    Ca    8.4      25 Aug 2023 05:56  Mg     2.1     08-25    TPro  6.3 [6.0 - 8.0]  /  Alb  3.2<L> [3.5 - 5.2]  /  TBili  0.3 [0.2 - 1.2]  /  DBili  x   /  AST  32 [0 - 41]  /  ALT  26 [0 - 41]  /  AlkPhos  86 [30 - 115]  08-25      Urinalysis Basic - ( 25 Aug 2023 05:56 )    Color: x / Appearance: x / SG: x / pH: x  Gluc: 148 mg/dL / Ketone: x  / Bili: x / Urobili: x   Blood: x / Protein: x / Nitrite: x   Leuk Esterase: x / RBC: x / WBC x   Sq Epi: x / Non Sq Epi: x / Bacteria: x        RADIOLOGY & ADDITIONAL TESTS:  CXR:   < from: Xray Chest 1 View- PORTABLE-Urgent (Xray Chest 1 View- PORTABLE-Urgent .) (08.24.23 @ 09:05) >    INTERPRETATION:  Clinical History/Reason for Exam:  Shortness of Breath    Technique:  Frontal view the chest.    Comparison: Chest x-ray 8/22/2023.    Findings:    Support devices:  External monitoring device overlies thorax.    Cardiac/mediastinum/hilum: Unremarkable    Lung parenchyma/ Pleura: Left basilar opacity with blunted costophrenic   angle, unchanged. No pneumothorax. Mild congestion      Skeleton/soft tissues: No focal skeletal lesions are identified.      Impression: Stable left basilar opacity with blunted costophrenic angle.   Mild congestion.      EKG:  < from: 12 Lead ECG (08.18.23 @ 18:41) >  Ventricular Rate 75 BPM    Atrial Rate 75 BPM    P-R Interval 140 ms    QRS Duration 160 ms    Q-T Interval 422 ms    QTC Calculation(Bazett) 471 ms    P Axis 67 degrees    R Axis -39 degrees    T Axis 130 degrees    Diagnosis Line Normal sinus rhythm  Left axis deviation  Left bundle branch block  Abnormal ECG      Carotids: ordered, results pending  Vein Mapping: ordered, results pending      Echocardiogram:   < from: TTE Echo Complete w/ Contrast w/ Doppler (08.24.23 @ 16:19) >  Summary:   1. Severely decreased global left ventricular systolic function.   2. LV Ejection Fraction by Diggs's Method with a biplane EF of 30 %.   3. Mild left ventricular hypertrophy.   4. The left ventricular diastolic function could not be assessed in this   study.   5. Mildly enlarged left atrium.   6. Normal right atrial size.   7. Moderate mitral valve regurgitation.   8. Thickening of the anterior and posterior mitral valve leaflets.   9. Sclerotic aortic valve with normal opening.  10. Mild pulmonic valve regurgitation.  11. Endocardial visualization was enhanced with intravenous echo contrast.      PFTs: FEV1 0.5L, 26% predicted      Allergies    No Known Allergies    Intolerances      MEDICATIONS  (STANDING):  albuterol/ipratropium for Nebulization 3 milliLiter(s) Nebulizer every 6 hours  aspirin  chewable 81 milliGRAM(s) Oral daily  atorvastatin 80 milliGRAM(s) Oral at bedtime  budesonide  80 MICROgram(s)/formoterol 4.5 MICROgram(s) Inhaler 2 Puff(s) Inhalation two times a day  buMETAnide Injectable 2 milliGRAM(s) IV Push every 12 hours  chlorhexidine 2% Cloths 1 Application(s) Topical <User Schedule>  dextrose 5%. 1000 milliLiter(s) (50 mL/Hr) IV Continuous <Continuous>  dextrose 50% Injectable 25 Gram(s) IV Push once  enoxaparin Injectable 60 milliGRAM(s) SubCutaneous once  glucagon  Injectable 1 milliGRAM(s) IntraMuscular once  insulin glargine Injectable (LANTUS) 22 Unit(s) SubCutaneous at bedtime  insulin lispro (ADMELOG) corrective regimen sliding scale   SubCutaneous three times a day before meals  insulin lispro Injectable (ADMELOG) 10 Unit(s) SubCutaneous three times a day before meals  iron sucrose IVPB 200 milliGRAM(s) IV Intermittent every 24 hours  metoprolol tartrate 50 milliGRAM(s) Oral every 12 hours  pantoprazole    Tablet 40 milliGRAM(s) Oral before breakfast  sacubitril 49 mG/valsartan 51 mG 1 Tablet(s) Oral two times a day  sodium chloride 0.9%. 1000 milliLiter(s) (100 mL/Hr) IV Continuous <Continuous>    MEDICATIONS  (PRN):  acetaminophen     Tablet .. 650 milliGRAM(s) Oral every 6 hours PRN Temp greater or equal to 38C (100.4F), Mild Pain (1 - 3)  dextrose Oral Gel 15 Gram(s) Oral once PRN Blood Glucose LESS THAN 70 milliGRAM(s)/deciliter    Home Medications:  aspirin 81 mg oral tablet, chewable: 1 chewed once a day (13 Aug 2023 19:53)  atorvastatin 10 mg oral tablet: 1 orally once a day (at bedtime) (13 Aug 2023 19:52)  Jardiance 10 mg oral tablet: 1 orally once a day (13 Aug 2023 19:53)  MetFORMIN (Eqv-Glumetza) 500 mg oral tablet, extended release: 1 orally 2 times a day (13 Aug 2023 19:51)  pioglitazone 30 mg oral tablet: 1 orally once a day (13 Aug 2023 19:52)      Pharmacologic DVT Prophylaxis [X] YES, [] NO: Contraindication:  SCD's: YES b/l    GI Prophylaxis: protonix 40mg    Pre-Op Beta-Blockers: [X] Yes, [] No: contraindication:  Pre-Op Aspirin:  [X] Yes, [] No: contraindication:  Pre-Op Statin:  [X] Yes, [] No: contraindication:    Ambulation/Activity Status: ambulate as tolerated    Assessment/Plan:  68y Female pre-op CABG   - Case and plan discussed with CTU Intensivist and CT Surgeon - Dr. Simental/Jaymie/Jim/Reshma  - Continue CTU supportive care and ongoing plan of care as per medicine   - Continue DVT/GI prophylaxis  - Incentive Spirometry 10 times an hour  - Continue to advance physical activity as tolerated and continue PT/OT as directed  1. CAD pre-op CABG: Continue ASA, statin, BB. Pre-op work-up in progress.   2. HTN: continue current medications   3. GI bleed: no melena noted, GI following  4: PE/DVT: on therapeutic lovenox  - anticipate sx next week.

## 2023-08-25 NOTE — PROGRESS NOTE ADULT - ASSESSMENT
67yo F w/a PMH of Type II DM, HTN, DLD, obesity, lateral epicondylitis  presented with severe left arm pain w findings of LBBB on ECG (unsure if new or not). Code STEMI was called and then canceled because troponin negative and no chest pain. Patient was then noted to have acute mental status change, SOB, lactate elevation, and acidosis on abg. Patient had two seizure episodes and was treated with benzodiazepine. Intubated by ER for airway protection, extubated on 8/15. Was admitted to CCU and was ruled in for MI.     Also noted to have DVT/Pulmonary embolism and NSTEMI with cardiomyopathy EF 30% s/p cath which showed LM and 3VD. Hospital course also significant for melena and SHAD with low Hb requiring 2 units of PRBCs.    NSTEMI  Ischemic cardiomyopathy with EF 30%  Acute HFrEF  S/P cath with 50% LM, ostial LAD, ostial Cx and Ostial RCA disease  Toxic / Metabolic encephalopathy  Asthma exacerbation  Acute DVT/PE  Iron deficiency anemia / Melena  DM-2 / HTN / DL                 PLAN:    ·	Cont tele  ·	CXR still shows mild congestion  ·	D/C Lasix and switch her to Bumex 2 mg iv q 12h and reevaluate in AM. If euvolemic, Switch her to Torsemide 10 mg po daily   ·	CTS on the case. Care d/w the CTS. Recommended to optimize the medical treatment for Asthma and CHF prior to surgery  ·	CTS also recommended to D/C full dose Lovenox today and start her Heparin 5000 units q 12h form Saturday  ·	Preop w/u in progress.   ·	On ASA, Metoprolol, Entresto and Lipitor  ·	Check i's and o's and daily wt.   ·	Low salt diet and water restriction to 1.5 L/D  ·	Cont Alb/Atrovent neb treatment q 6h and prn  ·	Cont Symbicort twice a day.  ·	Monitor H/H. Hb is stable. No further w/u as per GI. Keep Hb >8  ·	Monitor FS. On Insulin  ·	Plan of care d/w the son on bedside.     Progress Note Handoff    Pending (specify):  Consults_________, Tests________, Test Results_______, Other__3-VCAD, Fluid overlaod_______  Family discussion:  Disposition: Home___/SNF___/Other________/Unknown at this time________    Rafal Stephens MD  Spectra: [id number]

## 2023-08-25 NOTE — PROGRESS NOTE ADULT - SUBJECTIVE AND OBJECTIVE BOX
SUBJECTIVE:  HPI:  69yo F w/a PMH of Type II DM, HTN, DLD, obesity, lateral epicondylitis presenting to the ED w/ Left Arm pain. At time of my exam, patient intubated so spoke with the son at bedside. This arm started about two months ago and has continued to progress but in the last few days became severe. She went to the ED a few days ago, was given pain medications then sent home. Per son, pain still continued to progress after this. No fevers, chills, nausea, vomiting, diarrhea, constipation, SOB, CP.   On admission to the ED today, she presented with severe left arm pain w findings of LBBB on ECG (unsure if new or not). Code STEMI was called and then canceled because troponin negative and no chest pain. Patient was then noted to have acute mental status change, SOB, lactate elevation, and acidosis on abg..Patient had two seizure episodes and was treated with benzodiazepine. Intubated by ER for airway protection.     On Admission  Vitals: /64, HR 75, RR 20, T 98.5, satting 99 percent on RA   Labs: WBC 14.86, Hgb 10, , Na 139, K 5.4, BUN 12, Cr .6, Trop <.01 --> .13 on repeat  Imaging:   CXR -  Patchy bibasilar opacities, right greater than left.  CT Brain Stroke protocol - No evidence of acute transcortical infarct, hydrocephalus, acute intracranial hemorrhage, or mass effect.  CT brain perfusion: No evidence of acute infarct or ischemia.  CTA neck: No stenosis. No dissection.  CTA brain: No stenosis or aneurysm.  CTA Neck: The distal internal carotid arteries are patent. The [place] is normal in appearance. The visualized cerebral arteries are unremarkable.The vertebrobasilar junction and basilar artery are normal.    In the ED, given Levaquin. Keppra, started on propofol  Admitted to MICU for airway monitoring  (13 Aug 2023 19:09)      Patient is a 68y old Female who presents with a chief complaint of Arm Pain, AMS (24 Aug 2023 15:56)    Currently admitted to medicine with the primary diagnosis of Seizure       Today is hospital day 12d.     PAST MEDICAL & SURGICAL HISTORY  Diabetes        ALLERGIES:  No Known Allergies    MEDICATIONS:  ACTIVE MEDICATIONS  acetaminophen     Tablet .. 650 milliGRAM(s) Oral every 6 hours PRN  albuterol/ipratropium for Nebulization 3 milliLiter(s) Nebulizer every 6 hours  aspirin  chewable 81 milliGRAM(s) Oral daily  atorvastatin 80 milliGRAM(s) Oral at bedtime  budesonide  80 MICROgram(s)/formoterol 4.5 MICROgram(s) Inhaler 2 Puff(s) Inhalation two times a day  buMETAnide Injectable 2 milliGRAM(s) IV Push every 12 hours  chlorhexidine 2% Cloths 1 Application(s) Topical <User Schedule>  dextrose 5%. 1000 milliLiter(s) IV Continuous <Continuous>  dextrose 50% Injectable 25 Gram(s) IV Push once  dextrose Oral Gel 15 Gram(s) Oral once PRN  enoxaparin Injectable 60 milliGRAM(s) SubCutaneous every 12 hours  glucagon  Injectable 1 milliGRAM(s) IntraMuscular once  insulin glargine Injectable (LANTUS) 22 Unit(s) SubCutaneous at bedtime  insulin lispro (ADMELOG) corrective regimen sliding scale   SubCutaneous three times a day before meals  insulin lispro Injectable (ADMELOG) 10 Unit(s) SubCutaneous three times a day before meals  iron sucrose IVPB 200 milliGRAM(s) IV Intermittent every 24 hours  metoprolol tartrate 50 milliGRAM(s) Oral every 12 hours  pantoprazole    Tablet 40 milliGRAM(s) Oral before breakfast  sacubitril 49 mG/valsartan 51 mG 1 Tablet(s) Oral two times a day  sodium chloride 0.9%. 1000 milliLiter(s) IV Continuous <Continuous>      VITALS:   T(F): 98.5  HR: 93  BP: 140/61  RR: 18  SpO2: 95%    LABS:                        8.3    7.60  )-----------( 307      ( 25 Aug 2023 05:56 )             28.8     08-25    144  |  107  |  6<L>  ----------------------------<  148<H>  3.8   |  25  |  <0.5<L>    Ca    8.4      25 Aug 2023 05:56  Mg     2.1     08-25    TPro  6.3  /  Alb  3.2<L>  /  TBili  0.3  /  DBili  x   /  AST  32  /  ALT  26  /  AlkPhos  86  08-25      Urinalysis Basic - ( 25 Aug 2023 05:56 )    Color: x / Appearance: x / SG: x / pH: x  Gluc: 148 mg/dL / Ketone: x  / Bili: x / Urobili: x   Blood: x / Protein: x / Nitrite: x   Leuk Esterase: x / RBC: x / WBC x   Sq Epi: x / Non Sq Epi: x / Bacteria: x                    PHYSICAL EXAM:  GENERAL:  NAD  SKIN: No rashes or lesions  HENT: Atraumatic. Normocephalic. PERRL. Moist membranes.  NECK: Supple, No JVD. No lymphadenopathy.  PULMONARY: CTA B/L. No wheezing. No rales  CVS: Normal S1, S2. Rate and Rhythm are regular. No murmurs.  ABDOMEN/GI: Soft, Nontender, Nondistended; BS present  EXTREMITIES: Peripheral pulses intact. No edema B/L LE.  NEUROLOGIC:  No motor or sensory deficit.  PSYCH: Alert & oriented x 3

## 2023-08-25 NOTE — PROGRESS NOTE ADULT - SUBJECTIVE AND OBJECTIVE BOX
MARCIANO RODRIGUES  68y Female    CHIEF COMPLAINT:    Patient is a 68y old  Female who presents with a chief complaint of Arm Pain, AMS (25 Aug 2023 15:11)      INTERVAL HPI/OVERNIGHT EVENTS:    Patient seen and examined. Sitting in the bed. No cp. No palpitations. Breathing has improved but still is fluid overload.     ROS: All other systems are negative.    Vital Signs:    T(F): 97.9 (23 @ 12:42), Max: 98.5 (23 @ 05:13)  HR: 93 (23 @ 12:42) (86 - 102)  BP: 121/58 (23 @ 12:42) (121/58 - 140/61)  RR: 18 (23 @ 12:42) (18 - 18)  SpO2: 95% (23 @ 05:13) (95% - 95%)  I&O's Summary    24 Aug 2023 07:  -  25 Aug 2023 07:00  --------------------------------------------------------  IN: 795 mL / OUT: 950 mL / NET: -155 mL    25 Aug 2023 07:01  -  25 Aug 2023 15:56  --------------------------------------------------------  IN: 670 mL / OUT: 575 mL / NET: 95 mL      Daily     Daily Weight in k (25 Aug 2023 05:13)  CAPILLARY BLOOD GLUCOSE      POCT Blood Glucose.: 182 mg/dL (25 Aug 2023 11:51)  POCT Blood Glucose.: 237 mg/dL (25 Aug 2023 08:06)  POCT Blood Glucose.: 175 mg/dL (24 Aug 2023 21:21)  POCT Blood Glucose.: 230 mg/dL (24 Aug 2023 17:00)      PHYSICAL EXAM:    GENERAL:  NAD  SKIN: No rashes or lesions  HENT: Atraumatic. Normocephalic. PERRL. Moist membranes.  NECK: Supple, No JVD. No lymphadenopathy.  PULMONARY: Decreased BS in the bases B/L. No wheezing. No rales  CVS: Normal S1, S2. Rate and Rhythm are regular. No murmurs.  ABDOMEN/GI: Soft, Nontender, Nondistended; BS present  EXTREMITIES: Peripheral pulses intact. No edema B/L LE.  NEUROLOGIC:  No motor or sensory deficit.  PSYCH: Alert & oriented x 3    Consultant(s) Notes Reviewed:  [x ] YES  [ ] NO  Care Discussed with Consultants/Other Providers [ x] YES  [ ] NO    EKG reviewed  Telemetry reviewed    LABS:                        8.3    7.60  )-----------( 307      ( 25 Aug 2023 05:56 )             28.8         144  |  107  |  6<L>  ----------------------------<  148<H>  3.8   |  25  |  <0.5<L>    Ca    8.4      25 Aug 2023 05:56  Mg     2.1         TPro  6.3  /  Alb  3.2<L>  /  TBili  0.3  /  DBili  x   /  AST  32  /  ALT  26  /  AlkPhos  86                RADIOLOGY & ADDITIONAL TESTS:    < from: Xray Chest 1 View- PORTABLE-Urgent (Xray Chest 1 View- PORTABLE-Urgent .) (23 @ 09:05) >    Impression: Stable left basilar opacity with blunted costophrenic angle.   Mild congestion.    < end of copied text >    Imaging or report Personally Reviewed:  [x ] YES  [ ] NO    Medications:  Standing  albuterol/ipratropium for Nebulization 3 milliLiter(s) Nebulizer every 6 hours  aspirin  chewable 81 milliGRAM(s) Oral daily  atorvastatin 80 milliGRAM(s) Oral at bedtime  budesonide  80 MICROgram(s)/formoterol 4.5 MICROgram(s) Inhaler 2 Puff(s) Inhalation two times a day  buMETAnide Injectable 2 milliGRAM(s) IV Push every 12 hours  chlorhexidine 2% Cloths 1 Application(s) Topical <User Schedule>  dextrose 5%. 1000 milliLiter(s) IV Continuous <Continuous>  dextrose 50% Injectable 25 Gram(s) IV Push once  enoxaparin Injectable 60 milliGRAM(s) SubCutaneous once  glucagon  Injectable 1 milliGRAM(s) IntraMuscular once  insulin glargine Injectable (LANTUS) 22 Unit(s) SubCutaneous at bedtime  insulin lispro (ADMELOG) corrective regimen sliding scale   SubCutaneous three times a day before meals  insulin lispro Injectable (ADMELOG) 10 Unit(s) SubCutaneous three times a day before meals  iron sucrose IVPB 200 milliGRAM(s) IV Intermittent every 24 hours  metoprolol tartrate 50 milliGRAM(s) Oral every 12 hours  pantoprazole    Tablet 40 milliGRAM(s) Oral before breakfast  sacubitril 49 mG/valsartan 51 mG 1 Tablet(s) Oral two times a day  sodium chloride 0.9%. 1000 milliLiter(s) IV Continuous <Continuous>    PRN Meds  acetaminophen     Tablet .. 650 milliGRAM(s) Oral every 6 hours PRN  dextrose Oral Gel 15 Gram(s) Oral once PRN      Case discussed with resident    Care discussed with pt/family

## 2023-08-26 LAB
ALBUMIN SERPL ELPH-MCNC: 3.6 G/DL — SIGNIFICANT CHANGE UP (ref 3.5–5.2)
ALP SERPL-CCNC: 96 U/L — SIGNIFICANT CHANGE UP (ref 30–115)
ALT FLD-CCNC: 38 U/L — SIGNIFICANT CHANGE UP (ref 0–41)
ANION GAP SERPL CALC-SCNC: 13 MMOL/L — SIGNIFICANT CHANGE UP (ref 7–14)
APTT BLD: 40 SEC — HIGH (ref 27–39.2)
AST SERPL-CCNC: 43 U/L — HIGH (ref 0–41)
BASOPHILS # BLD AUTO: 0.04 K/UL — SIGNIFICANT CHANGE UP (ref 0–0.2)
BASOPHILS NFR BLD AUTO: 0.6 % — SIGNIFICANT CHANGE UP (ref 0–1)
BILIRUB SERPL-MCNC: 0.3 MG/DL — SIGNIFICANT CHANGE UP (ref 0.2–1.2)
BUN SERPL-MCNC: 9 MG/DL — LOW (ref 10–20)
CALCIUM SERPL-MCNC: 8.8 MG/DL — SIGNIFICANT CHANGE UP (ref 8.4–10.5)
CHLORIDE SERPL-SCNC: 100 MMOL/L — SIGNIFICANT CHANGE UP (ref 98–110)
CO2 SERPL-SCNC: 29 MMOL/L — SIGNIFICANT CHANGE UP (ref 17–32)
CREAT SERPL-MCNC: 0.7 MG/DL — SIGNIFICANT CHANGE UP (ref 0.7–1.5)
EGFR: 93 ML/MIN/1.73M2 — SIGNIFICANT CHANGE UP
EOSINOPHIL # BLD AUTO: 0.08 K/UL — SIGNIFICANT CHANGE UP (ref 0–0.7)
EOSINOPHIL NFR BLD AUTO: 1.1 % — SIGNIFICANT CHANGE UP (ref 0–8)
GLUCOSE BLDC GLUCOMTR-MCNC: 147 MG/DL — HIGH (ref 70–99)
GLUCOSE BLDC GLUCOMTR-MCNC: 212 MG/DL — HIGH (ref 70–99)
GLUCOSE BLDC GLUCOMTR-MCNC: 233 MG/DL — HIGH (ref 70–99)
GLUCOSE BLDC GLUCOMTR-MCNC: 243 MG/DL — HIGH (ref 70–99)
GLUCOSE SERPL-MCNC: 164 MG/DL — HIGH (ref 70–99)
HCT VFR BLD CALC: 33.4 % — LOW (ref 37–47)
HGB BLD-MCNC: 9.7 G/DL — LOW (ref 12–16)
IMM GRANULOCYTES NFR BLD AUTO: 0.6 % — HIGH (ref 0.1–0.3)
INR BLD: 1.03 RATIO — SIGNIFICANT CHANGE UP (ref 0.65–1.3)
LYMPHOCYTES # BLD AUTO: 1.95 K/UL — SIGNIFICANT CHANGE UP (ref 1.2–3.4)
LYMPHOCYTES # BLD AUTO: 27.2 % — SIGNIFICANT CHANGE UP (ref 20.5–51.1)
MAGNESIUM SERPL-MCNC: 2 MG/DL — SIGNIFICANT CHANGE UP (ref 1.8–2.4)
MCHC RBC-ENTMCNC: 23.4 PG — LOW (ref 27–31)
MCHC RBC-ENTMCNC: 29 G/DL — LOW (ref 32–37)
MCV RBC AUTO: 80.7 FL — LOW (ref 81–99)
MONOCYTES # BLD AUTO: 0.34 K/UL — SIGNIFICANT CHANGE UP (ref 0.1–0.6)
MONOCYTES NFR BLD AUTO: 4.7 % — SIGNIFICANT CHANGE UP (ref 1.7–9.3)
NEUTROPHILS # BLD AUTO: 4.71 K/UL — SIGNIFICANT CHANGE UP (ref 1.4–6.5)
NEUTROPHILS NFR BLD AUTO: 65.8 % — SIGNIFICANT CHANGE UP (ref 42.2–75.2)
NRBC # BLD: 0 /100 WBCS — SIGNIFICANT CHANGE UP (ref 0–0)
PLATELET # BLD AUTO: 398 K/UL — SIGNIFICANT CHANGE UP (ref 130–400)
PMV BLD: 10.3 FL — SIGNIFICANT CHANGE UP (ref 7.4–10.4)
POTASSIUM SERPL-MCNC: 4 MMOL/L — SIGNIFICANT CHANGE UP (ref 3.5–5)
POTASSIUM SERPL-SCNC: 4 MMOL/L — SIGNIFICANT CHANGE UP (ref 3.5–5)
PROT SERPL-MCNC: 7.1 G/DL — SIGNIFICANT CHANGE UP (ref 6–8)
PROTHROM AB SERPL-ACNC: 11.7 SEC — SIGNIFICANT CHANGE UP (ref 9.95–12.87)
RBC # BLD: 4.14 M/UL — LOW (ref 4.2–5.4)
RBC # FLD: 23.3 % — HIGH (ref 11.5–14.5)
SODIUM SERPL-SCNC: 142 MMOL/L — SIGNIFICANT CHANGE UP (ref 135–146)
T3 SERPL-MCNC: 130 NG/DL — SIGNIFICANT CHANGE UP (ref 80–200)
T4 AB SER-ACNC: 8.9 UG/DL — SIGNIFICANT CHANGE UP (ref 4.6–12)
T4 FREE SERPL-MCNC: 1.5 NG/DL — SIGNIFICANT CHANGE UP (ref 0.9–1.8)
TSH SERPL-MCNC: 5.12 UIU/ML — HIGH (ref 0.27–4.2)
WBC # BLD: 7.16 K/UL — SIGNIFICANT CHANGE UP (ref 4.8–10.8)
WBC # FLD AUTO: 7.16 K/UL — SIGNIFICANT CHANGE UP (ref 4.8–10.8)

## 2023-08-26 PROCEDURE — 99232 SBSQ HOSP IP/OBS MODERATE 35: CPT

## 2023-08-26 PROCEDURE — 99233 SBSQ HOSP IP/OBS HIGH 50: CPT

## 2023-08-26 RX ORDER — INSULIN LISPRO 100/ML
12 VIAL (ML) SUBCUTANEOUS
Refills: 0 | Status: DISCONTINUED | OUTPATIENT
Start: 2023-08-26 | End: 2023-08-27

## 2023-08-26 RX ADMIN — Medication 12 UNIT(S): at 12:29

## 2023-08-26 RX ADMIN — CHLORHEXIDINE GLUCONATE 1 APPLICATION(S): 213 SOLUTION TOPICAL at 05:58

## 2023-08-26 RX ADMIN — Medication 2: at 12:29

## 2023-08-26 RX ADMIN — Medication 10 UNIT(S): at 08:06

## 2023-08-26 RX ADMIN — INSULIN GLARGINE 22 UNIT(S): 100 INJECTION, SOLUTION SUBCUTANEOUS at 21:42

## 2023-08-26 RX ADMIN — IRON SUCROSE 110 MILLIGRAM(S): 20 INJECTION, SOLUTION INTRAVENOUS at 08:53

## 2023-08-26 RX ADMIN — Medication 3 MILLILITER(S): at 07:39

## 2023-08-26 RX ADMIN — Medication 50 MILLIGRAM(S): at 17:30

## 2023-08-26 RX ADMIN — BUMETANIDE 2 MILLIGRAM(S): 0.25 INJECTION INTRAMUSCULAR; INTRAVENOUS at 17:30

## 2023-08-26 RX ADMIN — Medication 50 MILLIGRAM(S): at 05:59

## 2023-08-26 RX ADMIN — Medication 3 MILLILITER(S): at 14:59

## 2023-08-26 RX ADMIN — Medication 2: at 17:19

## 2023-08-26 RX ADMIN — ATORVASTATIN CALCIUM 80 MILLIGRAM(S): 80 TABLET, FILM COATED ORAL at 21:43

## 2023-08-26 RX ADMIN — BUMETANIDE 2 MILLIGRAM(S): 0.25 INJECTION INTRAMUSCULAR; INTRAVENOUS at 05:58

## 2023-08-26 RX ADMIN — HEPARIN SODIUM 5000 UNIT(S): 5000 INJECTION INTRAVENOUS; SUBCUTANEOUS at 06:05

## 2023-08-26 RX ADMIN — Medication 12 UNIT(S): at 17:19

## 2023-08-26 RX ADMIN — SACUBITRIL AND VALSARTAN 1 TABLET(S): 24; 26 TABLET, FILM COATED ORAL at 17:30

## 2023-08-26 RX ADMIN — BUDESONIDE AND FORMOTEROL FUMARATE DIHYDRATE 2 PUFF(S): 160; 4.5 AEROSOL RESPIRATORY (INHALATION) at 21:42

## 2023-08-26 RX ADMIN — HEPARIN SODIUM 5000 UNIT(S): 5000 INJECTION INTRAVENOUS; SUBCUTANEOUS at 17:31

## 2023-08-26 RX ADMIN — Medication 81 MILLIGRAM(S): at 12:31

## 2023-08-26 RX ADMIN — PANTOPRAZOLE SODIUM 40 MILLIGRAM(S): 20 TABLET, DELAYED RELEASE ORAL at 06:00

## 2023-08-26 RX ADMIN — BUDESONIDE AND FORMOTEROL FUMARATE DIHYDRATE 2 PUFF(S): 160; 4.5 AEROSOL RESPIRATORY (INHALATION) at 08:07

## 2023-08-26 RX ADMIN — SACUBITRIL AND VALSARTAN 1 TABLET(S): 24; 26 TABLET, FILM COATED ORAL at 05:59

## 2023-08-26 NOTE — PROGRESS NOTE ADULT - ASSESSMENT
69yo F w/a PMH of Type II DM, HTN, DLD, obesity, lateral epicondylitis  presented with severe left arm pain w findings of LBBB on ECG (unsure if new or not). Code STEMI was called and then canceled because troponin negative and no chest pain. Patient was then noted to have acute mental status change, SOB, lactate elevation, and acidosis on abg. Patient had two seizure episodes and was treated with benzodiazepine. Intubated by ER for airway protection, extubated on 8/15. Was admitted to CCU and was ruled in for MI.     Also noted to have DVT/Pulmonary embolism and NSTEMI with cardiomyopathy EF 30% s/p cath which showed LM and 3VD. Hospital course also significant for melena and SHAD with low Hb requiring 2 units of PRBCs.    NSTEMI  Ischemic cardiomyopathy with EF 30%  Acute HFrEF  S/P cath with 50% LM, ostial LAD, ostial Cx and Ostial RCA disease  Toxic / Metabolic encephalopathy  Asthma exacerbation  Acute DVT/PE  Iron deficiency anemia / Melena  DM-2 / HTN / DL                 PLAN:    ·	Cont tele  ·	CXR still shows mild congestion  ·	Appears euvolemic, will change to torsemide 10 qd  ·	CTS on the case. Care d/w the CTS. Recommended to optimize the medical treatment for Asthma and CHF prior to surgery  ·	CTS recommending heparin 5000 q12h  ·	Preop w/u in progress.   ·	On ASA, Metoprolol, Entresto and Lipitor  ·	Check i's and o's and daily wt.   ·	Low salt diet and water restriction to 1.5 L/D  ·	Cont Alb/Atrovent neb treatment q 6h and prn  ·	Cont Symbicort twice a day.  ·	Monitor H/H. Hb is stable. No further w/u as per GI. Keep Hb >8  ·	Monitor FS. On Insulin

## 2023-08-26 NOTE — PROGRESS NOTE ADULT - SUBJECTIVE AND OBJECTIVE BOX
24H events:      Today is hospital day 13d.Code Status:    Family communication:  Contact date:  Name of person contacted:  Relationship to patient:  Communication details:  What matters most:    PAST MEDICAL & SURGICAL HISTORY  Diabetes      SOCIAL HISTORY:  Social History:  Per son, no smoking, alcohol use, drug use (13 Aug 2023 19:09)      ALLERGIES:  No Known Allergies    MEDICATIONS:  STANDING MEDICATIONS  albuterol/ipratropium for Nebulization 3 milliLiter(s) Nebulizer every 6 hours  aspirin  chewable 81 milliGRAM(s) Oral daily  atorvastatin 80 milliGRAM(s) Oral at bedtime  budesonide  80 MICROgram(s)/formoterol 4.5 MICROgram(s) Inhaler 2 Puff(s) Inhalation two times a day  chlorhexidine 2% Cloths 1 Application(s) Topical <User Schedule>  dextrose 5%. 1000 milliLiter(s) IV Continuous <Continuous>  dextrose 50% Injectable 25 Gram(s) IV Push once  glucagon  Injectable 1 milliGRAM(s) IntraMuscular once  heparin   Injectable 5000 Unit(s) SubCutaneous every 12 hours  insulin glargine Injectable (LANTUS) 22 Unit(s) SubCutaneous at bedtime  insulin lispro (ADMELOG) corrective regimen sliding scale   SubCutaneous three times a day before meals  insulin lispro Injectable (ADMELOG) 12 Unit(s) SubCutaneous three times a day before meals  metoprolol tartrate 50 milliGRAM(s) Oral every 12 hours  pantoprazole    Tablet 40 milliGRAM(s) Oral before breakfast  sacubitril 49 mG/valsartan 51 mG 1 Tablet(s) Oral two times a day    PRN MEDICATIONS  acetaminophen     Tablet .. 650 milliGRAM(s) Oral every 6 hours PRN  dextrose Oral Gel 15 Gram(s) Oral once PRN    VITALS:   T(F): 96.6  HR: 103  BP: 136/64  RR: 16  SpO2: 97%    PHYSICAL EXAM:  General: WN/WD NAD  Neurology: A&Ox3, nonfocal, LEONARD x 4  Head:  Normocephalic, atraumatic  ENT:  Mucosa moist, no ulcerations  Neck:  Supple, no sinuses or palpable masses  Lymphatic:  No palpable cervical, supraclavicular, axillary or inguinal adenopathy  Respiratory: CTA B/L  CV: RRR, S1S2, no murmur  Abdominal: Soft, NT, ND no palpable mass  MSK: No edema, + peripheral pulses  Incisions: intact, no erythema or drainage    LABS:                        9.7    7.16  )-----------( 398      ( 26 Aug 2023 07:51 )             33.4     08-26    142  |  100  |  9<L>  ----------------------------<  164<H>  4.0   |  29  |  0.7    Ca    8.8      26 Aug 2023 07:51  Mg     2.0     08-26    TPro  7.1  /  Alb  3.6  /  TBili  0.3  /  DBili  x   /  AST  43<H>  /  ALT  38  /  AlkPhos  96  08-26    PT/INR - ( 26 Aug 2023 07:51 )   PT: 11.70 sec;   INR: 1.03 ratio         PTT - ( 26 Aug 2023 07:51 )  PTT:40.0 sec  Urinalysis Basic - ( 26 Aug 2023 07:51 )    Color: x / Appearance: x / SG: x / pH: x  Gluc: 164 mg/dL / Ketone: x  / Bili: x / Urobili: x   Blood: x / Protein: x / Nitrite: x   Leuk Esterase: x / RBC: x / WBC x   Sq Epi: x / Non Sq Epi: x / Bacteria: x                RADIOLOGY:

## 2023-08-26 NOTE — PROGRESS NOTE ADULT - SUBJECTIVE AND OBJECTIVE BOX
OPERATIVE PROCEDURE(s):               Pre-op CABG                     68yFemale  SURGEON(s): CHRIS Fernandez    SUBJECTIVE ASSESSMENT: no chest pain, resting in bed, no dyspnea    Vital Signs Last 24 Hrs  T(F): 96.6 (26 Aug 2023 12:57), Max: 97.9 (26 Aug 2023 04:00)  HR: 91 (26 Aug 2023 12:57) (85 - 91)  BP: 116/55 (26 Aug 2023 12:57) (116/55 - 130/57)  BP(mean): 82 (25 Aug 2023 20:00) (82 - 82)  RR: 16 (26 Aug 2023 12:57) (16 - 16)  SpO2: 97% (26 Aug 2023 10:00) (97% - 97%)    I&O's Detail    25 Aug 2023 07:01  -  26 Aug 2023 07:00  --------------------------------------------------------  IN:    Oral Fluid: 1098 mL  Total IN: 1098 mL    OUT:    Voided (mL): 575 mL  Total OUT: 575 mL  Net:   I&O's Detail    24 Aug 2023 07:01  -  25 Aug 2023 07:00  --------------------------------------------------------  Total NET: -155 mL    25 Aug 2023 07:01  -  26 Aug 2023 07:00  --------------------------------------------------------  Total NET: 523 mL    CAPILLARY BLOOD GLUCOSE      POCT Blood Glucose.: 243 mg/dL (26 Aug 2023 11:56)  POCT Blood Glucose.: 147 mg/dL (26 Aug 2023 07:29)  POCT Blood Glucose.: 265 mg/dL (25 Aug 2023 21:26)  POCT Blood Glucose.: 253 mg/dL (25 Aug 2023 16:41)    Physical Exam:  General: NAD; A&Ox3  Cardiac: S1/S2, RRR, no murmur, no rubs  Lungs: unlabored shallow respirations, bilateral bs decreased at bases  Abdomen: Soft/NT/protuberant  Extremities: mild edema b/l lower extremities       LABS:                        9.7<L>  7.16  )-----------( 398      ( 26 Aug 2023 07:51 )             33.4<L>                        8.3<L>  7.60  )-----------( 307      ( 25 Aug 2023 05:56 )             28.8<L>    08-26    142  |  100  |  9<L>  ----------------------------<  164<H>  4.0   |  29  |  0.7  08-25    144  |  107  |  6<L>  ----------------------------<  148<H>  3.8   |  25  |  <0.5<L>    Ca    8.8      26 Aug 2023 07:51  Mg     2.0     08-26    TPro  7.1 [6.0 - 8.0]  /  Alb  3.6 [3.5 - 5.2]  /  TBili  0.3 [0.2 - 1.2]  /  DBili  x   /  AST  43<H> [0 - 41]  /  ALT  38 [0 - 41]  /  AlkPhos  96 [30 - 115]  08-26    PT/INR - ( 26 Aug 2023 07:51 )   PT: ;   INR: 1.03 ratio         PTT - ( 26 Aug 2023 07:51 )  PTT:40.0 sec  Urinalysis Basic - ( 26 Aug 2023 07:51 )    MEDICATIONS  (STANDING):  albuterol/ipratropium for Nebulization 3 milliLiter(s) Nebulizer every 6 hours  aspirin  chewable 81 milliGRAM(s) Oral daily  atorvastatin 80 milliGRAM(s) Oral at bedtime  budesonide  80 MICROgram(s)/formoterol 4.5 MICROgram(s) Inhaler 2 Puff(s) Inhalation two times a day  buMETAnide Injectable 2 milliGRAM(s) IV Push every 12 hours  chlorhexidine 2% Cloths 1 Application(s) Topical <User Schedule>  dextrose 5%. 1000 milliLiter(s) (50 mL/Hr) IV Continuous <Continuous>  dextrose 50% Injectable 25 Gram(s) IV Push once  glucagon  Injectable 1 milliGRAM(s) IntraMuscular once  heparin   Injectable 5000 Unit(s) SubCutaneous every 12 hours  insulin glargine Injectable (LANTUS) 22 Unit(s) SubCutaneous at bedtime  insulin lispro (ADMELOG) corrective regimen sliding scale   SubCutaneous three times a day before meals  insulin lispro Injectable (ADMELOG) 12 Unit(s) SubCutaneous three times a day before meals  metoprolol tartrate 50 milliGRAM(s) Oral every 12 hours  pantoprazole    Tablet 40 milliGRAM(s) Oral before breakfast  sacubitril 49 mG/valsartan 51 mG 1 Tablet(s) Oral two times a day  sodium chloride 0.9%. 1000 milliLiter(s) (100 mL/Hr) IV Continuous <Continuous>    MEDICATIONS  (PRN):  acetaminophen     Tablet .. 650 milliGRAM(s) Oral every 6 hours PRN Temp greater or equal to 38C (100.4F), Mild Pain (1 - 3)  dextrose Oral Gel 15 Gram(s) Oral once PRN Blood Glucose LESS THAN 70 milliGRAM(s)/deciliter    Allergies    No Known Allergies    Intolerances      Ambulation/Activity Status: ambulate as tolerated    Assessment/Plan:  68y Female sPre-op CABG for Tuesday  - Case and plan discussed with CTU Intensivist and CT Surgeon - Dr. Simental/Jaymie/Jim  - Continue DVT prophylaxis  - Continue GI prophylaxis  - Incentive Spirometry 10 times an hour  - Continue to advance physical activity as tolerated  - NSTEMI - multi-vessel CAD: Continue ASA, statin, BB - pre-op for surgery  - Anemia secondary to:  recent GIB and H/O chronic anemia  - Ischemic cardiomyopathy with EF 30%  - Heart failure:   Acute on Chronic:  Systolic        - Asthma exacerbation  - Acute AK and DVT bilateral  - DM/Glucose Control: A1c 8.2, glucose 147-265 need insulin adjusted  - continue medical management  - CTS will follow

## 2023-08-26 NOTE — PROGRESS NOTE ADULT - ASSESSMENT
89yo F w/a PMH of Type II DM, HTN, DLD, obesity, lateral epicondylitis presenting to the ED 8/13 w/left arm pain. This arm started about two months ago and has continued to progress for a few days prior to admission became severe. Pt was previously seen for this pain in the ED on 8/8, was given pain medications then sent home. Per son, pain still continued to progress after this. No fevers, chills, nausea, vomiting, diarrhea, constipation, SOB, CP.   On admission to the ED, she presented with severe left arm pain w findings of LBBB on ECG (unsure if new or not). Code STEMI was called and then canceled because troponin negative and no chest pain. Patient was then noted to have acute mental status change, SOB, lactate elevation, and acidosis on abg. Patient had two seizure episodes and was treated with benzodiazepine. Intubated by ER for airway protection, extubated on 8/15      #CAD and NSTEMI, s/p cath with left main and 3Vx disease findings  -Currently CTS team evaluating safety of CABG , anticipated date for CABG is Tuesday 8/29 if patient agrees  -Systolic dysfunction on ECHO with EF 30 to 35%. ,   -CT chest / spirometry / repeat echo  ordered by CTS as part of preparation if they want to proceed with surgery   -Cardiology following   -bumex 2 mg BID I.V   -will need life vest if not revascularized   -family and patient interested in stents , re-discussed with cardiology fellow , patient is high risk for bleeding and would defer PCI for now and follow up as OP     -CTS recommending heparin 5000 q12h  - NSTEMI - multi-vessel CAD: Continue ASA, statin, BB - pre-op for surgery        #Mild lung edema in the setting of low EF  - reassess volume status daily and f/u KFT   - start bumex 2 mg BID I.V       #Questionable Seizures  -s/p Keppra 1000 Q12 I.V for 8 days   -no further need for AED for now as per discussion with neuro team (normal EEG)     #Acute cognitive dysfunction (resolved), likely underlying ischemic event  #PE evidence on CT , on Low-Molecular Weight Heparin (Lovenox) at therapeutic dose       #Anemia, iron deficiency suspected, possible GI loss (episode of melena reported while in CCU)   -started on IV venofer   -consult GI for possible EGD/ lower Endoscopy  - GI saying patient is currently high risk and  risks outweigh benefits at this time for endoscopic intervention, will need cardio risk stratification if warranted   - c/w iron supplementation  - monitor H/H  - maintain active type and screen  -c/w Protonix   - Cardiology risk stratification done  ( high risk for low risk procedure )     ·#Lactic acidosis - resolved  -likely due transient ischemia (PE and NSTEMI)     #Type-2 diabetes mellitus  -on OAD as outpatient   -on Lantus 22 I.U and Lispro 8/8/8 in-patient     #Hypokalemia   replenished

## 2023-08-26 NOTE — PROGRESS NOTE ADULT - SUBJECTIVE AND OBJECTIVE BOX
SUBJECTIVE:    Patient is a 68y old Female who presents with a chief complaint of Arm Pain, AMS (26 Aug 2023 15:46)      HPI:  67yo F w/a PMH of Type II DM, HTN, DLD, obesity, lateral epicondylitis presenting to the ED w/ Left Arm pain. At time of my exam, patient intubated so spoke with the son at bedside. This arm started about two months ago and has continued to progress but in the last few days became severe. She went to the ED a few days ago, was given pain medications then sent home. Per son, pain still continued to progress after this. No fevers, chills, nausea, vomiting, diarrhea, constipation, SOB, CP.   On admission to the ED today, she presented with severe left arm pain w findings of LBBB on ECG (unsure if new or not). Code STEMI was called and then canceled because troponin negative and no chest pain. Patient was then noted to have acute mental status change, SOB, lactate elevation, and acidosis on abg..Patient had two seizure episodes and was treated with benzodiazepine. Intubated by ER for airway protection.     On Admission  Vitals: /64, HR 75, RR 20, T 98.5, satting 99 percent on RA   Labs: WBC 14.86, Hgb 10, , Na 139, K 5.4, BUN 12, Cr .6, Trop <.01 --> .13 on repeat  Imaging:   CXR -  Patchy bibasilar opacities, right greater than left.  CT Brain Stroke protocol - No evidence of acute transcortical infarct, hydrocephalus, acute intracranial hemorrhage, or mass effect.  CT brain perfusion: No evidence of acute infarct or ischemia.  CTA neck: No stenosis. No dissection.  CTA brain: No stenosis or aneurysm.  CTA Neck: The distal internal carotid arteries are patent. The [place] is normal in appearance. The visualized cerebral arteries are unremarkable.The vertebrobasilar junction and basilar artery are normal.    In the ED, given Levaquin. Keppra, started on propofol  Admitted to MICU for airway monitoring  (13 Aug 2023 19:09)      Currently admitted to medicine with the primary diagnosis of Seizure    no complaints today, no shortness of breath or chest pain    Besides the pertinent positives and negatives described above, the ROS was within normal limits.    PAST MEDICAL & SURGICAL HISTORY  Diabetes      SOCIAL HISTORY:    ALLERGIES:  No Known Allergies    MEDICATIONS:  STANDING MEDICATIONS  albuterol/ipratropium for Nebulization 3 milliLiter(s) Nebulizer every 6 hours  aspirin  chewable 81 milliGRAM(s) Oral daily  atorvastatin 80 milliGRAM(s) Oral at bedtime  budesonide  80 MICROgram(s)/formoterol 4.5 MICROgram(s) Inhaler 2 Puff(s) Inhalation two times a day  buMETAnide Injectable 2 milliGRAM(s) IV Push every 12 hours  chlorhexidine 2% Cloths 1 Application(s) Topical <User Schedule>  dextrose 5%. 1000 milliLiter(s) IV Continuous <Continuous>  dextrose 50% Injectable 25 Gram(s) IV Push once  glucagon  Injectable 1 milliGRAM(s) IntraMuscular once  heparin   Injectable 5000 Unit(s) SubCutaneous every 12 hours  insulin glargine Injectable (LANTUS) 22 Unit(s) SubCutaneous at bedtime  insulin lispro (ADMELOG) corrective regimen sliding scale   SubCutaneous three times a day before meals  insulin lispro Injectable (ADMELOG) 12 Unit(s) SubCutaneous three times a day before meals  metoprolol tartrate 50 milliGRAM(s) Oral every 12 hours  pantoprazole    Tablet 40 milliGRAM(s) Oral before breakfast  sacubitril 49 mG/valsartan 51 mG 1 Tablet(s) Oral two times a day  sodium chloride 0.9%. 1000 milliLiter(s) IV Continuous <Continuous>    PRN MEDICATIONS  acetaminophen     Tablet .. 650 milliGRAM(s) Oral every 6 hours PRN  dextrose Oral Gel 15 Gram(s) Oral once PRN    VITALS:   T(F): 96.6  HR: 103  BP: 136/64  RR: 16  SpO2: 97%    LABS:                        9.7    7.16  )-----------( 398      ( 26 Aug 2023 07:51 )             33.4     08-26    142  |  100  |  9<L>  ----------------------------<  164<H>  4.0   |  29  |  0.7    Ca    8.8      26 Aug 2023 07:51  Mg     2.0     08-26    TPro  7.1  /  Alb  3.6  /  TBili  0.3  /  DBili  x   /  AST  43<H>  /  ALT  38  /  AlkPhos  96  08-26    PT/INR - ( 26 Aug 2023 07:51 )   PT: 11.70 sec;   INR: 1.03 ratio         PTT - ( 26 Aug 2023 07:51 )  PTT:40.0 sec  Urinalysis Basic - ( 26 Aug 2023 07:51 )    Color: x / Appearance: x / SG: x / pH: x  Gluc: 164 mg/dL / Ketone: x  / Bili: x / Urobili: x   Blood: x / Protein: x / Nitrite: x   Leuk Esterase: x / RBC: x / WBC x   Sq Epi: x / Non Sq Epi: x / Bacteria: x                Seizure      RADIOLOGY:    PHYSICAL EXAM:  General: WN/WD NAD  Neurology: A&Ox3, nonfocal, LEONARD x 4  Head:  Normocephalic, atraumatic  ENT:  Mucosa moist, no ulcerations  Neck:  Supple, no sinuses or palpable masses  Lymphatic:  No palpable cervical, supraclavicular, axillary or inguinal adenopathy  Respiratory: CTA B/L  CV: RRR, S1S2, no murmur  Abdominal: Soft, NT, ND no palpable mass  MSK: No edema, + peripheral pulses  Incisions: intact, no erythema or drainage    Intravenous access: yes  NG tube: no  Johnson Catheter: no

## 2023-08-27 LAB
ALBUMIN SERPL ELPH-MCNC: 3.6 G/DL — SIGNIFICANT CHANGE UP (ref 3.5–5.2)
ALP SERPL-CCNC: 95 U/L — SIGNIFICANT CHANGE UP (ref 30–115)
ALT FLD-CCNC: 36 U/L — SIGNIFICANT CHANGE UP (ref 0–41)
ANION GAP SERPL CALC-SCNC: 12 MMOL/L — SIGNIFICANT CHANGE UP (ref 7–14)
AST SERPL-CCNC: 36 U/L — SIGNIFICANT CHANGE UP (ref 0–41)
BASOPHILS # BLD AUTO: 0.03 K/UL — SIGNIFICANT CHANGE UP (ref 0–0.2)
BASOPHILS NFR BLD AUTO: 0.4 % — SIGNIFICANT CHANGE UP (ref 0–1)
BILIRUB SERPL-MCNC: 0.3 MG/DL — SIGNIFICANT CHANGE UP (ref 0.2–1.2)
BUN SERPL-MCNC: 11 MG/DL — SIGNIFICANT CHANGE UP (ref 10–20)
CALCIUM SERPL-MCNC: 9 MG/DL — SIGNIFICANT CHANGE UP (ref 8.4–10.5)
CHLORIDE SERPL-SCNC: 103 MMOL/L — SIGNIFICANT CHANGE UP (ref 98–110)
CO2 SERPL-SCNC: 28 MMOL/L — SIGNIFICANT CHANGE UP (ref 17–32)
CREAT SERPL-MCNC: 0.5 MG/DL — LOW (ref 0.7–1.5)
EGFR: 101 ML/MIN/1.73M2 — SIGNIFICANT CHANGE UP
EOSINOPHIL # BLD AUTO: 0.08 K/UL — SIGNIFICANT CHANGE UP (ref 0–0.7)
EOSINOPHIL NFR BLD AUTO: 0.9 % — SIGNIFICANT CHANGE UP (ref 0–8)
GLUCOSE BLDC GLUCOMTR-MCNC: 159 MG/DL — HIGH (ref 70–99)
GLUCOSE BLDC GLUCOMTR-MCNC: 190 MG/DL — HIGH (ref 70–99)
GLUCOSE BLDC GLUCOMTR-MCNC: 196 MG/DL — HIGH (ref 70–99)
GLUCOSE BLDC GLUCOMTR-MCNC: 262 MG/DL — HIGH (ref 70–99)
GLUCOSE SERPL-MCNC: 168 MG/DL — HIGH (ref 70–99)
HCT VFR BLD CALC: 32.1 % — LOW (ref 37–47)
HGB BLD-MCNC: 9.6 G/DL — LOW (ref 12–16)
IMM GRANULOCYTES NFR BLD AUTO: 0.6 % — HIGH (ref 0.1–0.3)
LYMPHOCYTES # BLD AUTO: 2.06 K/UL — SIGNIFICANT CHANGE UP (ref 1.2–3.4)
LYMPHOCYTES # BLD AUTO: 24.4 % — SIGNIFICANT CHANGE UP (ref 20.5–51.1)
MAGNESIUM SERPL-MCNC: 1.9 MG/DL — SIGNIFICANT CHANGE UP (ref 1.8–2.4)
MCHC RBC-ENTMCNC: 24.4 PG — LOW (ref 27–31)
MCHC RBC-ENTMCNC: 29.9 G/DL — LOW (ref 32–37)
MCV RBC AUTO: 81.7 FL — SIGNIFICANT CHANGE UP (ref 81–99)
MONOCYTES # BLD AUTO: 0.38 K/UL — SIGNIFICANT CHANGE UP (ref 0.1–0.6)
MONOCYTES NFR BLD AUTO: 4.5 % — SIGNIFICANT CHANGE UP (ref 1.7–9.3)
NEUTROPHILS # BLD AUTO: 5.85 K/UL — SIGNIFICANT CHANGE UP (ref 1.4–6.5)
NEUTROPHILS NFR BLD AUTO: 69.2 % — SIGNIFICANT CHANGE UP (ref 42.2–75.2)
NRBC # BLD: 0 /100 WBCS — SIGNIFICANT CHANGE UP (ref 0–0)
PLATELET # BLD AUTO: 371 K/UL — SIGNIFICANT CHANGE UP (ref 130–400)
PMV BLD: 9.8 FL — SIGNIFICANT CHANGE UP (ref 7.4–10.4)
POTASSIUM SERPL-MCNC: 3.6 MMOL/L — SIGNIFICANT CHANGE UP (ref 3.5–5)
POTASSIUM SERPL-SCNC: 3.6 MMOL/L — SIGNIFICANT CHANGE UP (ref 3.5–5)
PROT SERPL-MCNC: 7.1 G/DL — SIGNIFICANT CHANGE UP (ref 6–8)
RBC # BLD: 3.93 M/UL — LOW (ref 4.2–5.4)
RBC # FLD: 23.6 % — HIGH (ref 11.5–14.5)
SODIUM SERPL-SCNC: 143 MMOL/L — SIGNIFICANT CHANGE UP (ref 135–146)
TROPONIN T SERPL-MCNC: <0.01 NG/ML — SIGNIFICANT CHANGE UP
WBC # BLD: 8.45 K/UL — SIGNIFICANT CHANGE UP (ref 4.8–10.8)
WBC # FLD AUTO: 8.45 K/UL — SIGNIFICANT CHANGE UP (ref 4.8–10.8)

## 2023-08-27 PROCEDURE — 93010 ELECTROCARDIOGRAM REPORT: CPT | Mod: 77

## 2023-08-27 PROCEDURE — 93010 ELECTROCARDIOGRAM REPORT: CPT

## 2023-08-27 PROCEDURE — 99233 SBSQ HOSP IP/OBS HIGH 50: CPT

## 2023-08-27 RX ORDER — INSULIN LISPRO 100/ML
15 VIAL (ML) SUBCUTANEOUS
Refills: 0 | Status: DISCONTINUED | OUTPATIENT
Start: 2023-08-27 | End: 2023-08-28

## 2023-08-27 RX ADMIN — Medication 1: at 08:18

## 2023-08-27 RX ADMIN — Medication 1: at 17:28

## 2023-08-27 RX ADMIN — HEPARIN SODIUM 5000 UNIT(S): 5000 INJECTION INTRAVENOUS; SUBCUTANEOUS at 17:30

## 2023-08-27 RX ADMIN — Medication 50 MILLIGRAM(S): at 06:22

## 2023-08-27 RX ADMIN — Medication 50 MILLIGRAM(S): at 17:31

## 2023-08-27 RX ADMIN — Medication 30 MILLILITER(S): at 20:05

## 2023-08-27 RX ADMIN — Medication 3: at 11:51

## 2023-08-27 RX ADMIN — Medication 10 MILLIGRAM(S): at 06:22

## 2023-08-27 RX ADMIN — SACUBITRIL AND VALSARTAN 1 TABLET(S): 24; 26 TABLET, FILM COATED ORAL at 06:21

## 2023-08-27 RX ADMIN — PANTOPRAZOLE SODIUM 40 MILLIGRAM(S): 20 TABLET, DELAYED RELEASE ORAL at 06:21

## 2023-08-27 RX ADMIN — Medication 3 MILLILITER(S): at 08:58

## 2023-08-27 RX ADMIN — ATORVASTATIN CALCIUM 80 MILLIGRAM(S): 80 TABLET, FILM COATED ORAL at 21:15

## 2023-08-27 RX ADMIN — Medication 15 UNIT(S): at 17:28

## 2023-08-27 RX ADMIN — Medication 12 UNIT(S): at 11:50

## 2023-08-27 RX ADMIN — Medication 81 MILLIGRAM(S): at 11:52

## 2023-08-27 RX ADMIN — BUDESONIDE AND FORMOTEROL FUMARATE DIHYDRATE 2 PUFF(S): 160; 4.5 AEROSOL RESPIRATORY (INHALATION) at 20:38

## 2023-08-27 RX ADMIN — CHLORHEXIDINE GLUCONATE 1 APPLICATION(S): 213 SOLUTION TOPICAL at 06:22

## 2023-08-27 RX ADMIN — SACUBITRIL AND VALSARTAN 1 TABLET(S): 24; 26 TABLET, FILM COATED ORAL at 17:31

## 2023-08-27 RX ADMIN — Medication 12 UNIT(S): at 08:18

## 2023-08-27 RX ADMIN — Medication 3 MILLILITER(S): at 14:28

## 2023-08-27 RX ADMIN — BUDESONIDE AND FORMOTEROL FUMARATE DIHYDRATE 2 PUFF(S): 160; 4.5 AEROSOL RESPIRATORY (INHALATION) at 08:22

## 2023-08-27 RX ADMIN — Medication 3 MILLILITER(S): at 20:44

## 2023-08-27 RX ADMIN — INSULIN GLARGINE 22 UNIT(S): 100 INJECTION, SOLUTION SUBCUTANEOUS at 21:15

## 2023-08-27 RX ADMIN — HEPARIN SODIUM 5000 UNIT(S): 5000 INJECTION INTRAVENOUS; SUBCUTANEOUS at 06:22

## 2023-08-27 NOTE — PROGRESS NOTE ADULT - ASSESSMENT
69yo F w/a PMH of Type II DM, HTN, DLD, obesity, lateral epicondylitis  presented with severe left arm pain w findings of LBBB on ECG (unsure if new or not). Code STEMI was called and then canceled because troponin negative and no chest pain. Patient was then noted to have acute mental status change, SOB, lactate elevation, and acidosis on abg. Patient had two seizure episodes and was treated with benzodiazepine. Intubated by ER for airway protection, extubated on 8/15. Was admitted to CCU and was ruled in for MI.     Also noted to have DVT/Pulmonary embolism and NSTEMI with cardiomyopathy EF 30% s/p cath which showed LM and 3VD. Hospital course also significant for melena and SHAD with low Hb requiring 2 units of PRBCs.    NSTEMI  Ischemic cardiomyopathy with EF 30%  Acute HFrEF  S/P cath with 50% LM, ostial LAD, ostial Cx and Ostial RCA disease  Toxic / Metabolic encephalopathy  Asthma exacerbation  Acute DVT/PE  Iron deficiency anemia / Melena  DM-2 / HTN / DL                 PLAN:    ·	Cont tele  ·	Appears euvolemic, c/w torsemide 10 qd  ·	CTS on the case. Care d/w the CTS. Recommended to optimize the medical treatment for Asthma and CHF prior to surgery  ·	CTS recommending heparin 5000 q12h as per notes 8/25  ·	Preop w/u in progress.   ·	On ASA, Metoprolol, Entresto and Lipitor  ·	Check i's and o's and daily wt.   ·	Low salt diet and water restriction to 1.5 L/D  ·	Cont Alb/Atrovent neb treatment q 6h and prn  ·	Cont Symbicort twice a day.  ·	Monitor H/H. Hb is stable. No further w/u as per GI. Keep Hb >8  ·	Monitor FS. increased lispro    Pending: CTS for CABG

## 2023-08-27 NOTE — PROGRESS NOTE ADULT - ASSESSMENT
Assessment  67yo F w/a PMH of Type II DM, HTN, DLD, obesity, lateral epicondylitis  presented with severe left arm pain w findings of LBBB on ECG (unsure if new or not). Code STEMI was called and then canceled because troponin negative and no chest pain. Patient was then noted to have acute mental status change, SOB, lactate elevation, and acidosis on abg. Patient had two seizure episodes and was treated with benzodiazepine. Intubated by ER for airway protection, extubated on 8/15. Was admitted to CCU and was ruled in for MI.     Also noted to have DVT/Pulmonary embolism and NSTEMI with cardiomyopathy EF 30% s/p cath which showed LM and 3VD. Hospital course also significant for melena and SHAD with low Hb requiring 2 units of PRBCs.    #NSTEMI  #Ischemic cardiomyopathy with EF 30%  #Acute HFrEF  #S/P cath with 50% LM, ostial LAD, ostial Cx and Ostial RCA disease  -Appears euvolemic, c/w torsemide 10 qd  -CTS on the case. Care d/w the CTS.  CTS recommending heparin 5000 q12h as per notes 8/25  Preop w/u in progress.   On ASA, Metoprolol, Entresto and Lipitor  Planned CABG on tuesday 8/29      #Asthma exacerbation  -Cont Alb/Atrovent neb treatment q 6h and prn  -Cont Symbicort twice a day.    #Iron deficiency anemia / Melena  -Monitor H/H. Hb is stable. No further w/u as per GI. Keep Hb >8    #DM-2 / HTN / DL  -Monitor FS. increased lispro

## 2023-08-27 NOTE — PROGRESS NOTE ADULT - SUBJECTIVE AND OBJECTIVE BOX
SUBJECTIVE:    Patient is a 68y old Female who presents with a chief complaint of Arm Pain, AMS (26 Aug 2023 19:21)      HPI:  67yo F w/a PMH of Type II DM, HTN, DLD, obesity, lateral epicondylitis presenting to the ED w/ Left Arm pain. At time of my exam, patient intubated so spoke with the son at bedside. This arm started about two months ago and has continued to progress but in the last few days became severe. She went to the ED a few days ago, was given pain medications then sent home. Per son, pain still continued to progress after this. No fevers, chills, nausea, vomiting, diarrhea, constipation, SOB, CP.   On admission to the ED today, she presented with severe left arm pain w findings of LBBB on ECG (unsure if new or not). Code STEMI was called and then canceled because troponin negative and no chest pain. Patient was then noted to have acute mental status change, SOB, lactate elevation, and acidosis on abg..Patient had two seizure episodes and was treated with benzodiazepine. Intubated by ER for airway protection.     On Admission  Vitals: /64, HR 75, RR 20, T 98.5, satting 99 percent on RA   Labs: WBC 14.86, Hgb 10, , Na 139, K 5.4, BUN 12, Cr .6, Trop <.01 --> .13 on repeat  Imaging:   CXR -  Patchy bibasilar opacities, right greater than left.  CT Brain Stroke protocol - No evidence of acute transcortical infarct, hydrocephalus, acute intracranial hemorrhage, or mass effect.  CT brain perfusion: No evidence of acute infarct or ischemia.  CTA neck: No stenosis. No dissection.  CTA brain: No stenosis or aneurysm.  CTA Neck: The distal internal carotid arteries are patent. The [place] is normal in appearance. The visualized cerebral arteries are unremarkable.The vertebrobasilar junction and basilar artery are normal.    In the ED, given Levaquin. Keppra, started on propofol  Admitted to MICU for airway monitoring  (13 Aug 2023 19:09)      Currently admitted to medicine with the primary diagnosis of Seizure    no complaints    Besides the pertinent positives and negatives described above, the ROS was within normal limits.    PAST MEDICAL & SURGICAL HISTORY  Diabetes      SOCIAL HISTORY:    ALLERGIES:  No Known Allergies    MEDICATIONS:  STANDING MEDICATIONS  albuterol/ipratropium for Nebulization 3 milliLiter(s) Nebulizer every 6 hours  aspirin  chewable 81 milliGRAM(s) Oral daily  atorvastatin 80 milliGRAM(s) Oral at bedtime  budesonide  80 MICROgram(s)/formoterol 4.5 MICROgram(s) Inhaler 2 Puff(s) Inhalation two times a day  chlorhexidine 2% Cloths 1 Application(s) Topical <User Schedule>  dextrose 5%. 1000 milliLiter(s) IV Continuous <Continuous>  dextrose 50% Injectable 25 Gram(s) IV Push once  glucagon  Injectable 1 milliGRAM(s) IntraMuscular once  heparin   Injectable 5000 Unit(s) SubCutaneous every 12 hours  insulin glargine Injectable (LANTUS) 22 Unit(s) SubCutaneous at bedtime  insulin lispro (ADMELOG) corrective regimen sliding scale   SubCutaneous three times a day before meals  insulin lispro Injectable (ADMELOG) 12 Unit(s) SubCutaneous three times a day before meals  metoprolol tartrate 50 milliGRAM(s) Oral every 12 hours  pantoprazole    Tablet 40 milliGRAM(s) Oral before breakfast  sacubitril 49 mG/valsartan 51 mG 1 Tablet(s) Oral two times a day  torsemide 10 milliGRAM(s) Oral daily    PRN MEDICATIONS  acetaminophen     Tablet .. 650 milliGRAM(s) Oral every 6 hours PRN  dextrose Oral Gel 15 Gram(s) Oral once PRN    VITALS:   T(F): 98.3  HR: 91  BP: 109/54  RR: 16  SpO2: 97%    LABS:                        9.6    8.45  )-----------( 371      ( 27 Aug 2023 07:17 )             32.1     08-27    143  |  103  |  11  ----------------------------<  168<H>  3.6   |  28  |  0.5<L>    Ca    9.0      27 Aug 2023 07:17  Mg     1.9     08-27    TPro  7.1  /  Alb  3.6  /  TBili  0.3  /  DBili  x   /  AST  36  /  ALT  36  /  AlkPhos  95  08-27    PT/INR - ( 26 Aug 2023 07:51 )   PT: 11.70 sec;   INR: 1.03 ratio         PTT - ( 26 Aug 2023 07:51 )  PTT:40.0 sec  Urinalysis Basic - ( 27 Aug 2023 07:17 )    Color: x / Appearance: x / SG: x / pH: x  Gluc: 168 mg/dL / Ketone: x  / Bili: x / Urobili: x   Blood: x / Protein: x / Nitrite: x   Leuk Esterase: x / RBC: x / WBC x   Sq Epi: x / Non Sq Epi: x / Bacteria: x                Seizure      RADIOLOGY:    PHYSICAL EXAM:  General: WN/WD NAD  Neurology: A&Ox3, nonfocal, LEONARD x 4  Head:  Normocephalic, atraumatic  ENT:  Mucosa moist, no ulcerations  Neck:  Supple, no sinuses or palpable masses  Lymphatic:  No palpable cervical, supraclavicular, axillary or inguinal adenopathy  Respiratory: CTA B/L  CV: RRR, S1S2, no murmur  Abdominal: Soft, NT, ND no palpable mass  MSK: No edema, + peripheral pulses  Incisions: intact, no erythema or drainage    Intravenous access: yes  NG tube: no  Johnson Catheter: no

## 2023-08-27 NOTE — PROGRESS NOTE ADULT - SUBJECTIVE AND OBJECTIVE BOX
24H events:    Patient is a 68y old Female who presents with a chief complaint of Arm Pain, AMS (27 Aug 2023 13:00)    Primary diagnosis of Seizure       Today is hospital day 14d.      PAST MEDICAL & SURGICAL HISTORY  Diabetes      SOCIAL HISTORY:  Negative for smoking/alcohol/drug use.     ALLERGIES:  No Known Allergies    MEDICATIONS:  STANDING MEDICATIONS  albuterol/ipratropium for Nebulization 3 milliLiter(s) Nebulizer every 6 hours  aspirin  chewable 81 milliGRAM(s) Oral daily  atorvastatin 80 milliGRAM(s) Oral at bedtime  budesonide  80 MICROgram(s)/formoterol 4.5 MICROgram(s) Inhaler 2 Puff(s) Inhalation two times a day  chlorhexidine 2% Cloths 1 Application(s) Topical <User Schedule>  dextrose 5%. 1000 milliLiter(s) IV Continuous <Continuous>  dextrose 50% Injectable 25 Gram(s) IV Push once  glucagon  Injectable 1 milliGRAM(s) IntraMuscular once  heparin   Injectable 5000 Unit(s) SubCutaneous every 12 hours  insulin glargine Injectable (LANTUS) 22 Unit(s) SubCutaneous at bedtime  insulin lispro (ADMELOG) corrective regimen sliding scale   SubCutaneous three times a day before meals  insulin lispro Injectable (ADMELOG) 15 Unit(s) SubCutaneous three times a day before meals  metoprolol tartrate 50 milliGRAM(s) Oral every 12 hours  pantoprazole    Tablet 40 milliGRAM(s) Oral before breakfast  sacubitril 49 mG/valsartan 51 mG 1 Tablet(s) Oral two times a day  torsemide 10 milliGRAM(s) Oral daily    PRN MEDICATIONS  acetaminophen     Tablet .. 650 milliGRAM(s) Oral every 6 hours PRN  dextrose Oral Gel 15 Gram(s) Oral once PRN    VITALS:   T(F): 98.3  HR: 91  BP: 109/54  RR: 16  SpO2: 97%    LABS:                        9.6    8.45  )-----------( 371      ( 27 Aug 2023 07:17 )             32.1     08-27    143  |  103  |  11  ----------------------------<  168<H>  3.6   |  28  |  0.5<L>    Ca    9.0      27 Aug 2023 07:17  Mg     1.9     08-27    TPro  7.1  /  Alb  3.6  /  TBili  0.3  /  DBili  x   /  AST  36  /  ALT  36  /  AlkPhos  95  08-27    PT/INR - ( 26 Aug 2023 07:51 )   PT: 11.70 sec;   INR: 1.03 ratio         PTT - ( 26 Aug 2023 07:51 )  PTT:40.0 sec  Urinalysis Basic - ( 27 Aug 2023 07:17 )    Color: x / Appearance: x / SG: x / pH: x  Gluc: 168 mg/dL / Ketone: x  / Bili: x / Urobili: x   Blood: x / Protein: x / Nitrite: x   Leuk Esterase: x / RBC: x / WBC x   Sq Epi: x / Non Sq Epi: x / Bacteria: x        PHYSICAL EXAM:  GENERAL: NAD  NECK: Supple, No JVD, Normal thyroid  NERVOUS SYSTEM:  Alert & Oriented X3  CHEST/LUNG: mild basilar creps  HEART: Regular rate and rhythm;   ABDOMEN: Soft, Nontender, Nondistended; Bowel sounds present  EXTREMITIES:  + Peripheral Pulses,

## 2023-08-28 LAB
ALBUMIN SERPL ELPH-MCNC: 3.6 G/DL — SIGNIFICANT CHANGE UP (ref 3.5–5.2)
ALP SERPL-CCNC: 98 U/L — SIGNIFICANT CHANGE UP (ref 30–115)
ALT FLD-CCNC: 33 U/L — SIGNIFICANT CHANGE UP (ref 0–41)
ANION GAP SERPL CALC-SCNC: 11 MMOL/L — SIGNIFICANT CHANGE UP (ref 7–14)
AST SERPL-CCNC: 32 U/L — SIGNIFICANT CHANGE UP (ref 0–41)
BASOPHILS # BLD AUTO: 0.03 K/UL — SIGNIFICANT CHANGE UP (ref 0–0.2)
BASOPHILS NFR BLD AUTO: 0.4 % — SIGNIFICANT CHANGE UP (ref 0–1)
BILIRUB SERPL-MCNC: 0.4 MG/DL — SIGNIFICANT CHANGE UP (ref 0.2–1.2)
BLD GP AB SCN SERPL QL: SIGNIFICANT CHANGE UP
BUN SERPL-MCNC: 13 MG/DL — SIGNIFICANT CHANGE UP (ref 10–20)
CALCIUM SERPL-MCNC: 8.7 MG/DL — SIGNIFICANT CHANGE UP (ref 8.4–10.4)
CHLORIDE SERPL-SCNC: 101 MMOL/L — SIGNIFICANT CHANGE UP (ref 98–110)
CO2 SERPL-SCNC: 31 MMOL/L — SIGNIFICANT CHANGE UP (ref 17–32)
CREAT SERPL-MCNC: 0.7 MG/DL — SIGNIFICANT CHANGE UP (ref 0.7–1.5)
EGFR: 93 ML/MIN/1.73M2 — SIGNIFICANT CHANGE UP
EOSINOPHIL # BLD AUTO: 0.03 K/UL — SIGNIFICANT CHANGE UP (ref 0–0.7)
EOSINOPHIL NFR BLD AUTO: 0.4 % — SIGNIFICANT CHANGE UP (ref 0–8)
GLUCOSE BLDC GLUCOMTR-MCNC: 177 MG/DL — HIGH (ref 70–99)
GLUCOSE BLDC GLUCOMTR-MCNC: 187 MG/DL — HIGH (ref 70–99)
GLUCOSE BLDC GLUCOMTR-MCNC: 213 MG/DL — HIGH (ref 70–99)
GLUCOSE BLDC GLUCOMTR-MCNC: 267 MG/DL — HIGH (ref 70–99)
GLUCOSE SERPL-MCNC: 216 MG/DL — HIGH (ref 70–99)
HCT VFR BLD CALC: 31.1 % — LOW (ref 37–47)
HGB BLD-MCNC: 9.1 G/DL — LOW (ref 12–16)
IMM GRANULOCYTES NFR BLD AUTO: 0.5 % — HIGH (ref 0.1–0.3)
LYMPHOCYTES # BLD AUTO: 1.82 K/UL — SIGNIFICANT CHANGE UP (ref 1.2–3.4)
LYMPHOCYTES # BLD AUTO: 21.8 % — SIGNIFICANT CHANGE UP (ref 20.5–51.1)
MAGNESIUM SERPL-MCNC: 2 MG/DL — SIGNIFICANT CHANGE UP (ref 1.8–2.4)
MCHC RBC-ENTMCNC: 23.9 PG — LOW (ref 27–31)
MCHC RBC-ENTMCNC: 29.3 G/DL — LOW (ref 32–37)
MCV RBC AUTO: 81.8 FL — SIGNIFICANT CHANGE UP (ref 81–99)
MONOCYTES # BLD AUTO: 0.38 K/UL — SIGNIFICANT CHANGE UP (ref 0.1–0.6)
MONOCYTES NFR BLD AUTO: 4.6 % — SIGNIFICANT CHANGE UP (ref 1.7–9.3)
NEUTROPHILS # BLD AUTO: 6.04 K/UL — SIGNIFICANT CHANGE UP (ref 1.4–6.5)
NEUTROPHILS NFR BLD AUTO: 72.3 % — SIGNIFICANT CHANGE UP (ref 42.2–75.2)
NRBC # BLD: 0 /100 WBCS — SIGNIFICANT CHANGE UP (ref 0–0)
PLATELET # BLD AUTO: 381 K/UL — SIGNIFICANT CHANGE UP (ref 130–400)
PMV BLD: 10 FL — SIGNIFICANT CHANGE UP (ref 7.4–10.4)
POTASSIUM SERPL-MCNC: 4 MMOL/L — SIGNIFICANT CHANGE UP (ref 3.5–5)
POTASSIUM SERPL-SCNC: 4 MMOL/L — SIGNIFICANT CHANGE UP (ref 3.5–5)
PROT SERPL-MCNC: 7 G/DL — SIGNIFICANT CHANGE UP (ref 6–8)
RBC # BLD: 3.8 M/UL — LOW (ref 4.2–5.4)
RBC # FLD: 24.5 % — HIGH (ref 11.5–14.5)
SODIUM SERPL-SCNC: 143 MMOL/L — SIGNIFICANT CHANGE UP (ref 135–146)
TROPONIN T SERPL-MCNC: <0.01 NG/ML — SIGNIFICANT CHANGE UP
WBC # BLD: 8.34 K/UL — SIGNIFICANT CHANGE UP (ref 4.8–10.8)
WBC # FLD AUTO: 8.34 K/UL — SIGNIFICANT CHANGE UP (ref 4.8–10.8)

## 2023-08-28 PROCEDURE — 99233 SBSQ HOSP IP/OBS HIGH 50: CPT | Mod: 57

## 2023-08-28 PROCEDURE — 99233 SBSQ HOSP IP/OBS HIGH 50: CPT

## 2023-08-28 RX ORDER — IPRATROPIUM/ALBUTEROL SULFATE 18-103MCG
3 AEROSOL WITH ADAPTER (GRAM) INHALATION EVERY 6 HOURS
Refills: 0 | Status: DISCONTINUED | OUTPATIENT
Start: 2023-08-28 | End: 2023-08-30

## 2023-08-28 RX ORDER — INSULIN LISPRO 100/ML
18 VIAL (ML) SUBCUTANEOUS
Refills: 0 | Status: DISCONTINUED | OUTPATIENT
Start: 2023-08-28 | End: 2023-08-30

## 2023-08-28 RX ORDER — CHLORHEXIDINE GLUCONATE 213 G/1000ML
1 SOLUTION TOPICAL ONCE
Refills: 0 | Status: COMPLETED | OUTPATIENT
Start: 2023-08-28 | End: 2023-08-28

## 2023-08-28 RX ORDER — CHLORHEXIDINE GLUCONATE 213 G/1000ML
1 SOLUTION TOPICAL ONCE
Refills: 0 | Status: DISCONTINUED | OUTPATIENT
Start: 2023-08-28 | End: 2023-08-28

## 2023-08-28 RX ORDER — INSULIN GLARGINE 100 [IU]/ML
25 INJECTION, SOLUTION SUBCUTANEOUS AT BEDTIME
Refills: 0 | Status: DISCONTINUED | OUTPATIENT
Start: 2023-08-28 | End: 2023-08-30

## 2023-08-28 RX ORDER — SACUBITRIL AND VALSARTAN 24; 26 MG/1; MG/1
1 TABLET, FILM COATED ORAL
Refills: 0 | Status: DISCONTINUED | OUTPATIENT
Start: 2023-08-28 | End: 2023-08-30

## 2023-08-28 RX ADMIN — Medication 3: at 12:01

## 2023-08-28 RX ADMIN — Medication 3 MILLILITER(S): at 08:45

## 2023-08-28 RX ADMIN — Medication 15 UNIT(S): at 12:02

## 2023-08-28 RX ADMIN — Medication 50 MILLIGRAM(S): at 17:46

## 2023-08-28 RX ADMIN — Medication 15 UNIT(S): at 08:07

## 2023-08-28 RX ADMIN — Medication 3 MILLILITER(S): at 21:11

## 2023-08-28 RX ADMIN — HEPARIN SODIUM 5000 UNIT(S): 5000 INJECTION INTRAVENOUS; SUBCUTANEOUS at 17:46

## 2023-08-28 RX ADMIN — Medication 18 UNIT(S): at 17:13

## 2023-08-28 RX ADMIN — HEPARIN SODIUM 5000 UNIT(S): 5000 INJECTION INTRAVENOUS; SUBCUTANEOUS at 05:17

## 2023-08-28 RX ADMIN — CHLORHEXIDINE GLUCONATE 1 APPLICATION(S): 213 SOLUTION TOPICAL at 05:19

## 2023-08-28 RX ADMIN — BUDESONIDE AND FORMOTEROL FUMARATE DIHYDRATE 2 PUFF(S): 160; 4.5 AEROSOL RESPIRATORY (INHALATION) at 20:54

## 2023-08-28 RX ADMIN — PANTOPRAZOLE SODIUM 40 MILLIGRAM(S): 20 TABLET, DELAYED RELEASE ORAL at 05:17

## 2023-08-28 RX ADMIN — Medication 10 MILLIGRAM(S): at 05:17

## 2023-08-28 RX ADMIN — Medication 1: at 17:11

## 2023-08-28 RX ADMIN — INSULIN GLARGINE 25 UNIT(S): 100 INJECTION, SOLUTION SUBCUTANEOUS at 22:09

## 2023-08-28 RX ADMIN — ATORVASTATIN CALCIUM 80 MILLIGRAM(S): 80 TABLET, FILM COATED ORAL at 22:08

## 2023-08-28 RX ADMIN — SACUBITRIL AND VALSARTAN 1 TABLET(S): 24; 26 TABLET, FILM COATED ORAL at 05:17

## 2023-08-28 RX ADMIN — Medication 50 MILLIGRAM(S): at 05:17

## 2023-08-28 RX ADMIN — Medication 2: at 08:07

## 2023-08-28 RX ADMIN — Medication 81 MILLIGRAM(S): at 12:03

## 2023-08-28 RX ADMIN — CHLORHEXIDINE GLUCONATE 1 APPLICATION(S): 213 SOLUTION TOPICAL at 17:51

## 2023-08-28 NOTE — PROGRESS NOTE ADULT - SUBJECTIVE AND OBJECTIVE BOX
SUBJECTIVE / OVERNIGHT EVENTS  Patient was seen and examined. She is on 6L oxygen nasal canula. No acute complaints    MEDICATIONS  aspirin  chewable 81 milliGRAM(s) Oral daily  atorvastatin 80 milliGRAM(s) Oral at bedtime  budesonide  80 MICROgram(s)/formoterol 4.5 MICROgram(s) Inhaler 2 Puff(s) Inhalation two times a day  chlorhexidine 2% Cloths 1 Application(s) Topical <User Schedule>  dextrose 5%. 1000 milliLiter(s) IV Continuous <Continuous>  dextrose 50% Injectable 25 Gram(s) IV Push once  glucagon  Injectable 1 milliGRAM(s) IntraMuscular once  heparin   Injectable 5000 Unit(s) SubCutaneous every 12 hours  insulin glargine Injectable (LANTUS) 25 Unit(s) SubCutaneous at bedtime  insulin lispro (ADMELOG) corrective regimen sliding scale   SubCutaneous three times a day before meals  insulin lispro Injectable (ADMELOG) 18 Unit(s) SubCutaneous three times a day before meals  metoprolol tartrate 50 milliGRAM(s) Oral every 12 hours  pantoprazole    Tablet 40 milliGRAM(s) Oral before breakfast  torsemide 10 milliGRAM(s) Oral daily    acetaminophen     Tablet .. 650 milliGRAM(s) Oral every 6 hours PRN Temp greater or equal to 38C (100.4F), Mild Pain (1 - 3)  albuterol/ipratropium for Nebulization 3 milliLiter(s) Nebulizer every 6 hours PRN Bronchospasm  aluminum hydroxide/magnesium hydroxide/simethicone Suspension 30 milliLiter(s) Oral every 8 hours PRN Dyspepsia  dextrose Oral Gel 15 Gram(s) Oral once PRN Blood Glucose LESS THAN 70 milliGRAM(s)/deciliter    VITALS /  EXAM    T(C): 37.3 (08-28-23 @ 12:06), Max: 37.3 (08-28-23 @ 12:06)  HR: 108 (08-28-23 @ 12:06) (92 - 108)  BP: 140/65 (08-28-23 @ 12:06) (108/54 - 140/65)  RR: 18 (08-28-23 @ 12:06) (17 - 18)  SpO2: 99% (08-28-23 @ 10:00) (96% - 99%)  POCT Blood Glucose.: 267 mg/dL (08-28-23 @ 11:51)  POCT Blood Glucose.: 213 mg/dL (08-28-23 @ 07:38)  POCT Blood Glucose.: 196 mg/dL (08-27-23 @ 20:57)  POCT Blood Glucose.: 190 mg/dL (08-27-23 @ 16:53)    General: NAD; A&Ox3  Cardiac: S1/S2, RRR, no murmur, no rubs  Lungs: unlabored respirations, CTA b/l, no wheeze, no rales, no crackles  Abdomen: Soft/NT/ND; positive bowel sounds x 4  Extremities: No edema b/l lower extremities; good capillary refill; no cyanosis; palpable 1+ pedal pulses b/l      I's & O's     08-27-23 @ 07:01  -  08-28-23 @ 07:00  --------------------------------------------------------  IN:    Oral Fluid: 325 mL  Total IN: 325 mL    OUT:    Voided (mL): 200 mL  Total OUT: 200 mL    Total NET: 125 mL      08-28-23 @ 07:01  -  08-28-23 @ 15:07  --------------------------------------------------------  IN:    Oral Fluid: 200 mL  Total IN: 200 mL    OUT:    Voided (mL): 750 mL  Total OUT: 750 mL    Total NET: -550 mL        LABS             9.1    8.34  )-----------( 381      ( 08-28-23 @ 07:39 )             31.1     143  |  101  |  13  -------------------------<  216   08-28-23 @ 07:39  4.0  |  31  |  0.7    Ca      8.7     08-28-23 @ 07:39  Mg     2.0     08-28-23 @ 07:39    TPro  7.0  /  Alb  3.6  /  TBili  0.4  /  DBili  x   /  AST  32  /  ALT  33  /  AlkPhos  98  /  GGT  x     08-28-23 @ 07:39      Troponin T, Serum: <0.01 ng/mL (08-28-23 @ 01:58)  Troponin T, Serum: <0.01 ng/mL (08-27-23 @ 16:15)                Urinalysis Basic - ( 28 Aug 2023 07:39 )    Color: x / Appearance: x / SG: x / pH: x  Gluc: 216 mg/dL / Ketone: x  / Bili: x / Urobili: x   Blood: x / Protein: x / Nitrite: x   Leuk Esterase: x / RBC: x / WBC x   Sq Epi: x / Non Sq Epi: x / Bacteria: x      MICRO / IMAGING / CARDIOLOGY  Telemetry: Reviewed   EKG: Reviewed    CULTURES    IMAGING    CARDIOLOGY

## 2023-08-28 NOTE — CHART NOTE - NSCHARTNOTEFT_GEN_A_CORE
After discussing the risks and benefits of open heart surgery with the patient and family via [facility] , [facility] #[id number], the patient will not be undergoing CABG procedure tomorrow. CT Surgery will discuss with cardiology on CABG v. cardiac stents tomorrow. Please call [phone] for any additional questions or concerns.

## 2023-08-28 NOTE — CHART NOTE - NSCHARTNOTEFT_GEN_A_CORE
Registered Dietitian Follow-Up     Patient Profile Reviewed                           Yes [x]   No []     Nutrition History Previously Obtained        Yes []  No [x]       Pertinent Subjective Information: Per family, pt with good appetite & po intake PTP. Follows a regular diet PTP; tries to avoid foods high in salt & sugar. NKFA. No supplements. No food preferences reported. No dietary restrictions related to culture/Druze. UBW reported to be 56.8 kg, with no known h/o unintentional wt loss. No signs of significant muscle wasting/subcutaneous fat loss observed at this time.     Pertinent Medical Information: Presented initially w/ SOB. Found to have CAD and NSTEMI, s/p cath with left main and 3Vx disease findings. Currently evaluating safety of CABG , anticipated date for CABG is .    Anemia, iron deficiency suspected, possible GI loss (episode of melena reported while in CCU). Per progress notes, GI saying patient is currently high risk and risks outweigh benefits at this time for endoscopic intervention. Started on IV Venofer.      PMH includes Type II DM, HTN, DLD, obesity, lateral epicondylitis.    Diet order: Consistent Carbohydrate (no snacks) + DASH/TLC diet.    Anthropometrics:  Height (cm): 152.4 (08-15-23 @ 12:14)  Weight (kg): 61.7 (08-15-23 @ 12:14)  BMI (kg/m2): 26.6 (08-15-23 @ 12:14)  IBW: 45.4 KG    Daily Weight in k.5 (-), Weight in k (), Weight in k (), Weight in k (24), Weight in k (-), Weight in k.5 (-18), Weight in k.6 (-17), Weight in k.8 (-16), Weight in k.4 (-15), Weight in k.7 (-)    Latest wt 126.5 kg is likely documentation error; wt trends observed suspect to be related to fluid shifts; pt noted to be on diuretic at this time.    MEDICATIONS  (STANDING):  aspirin  chewable 81 milliGRAM(s) Oral daily  atorvastatin 80 milliGRAM(s) Oral at bedtime  budesonide  80 MICROgram(s)/formoterol 4.5 MICROgram(s) Inhaler 2 Puff(s) Inhalation two times a day  chlorhexidine 2% Cloths 1 Application(s) Topical <User Schedule>  chlorhexidine 4% Liquid 1 Application(s) Topical once  chlorhexidine 4% Liquid 1 Application(s) Topical once  dextrose 5%. 1000 milliLiter(s) (50 mL/Hr) IV Continuous <Continuous>  dextrose 50% Injectable 25 Gram(s) IV Push once  glucagon  Injectable 1 milliGRAM(s) IntraMuscular once  heparin   Injectable 5000 Unit(s) SubCutaneous every 12 hours  insulin glargine Injectable (LANTUS) 25 Unit(s) SubCutaneous at bedtime  insulin lispro (ADMELOG) corrective regimen sliding scale   SubCutaneous three times a day before meals  insulin lispro Injectable (ADMELOG) 18 Unit(s) SubCutaneous three times a day before meals  metoprolol tartrate 50 milliGRAM(s) Oral every 12 hours  pantoprazole    Tablet 40 milliGRAM(s) Oral before breakfast  torsemide 10 milliGRAM(s) Oral daily    MEDICATIONS  (PRN):  acetaminophen     Tablet .. 650 milliGRAM(s) Oral every 6 hours PRN Temp greater or equal to 38C (100.4F), Mild Pain (1 - 3)  albuterol/ipratropium for Nebulization 3 milliLiter(s) Nebulizer every 6 hours PRN Bronchospasm  aluminum hydroxide/magnesium hydroxide/simethicone Suspension 30 milliLiter(s) Oral every 8 hours PRN Dyspepsia  dextrose Oral Gel 15 Gram(s) Oral once PRN Blood Glucose LESS THAN 70 milliGRAM(s)/deciliter    Pertinent Labs:  @ 07:39: Na 143, BUN 13, Cr 0.7, <H>, K+ 4.0, Mg 2.0, Alk Phos 98, ALT/SGPT 33, AST/SGOT 32    Finger Sticks:  POCT Blood Glucose.: 187 mg/dL ( @ 16:35)  POCT Blood Glucose.: 267 mg/dL ( @ 11:51)  POCT Blood Glucose.: 213 mg/dL ( @ 07:38)  POCT Blood Glucose.: 196 mg/dL ( @ 20:57)    Physical Findings:  - Appearance: alert  - GI function: last BM ; no nausea/vomiting/diarrhea/constipation reported at this time  - Tubes: no tube feeding  - Oral/Mouth cavity: Seen by SLP services  following extubation earlier this admit: at the time, SLP noted "Mildly prolonged mastication w soft solids +toleration of thin, puree and easy to chew solids w/o overt s/s of penetration/aspiration" SLP had recommended "easy to chew w/ thin liquids, if intake is slow 2' prolonged mastication w/ ETC downgrade to soft and bite size, no need for ST re-eval". At this time, pt is reportedly tolerating regular texture and is consuming 50-75% of most meals provided in-house + additional meals provided from home. Reporting no chewing/swallowing difficulty at this time  - Skin: ecchymosis; no pressure injuries noted  - Edema: no edema noted at this time     Nutrition Requirements:  Weight Used: Using ABW as per previous RD assessment    Estimated Energy Needs    Continue [x]  Adjust []  3152-2542 kcal/day (20-25 kcal/kg)     Estimated Protein Needs    Continue [x]  Adjust []  74-86 g/day (1.2-1.4 g/kg)     Estimated Fluid Needs        Continue [x]  Adjust []  1543 mL/day (25 mL/kg)     Nutrient Intake: Pt demonstrates adequate po intake & tolerance at this time. Given pt is currently noted pre-op for CABG tomorrow, will maintain pt at moderate nutrition risk to more closely monitor changes in energy intake & need for additional nutrition intervention.    [x] Previous Nutrition Diagnosis: Inadequate Energy Intake (continues to improve) - will monitor for changes in energy intake following planned CABG procedure tomorrow.    Nutrition Education: Reviewed diet order; discussed DM & Heart Healthy nutrition therapy concepts.    Goal/Expected Outcome: Pt to maintain tolerance & adherence to diet order, with at least 75% po intake maintained over next 5-7 days.    Pt at moderate nutrition risk; RD to follow-up in 5-7 days.     Indicator/Monitoring: Skin, labs, BM, wt, nutrition focused physical findings, body composition, GI, swallow function, adherence.    Recommendation: Continue providing Consistent Carbohydrate (no snacks) + DASH/TLC diet as tolerated. Monitor tolerance to diet order; if chewing difficulty observed, would trial easy to chew diet & re-consult SLP services to evaluate. Adjust insulin regimen as needed while hyperglycemia persists.    [] No active nutrition diagnosis identified at this time     Nutrition Education:      Goal/Expected Outcome:      Indicator/Monitoring:     Recommendation:

## 2023-08-28 NOTE — PROGRESS NOTE ADULT - SUBJECTIVE AND OBJECTIVE BOX
PLANNED OPERATIVE PROCEDURE(s): CABG               HD # 15                      SURGEON(s): Jim    HPI:      SUBJECTIVE ASSESSMENT:68y Female patient seen and examined at bedside.    Vital Signs Last 24 Hrs  T(F): 97.9 (28 Aug 2023 04:00), Max: 98.6 (27 Aug 2023 20:00)  HR: 92 (28 Aug 2023 04:00) (92 - 92)  BP: 118/53 (28 Aug 2023 04:00) (108/54 - 118/62)  BP(mean): 77 (28 Aug 2023 04:00) (77 - 77)  RR: 18 (28 Aug 2023 04:00) (17 - 18)  SpO2: 99% (28 Aug 2023 10:00) (96% - 99%)    RUE SBP:                LUE SBP:      I&O's Detail    27 Aug 2023 07:01  -  28 Aug 2023 07:00  --------------------------------------------------------  IN:    Oral Fluid: 325 mL  Total IN: 325 mL    OUT:    Voided (mL): 200 mL  Total OUT: 200 mL        Net: I&O's Detail    26 Aug 2023 07:01  -  27 Aug 2023 07:00  --------------------------------------------------------  Total NET: 237 mL      27 Aug 2023 07:01  -  28 Aug 2023 07:00  --------------------------------------------------------  Total NET: 125 mL        CAPILLARY BLOOD GLUCOSE      POCT Blood Glucose.: 267 mg/dL (28 Aug 2023 11:51)  POCT Blood Glucose.: 213 mg/dL (28 Aug 2023 07:38)  POCT Blood Glucose.: 196 mg/dL (27 Aug 2023 20:57)  POCT Blood Glucose.: 190 mg/dL (27 Aug 2023 16:53)      Allergies    No Known Allergies    Intolerances        MEDICATIONS  (STANDING):  aspirin  chewable 81 milliGRAM(s) Oral daily  atorvastatin 80 milliGRAM(s) Oral at bedtime  budesonide  80 MICROgram(s)/formoterol 4.5 MICROgram(s) Inhaler 2 Puff(s) Inhalation two times a day  chlorhexidine 2% Cloths 1 Application(s) Topical <User Schedule>  dextrose 5%. 1000 milliLiter(s) (50 mL/Hr) IV Continuous <Continuous>  dextrose 50% Injectable 25 Gram(s) IV Push once  glucagon  Injectable 1 milliGRAM(s) IntraMuscular once  heparin   Injectable 5000 Unit(s) SubCutaneous every 12 hours  insulin glargine Injectable (LANTUS) 25 Unit(s) SubCutaneous at bedtime  insulin lispro (ADMELOG) corrective regimen sliding scale   SubCutaneous three times a day before meals  insulin lispro Injectable (ADMELOG) 18 Unit(s) SubCutaneous three times a day before meals  metoprolol tartrate 50 milliGRAM(s) Oral every 12 hours  pantoprazole    Tablet 40 milliGRAM(s) Oral before breakfast  torsemide 10 milliGRAM(s) Oral daily    MEDICATIONS  (PRN):  acetaminophen     Tablet .. 650 milliGRAM(s) Oral every 6 hours PRN Temp greater or equal to 38C (100.4F), Mild Pain (1 - 3)  albuterol/ipratropium for Nebulization 3 milliLiter(s) Nebulizer every 6 hours PRN Bronchospasm  aluminum hydroxide/magnesium hydroxide/simethicone Suspension 30 milliLiter(s) Oral every 8 hours PRN Dyspepsia  dextrose Oral Gel 15 Gram(s) Oral once PRN Blood Glucose LESS THAN 70 milliGRAM(s)/deciliter    Home Medications:  aspirin 81 mg oral tablet, chewable: 1 chewed once a day (13 Aug 2023 19:53)  atorvastatin 10 mg oral tablet: 1 orally once a day (at bedtime) (13 Aug 2023 19:52)  Jardiance 10 mg oral tablet: 1 orally once a day (13 Aug 2023 19:53)  MetFORMIN (Eqv-Glumetza) 500 mg oral tablet, extended release: 1 orally 2 times a day (13 Aug 2023 19:51)  pioglitazone 30 mg oral tablet: 1 orally once a day (13 Aug 2023 19:52)      Physical Exam:  General: NAD; A&Ox3  Cardiac: S1/S2, RRR, no murmur, no rubs  Lungs: unlabored respirations, CTA b/l, no wheeze, no rales, no crackles  Abdomen: Soft/NT/ND; positive bowel sounds x 4  Extremities: No edema b/l lower extremities; good capillary refill; no cyanosis; palpable 1+ pedal pulses b/l    BOWEL MOVEMENT:  [] YES [] NO, If No, Timing since last BM Day #        LABS:                        9.1<L>  8.34  )-----------( 381      ( 28 Aug 2023 07:39 )             31.1<L>                        9.6<L>  8.45  )-----------( 371      ( 27 Aug 2023 07:17 )             32.1<L>    08-28    143  |  101  |  13  ----------------------------<  216<H>  4.0   |  31  |  0.7  08-27    143  |  103  |  11  ----------------------------<  168<H>  3.6   |  28  |  0.5<L>    Ca    8.7      28 Aug 2023 07:39  Mg     2.0     08-28    TPro  7.0 [6.0 - 8.0]  /  Alb  3.6 [3.5 - 5.2]  /  TBili  0.4 [0.2 - 1.2]  /  DBili  x   /  AST  32 [0 - 41]  /  ALT  33 [0 - 41]  /  AlkPhos  98 [30 - 115]  08-28      Urinalysis Basic - ( 28 Aug 2023 07:39 )    Color: x / Appearance: x / SG: x / pH: x  Gluc: 216 mg/dL / Ketone: x  / Bili: x / Urobili: x   Blood: x / Protein: x / Nitrite: x   Leuk Esterase: x / RBC: x / WBC x   Sq Epi: x / Non Sq Epi: x / Bacteria: x        A1C with Estimated Average Glucose Result: 8.2 % (08-14-23 @ 04:40)      RADIOLOGY & ADDITIONAL TESTS:  CXR:     EKG:    Carotid Duplex:      PFT's:    Echocardiogram:     Cardiac catheterization:    CT CHEST:      Pharmacologic DVT Prophylaxis: [] YES, []NO: Contraindication:   [] HEPARIN: Dose: XX mg  Q24H    [] LOVENOX: Dose: XX mg  Q24H                 SCD's: YESb/l    GI Prophylaxis: Protonix [], Pepcid []    Pre-op ACEi/ARB/CCB held 24 hours prior to planned procedure: [] Yes, [] NO: indication:  Pre-Op Beta-Blockers: []Yes, []No: contraindication:  Pre-Op Aspirin: []Yes,  []No: contraindication: [] Held for Pre-op cardiac valve surgery with no CAD  Pre-Op Statin: []Yes, []No: contraindication:    Ambulation/Activity Status:  5meter walk test: T1:      s/T2:     s/T3:     s        Cardiac Surgery Risk Factors  CVA and/or carotid/cerebrovascular disease. Yes  No  Explain if Yes  Aortoiliac disease Yes  No  Explain if Yes  Previous MI Yes  No  Explain if Yes  Previous Cardiac Surgery Yes  No  Explain if Yes  Hemodynamics-Unstable or Shock Yes  No  Explain if Yes  Diabetes Yes  No  Explain if Yes  Hepatic Failure Yes  No  Explain if Yes  Renal failure and/or dialysis Yes  No  Explain if Yes  Heart failure-type-present or past Yes  No  Explain if Yes  COPD Yes  No  Explain if Yes  Immune System Deficiency Yes  No  Explain if Yes  Malignant Ventricular Arrhythmia Yes  No  Explain if Yes    STS Score:     Assessment/Plan:  68y Female Pre-op for   - Case and plan discussed with CTU Intensivist and CT Surgeon - Dr. Simental/Jaymie/Jim/Reshma. STS risk score assessed and discussed risk with patient. Attending note to follow.  - Continue supportive care.    - Continue DVT/GI prophylaxis  - Incentive Spirometry 10 times an hour  - Continue to advance physical activity as tolerated and continue PT/OT as directed  1. CAD: Continue ASA, statin, BB  2. HTN:   3. A. Fib:   4. COPD/Hypoxia:   5. DM/Glucose Control:          PLANNED OPERATIVE PROCEDURE(s): CABG               HD # 15                      SURGEON(s): Jim    HPI:  67yo F w/a PMH of Type II DM, HTN, DLD, obesity, lateral epicondylitis presented to the ED w/ Left Arm pain.  This arm started about two months ago and has continued to progress but in the last few days became severe. She went to the ED a few days prior to admission and, was given pain medications then sent home. Per son, pain still continued to progress after this. No fevers, chills, nausea, vomiting, diarrhea, constipation, SOB, CP.   On  this admission she again presented with severe left arm pain w findings of LBBB on ECG (unsure if new or not). Code STEMI was called and then canceled because troponin negative and no chest pain. Patient was then noted to have acute mental status change, SOB, lactate elevation, and acidosis on abg.. Patient had two seizure type episodes and was treated with benzodiazepine. Intubated by ER for airway protection.     Stoke code called -  no CVA but patient developed elevated troponins and TTE revealed EF of 30% with CCTA revealing score of 134 and LAD disease. Patient went for Cardiac cath that revealed multi-vessel CAD. Hospital course thus far required treatment for Cardiomyopathy (EF 30%), uncontrolled DM (A1c 8.2), acute DVT (R peroneal L femoral/posterior tibial / peroneal veins)  , acute PE (b/l segmental), ? seizures but VEEG & MRI negative, anemia (? etiology    and aspiration pneumonia (resolved). CTS consulted for myocardial revascularization recommendation    SUBJECTIVE ASSESSMENT:68y Female patient seen and examined at bedside.    Vital Signs Last 24 Hrs  T(F): 97.9 (28 Aug 2023 04:00), Max: 98.6 (27 Aug 2023 20:00)  HR: 92 (28 Aug 2023 04:00) (92 - 92)  BP: 118/53 (28 Aug 2023 04:00) (108/54 - 118/62)  BP(mean): 77 (28 Aug 2023 04:00) (77 - 77)  RR: 18 (28 Aug 2023 04:00) (17 - 18)  SpO2: 99% (28 Aug 2023 10:00) (96% - 99%)    RUE SBP:                LUE SBP:      I&O's Detail    27 Aug 2023 07:01  -  28 Aug 2023 07:00  --------------------------------------------------------  IN:    Oral Fluid: 325 mL  Total IN: 325 mL    OUT:    Voided (mL): 200 mL  Total OUT: 200 mL        Net: I&O's Detail    26 Aug 2023 07:01  -  27 Aug 2023 07:00  --------------------------------------------------------  Total NET: 237 mL      27 Aug 2023 07:01  -  28 Aug 2023 07:00  --------------------------------------------------------  Total NET: 125 mL        CAPILLARY BLOOD GLUCOSE      POCT Blood Glucose.: 267 mg/dL (28 Aug 2023 11:51)  POCT Blood Glucose.: 213 mg/dL (28 Aug 2023 07:38)  POCT Blood Glucose.: 196 mg/dL (27 Aug 2023 20:57)  POCT Blood Glucose.: 190 mg/dL (27 Aug 2023 16:53)      Allergies    No Known Allergies    Intolerances        MEDICATIONS  (STANDING):  aspirin  chewable 81 milliGRAM(s) Oral daily  atorvastatin 80 milliGRAM(s) Oral at bedtime  budesonide  80 MICROgram(s)/formoterol 4.5 MICROgram(s) Inhaler 2 Puff(s) Inhalation two times a day  chlorhexidine 2% Cloths 1 Application(s) Topical <User Schedule>  dextrose 5%. 1000 milliLiter(s) (50 mL/Hr) IV Continuous <Continuous>  dextrose 50% Injectable 25 Gram(s) IV Push once  glucagon  Injectable 1 milliGRAM(s) IntraMuscular once  heparin   Injectable 5000 Unit(s) SubCutaneous every 12 hours  insulin glargine Injectable (LANTUS) 25 Unit(s) SubCutaneous at bedtime  insulin lispro (ADMELOG) corrective regimen sliding scale   SubCutaneous three times a day before meals  insulin lispro Injectable (ADMELOG) 18 Unit(s) SubCutaneous three times a day before meals  metoprolol tartrate 50 milliGRAM(s) Oral every 12 hours  pantoprazole    Tablet 40 milliGRAM(s) Oral before breakfast  torsemide 10 milliGRAM(s) Oral daily    MEDICATIONS  (PRN):  acetaminophen     Tablet .. 650 milliGRAM(s) Oral every 6 hours PRN Temp greater or equal to 38C (100.4F), Mild Pain (1 - 3)  albuterol/ipratropium for Nebulization 3 milliLiter(s) Nebulizer every 6 hours PRN Bronchospasm  aluminum hydroxide/magnesium hydroxide/simethicone Suspension 30 milliLiter(s) Oral every 8 hours PRN Dyspepsia  dextrose Oral Gel 15 Gram(s) Oral once PRN Blood Glucose LESS THAN 70 milliGRAM(s)/deciliter    Home Medications:  aspirin 81 mg oral tablet, chewable: 1 chewed once a day (13 Aug 2023 19:53)  atorvastatin 10 mg oral tablet: 1 orally once a day (at bedtime) (13 Aug 2023 19:52)  Jardiance 10 mg oral tablet: 1 orally once a day (13 Aug 2023 19:53)  MetFORMIN (Eqv-Glumetza) 500 mg oral tablet, extended release: 1 orally 2 times a day (13 Aug 2023 19:51)  pioglitazone 30 mg oral tablet: 1 orally once a day (13 Aug 2023 19:52)      Physical Exam:  General: NAD; A&Ox3  Cardiac: S1/S2, RRR, no murmur, no rubs  Lungs: unlabored respirations, CTA b/l, no wheeze, no rales, no crackles  Abdomen: Soft/NT/ND; positive bowel sounds x 4  Extremities: No edema b/l lower extremities; good capillary refill; no cyanosis; palpable 1+ pedal pulses b/l    BOWEL MOVEMENT:  [] YES [] NO, If No, Timing since last BM Day #        LABS:                        9.1<L>  8.34  )-----------( 381      ( 28 Aug 2023 07:39 )             31.1<L>                        9.6<L>  8.45  )-----------( 371      ( 27 Aug 2023 07:17 )             32.1<L>    08-28    143  |  101  |  13  ----------------------------<  216<H>  4.0   |  31  |  0.7  08-27    143  |  103  |  11  ----------------------------<  168<H>  3.6   |  28  |  0.5<L>    Ca    8.7      28 Aug 2023 07:39  Mg     2.0     08-28    TPro  7.0 [6.0 - 8.0]  /  Alb  3.6 [3.5 - 5.2]  /  TBili  0.4 [0.2 - 1.2]  /  DBili  x   /  AST  32 [0 - 41]  /  ALT  33 [0 - 41]  /  AlkPhos  98 [30 - 115]  08-28      Urinalysis Basic - ( 28 Aug 2023 07:39 )    Color: x / Appearance: x / SG: x / pH: x  Gluc: 216 mg/dL / Ketone: x  / Bili: x / Urobili: x   Blood: x / Protein: x / Nitrite: x   Leuk Esterase: x / RBC: x / WBC x   Sq Epi: x / Non Sq Epi: x / Bacteria: x        A1C with Estimated Average Glucose Result: 8.2 % (08-14-23 @ 04:40)      RADIOLOGY & ADDITIONAL TESTS:  CXR:   < from: Xray Chest 1 View- PORTABLE-Urgent (Xray Chest 1 View- PORTABLE-Urgent .) (08.24.23 @ 09:05) >  INTERPRETATION:  Clinical History/Reason for Exam:  Shortness of Breath    Technique:  Frontal view the chest.    Comparison: Chest x-ray 8/22/2023.    Findings:    Support devices:  External monitoring device overlies thorax.    Cardiac/mediastinum/hilum: Unremarkable    Lung parenchyma/ Pleura: Left basilar opacity with blunted costophrenic   angle, unchanged. No pneumothorax. Mild congestion      Skeleton/soft tissues: No focal skeletal lesions are identified.      Impression: Stable left basilar opacity with blunted costophrenic angle.   Mild congestion.        EKG:  < from: 12 Lead ECG (08.27.23 @ 16:18) >  Ventricular Rate 102 BPM    Atrial Rate 102 BPM    P-R Interval 136 ms    QRS Duration 152 ms    Q-T Interval 412 ms    QTC Calculation(Bazett) 536 ms    P Axis 72 degrees    R Axis -11 degrees    T Axis 131 degrees    Diagnosis Line Sinus tachycardia  Left bundle branch block  Abnormal ECG      Carotid Duplex:    < from: VA Duplex Carotid, Bilat (08.25.23 @ 18:16) >  FINDINGS:    No elevated velocities or abnormal waveforms are encountered.    Peak systolic velocities are as follows:    RIGHT:  PROX CCA = 94 cm/s  DIST CCA = 67 cm/s  PROX ICA = 89 cm/s  DIST ICA = 72 cm/s  ECA = 113 cm/s    LEFT:  PROX CCA = 80 cm/s  DIST CCA = 67 cm/s  PROX ICA = 61 cm/s  DIST ICA = 100 cm/s  ECA = 80 cm/s    Antegrade flow is noted within both vertebral arteries.    IMPRESSION: No significant hemodynamic stenosis of either carotid artery.   20-39% stenosis bilaterally.    Measurement of carotid stenosis is based on velocity parameters that   correlate the residual internal carotid diameter with that of the more   distal vessel in accordance with a method such as the [place] American   Symptomatic Carotid Endarterectomy Trial (NASCET).    PFT's: FEV1: 0.50L, 26% predicted      Echocardiogram:   < from: TTE Echo Complete w/ Contrast w/ Doppler (08.24.23 @ 16:19) >  Summary:   1. Severely decreased global left ventricular systolic function.   2. LV Ejection Fraction by Diggs's Method with a biplane EF of 30 %.   3. Mild left ventricular hypertrophy.   4. The left ventricular diastolic function could not be assessed in this   study.   5. Mildly enlarged left atrium.   6. Normal right atrial size.   7. Moderate mitral valve regurgitation.   8. Thickening of the anterior and posterior mitral valve leaflets.   9. Sclerotic aortic valve with normal opening.  10. Mild pulmonic valve regurgitation.  11. Endocardial visualization was enhanced with intravenous echo contrast.    PHYSICIAN INTERPRETATION:  Left Ventricle: Endocardial visualization was enhanced with intravenous   echo contrast. The left ventricular internal cavity size is normal. There   is mild left ventricular hypertrophy. Global LV systolic function was   severely decreased. Abnormal (paradoxical) septal motion, consistent with   left bundle branch block. The left ventricular diastolic function could   not beassessed in this study.  Right Ventricle: Normal right ventricular size and function.  Left Atrium: Mildly enlarged left atrium.  Right Atrium: Normal right atrial size.  Pericardium: Trivial pericardial effusion is present.  Mitral Valve: Structurally normal mitral valve, with normal leaflet   excursion. Thickening of the anterior and posterior mitral valve   leaflets. Moderate mitral valve regurgitation is seen. The MR jet is   posteriorly-directed.  Aortic Valve: The aortic valve is trileaflet. No evidence of aortic   stenosis. Sclerotic aortic valve with normal opening. No evidence of   aortic valve regurgitation is seen.  Pulmonic Valve: Structurally normal pulmonic valve, with normal leaflet   excursion. Mild pulmonic valve regurgitation.      Cardiac catheterization:  FINDINGS:   Coronary Dominance: Right  LM: Distal segment with 50% stenosis  LAD: Ostium with 80% stenosis  CX: Ostium with 80% stenosis   RCA: Ostium with 90% stenosis    Syntax Score: 30    LVEDP:  13 mmHg       CT CHEST:  < from: CT Chest No Cont (08.22.23 @ 16:14) >    INTERPRETATION:  Reason for Exam:  Coronary artery disease.  CT of the chest was performed from the thoracic inlet to the level of the   adrenal glands without contrast injection. Coronal and sagittal images   have been submitted.    Comparison: None    Findings:    Tubes/Lines: None    Lungs, Pleura, and Airways:There are bilateral pleural effusions, left   greater than right, with left lower lobe atelectasis. Hazy groundglass   opacities are seen. There is a 4 mm nodule within the right upper lung   field posteriorly, series 3 image #32.    Mediastinum/Lymph Nodes:Reactive axillary lymph nodes. Subcentimeter   pretracheal and subcarinal lymph nodes.    Heart/Great Vessels: Cardiomegaly. Coronary calcifications. Pericardial   effusion.    Abdomen: Cholelithiasis.    Bones and soft tissues: Within normal limits.    IMPRESSION:    CHF.    Nonspecific right lung nodule.      Pharmacologic DVT Prophylaxis: [] YES, []NO: Contraindication:   [X] HEPARIN: Dose: 5000 mg  Q12H    [] LOVENOX: Dose: XX mg  Q24H                 SCD's: YESb/l    GI Prophylaxis: Protonix [X], Pepcid []    Pre-op ACEi/ARB/CCB held 24 hours prior to planned procedure: [X] Yes, [] NO: indication:  Pre-Op Beta-Blockers: [X]Yes, []No: contraindication:  Pre-Op Aspirin: [X]Yes,  []No: contraindication: [] Held for Pre-op cardiac valve surgery with no CAD  Pre-Op Statin: [X]Yes, []No: contraindication:    Ambulation/Activity Status:  5meter walk test: T1:    6  s/T2:    6 s/T3:    6 s        Cardiac Surgery Risk Factors  CVA and/or carotid/cerebrovascular disease. Yes  No  Explain if Yes  Aortoiliac disease Yes  No  Explain if Yes  Previous MI Yes  No  Explain if Yes  Previous Cardiac Surgery Yes  No  Explain if Yes  Hemodynamics-Unstable or Shock Yes  No  Explain if Yes  Diabetes Yes  No  Explain if Yes Hbg A1c 8.2, on insulin  Hepatic Failure Yes  No  Explain if Yes  Renal failure and/or dialysis Yes  No  Explain if Yes  Heart failure-type-present or past Yes  No  Explain if Yes  COPD Yes  No  Explain if Yes  Immune System Deficiency Yes  No  Explain if Yes  Malignant Ventricular Arrhythmia Yes  No  Explain if Yes    STS Score:     Assessment/Plan:  68y Female Pre-op for   - Case and plan discussed with CTU Intensivist and CT Surgeon - Dr. Simental/Jaymie/Jim/Reshma. STS risk score assessed and discussed risk with patient. Attending note to follow.  - Continue supportive care.    - Continue DVT/GI prophylaxis  - Incentive Spirometry 10 times an hour  - Continue to advance physical activity as tolerated and continue PT/OT as directed  1. CAD: Continue ASA, statin, BB  2. HTN:   3. A. Fib:   4. COPD/Hypoxia:   5. DM/Glucose Control:          PLANNED OPERATIVE PROCEDURE(s): CABG               HD # 15                      SURGEON(s): Jim    HPI:  69yo F w/a PMH of Type II DM, HTN, DLD, obesity, lateral epicondylitis presented to the ED w/ Left Arm pain.  This arm started about two months ago and has continued to progress but in the last few days became severe. She went to the ED a few days prior to admission and, was given pain medications then sent home. Per son, pain still continued to progress after this. No fevers, chills, nausea, vomiting, diarrhea, constipation, SOB, CP.   On  this admission she again presented with severe left arm pain w findings of LBBB on ECG (unsure if new or not). Code STEMI was called and then canceled because troponin negative and no chest pain. Patient was then noted to have acute mental status change, SOB, lactate elevation, and acidosis on abg.. Patient had two seizure type episodes and was treated with benzodiazepine. Intubated by ER for airway protection.     Stoke code called -  no CVA but patient developed elevated troponins and TTE revealed EF of 30% with CCTA revealing score of 134 and LAD disease. Patient went for Cardiac cath that revealed multi-vessel CAD. Hospital course thus far required treatment for Cardiomyopathy (EF 30%), uncontrolled DM (A1c 8.2), acute DVT (R peroneal L femoral/posterior tibial / peroneal veins)  , acute PE (b/l segmental), ? seizures but VEEG & MRI negative, anemia (? etiology    and aspiration pneumonia (resolved). CTS consulted for myocardial revascularization recommendation    SUBJECTIVE ASSESSMENT:68y Female patient seen and examined at bedside.    Vital Signs Last 24 Hrs  T(F): 97.9 (28 Aug 2023 04:00), Max: 98.6 (27 Aug 2023 20:00)  HR: 92 (28 Aug 2023 04:00) (92 - 92)  BP: 118/53 (28 Aug 2023 04:00) (108/54 - 118/62)  BP(mean): 77 (28 Aug 2023 04:00) (77 - 77)  RR: 18 (28 Aug 2023 04:00) (17 - 18)  SpO2: 99% (28 Aug 2023 10:00) (96% - 99%)      I&O's Detail    27 Aug 2023 07:01  -  28 Aug 2023 07:00  --------------------------------------------------------  IN:    Oral Fluid: 325 mL  Total IN: 325 mL    OUT:    Voided (mL): 200 mL  Total OUT: 200 mL        Net: I&O's Detail    26 Aug 2023 07:01  -  27 Aug 2023 07:00  --------------------------------------------------------  Total NET: 237 mL      27 Aug 2023 07:01  -  28 Aug 2023 07:00  --------------------------------------------------------  Total NET: 125 mL        CAPILLARY BLOOD GLUCOSE      POCT Blood Glucose.: 267 mg/dL (28 Aug 2023 11:51)  POCT Blood Glucose.: 213 mg/dL (28 Aug 2023 07:38)  POCT Blood Glucose.: 196 mg/dL (27 Aug 2023 20:57)  POCT Blood Glucose.: 190 mg/dL (27 Aug 2023 16:53)      Allergies    No Known Allergies    Intolerances        MEDICATIONS  (STANDING):  aspirin  chewable 81 milliGRAM(s) Oral daily  atorvastatin 80 milliGRAM(s) Oral at bedtime  budesonide  80 MICROgram(s)/formoterol 4.5 MICROgram(s) Inhaler 2 Puff(s) Inhalation two times a day  chlorhexidine 2% Cloths 1 Application(s) Topical <User Schedule>  dextrose 5%. 1000 milliLiter(s) (50 mL/Hr) IV Continuous <Continuous>  dextrose 50% Injectable 25 Gram(s) IV Push once  glucagon  Injectable 1 milliGRAM(s) IntraMuscular once  heparin   Injectable 5000 Unit(s) SubCutaneous every 12 hours  insulin glargine Injectable (LANTUS) 25 Unit(s) SubCutaneous at bedtime  insulin lispro (ADMELOG) corrective regimen sliding scale   SubCutaneous three times a day before meals  insulin lispro Injectable (ADMELOG) 18 Unit(s) SubCutaneous three times a day before meals  metoprolol tartrate 50 milliGRAM(s) Oral every 12 hours  pantoprazole    Tablet 40 milliGRAM(s) Oral before breakfast  torsemide 10 milliGRAM(s) Oral daily    MEDICATIONS  (PRN):  acetaminophen     Tablet .. 650 milliGRAM(s) Oral every 6 hours PRN Temp greater or equal to 38C (100.4F), Mild Pain (1 - 3)  albuterol/ipratropium for Nebulization 3 milliLiter(s) Nebulizer every 6 hours PRN Bronchospasm  aluminum hydroxide/magnesium hydroxide/simethicone Suspension 30 milliLiter(s) Oral every 8 hours PRN Dyspepsia  dextrose Oral Gel 15 Gram(s) Oral once PRN Blood Glucose LESS THAN 70 milliGRAM(s)/deciliter    Home Medications:  aspirin 81 mg oral tablet, chewable: 1 chewed once a day (13 Aug 2023 19:53)  atorvastatin 10 mg oral tablet: 1 orally once a day (at bedtime) (13 Aug 2023 19:52)  Jardiance 10 mg oral tablet: 1 orally once a day (13 Aug 2023 19:53)  MetFORMIN (Eqv-Glumetza) 500 mg oral tablet, extended release: 1 orally 2 times a day (13 Aug 2023 19:51)  pioglitazone 30 mg oral tablet: 1 orally once a day (13 Aug 2023 19:52)      Physical Exam:  General: NAD; A&Ox3  Cardiac: S1/S2, RRR, no murmur, no rubs  Lungs: unlabored respirations, CTA b/l, no wheeze, no rales, no crackles  Abdomen: Soft/NT/ND; positive bowel sounds x 4  Extremities: No edema b/l lower extremities; good capillary refill; no cyanosis; palpable 1+ pedal pulses b/l    BOWEL MOVEMENT:  [X] YES [] NO, If No, Timing since last BM Day #        LABS:                        9.1<L>  8.34  )-----------( 381      ( 28 Aug 2023 07:39 )             31.1<L>                        9.6<L>  8.45  )-----------( 371      ( 27 Aug 2023 07:17 )             32.1<L>    08-28    143  |  101  |  13  ----------------------------<  216<H>  4.0   |  31  |  0.7  08-27    143  |  103  |  11  ----------------------------<  168<H>  3.6   |  28  |  0.5<L>    Ca    8.7      28 Aug 2023 07:39  Mg     2.0     08-28    TPro  7.0 [6.0 - 8.0]  /  Alb  3.6 [3.5 - 5.2]  /  TBili  0.4 [0.2 - 1.2]  /  DBili  x   /  AST  32 [0 - 41]  /  ALT  33 [0 - 41]  /  AlkPhos  98 [30 - 115]  08-28      Urinalysis Basic - ( 28 Aug 2023 07:39 )    Color: x / Appearance: x / SG: x / pH: x  Gluc: 216 mg/dL / Ketone: x  / Bili: x / Urobili: x   Blood: x / Protein: x / Nitrite: x   Leuk Esterase: x / RBC: x / WBC x   Sq Epi: x / Non Sq Epi: x / Bacteria: x        A1C with Estimated Average Glucose Result: 8.2 % (08-14-23 @ 04:40)      RADIOLOGY & ADDITIONAL TESTS:  CXR:   < from: Xray Chest 1 View- PORTABLE-Urgent (Xray Chest 1 View- PORTABLE-Urgent .) (08.24.23 @ 09:05) >  INTERPRETATION:  Clinical History/Reason for Exam:  Shortness of Breath    Technique:  Frontal view the chest.    Comparison: Chest x-ray 8/22/2023.    Findings:    Support devices:  External monitoring device overlies thorax.    Cardiac/mediastinum/hilum: Unremarkable    Lung parenchyma/ Pleura: Left basilar opacity with blunted costophrenic   angle, unchanged. No pneumothorax. Mild congestion      Skeleton/soft tissues: No focal skeletal lesions are identified.      Impression: Stable left basilar opacity with blunted costophrenic angle.   Mild congestion.        EKG:  < from: 12 Lead ECG (08.27.23 @ 16:18) >  Ventricular Rate 102 BPM    Atrial Rate 102 BPM    P-R Interval 136 ms    QRS Duration 152 ms    Q-T Interval 412 ms    QTC Calculation(Bazett) 536 ms    P Axis 72 degrees    R Axis -11 degrees    T Axis 131 degrees    Diagnosis Line Sinus tachycardia  Left bundle branch block  Abnormal ECG      Carotid Duplex:    < from: VA Duplex Carotid, Bilat (08.25.23 @ 18:16) >  FINDINGS:    No elevated velocities or abnormal waveforms are encountered.    Peak systolic velocities are as follows:    RIGHT:  PROX CCA = 94 cm/s  DIST CCA = 67 cm/s  PROX ICA = 89 cm/s  DIST ICA = 72 cm/s  ECA = 113 cm/s    LEFT:  PROX CCA = 80 cm/s  DIST CCA = 67 cm/s  PROX ICA = 61 cm/s  DIST ICA = 100 cm/s  ECA = 80 cm/s    Antegrade flow is noted within both vertebral arteries.    IMPRESSION: No significant hemodynamic stenosis of either carotid artery.   20-39% stenosis bilaterally.    Measurement of carotid stenosis is based on velocity parameters that   correlate the residual internal carotid diameter with that of the more   distal vessel in accordance with a method such as the [place] American   Symptomatic Carotid Endarterectomy Trial (NASCET).    PFT's: FEV1: 0.50L, 26% predicted      Echocardiogram:   < from: TTE Echo Complete w/ Contrast w/ Doppler (08.24.23 @ 16:19) >  Summary:   1. Severely decreased global left ventricular systolic function.   2. LV Ejection Fraction by Diggs's Method with a biplane EF of 30 %.   3. Mild left ventricular hypertrophy.   4. The left ventricular diastolic function could not be assessed in this   study.   5. Mildly enlarged left atrium.   6. Normal right atrial size.   7. Moderate mitral valve regurgitation.   8. Thickening of the anterior and posterior mitral valve leaflets.   9. Sclerotic aortic valve with normal opening.  10. Mild pulmonic valve regurgitation.  11. Endocardial visualization was enhanced with intravenous echo contrast.    PHYSICIAN INTERPRETATION:  Left Ventricle: Endocardial visualization was enhanced with intravenous   echo contrast. The left ventricular internal cavity size is normal. There   is mild left ventricular hypertrophy. Global LV systolic function was   severely decreased. Abnormal (paradoxical) septal motion, consistent with   left bundle branch block. The left ventricular diastolic function could   not beassessed in this study.  Right Ventricle: Normal right ventricular size and function.  Left Atrium: Mildly enlarged left atrium.  Right Atrium: Normal right atrial size.  Pericardium: Trivial pericardial effusion is present.  Mitral Valve: Structurally normal mitral valve, with normal leaflet   excursion. Thickening of the anterior and posterior mitral valve   leaflets. Moderate mitral valve regurgitation is seen. The MR jet is   posteriorly-directed.  Aortic Valve: The aortic valve is trileaflet. No evidence of aortic   stenosis. Sclerotic aortic valve with normal opening. No evidence of   aortic valve regurgitation is seen.  Pulmonic Valve: Structurally normal pulmonic valve, with normal leaflet   excursion. Mild pulmonic valve regurgitation.      Cardiac catheterization:  FINDINGS:   Coronary Dominance: Right  LM: Distal segment with 50% stenosis  LAD: Ostium with 80% stenosis  CX: Ostium with 80% stenosis   RCA: Ostium with 90% stenosis    Syntax Score: 30    LVEDP:  13 mmHg       CT CHEST:  < from: CT Chest No Cont (08.22.23 @ 16:14) >    INTERPRETATION:  Reason for Exam:  Coronary artery disease.  CT of the chest was performed from the thoracic inlet to the level of the   adrenal glands without contrast injection. Coronal and sagittal images   have been submitted.    Comparison: None    Findings:    Tubes/Lines: None    Lungs, Pleura, and Airways:There are bilateral pleural effusions, left   greater than right, with left lower lobe atelectasis. Hazy groundglass   opacities are seen. There is a 4 mm nodule within the right upper lung   field posteriorly, series 3 image #32.    Mediastinum/Lymph Nodes:Reactive axillary lymph nodes. Subcentimeter   pretracheal and subcarinal lymph nodes.    Heart/Great Vessels: Cardiomegaly. Coronary calcifications. Pericardial   effusion.    Abdomen: Cholelithiasis.    Bones and soft tissues: Within normal limits.    IMPRESSION:    CHF.    Nonspecific right lung nodule.      Pharmacologic DVT Prophylaxis: [] YES, []NO: Contraindication:   [X] HEPARIN: Dose: 5000 mg  Q12H    [] LOVENOX: Dose: XX mg  Q24H                 SCD's: YESb/l    GI Prophylaxis: Protonix [X], Pepcid []    Pre-op ACEi/ARB/CCB held 24 hours prior to planned procedure: [X] Yes, [] NO: indication:  Pre-Op Beta-Blockers: [X]Yes, []No: contraindication:  Pre-Op Aspirin: [X]Yes,  []No: contraindication: [] Held for Pre-op cardiac valve surgery with no CAD  Pre-Op Statin: [X]Yes, []No: contraindication:    Ambulation/Activity Status:  5meter walk test: T1:    6  s/T2:    6 s/T3:    6 s        Cardiac Surgery Risk Factors  CVA and/or carotid/cerebrovascular disease. Yes  No  Explain if Yes  Aortoiliac disease Yes  No  Explain if Yes  Previous MI Yes  No  Explain if Yes  Previous Cardiac Surgery Yes  No  Explain if Yes  Hemodynamics-Unstable or Shock Yes  No  Explain if Yes  Diabetes Yes  No  Explain if Yes Hbg A1c 8.2, on insulin  Hepatic Failure Yes  No  Explain if Yes  Renal failure and/or dialysis Yes  No  Explain if Yes  Heart failure-type-present or past Yes  No  Explain if Yes EF 30%  COPD Yes  No  Explain if Yes: FEV1 26%  Immune System Deficiency Yes  No  Explain if Yes  Malignant Ventricular Arrhythmia Yes  No  Explain if Yes    STS Score:     Procedure Type: Isolated CABG  Perioperative Outcome	Estimate %  Operative Mortality	24.3%  Morbidity & Mortality	47.4%  Stroke	5.99%  Renal Failure	NA  Reoperation	9.1%  Prolonged Ventilation	51.5%  Deep Sternal Wound Infection	3.7%  Long Hospital Stay (>14 days)	51.4%  Short Hospital Stay (<6 days)*	3.05%        Assessment/Plan:  68y Female Pre-op for   - Case and plan discussed with CTU Intensivist and CT Surgeon - Dr. Simental/Jaymie/Jim/Reshma. STS risk score assessed and discussed risk with patient. Attending note to follow.  - Continue supportive care.    - Continue DVT/GI prophylaxis  - Incentive Spirometry 10 times an hour  - Continue to advance physical activity as tolerated and continue PT/OT as directed  1. CAD: Continue ASA, statin, BB  2. HTN:   3. A. Fib:   4. COPD/Hypoxia:   5. DM/Glucose Control:

## 2023-08-28 NOTE — PROGRESS NOTE ADULT - ASSESSMENT
67yo F w/a PMH of Type II DM, HTN, DLD, obesity, lateral epicondylitis  presented with severe left arm pain w findings of LBBB on ECG (unsure if new or not). Code STEMI was called and then canceled because troponin negative and no chest pain. Patient was then noted to have acute mental status change, SOB, lactate elevation, and acidosis on abg. Patient had two seizure episodes and was treated with benzodiazepine. Intubated by ER for airway protection, extubated on 8/15. Was admitted to CCU and was ruled in for MI.     Also noted to have DVT/Pulmonary embolism and NSTEMI with cardiomyopathy EF 30% s/p cath which showed LM and 3VD. Hospital course also significant for melena and SHAD with low Hb requiring 2 units of PRBCs.    NSTEMI  Ischemic cardiomyopathy with EF 30%  Acute HFrEF  S/P cath with 50% LM, ostial LAD, ostial Cx and Ostial RCA disease  Toxic / Metabolic encephalopathy  Asthma exacerbation  Acute DVT/PE  Iron deficiency anemia / Melena  DM-2 / HTN / DL                 PLAN:    ·	Cont tele  ·	CXR still shows mild congestion  ·	Euvolemic. Cont Torsemide 10 mg po daily   ·	CTS on the case. Care d/w the CTS today. Scheduled for CABG in AM  ·	Cont Heparin 5000 units q 12h. Full A/C was withheld as per CTS recommendation  ·	On ASA, Metoprolol and Lipitor. Entresto was withheld by CTS  ·	Check i's and o's and daily wt.   ·	Low salt diet and water restriction to 1.5 L/D  ·	Cont Alb/Atrovent neb treatment prn  ·	Cont Symbicort twice a day.  ·	Monitor H/H. Hb is stable. No further w/u as per GI. Keep Hb >8  ·	Monitor FS. On Insulin  ·	Plan of care d/w the son on bedside.     Progress Note Handoff    Pending (specify):  Consults_________, Tests________, Test Results_______, Other__CABG in AM______  Family discussion:  Disposition: Home___/SNF___/Other________/Unknown at this time________    Rafal Stephens MD  Spectra: [phone]

## 2023-08-28 NOTE — PROGRESS NOTE ADULT - ASSESSMENT
89yo F w/a PMH of Type II DM, HTN, DLD, obesity, lateral epicondylitis presenting to the ED 8/13 w SOB    #CAD and NSTEMI, s/p cath with left main and 3Vx disease findings  -Currently CTS team evaluating safety of CABG , anticipated date for CABG is Tuesday 8/29   -Systolic dysfunction on ECHO with EF 30 to 35%. ,   - Case and plan discussed with CTU Intensivist and CT Surgeon - Dr. Simental/Jaymie/Jim/Reshma CABG. Patient will be pr-ops for CABG tmrw  - Continue DVT/GI prophylaxis  - Incentive Spirometry 10 times an hour  - Continue to advance physical activity as tolerated and continue PT/OT as directed  - Hold entresto  -  Continue ASA, statin, BB     #Questionable Seizures  -s/p Keppra 1000 Q12 I.V for 8 days   -no further need for AED for now as per discussion with neuro team (normal EEG)     #Acute cognitive dysfunction (resolved), likely underlying ischemic event  #PE evidence on CT , on Low-Molecular Weight Heparin (Lovenox) at therapeutic dose       #Anemia, iron deficiency suspected, possible GI loss (episode of melena reported while in CCU)   -started on IV venofer   -consult GI for possible EGD/ lower Endoscopy  - GI saying patient is currently high risk and  risks outweigh benefits at this time for endoscopic intervention, will need cardio risk stratification if warranted   - c/w iron supplementation  - monitor H/H  - maintain active type and screen  -c/w Protonix   - Cardiology risk stratification done  ( high risk for low risk procedure )     ·#Lactic acidosis - resolved  -likely due transient ischemia (PE and NSTEMI)     #Type-2 diabetes mellitus  -on OAD as outpatient   -on Lantus 22 I.U and Lispro 8/8/8 in-patient     #Hypokalemia   replenished

## 2023-08-28 NOTE — PROGRESS NOTE ADULT - SUBJECTIVE AND OBJECTIVE BOX
MARCIANO RODRIGUES  68y Female    CHIEF COMPLAINT:    Patient is a 68y old  Female who presents with a chief complaint of Arm Pain, AMS (28 Aug 2023 14:00)      INTERVAL HPI/OVERNIGHT EVENTS:    Patient seen and examined. Feels good. No cp. No palpitations. No sob. Possible CABG in AM    ROS: All other systems are negative.    Vital Signs:    T(F): 99.2 (08-28-23 @ 12:06), Max: 99.2 (08-28-23 @ 12:06)  HR: 108 (08-28-23 @ 12:06) (92 - 108)  BP: 140/65 (08-28-23 @ 12:06) (108/54 - 140/65)  RR: 18 (08-28-23 @ 12:06) (17 - 18)  SpO2: 99% (08-28-23 @ 10:00) (96% - 99%)  I&O's Summary    27 Aug 2023 07:01  -  28 Aug 2023 07:00  --------------------------------------------------------  IN: 325 mL / OUT: 200 mL / NET: 125 mL    28 Aug 2023 07:01  -  28 Aug 2023 15:06  --------------------------------------------------------  IN: 200 mL / OUT: 750 mL / NET: -550 mL      Daily     Daily   CAPILLARY BLOOD GLUCOSE      POCT Blood Glucose.: 267 mg/dL (28 Aug 2023 11:51)  POCT Blood Glucose.: 213 mg/dL (28 Aug 2023 07:38)  POCT Blood Glucose.: 196 mg/dL (27 Aug 2023 20:57)  POCT Blood Glucose.: 190 mg/dL (27 Aug 2023 16:53)      PHYSICAL EXAM:    GENERAL:  NAD  SKIN: No rashes or lesions  HENT: Atraumatic. Normocephalic. PERRL. Moist membranes.  NECK: Supple, No JVD. No lymphadenopathy.  PULMONARY: CTA B/L. No wheezing. No rales  CVS: Normal S1, S2. Rate and Rhythm are regular. No murmurs.  ABDOMEN/GI: Soft, Nontender, Nondistended; BS present  EXTREMITIES: Peripheral pulses intact. No edema B/L LE.  NEUROLOGIC:  No motor or sensory deficit.  PSYCH: Alert & oriented x 3    Consultant(s) Notes Reviewed:  [x ] YES  [ ] NO  Care Discussed with Consultants/Other Providers [ x] YES  [ ] NO    EKG reviewed  Telemetry reviewed    LABS:                        9.1    8.34  )-----------( 381      ( 28 Aug 2023 07:39 )             31.1   Hemoglobin: 9.1 g/dL (08-28 @ 07:39)  Hemoglobin: 9.6 g/dL (08-27 @ 07:17)  Hemoglobin: 9.7 g/dL (08-26 @ 07:51)  Hemoglobin: 8.3 g/dL (08-25 @ 05:56)  Hemoglobin: 8.3 g/dL (08-24 @ 06:48)    08-28    143  |  101  |  13  ----------------------------<  216<H>  4.0   |  31  |  0.7    Ca    8.7      28 Aug 2023 07:39  Mg     2.0     08-28    TPro  7.0  /  Alb  3.6  /  TBili  0.4  /  DBili  x   /  AST  32  /  ALT  33  /  AlkPhos  98  08-28        Trop <0.01, CKMB --, CK --, 08-28-23 @ 01:58  Trop <0.01, CKMB --, CK --, 08-27-23 @ 16:15        RADIOLOGY & ADDITIONAL TESTS:      Imaging or report Personally Reviewed:  [ ] YES  [ ] NO    Medications:  Standing  aspirin  chewable 81 milliGRAM(s) Oral daily  atorvastatin 80 milliGRAM(s) Oral at bedtime  budesonide  80 MICROgram(s)/formoterol 4.5 MICROgram(s) Inhaler 2 Puff(s) Inhalation two times a day  chlorhexidine 2% Cloths 1 Application(s) Topical <User Schedule>  dextrose 5%. 1000 milliLiter(s) IV Continuous <Continuous>  dextrose 50% Injectable 25 Gram(s) IV Push once  glucagon  Injectable 1 milliGRAM(s) IntraMuscular once  heparin   Injectable 5000 Unit(s) SubCutaneous every 12 hours  insulin glargine Injectable (LANTUS) 25 Unit(s) SubCutaneous at bedtime  insulin lispro (ADMELOG) corrective regimen sliding scale   SubCutaneous three times a day before meals  insulin lispro Injectable (ADMELOG) 18 Unit(s) SubCutaneous three times a day before meals  metoprolol tartrate 50 milliGRAM(s) Oral every 12 hours  pantoprazole    Tablet 40 milliGRAM(s) Oral before breakfast  torsemide 10 milliGRAM(s) Oral daily    PRN Meds  acetaminophen     Tablet .. 650 milliGRAM(s) Oral every 6 hours PRN  albuterol/ipratropium for Nebulization 3 milliLiter(s) Nebulizer every 6 hours PRN  aluminum hydroxide/magnesium hydroxide/simethicone Suspension 30 milliLiter(s) Oral every 8 hours PRN  dextrose Oral Gel 15 Gram(s) Oral once PRN      Case discussed with resident    Care discussed with pt/family

## 2023-08-28 NOTE — PROGRESS NOTE ADULT - NS ATTEND AMEND GEN_ALL_CORE FT
Patient was seen and examined as described  Along with my nurse practitioner    We have been following this 68-year-old lady  Admitted with an acute myocardial infarction  Left bundle branch block  Altered mental status  Stroke  Ventricular fibrillation  Requiring intubation and respiratory failure was in the ICU for a few days  Severe deconditioning  Poorly controlled diabetes  Hypertension and hypercholesterolemia  Severe anemia, hemoglobin was down to 6.4  GI bleed  Obesity  Chronic tobacco chewer since the age of 7  Severe COPD  FEV1 of 25% of predicted  Bilateral pulmonary emboli  Bilateral deep vein thrombosis    Cardiac catheterization revealed critical left main and critical ostial right coronary stenosis  Cardiomyopathy  Very low ejection fraction 25 to 30%    Overall the patient is an extremely high risk surgical candidate and we had a full heart team approach discussion and given the anatomy of the coronary disease she would not be a good candidate for coronary artery stenting.    I had a sit down with the patient and her sons who are her healthcare proxy and spent a significant amount of time with the patient and her healthcare proxy's and her sons describing this critical situation to her and had no choice but to offer the patient very high risk open heart surgery with coronary artery bypass grafting.    Her STS score for an isolated coronary artery bypass grafting surgery predicted an operative mortality of 23% with a combined morbidity and mortality of more than 50%.    She would be at a high risk for respiratory failure requiring a tracheostomy etc.  Wound healing issues could also be an issue and given the fact that she has bilateral pulmonary emboli and deep vein thrombosis ischemia to the leg and other issues also come into play.    Given the fact that she had an altered mental status only recently she would be extremely high risk for neurological issues also.    Despite all these issues the patient and her family want to proceed with a very high risk operation. I discussed the risks, benefits, and alternatives to the surgical procedure in detail.  The risks that we discussed included but were not limited to bleeding, infection, stroke, myocardial infarction, renal failure, multi-organ failure, death, etc. amongst others were discussed in detail.  All questions. were answered.  The patient and the family want to proceed with the high risk intervention.

## 2023-08-29 LAB
ALBUMIN SERPL ELPH-MCNC: 3.2 G/DL — LOW (ref 3.5–5.2)
ALP SERPL-CCNC: 86 U/L — SIGNIFICANT CHANGE UP (ref 30–115)
ALT FLD-CCNC: 26 U/L — SIGNIFICANT CHANGE UP (ref 0–41)
ANION GAP SERPL CALC-SCNC: 10 MMOL/L — SIGNIFICANT CHANGE UP (ref 7–14)
APTT BLD: 31.2 SEC — SIGNIFICANT CHANGE UP (ref 27–39.2)
APTT BLD: 32.8 SEC — SIGNIFICANT CHANGE UP (ref 27–39.2)
AST SERPL-CCNC: 19 U/L — SIGNIFICANT CHANGE UP (ref 0–41)
BASOPHILS # BLD AUTO: 0.03 K/UL — SIGNIFICANT CHANGE UP (ref 0–0.2)
BASOPHILS NFR BLD AUTO: 0.4 % — SIGNIFICANT CHANGE UP (ref 0–1)
BILIRUB SERPL-MCNC: 0.3 MG/DL — SIGNIFICANT CHANGE UP (ref 0.2–1.2)
BUN SERPL-MCNC: 8 MG/DL — LOW (ref 10–20)
CALCIUM SERPL-MCNC: 8.8 MG/DL — SIGNIFICANT CHANGE UP (ref 8.4–10.4)
CHLORIDE SERPL-SCNC: 103 MMOL/L — SIGNIFICANT CHANGE UP (ref 98–110)
CO2 SERPL-SCNC: 30 MMOL/L — SIGNIFICANT CHANGE UP (ref 17–32)
CREAT SERPL-MCNC: 0.6 MG/DL — LOW (ref 0.7–1.5)
EGFR: 96 ML/MIN/1.73M2 — SIGNIFICANT CHANGE UP
EOSINOPHIL # BLD AUTO: 0.03 K/UL — SIGNIFICANT CHANGE UP (ref 0–0.7)
EOSINOPHIL NFR BLD AUTO: 0.4 % — SIGNIFICANT CHANGE UP (ref 0–8)
GLUCOSE BLDC GLUCOMTR-MCNC: 167 MG/DL — HIGH (ref 70–99)
GLUCOSE BLDC GLUCOMTR-MCNC: 175 MG/DL — HIGH (ref 70–99)
GLUCOSE BLDC GLUCOMTR-MCNC: 175 MG/DL — HIGH (ref 70–99)
GLUCOSE BLDC GLUCOMTR-MCNC: 219 MG/DL — HIGH (ref 70–99)
GLUCOSE SERPL-MCNC: 160 MG/DL — HIGH (ref 70–99)
HCT VFR BLD CALC: 28.9 % — LOW (ref 37–47)
HGB BLD-MCNC: 8.5 G/DL — LOW (ref 12–16)
IMM GRANULOCYTES NFR BLD AUTO: 0.4 % — HIGH (ref 0.1–0.3)
INR BLD: 0.93 RATIO — SIGNIFICANT CHANGE UP (ref 0.65–1.3)
INR BLD: 1 RATIO — SIGNIFICANT CHANGE UP (ref 0.65–1.3)
LYMPHOCYTES # BLD AUTO: 1.9 K/UL — SIGNIFICANT CHANGE UP (ref 1.2–3.4)
LYMPHOCYTES # BLD AUTO: 26.5 % — SIGNIFICANT CHANGE UP (ref 20.5–51.1)
MAGNESIUM SERPL-MCNC: 2.2 MG/DL — SIGNIFICANT CHANGE UP (ref 1.8–2.4)
MCHC RBC-ENTMCNC: 25 PG — LOW (ref 27–31)
MCHC RBC-ENTMCNC: 29.4 G/DL — LOW (ref 32–37)
MCV RBC AUTO: 85 FL — SIGNIFICANT CHANGE UP (ref 81–99)
MONOCYTES # BLD AUTO: 0.38 K/UL — SIGNIFICANT CHANGE UP (ref 0.1–0.6)
MONOCYTES NFR BLD AUTO: 5.3 % — SIGNIFICANT CHANGE UP (ref 1.7–9.3)
NEUTROPHILS # BLD AUTO: 4.81 K/UL — SIGNIFICANT CHANGE UP (ref 1.4–6.5)
NEUTROPHILS NFR BLD AUTO: 67 % — SIGNIFICANT CHANGE UP (ref 42.2–75.2)
NRBC # BLD: 0 /100 WBCS — SIGNIFICANT CHANGE UP (ref 0–0)
PLATELET # BLD AUTO: 314 K/UL — SIGNIFICANT CHANGE UP (ref 130–400)
PMV BLD: 9.8 FL — SIGNIFICANT CHANGE UP (ref 7.4–10.4)
POTASSIUM SERPL-MCNC: 3.5 MMOL/L — SIGNIFICANT CHANGE UP (ref 3.5–5)
POTASSIUM SERPL-SCNC: 3.5 MMOL/L — SIGNIFICANT CHANGE UP (ref 3.5–5)
PROT SERPL-MCNC: 6.4 G/DL — SIGNIFICANT CHANGE UP (ref 6–8)
PROTHROM AB SERPL-ACNC: 10.6 SEC — SIGNIFICANT CHANGE UP (ref 9.95–12.87)
PROTHROM AB SERPL-ACNC: 11.4 SEC — SIGNIFICANT CHANGE UP (ref 9.95–12.87)
RBC # BLD: 3.4 M/UL — LOW (ref 4.2–5.4)
RBC # FLD: 24.9 % — HIGH (ref 11.5–14.5)
SODIUM SERPL-SCNC: 143 MMOL/L — SIGNIFICANT CHANGE UP (ref 135–146)
WBC # BLD: 7.18 K/UL — SIGNIFICANT CHANGE UP (ref 4.8–10.8)
WBC # FLD AUTO: 7.18 K/UL — SIGNIFICANT CHANGE UP (ref 4.8–10.8)

## 2023-08-29 PROCEDURE — 99233 SBSQ HOSP IP/OBS HIGH 50: CPT

## 2023-08-29 PROCEDURE — 99253 IP/OBS CNSLTJ NEW/EST LOW 45: CPT | Mod: 57

## 2023-08-29 RX ORDER — ALBUMIN HUMAN 25 %
3000 VIAL (ML) INTRAVENOUS ONCE
Refills: 0 | Status: DISCONTINUED | OUTPATIENT
Start: 2023-08-30 | End: 2023-08-30

## 2023-08-29 RX ORDER — CHLORHEXIDINE GLUCONATE 213 G/1000ML
1 SOLUTION TOPICAL ONCE
Refills: 0 | Status: COMPLETED | OUTPATIENT
Start: 2023-08-29 | End: 2023-08-29

## 2023-08-29 RX ORDER — CHLORHEXIDINE GLUCONATE 213 G/1000ML
30 SOLUTION TOPICAL ONCE
Refills: 0 | Status: DISCONTINUED | OUTPATIENT
Start: 2023-08-29 | End: 2023-08-30

## 2023-08-29 RX ADMIN — Medication 1: at 08:46

## 2023-08-29 RX ADMIN — SACUBITRIL AND VALSARTAN 1 TABLET(S): 24; 26 TABLET, FILM COATED ORAL at 05:48

## 2023-08-29 RX ADMIN — Medication 18 UNIT(S): at 16:52

## 2023-08-29 RX ADMIN — Medication 1: at 11:45

## 2023-08-29 RX ADMIN — Medication 10 MILLIGRAM(S): at 05:49

## 2023-08-29 RX ADMIN — ATORVASTATIN CALCIUM 80 MILLIGRAM(S): 80 TABLET, FILM COATED ORAL at 21:50

## 2023-08-29 RX ADMIN — Medication 18 UNIT(S): at 08:47

## 2023-08-29 RX ADMIN — CHLORHEXIDINE GLUCONATE 1 APPLICATION(S): 213 SOLUTION TOPICAL at 05:52

## 2023-08-29 RX ADMIN — CHLORHEXIDINE GLUCONATE 1 APPLICATION(S): 213 SOLUTION TOPICAL at 19:45

## 2023-08-29 RX ADMIN — SACUBITRIL AND VALSARTAN 1 TABLET(S): 24; 26 TABLET, FILM COATED ORAL at 18:09

## 2023-08-29 RX ADMIN — BUDESONIDE AND FORMOTEROL FUMARATE DIHYDRATE 2 PUFF(S): 160; 4.5 AEROSOL RESPIRATORY (INHALATION) at 19:44

## 2023-08-29 RX ADMIN — Medication 50 MILLIGRAM(S): at 05:49

## 2023-08-29 RX ADMIN — INSULIN GLARGINE 25 UNIT(S): 100 INJECTION, SOLUTION SUBCUTANEOUS at 21:50

## 2023-08-29 RX ADMIN — Medication 81 MILLIGRAM(S): at 12:32

## 2023-08-29 RX ADMIN — PANTOPRAZOLE SODIUM 40 MILLIGRAM(S): 20 TABLET, DELAYED RELEASE ORAL at 05:49

## 2023-08-29 RX ADMIN — Medication 2: at 16:52

## 2023-08-29 RX ADMIN — Medication 3 MILLILITER(S): at 10:20

## 2023-08-29 RX ADMIN — Medication 50 MILLIGRAM(S): at 18:08

## 2023-08-29 RX ADMIN — HEPARIN SODIUM 5000 UNIT(S): 5000 INJECTION INTRAVENOUS; SUBCUTANEOUS at 05:48

## 2023-08-29 NOTE — PROGRESS NOTE ADULT - SUBJECTIVE AND OBJECTIVE BOX
MARCIANO RODRIGUES  68y Female    CHIEF COMPLAINT:    Patient is a 68y old  Female who presents with a chief complaint of Arm Pain, AMS (29 Aug 2023 11:32)      INTERVAL HPI/OVERNIGHT EVENTS:    Patient seen and examined. No cp. No sob. Waiting for CABG vs PCI    ROS: All other systems are negative.    Vital Signs:    T(F): 98.2 (08-29-23 @ 05:46), Max: 99.3 (08-28-23 @ 19:55)  HR: 82 (08-29-23 @ 05:46) (82 - 113)  BP: 113/58 (08-29-23 @ 05:46) (113/58 - 152/67)  RR: 18 (08-29-23 @ 05:46) (18 - 18)  SpO2: 99% (08-29-23 @ 08:12) (97% - 99%)  I&O's Summary    28 Aug 2023 07:01  -  29 Aug 2023 07:00  --------------------------------------------------------  IN: 200 mL / OUT: 1750 mL / NET: -1550 mL    29 Aug 2023 07:01  -  29 Aug 2023 13:03  --------------------------------------------------------  IN: 960 mL / OUT: 500 mL / NET: 460 mL      Daily     Daily   CAPILLARY BLOOD GLUCOSE      POCT Blood Glucose.: 167 mg/dL (29 Aug 2023 11:37)  POCT Blood Glucose.: 175 mg/dL (29 Aug 2023 08:40)  POCT Blood Glucose.: 177 mg/dL (28 Aug 2023 21:33)  POCT Blood Glucose.: 187 mg/dL (28 Aug 2023 16:35)      PHYSICAL EXAM:    GENERAL:  NAD  SKIN: No rashes or lesions  HENT: Atraumatic. Normocephalic. PERRL. Moist membranes.  NECK: Supple, No JVD. No lymphadenopathy.  PULMONARY: CTA B/L. No wheezing. No rales  CVS: Normal S1, S2. Rate and Rhythm are regular. No murmurs.  ABDOMEN/GI: Soft, Nontender, Nondistended; BS present  EXTREMITIES: Peripheral pulses intact. No edema B/L LE.  NEUROLOGIC:  No motor or sensory deficit.  PSYCH: Alert & oriented x 3    Consultant(s) Notes Reviewed:  [x ] YES  [ ] NO  Care Discussed with Consultants/Other Providers [ x] YES  [ ] NO    EKG reviewed  Telemetry reviewed    LABS:                        8.5    7.18  )-----------( 314      ( 29 Aug 2023 06:51 )             28.9     08-29    143  |  103  |  8<L>  ----------------------------<  160<H>  3.5   |  30  |  0.6<L>    Ca    8.8      29 Aug 2023 06:51  Mg     2.2     08-29    TPro  6.4  /  Alb  3.2<L>  /  TBili  0.3  /  DBili  x   /  AST  19  /  ALT  26  /  AlkPhos  86  08-29    PT/INR - ( 29 Aug 2023 06:51 )   PT: 11.40 sec;   INR: 1.00 ratio         PTT - ( 29 Aug 2023 06:51 )  PTT:32.8 sec    Trop <0.01, CKMB --, CK --, 08-28-23 @ 01:58  Trop <0.01, CKMB --, CK --, 08-27-23 @ 16:15        RADIOLOGY & ADDITIONAL TESTS:      Imaging or report Personally Reviewed:  [ ] YES  [ ] NO    Medications:  Standing  aspirin  chewable 81 milliGRAM(s) Oral daily  atorvastatin 80 milliGRAM(s) Oral at bedtime  budesonide  80 MICROgram(s)/formoterol 4.5 MICROgram(s) Inhaler 2 Puff(s) Inhalation two times a day  chlorhexidine 2% Cloths 1 Application(s) Topical <User Schedule>  dextrose 5%. 1000 milliLiter(s) IV Continuous <Continuous>  dextrose 50% Injectable 25 Gram(s) IV Push once  glucagon  Injectable 1 milliGRAM(s) IntraMuscular once  heparin   Injectable 5000 Unit(s) SubCutaneous every 12 hours  insulin glargine Injectable (LANTUS) 25 Unit(s) SubCutaneous at bedtime  insulin lispro (ADMELOG) corrective regimen sliding scale   SubCutaneous three times a day before meals  insulin lispro Injectable (ADMELOG) 18 Unit(s) SubCutaneous three times a day before meals  metoprolol tartrate 50 milliGRAM(s) Oral every 12 hours  pantoprazole    Tablet 40 milliGRAM(s) Oral before breakfast  sacubitril 49 mG/valsartan 51 mG 1 Tablet(s) Oral two times a day  torsemide 10 milliGRAM(s) Oral daily    PRN Meds  acetaminophen     Tablet .. 650 milliGRAM(s) Oral every 6 hours PRN  albuterol/ipratropium for Nebulization 3 milliLiter(s) Nebulizer every 6 hours PRN  aluminum hydroxide/magnesium hydroxide/simethicone Suspension 30 milliLiter(s) Oral every 8 hours PRN  dextrose Oral Gel 15 Gram(s) Oral once PRN      Case discussed with resident    Care discussed with pt/family

## 2023-08-29 NOTE — PROGRESS NOTE ADULT - TIME BILLING
CTS ATTENDING  -------------------  Pt interviewed and examined    used to spek with pt and family (Son)  case discussed and Heart Team conference  case reviewed with Dr. Wright  pt referred for CABG with critical 3-vessel CAD, reduced LVEF, s/p cardiac arrest, undetermined arrhythmia  pt recovering from intubation and prolonged hospital stay  She ambulates, eats and interacts with staff and family, but appears somber and depressed.  She responds appropriately and stated she wants to recover and proceed with surgery if that is the prescribed treatment  I explained the procedure, the risks, benefits and alternatives, and the patient and family stated they understood  Pt signed informed consent forms and agreed to proceed with surgery tomorrow.  40-min interview/consult/decision for surgery/imaging and clinical review    -FMR
Coordination of care

## 2023-08-29 NOTE — PROGRESS NOTE ADULT - ASSESSMENT
69yo F w/a PMH of Type II DM, HTN, DLD, obesity, lateral epicondylitis  presented with severe left arm pain w findings of LBBB on ECG (unsure if new or not). Code STEMI was called and then canceled because troponin negative and no chest pain. Patient was then noted to have acute mental status change, SOB, lactate elevation, and acidosis on abg. Patient had two seizure episodes and was treated with benzodiazepine. Intubated by ER for airway protection, extubated on 8/15. Was admitted to CCU and was ruled in for MI.     Also noted to have DVT/Pulmonary embolism and NSTEMI with cardiomyopathy EF 30% s/p cath which showed LM and 3VD. Hospital course also significant for melena and SHAD with low Hb requiring 2 units of PRBCs.    NSTEMI  Ischemic cardiomyopathy with EF 30%  Acute HFrEF  S/P cath with 50% LM, ostial LAD, ostial Cx and Ostial RCA disease  Toxic / Metabolic encephalopathy  Asthma exacerbation  Acute DVT/PE  Iron deficiency anemia / Melena  DM-2 / HTN / DL                 PLAN:    ·	Cont tele  ·	Care d/w the cardiology. Cardiology and CTS have meeting with the son today to discuss CABG vs PCI  ·	Euvolemic. Cont Torsemide 10 mg po daily   ·	Cont Heparin 5000 units q 12h. Full A/C was withheld as per CTS recommendation  ·	On ASA, Metoprolol and Lipitor. Entresto was withheld by CTS  ·	Check i's and o's and daily wt.   ·	Low salt diet and water restriction to 1.5 L/D  ·	Cont Alb/Atrovent neb treatment prn  ·	Cont Symbicort twice a day.  ·	Monitor H/H. Hb is stable. No further w/u as per GI. Keep Hb >8  ·	Monitor FS. On Insulin  ·	Plan of care d/w the son on bedside.     Progress Note Handoff    Pending (specify):  Consults_________, Tests________, Test Results_______, Other__Family meeting with CTS & Cardiology today______  Family discussion:  Disposition: Home___/SNF___/Other________/Unknown at this time________    Rafal Stephens MD  Spectra: [phone]

## 2023-08-29 NOTE — PROGRESS NOTE ADULT - ASSESSMENT
· Assessment	  87yo F w/a PMH of Type II DM, HTN, DLD, obesity, lateral epicondylitis presenting to the ED 8/13 w SOB    #CAD and NSTEMI, s/p cath with left main and 3Vx disease findings  -Currently CTS team evaluating safety of CABG , anticipated date for CABG is Tuesday 8/29   -Systolic dysfunction on ECHO with EF 30 to 35%. ,   - Continue DVT/GI prophylaxis  - Incentive Spirometry 10 times an hour  - Continue to advance physical activity as tolerated and continue PT/OT as directed  - Hold entresto  -  Continue ASA, statin, BB.  - CABG pre-op evaluation was postponed due to patient and son not being agreeable to it at first. Son was informed about the best approach and he was agreeable today. f/u with CT surgery    #Questionable Seizures  -s/p Keppra 1000 Q12 I.V for 8 days   -no further need for AED for now as per discussion with neuro team (normal EEG)     #Acute cognitive dysfunction (resolved), likely underlying ischemic event  #PE evidence on CT , on Low-Molecular Weight Heparin (Lovenox) at therapeutic dose       #Anemia, iron deficiency suspected, possible GI loss (episode of melena reported while in CCU)   -started on IV venofer   -consult GI for possible EGD/ lower Endoscopy  - GI saying patient is currently high risk and  risks outweigh benefits at this time for endoscopic intervention, will need cardio risk stratification if warranted   - c/w iron supplementation  - monitor H/H  - maintain active type and screen  -c/w Protonix   - Cardiology risk stratification done  ( high risk for low risk procedure )     ·#Lactic acidosis - resolved  -likely due transient ischemia (PE and NSTEMI)     #Type-2 diabetes mellitus  -on OAD as outpatient   -on Lantus 22 I.U and Lispro 8/8/8 in-patient     #Hypokalemia   replenished

## 2023-08-29 NOTE — PROGRESS NOTE ADULT - SUBJECTIVE AND OBJECTIVE BOX
SUBJECTIVE / OVERNIGHT EVENTS  Patient was seen and examined. She is on 6L oxygen nasal canula. No acute complaints     MEDICATIONS  aspirin  chewable 81 milliGRAM(s) Oral daily  atorvastatin 80 milliGRAM(s) Oral at bedtime  budesonide  80 MICROgram(s)/formoterol 4.5 MICROgram(s) Inhaler 2 Puff(s) Inhalation two times a day  chlorhexidine 2% Cloths 1 Application(s) Topical <User Schedule>  dextrose 5%. 1000 milliLiter(s) IV Continuous <Continuous>  dextrose 50% Injectable 25 Gram(s) IV Push once  glucagon  Injectable 1 milliGRAM(s) IntraMuscular once  heparin   Injectable 5000 Unit(s) SubCutaneous every 12 hours  insulin glargine Injectable (LANTUS) 25 Unit(s) SubCutaneous at bedtime  insulin lispro (ADMELOG) corrective regimen sliding scale   SubCutaneous three times a day before meals  insulin lispro Injectable (ADMELOG) 18 Unit(s) SubCutaneous three times a day before meals  metoprolol tartrate 50 milliGRAM(s) Oral every 12 hours  pantoprazole    Tablet 40 milliGRAM(s) Oral before breakfast  sacubitril 49 mG/valsartan 51 mG 1 Tablet(s) Oral two times a day  torsemide 10 milliGRAM(s) Oral daily    acetaminophen     Tablet .. 650 milliGRAM(s) Oral every 6 hours PRN Temp greater or equal to 38C (100.4F), Mild Pain (1 - 3)  albuterol/ipratropium for Nebulization 3 milliLiter(s) Nebulizer every 6 hours PRN Bronchospasm  aluminum hydroxide/magnesium hydroxide/simethicone Suspension 30 milliLiter(s) Oral every 8 hours PRN Dyspepsia  dextrose Oral Gel 15 Gram(s) Oral once PRN Blood Glucose LESS THAN 70 milliGRAM(s)/deciliter    VITALS /  EXAM    T(C): 36.8 (08-29-23 @ 05:46), Max: 37.4 (08-28-23 @ 19:55)  HR: 82 (08-29-23 @ 05:46) (82 - 113)  BP: 113/58 (08-29-23 @ 05:46) (113/58 - 152/67)  RR: 18 (08-29-23 @ 05:46) (18 - 18)  SpO2: 99% (08-29-23 @ 08:12) (97% - 99%)  POCT Blood Glucose.: 175 mg/dL (08-29-23 @ 08:40)  POCT Blood Glucose.: 177 mg/dL (08-28-23 @ 21:33)  POCT Blood Glucose.: 187 mg/dL (08-28-23 @ 16:35)  POCT Blood Glucose.: 267 mg/dL (08-28-23 @ 11:51)    General: NAD; A&Ox3  Cardiac: S1/S2, RRR, no murmur, no rubs  Lungs: unlabored respirations, CTA b/l, no wheeze, no rales, no crackles  Abdomen: Soft/NT/ND; positive bowel sounds x 4  Extremities: No edema b/l lower extremities; good capillary refill; no cyanosis; palpable 1+ pedal pulses b/      I's & O's     08-28-23 @ 07:01  -  08-29-23 @ 07:00  --------------------------------------------------------  IN:    Oral Fluid: 200 mL  Total IN: 200 mL    OUT:    Voided (mL): 1750 mL  Total OUT: 1750 mL    Total NET: -1550 mL      08-29-23 @ 07:01  -  08-29-23 @ 11:32  --------------------------------------------------------  IN:  Total IN: 0 mL    OUT:    Voided (mL): 200 mL  Total OUT: 200 mL    Total NET: -200 mL        LABS             8.5    7.18  )-----------( 314      ( 08-29-23 @ 06:51 )             28.9     143  |  103  |  8   -------------------------<  160   08-29-23 @ 06:51  3.5  |  30  |  0.6    Ca      8.8     08-29-23 @ 06:51  Mg     2.2     08-29-23 @ 06:51    TPro  6.4  /  Alb  3.2  /  TBili  0.3  /  DBili  x   /  AST  19  /  ALT  26  /  AlkPhos  86  /  GGT  x     08-29-23 @ 06:51    PT/INR - ( 08-29-23 @ 06:51 )   PT: 11.40 sec;   INR: 1.00 ratio  PTT - ( 08-29-23 @ 06:51 )  PTT:32.8 sec    Troponin T, Serum: <0.01 ng/mL (08-28-23 @ 01:58)  Troponin T, Serum: <0.01 ng/mL (08-27-23 @ 16:15)                Urinalysis Basic - ( 29 Aug 2023 06:51 )    Color: x / Appearance: x / SG: x / pH: x  Gluc: 160 mg/dL / Ketone: x  / Bili: x / Urobili: x   Blood: x / Protein: x / Nitrite: x   Leuk Esterase: x / RBC: x / WBC x   Sq Epi: x / Non Sq Epi: x / Bacteria: x      MICRO / IMAGING / CARDIOLOGY  Telemetry: Reviewed   EKG: Reviewed    CULTURES    IMAGING    CARDIOLOGY

## 2023-08-29 NOTE — CHART NOTE - NSCHARTNOTEFT_GEN_A_CORE
Case discussed at multidisciplinary cardiac cath conference. It was decided best option for patient is surgery.  Will pre-op for tomorrow

## 2023-08-29 NOTE — PROGRESS NOTE ADULT - SUBJECTIVE AND OBJECTIVE BOX
CTS ATTENDING  -------------------  Pt interviewed and examined    used to spek with pt and family (Son)  case discussed and Heart Team conference  case reviewed with Dr. Wright  pt referred for CABG with critical 3-vessel CAD, reduced LVEF, s/p cardiac arrest, undetermined arrhythmia  pt recovering from intubation and prolonged hospital stay  She ambulates, eats and interacts with staff and family, but appears somber and depressed.  She responds appropriately and stated she wants to recover and proceed with surgery if that is the prescribed treatment  I explained the procedure, the risks, benefits and alternatives, and the patient and family stated they understood  Pt signed informed consent forms and agreed to proceed with surgery tomorrow.  40-min interview/consult/decision for surgery/imaging and clinical review    -FMR       CTS ATTENDING  -------------------  Pt interviewed and examined    used to spek with pt and family (Son)  case discussed and Heart Team conference  case reviewed with Dr. Wright  pt referred for CABG with critical 3-vessel CAD, reduced LVEF, s/p cardiac arrest, undetermined arrhythmia  pt recovering from intubation and prolonged hospital stay  She ambulates, eats and interacts with staff and family, but appears somber and depressed.  She responds appropriately and stated she wants to recover and proceed with surgery if that is the prescribed treatment  I explained the procedure, the risks, benefits and alternatives, and the patient and family stated they understood  Pt signed informed consent forms and agreed to proceed with surgery tomorrow.  45-min interview/consult/decision for surgery/imaging and clinical review    -FMR

## 2023-08-30 ENCOUNTER — TRANSCRIPTION ENCOUNTER (OUTPATIENT)
Age: 68
End: 2023-08-30

## 2023-08-30 LAB
ALBUMIN SERPL ELPH-MCNC: 4.2 G/DL — SIGNIFICANT CHANGE UP (ref 3.5–5.2)
ALP SERPL-CCNC: 47 U/L — SIGNIFICANT CHANGE UP (ref 30–115)
ALT FLD-CCNC: 13 U/L — SIGNIFICANT CHANGE UP (ref 0–41)
ANION GAP SERPL CALC-SCNC: 9 MMOL/L — SIGNIFICANT CHANGE UP (ref 7–14)
APTT BLD: 29.6 SEC — SIGNIFICANT CHANGE UP (ref 27–39.2)
AST SERPL-CCNC: 18 U/L — SIGNIFICANT CHANGE UP (ref 0–41)
BASOPHILS # BLD AUTO: 0.03 K/UL — SIGNIFICANT CHANGE UP (ref 0–0.2)
BASOPHILS NFR BLD AUTO: 0.2 % — SIGNIFICANT CHANGE UP (ref 0–1)
BILIRUB SERPL-MCNC: 1 MG/DL — SIGNIFICANT CHANGE UP (ref 0.2–1.2)
BUN SERPL-MCNC: 8 MG/DL — LOW (ref 10–20)
C NEOFORM RRNA SPEC NAA+PROBE-ACNC: SIGNIFICANT CHANGE UP
CALCIUM SERPL-MCNC: 8.8 MG/DL — SIGNIFICANT CHANGE UP (ref 8.4–10.5)
CHLORIDE SERPL-SCNC: 112 MMOL/L — HIGH (ref 98–110)
CMV DNA CSF QL NAA+PROBE: SIGNIFICANT CHANGE UP
CO2 SERPL-SCNC: 22 MMOL/L — SIGNIFICANT CHANGE UP (ref 17–32)
CREAT SERPL-MCNC: 0.5 MG/DL — LOW (ref 0.7–1.5)
E COLI K1 DNA CSF QL NAA+NON-PROBE: SIGNIFICANT CHANGE UP
EGFR: 101 ML/MIN/1.73M2 — SIGNIFICANT CHANGE UP
EOSINOPHIL # BLD AUTO: 0 K/UL — SIGNIFICANT CHANGE UP (ref 0–0.7)
EOSINOPHIL NFR BLD AUTO: 0 % — SIGNIFICANT CHANGE UP (ref 0–8)
ESCHERICHIA COLI K1: SIGNIFICANT CHANGE UP
EV RNA CSF QL NAA+PROBE: SIGNIFICANT CHANGE UP
GAS PNL BLDA: SIGNIFICANT CHANGE UP
GLUCOSE BLDC GLUCOMTR-MCNC: 101 MG/DL — HIGH (ref 70–99)
GLUCOSE BLDC GLUCOMTR-MCNC: 102 MG/DL — HIGH (ref 70–99)
GLUCOSE BLDC GLUCOMTR-MCNC: 106 MG/DL — HIGH (ref 70–99)
GLUCOSE BLDC GLUCOMTR-MCNC: 107 MG/DL — HIGH (ref 70–99)
GLUCOSE BLDC GLUCOMTR-MCNC: 110 MG/DL — HIGH (ref 70–99)
GLUCOSE BLDC GLUCOMTR-MCNC: 113 MG/DL — HIGH (ref 70–99)
GLUCOSE BLDC GLUCOMTR-MCNC: 121 MG/DL — HIGH (ref 70–99)
GLUCOSE BLDC GLUCOMTR-MCNC: 121 MG/DL — HIGH (ref 70–99)
GLUCOSE BLDC GLUCOMTR-MCNC: 133 MG/DL — HIGH (ref 70–99)
GLUCOSE BLDC GLUCOMTR-MCNC: 140 MG/DL — HIGH (ref 70–99)
GLUCOSE BLDC GLUCOMTR-MCNC: 151 MG/DL — HIGH (ref 70–99)
GLUCOSE SERPL-MCNC: 153 MG/DL — HIGH (ref 70–99)
GP B STREP DNA SPEC QL NAA+PROBE: SIGNIFICANT CHANGE UP
HAEM INFLU DNA SPEC QL NAA+PROBE: SIGNIFICANT CHANGE UP
HCT VFR BLD CALC: 27.9 % — LOW (ref 37–47)
HGB BLD-MCNC: 8.8 G/DL — LOW (ref 12–16)
HHV6 DNA CSF QL NAA+PROBE: SIGNIFICANT CHANGE UP
HSV1 DNA CSF QL NAA+PROBE: SIGNIFICANT CHANGE UP
HSV2 DNA CSF QL NAA+PROBE: SIGNIFICANT CHANGE UP
IMM GRANULOCYTES NFR BLD AUTO: 0.5 % — HIGH (ref 0.1–0.3)
IMMUNOLOGIST REVIEW: SIGNIFICANT CHANGE UP
INR BLD: 1.27 RATIO — SIGNIFICANT CHANGE UP (ref 0.65–1.3)
L MONOCYTOG DNA SPEC QL NAA+PROBE: SIGNIFICANT CHANGE UP
LYMPHOCYTES # BLD AUTO: 1.74 K/UL — SIGNIFICANT CHANGE UP (ref 1.2–3.4)
LYMPHOCYTES # BLD AUTO: 13.6 % — LOW (ref 20.5–51.1)
MCHC RBC-ENTMCNC: 26.4 PG — LOW (ref 27–31)
MCHC RBC-ENTMCNC: 31.5 G/DL — LOW (ref 32–37)
MCV RBC AUTO: 83.8 FL — SIGNIFICANT CHANGE UP (ref 81–99)
MONOCYTES # BLD AUTO: 0.49 K/UL — SIGNIFICANT CHANGE UP (ref 0.1–0.6)
MONOCYTES NFR BLD AUTO: 3.8 % — SIGNIFICANT CHANGE UP (ref 1.7–9.3)
N MEN DNA SPEC QL NAA+PROBE: SIGNIFICANT CHANGE UP
NEUTROPHILS # BLD AUTO: 10.47 K/UL — HIGH (ref 1.4–6.5)
NEUTROPHILS NFR BLD AUTO: 81.9 % — HIGH (ref 42.2–75.2)
NRBC # BLD: 0 /100 WBCS — SIGNIFICANT CHANGE UP (ref 0–0)
PARECHOVIRUS A RNA SPEC QL NAA+PROBE: SIGNIFICANT CHANGE UP
PLATELET # BLD AUTO: 179 K/UL — SIGNIFICANT CHANGE UP (ref 130–400)
PMV BLD: 9.6 FL — SIGNIFICANT CHANGE UP (ref 7.4–10.4)
POTASSIUM SERPL-MCNC: 4 MMOL/L — SIGNIFICANT CHANGE UP (ref 3.5–5)
POTASSIUM SERPL-SCNC: 4 MMOL/L — SIGNIFICANT CHANGE UP (ref 3.5–5)
PROT SERPL-MCNC: 5.8 G/DL — LOW (ref 6–8)
PROTHROM AB SERPL-ACNC: 14.6 SEC — HIGH (ref 9.95–12.87)
RBC # BLD: 3.33 M/UL — LOW (ref 4.2–5.4)
RBC # FLD: 21.3 % — HIGH (ref 11.5–14.5)
S PNEUM DNA SPEC QL NAA+PROBE: SIGNIFICANT CHANGE UP
SODIUM SERPL-SCNC: 143 MMOL/L — SIGNIFICANT CHANGE UP (ref 135–146)
VZV DNA CSF QL NAA+PROBE: SIGNIFICANT CHANGE UP
WBC # BLD: 12.8 K/UL — HIGH (ref 4.8–10.8)
WBC # FLD AUTO: 12.8 K/UL — HIGH (ref 4.8–10.8)

## 2023-08-30 PROCEDURE — 33508 ENDOSCOPIC VEIN HARVEST: CPT | Mod: 59

## 2023-08-30 PROCEDURE — 71045 X-RAY EXAM CHEST 1 VIEW: CPT | Mod: 26

## 2023-08-30 PROCEDURE — 33508 ENDOSCOPIC VEIN HARVEST: CPT | Mod: AS,59

## 2023-08-30 PROCEDURE — 93010 ELECTROCARDIOGRAM REPORT: CPT

## 2023-08-30 PROCEDURE — 33518 CABG ARTERY-VEIN TWO: CPT

## 2023-08-30 PROCEDURE — 33518 CABG ARTERY-VEIN TWO: CPT | Mod: AS

## 2023-08-30 PROCEDURE — 33533 CABG ARTERIAL SINGLE: CPT

## 2023-08-30 PROCEDURE — 33533 CABG ARTERIAL SINGLE: CPT | Mod: AS

## 2023-08-30 RX ORDER — POTASSIUM CHLORIDE 20 MEQ
20 PACKET (EA) ORAL ONCE
Refills: 0 | Status: COMPLETED | OUTPATIENT
Start: 2023-08-30 | End: 2023-08-30

## 2023-08-30 RX ORDER — SODIUM CHLORIDE 9 MG/ML
1000 INJECTION INTRAMUSCULAR; INTRAVENOUS; SUBCUTANEOUS
Refills: 0 | Status: DISCONTINUED | OUTPATIENT
Start: 2023-08-30 | End: 2023-09-02

## 2023-08-30 RX ORDER — IPRATROPIUM BROMIDE 0.2 MG/ML
2 SOLUTION, NON-ORAL INHALATION EVERY 6 HOURS
Refills: 0 | Status: DISCONTINUED | OUTPATIENT
Start: 2023-08-30 | End: 2023-08-31

## 2023-08-30 RX ORDER — OXYCODONE HYDROCHLORIDE 5 MG/1
10 TABLET ORAL EVERY 4 HOURS
Refills: 0 | Status: DISCONTINUED | OUTPATIENT
Start: 2023-08-30 | End: 2023-09-02

## 2023-08-30 RX ORDER — KETOROLAC TROMETHAMINE 30 MG/ML
15 SYRINGE (ML) INJECTION EVERY 4 HOURS
Refills: 0 | Status: DISCONTINUED | OUTPATIENT
Start: 2023-08-30 | End: 2023-08-31

## 2023-08-30 RX ORDER — CHLORHEXIDINE GLUCONATE 213 G/1000ML
1 SOLUTION TOPICAL DAILY
Refills: 0 | Status: DISCONTINUED | OUTPATIENT
Start: 2023-08-30 | End: 2023-09-06

## 2023-08-30 RX ORDER — PROPOFOL 10 MG/ML
1 INJECTION, EMULSION INTRAVENOUS
Qty: 1000 | Refills: 0 | Status: DISCONTINUED | OUTPATIENT
Start: 2023-08-30 | End: 2023-08-31

## 2023-08-30 RX ORDER — CEFAZOLIN SODIUM 1 G
1000 VIAL (EA) INJECTION EVERY 8 HOURS
Refills: 0 | Status: COMPLETED | OUTPATIENT
Start: 2023-08-30 | End: 2023-08-31

## 2023-08-30 RX ORDER — MEPERIDINE HYDROCHLORIDE 50 MG/ML
25 INJECTION INTRAMUSCULAR; INTRAVENOUS; SUBCUTANEOUS ONCE
Refills: 0 | Status: DISCONTINUED | OUTPATIENT
Start: 2023-08-30 | End: 2023-08-31

## 2023-08-30 RX ORDER — VASOPRESSIN 20 [USP'U]/ML
0.04 INJECTION INTRAVENOUS
Qty: 40 | Refills: 0 | Status: DISCONTINUED | OUTPATIENT
Start: 2023-08-30 | End: 2023-08-31

## 2023-08-30 RX ORDER — ASPIRIN/CALCIUM CARB/MAGNESIUM 324 MG
300 TABLET ORAL DAILY
Refills: 0 | Status: DISCONTINUED | OUTPATIENT
Start: 2023-08-30 | End: 2023-08-31

## 2023-08-30 RX ORDER — NITROGLYCERIN 6.5 MG
5 CAPSULE, EXTENDED RELEASE ORAL
Qty: 50 | Refills: 0 | Status: DISCONTINUED | OUTPATIENT
Start: 2023-08-30 | End: 2023-08-31

## 2023-08-30 RX ORDER — SUGAMMADEX 100 MG/ML
100 INJECTION, SOLUTION INTRAVENOUS ONCE
Refills: 0 | Status: COMPLETED | OUTPATIENT
Start: 2023-08-30 | End: 2023-08-30

## 2023-08-30 RX ORDER — FAMOTIDINE 10 MG/ML
20 INJECTION INTRAVENOUS EVERY 12 HOURS
Refills: 0 | Status: DISCONTINUED | OUTPATIENT
Start: 2023-08-30 | End: 2023-08-31

## 2023-08-30 RX ORDER — GLUCAGON INJECTION, SOLUTION 0.5 MG/.1ML
1 INJECTION, SOLUTION SUBCUTANEOUS ONCE
Refills: 0 | Status: DISCONTINUED | OUTPATIENT
Start: 2023-08-30 | End: 2023-09-06

## 2023-08-30 RX ORDER — POLYETHYLENE GLYCOL 3350 17 G/17G
17 POWDER, FOR SOLUTION ORAL DAILY
Refills: 0 | Status: DISCONTINUED | OUTPATIENT
Start: 2023-08-31 | End: 2023-09-06

## 2023-08-30 RX ORDER — MILRINONE LACTATE 1 MG/ML
0.38 INJECTION, SOLUTION INTRAVENOUS
Qty: 20 | Refills: 0 | Status: DISCONTINUED | OUTPATIENT
Start: 2023-08-30 | End: 2023-09-01

## 2023-08-30 RX ORDER — NOREPINEPHRINE BITARTRATE/D5W 8 MG/250ML
0.05 PLASTIC BAG, INJECTION (ML) INTRAVENOUS
Qty: 8 | Refills: 0 | Status: DISCONTINUED | OUTPATIENT
Start: 2023-08-30 | End: 2023-08-31

## 2023-08-30 RX ORDER — DEXTROSE 50 % IN WATER 50 %
50 SYRINGE (ML) INTRAVENOUS
Refills: 0 | Status: DISCONTINUED | OUTPATIENT
Start: 2023-08-30 | End: 2023-09-06

## 2023-08-30 RX ORDER — DEXTROSE 50 % IN WATER 50 %
15 SYRINGE (ML) INTRAVENOUS ONCE
Refills: 0 | Status: DISCONTINUED | OUTPATIENT
Start: 2023-08-30 | End: 2023-09-06

## 2023-08-30 RX ORDER — CHLORHEXIDINE GLUCONATE 213 G/1000ML
5 SOLUTION TOPICAL
Refills: 0 | Status: DISCONTINUED | OUTPATIENT
Start: 2023-08-30 | End: 2023-08-31

## 2023-08-30 RX ORDER — INSULIN HUMAN 100 [IU]/ML
1 INJECTION, SOLUTION SUBCUTANEOUS
Qty: 100 | Refills: 0 | Status: DISCONTINUED | OUTPATIENT
Start: 2023-08-30 | End: 2023-09-01

## 2023-08-30 RX ORDER — SODIUM CHLORIDE 9 MG/ML
1000 INJECTION, SOLUTION INTRAVENOUS
Refills: 0 | Status: DISCONTINUED | OUTPATIENT
Start: 2023-08-30 | End: 2023-09-06

## 2023-08-30 RX ORDER — KETOROLAC TROMETHAMINE 30 MG/ML
15 SYRINGE (ML) INJECTION ONCE
Refills: 0 | Status: DISCONTINUED | OUTPATIENT
Start: 2023-08-30 | End: 2023-08-30

## 2023-08-30 RX ORDER — ACETAMINOPHEN 500 MG
1000 TABLET ORAL ONCE
Refills: 0 | Status: COMPLETED | OUTPATIENT
Start: 2023-08-31 | End: 2023-08-31

## 2023-08-30 RX ORDER — NICARDIPINE HYDROCHLORIDE 30 MG/1
5 CAPSULE, EXTENDED RELEASE ORAL
Qty: 40 | Refills: 0 | Status: DISCONTINUED | OUTPATIENT
Start: 2023-08-30 | End: 2023-08-31

## 2023-08-30 RX ORDER — OXYCODONE HYDROCHLORIDE 5 MG/1
5 TABLET ORAL EVERY 4 HOURS
Refills: 0 | Status: DISCONTINUED | OUTPATIENT
Start: 2023-08-30 | End: 2023-09-06

## 2023-08-30 RX ORDER — DEXMEDETOMIDINE HYDROCHLORIDE IN 0.9% SODIUM CHLORIDE 4 UG/ML
0.2 INJECTION INTRAVENOUS
Qty: 200 | Refills: 0 | Status: DISCONTINUED | OUTPATIENT
Start: 2023-08-30 | End: 2023-08-31

## 2023-08-30 RX ORDER — SENNA PLUS 8.6 MG/1
2 TABLET ORAL AT BEDTIME
Refills: 0 | Status: DISCONTINUED | OUTPATIENT
Start: 2023-08-31 | End: 2023-09-06

## 2023-08-30 RX ORDER — ACETAMINOPHEN 500 MG
1000 TABLET ORAL EVERY 6 HOURS
Refills: 0 | Status: COMPLETED | OUTPATIENT
Start: 2023-08-31 | End: 2023-08-31

## 2023-08-30 RX ORDER — ALBUTEROL 90 UG/1
2 AEROSOL, METERED ORAL EVERY 6 HOURS
Refills: 0 | Status: DISCONTINUED | OUTPATIENT
Start: 2023-08-30 | End: 2023-08-31

## 2023-08-30 RX ORDER — ACETAMINOPHEN 500 MG
1000 TABLET ORAL ONCE
Refills: 0 | Status: COMPLETED | OUTPATIENT
Start: 2023-08-31 | End: 2023-08-30

## 2023-08-30 RX ADMIN — Medication 300 MILLIGRAM(S): at 22:18

## 2023-08-30 RX ADMIN — SUGAMMADEX 100 MILLIGRAM(S): 100 INJECTION, SOLUTION INTRAVENOUS at 16:30

## 2023-08-30 RX ADMIN — Medication 100 MILLIEQUIVALENT(S): at 20:00

## 2023-08-30 RX ADMIN — Medication 15 MILLIGRAM(S): at 19:17

## 2023-08-30 RX ADMIN — Medication 100 MILLIGRAM(S): at 15:22

## 2023-08-30 RX ADMIN — SACUBITRIL AND VALSARTAN 1 TABLET(S): 24; 26 TABLET, FILM COATED ORAL at 06:05

## 2023-08-30 RX ADMIN — Medication 15 MILLIGRAM(S): at 19:03

## 2023-08-30 RX ADMIN — Medication 100 MILLIEQUIVALENT(S): at 21:30

## 2023-08-30 RX ADMIN — CHLORHEXIDINE GLUCONATE 1 APPLICATION(S): 213 SOLUTION TOPICAL at 22:16

## 2023-08-30 RX ADMIN — PANTOPRAZOLE SODIUM 40 MILLIGRAM(S): 20 TABLET, DELAYED RELEASE ORAL at 06:06

## 2023-08-30 RX ADMIN — Medication 15 MILLIGRAM(S): at 21:40

## 2023-08-30 RX ADMIN — Medication 50 MILLIGRAM(S): at 06:05

## 2023-08-30 RX ADMIN — FAMOTIDINE 20 MILLIGRAM(S): 10 INJECTION INTRAVENOUS at 18:02

## 2023-08-30 RX ADMIN — Medication 400 MILLIGRAM(S): at 23:54

## 2023-08-30 RX ADMIN — Medication 15 MILLIGRAM(S): at 21:10

## 2023-08-30 RX ADMIN — Medication 10 MILLIGRAM(S): at 06:05

## 2023-08-30 RX ADMIN — MILRINONE LACTATE 6.94 MICROGRAM(S)/KG/MIN: 1 INJECTION, SOLUTION INTRAVENOUS at 20:05

## 2023-08-30 RX ADMIN — SODIUM CHLORIDE 10 MILLILITER(S): 9 INJECTION INTRAMUSCULAR; INTRAVENOUS; SUBCUTANEOUS at 20:05

## 2023-08-30 RX ADMIN — Medication 100 MILLIGRAM(S): at 22:18

## 2023-08-30 RX ADMIN — Medication 1000 MILLIGRAM(S): at 19:15

## 2023-08-30 RX ADMIN — Medication 400 MILLIGRAM(S): at 19:00

## 2023-08-30 RX ADMIN — INSULIN HUMAN 1 UNIT(S)/HR: 100 INJECTION, SOLUTION SUBCUTANEOUS at 20:06

## 2023-08-30 RX ADMIN — CHLORHEXIDINE GLUCONATE 5 MILLILITER(S): 213 SOLUTION TOPICAL at 18:02

## 2023-08-30 NOTE — CHART NOTE - NSCHARTNOTEFT_GEN_A_CORE
Transfer from: [facility] 3C (Back)    Transfer to: CTU    Sign out given to:     HPI / HOSPITAL COURSE:  69yo F w/a PMH of Type II DM, HTN, DLD, obesity, lateral epicondylitis presenting to the ED w/ Left Arm and chest pain. Her cgest pain  about two months ago and has continued to progress but in the last few days became severe. She went to the ED a few days ago, was given pain medications then sent home. Per son, pain still continued to progress after this. No fevers, chills, nausea, vomiting, diarrhea, constipation, SOB, CP.   On admission to the ED , she presented with severe left arm/chest pain w findings of LBBB on ECG (unsure if new or not). Code STEMI was called and then canceled because troponin negative Patient was then noted to have acute mental status change, SOB, lactate elevation, and acidosis on abg..Patient had two seizure episodes and was treated with benzodiazepine. Intubated by ER for airway protection.     On Admission  Vitals: /64, HR 75, RR 20, T 98.5, satting 99 percent on RA   Labs: WBC 14.86, Hgb 10, , Na 139, K 5.4, BUN 12, Cr .6, Trop <.01 --> .13 on repeat  Imaging:   CXR -  Patchy bibasilar opacities, right greater than left.  CT Brain Stroke protocol - No evidence of acute transcortical infarct, hydrocephalus, acute intracranial hemorrhage, or mass effect.  CT brain perfusion: No evidence of acute infarct or ischemia.  CTA neck: No stenosis. No dissection.  CTA brain: No stenosis or aneurysm.  CTA Neck: The distal internal carotid arteries are patent. The [place] is normal in appearance. The visualized cerebral arteries are unremarkable.The vertebrobasilar junction and basilar artery are normal.    In the ED, given Levaquin. Keppra, started on propofol. Admitted to MICU for monitoring. Patient extubated 08/15/2023. Patient was then transferred to CCU for workup of ischemia and was noted to have DVT/Pulmonary embolism and NSTEMI with cardiomyopathy EF 30% s/p cath which showed LM and 3VD. Hospital course also significant for melena and SHAD with low Hb requiring 2 units of PRBCs. Patient was then transferred to a normal floor, where the risks, benefits and alternatives for CABG were explained to the patient and family. Pt signed informed consent forms for CABG and agreed to proceed with surgery .    Assessment	  87yo F w/a PMH of Type II DM, HTN, DLD, obesity, lateral epicondylitis presenting to the ED 8/13 w chest pain and left arm pain.    #CAD and NSTEMI, s/p cath with left main and 3Vx disease findings  -Currently CTS team evaluating safety of CABG , anticipated date for CABG is Tuesday 8/29   -Systolic dysfunction on ECHO with EF 30 to 35%. ,   - Continue DVT/GI prophylaxis  - Incentive Spirometry 10 times an hour  - Continue to advance physical activity as tolerated and continue PT/OT as directed  - entresto held  -  Continue ASA, statin, BB.  - CABG planned for 08/30/2023    #Questionable Seizures  -s/p Keppra 1000 Q12 I.V for 8 days   -no further need for AED for now as per discussion with neuro team (normal EEG)     #Acute cognitive dysfunction (resolved), likely underlying ischemic event  #PE evidence on CT , on Low-Molecular Weight Heparin (Lovenox) at therapeutic dose       #Anemia, iron deficiency suspected, possible GI loss (episode of melena reported while in CCU)   -started on IV venofer   -consult GI for possible EGD/ lower Endoscopy  - GI saying patient is currently high risk and  risks outweigh benefits at this time for endoscopic intervention, will need cardio risk stratification if warranted   - c/w iron supplementation  - monitor H/H  - maintain active type and screen  -c/w Protonix   - Cardiology risk stratification done  ( high risk for low risk procedure )     ·#Lactic acidosis - resolved  -likely due transient ischemia (PE and NSTEMI)     #Type-2 diabetes mellitus  -on OAD as outpatient   -on Lantus 22 I.U and Lispro 8/8/8 in-patient     #Hypokalemia   replenished                       ASSESSMENT & PLAN:         FOR FOLLOW UP:  [ ]   [ ]   [ ]

## 2023-08-30 NOTE — BRIEF OPERATIVE NOTE - NSICDXBRIEFPREOP_GEN_ALL_CORE_FT
PRE-OP DIAGNOSIS:  Left main coronary artery disease 30-Aug-2023 12:36:11  Glynn Coon  Triple vessel coronary artery disease 30-Aug-2023 12:36:18  Glynn Coon  Cardiac arrest 30-Aug-2023 12:36:37  Glynn Coon  Non-ST elevation MI (NSTEMI) 30-Aug-2023 12:36:02  Glynn Coon

## 2023-08-30 NOTE — BRIEF OPERATIVE NOTE - COMMENTS
Atilio Holden participated as the first assistant during the distal and proximal anastomosis in the absence of a qualified MD or fellow.  RSVG [place] time: 45   mins

## 2023-08-30 NOTE — BRIEF OPERATIVE NOTE - NSICDXBRIEFPOSTOP_GEN_ALL_CORE_FT
POST-OP DIAGNOSIS:  Non-ST elevation MI (NSTEMI) 30-Aug-2023 12:37:15  Glynn Coon  Triple vessel coronary artery disease 30-Aug-2023 12:36:55  Glynn Coon  Left main coronary artery disease 30-Aug-2023 12:36:50  Glynn Coon  Cardiac arrest 30-Aug-2023 12:37:07  Glynn Coon

## 2023-08-30 NOTE — CHART NOTE - NSCHARTNOTESELECT_GEN_ALL_CORE
Brief Cath Report/Event Note
CT Surgery/Event Note
Event Note
Off Service Note
Transfer Note
Transfer Note
Dietitian Follow-Up/Event Note
Event Note
Follow Up/Nutrition Services
Internal Medicine/Transfer Note
STEMI/Event Note
Transfer Note
pre-cath/Event Note

## 2023-08-31 ENCOUNTER — TRANSCRIPTION ENCOUNTER (OUTPATIENT)
Age: 68
End: 2023-08-31

## 2023-08-31 LAB
ALBUMIN SERPL ELPH-MCNC: 3.7 G/DL — SIGNIFICANT CHANGE UP (ref 3.5–5.2)
ALP SERPL-CCNC: 47 U/L — SIGNIFICANT CHANGE UP (ref 30–115)
ALT FLD-CCNC: 11 U/L — SIGNIFICANT CHANGE UP (ref 0–41)
ANION GAP SERPL CALC-SCNC: 8 MMOL/L — SIGNIFICANT CHANGE UP (ref 7–14)
AST SERPL-CCNC: 22 U/L — SIGNIFICANT CHANGE UP (ref 0–41)
BASE EXCESS BLDV CALC-SCNC: -4.5 MMOL/L — LOW (ref -2–3)
BASOPHILS # BLD AUTO: 0.02 K/UL — SIGNIFICANT CHANGE UP (ref 0–0.2)
BASOPHILS NFR BLD AUTO: 0.2 % — SIGNIFICANT CHANGE UP (ref 0–1)
BILIRUB SERPL-MCNC: 0.7 MG/DL — SIGNIFICANT CHANGE UP (ref 0.2–1.2)
BUN SERPL-MCNC: 10 MG/DL — SIGNIFICANT CHANGE UP (ref 10–20)
CA-I SERPL-SCNC: 1.21 MMOL/L — SIGNIFICANT CHANGE UP (ref 1.15–1.33)
CALCIUM SERPL-MCNC: 8.3 MG/DL — LOW (ref 8.4–10.5)
CHLORIDE SERPL-SCNC: 115 MMOL/L — HIGH (ref 98–110)
CO2 SERPL-SCNC: 21 MMOL/L — SIGNIFICANT CHANGE UP (ref 17–32)
CREAT SERPL-MCNC: 0.6 MG/DL — LOW (ref 0.7–1.5)
EGFR: 96 ML/MIN/1.73M2 — SIGNIFICANT CHANGE UP
EOSINOPHIL # BLD AUTO: 0 K/UL — SIGNIFICANT CHANGE UP (ref 0–0.7)
EOSINOPHIL NFR BLD AUTO: 0 % — SIGNIFICANT CHANGE UP (ref 0–8)
GAS PNL BLDA: SIGNIFICANT CHANGE UP
GAS PNL BLDV: 141 MMOL/L — SIGNIFICANT CHANGE UP (ref 136–145)
GAS PNL BLDV: SIGNIFICANT CHANGE UP
GLUCOSE BLDC GLUCOMTR-MCNC: 102 MG/DL — HIGH (ref 70–99)
GLUCOSE BLDC GLUCOMTR-MCNC: 115 MG/DL — HIGH (ref 70–99)
GLUCOSE BLDC GLUCOMTR-MCNC: 124 MG/DL — HIGH (ref 70–99)
GLUCOSE BLDC GLUCOMTR-MCNC: 139 MG/DL — HIGH (ref 70–99)
GLUCOSE BLDC GLUCOMTR-MCNC: 145 MG/DL — HIGH (ref 70–99)
GLUCOSE BLDC GLUCOMTR-MCNC: 158 MG/DL — HIGH (ref 70–99)
GLUCOSE BLDC GLUCOMTR-MCNC: 169 MG/DL — HIGH (ref 70–99)
GLUCOSE BLDC GLUCOMTR-MCNC: 169 MG/DL — HIGH (ref 70–99)
GLUCOSE BLDC GLUCOMTR-MCNC: 175 MG/DL — HIGH (ref 70–99)
GLUCOSE BLDC GLUCOMTR-MCNC: 184 MG/DL — HIGH (ref 70–99)
GLUCOSE BLDC GLUCOMTR-MCNC: 235 MG/DL — HIGH (ref 70–99)
GLUCOSE BLDC GLUCOMTR-MCNC: 78 MG/DL — SIGNIFICANT CHANGE UP (ref 70–99)
GLUCOSE SERPL-MCNC: 112 MG/DL — HIGH (ref 70–99)
HCO3 BLDV-SCNC: 21 MMOL/L — LOW (ref 22–29)
HCT VFR BLD CALC: 25.8 % — LOW (ref 37–47)
HCT VFR BLDA CALC: 25 % — LOW (ref 39–51)
HGB BLD CALC-MCNC: 8.2 G/DL — LOW (ref 12.6–17.4)
HGB BLD-MCNC: 8 G/DL — LOW (ref 12–16)
IMM GRANULOCYTES NFR BLD AUTO: 0.3 % — SIGNIFICANT CHANGE UP (ref 0.1–0.3)
LACTATE BLDV-MCNC: 0.7 MMOL/L — SIGNIFICANT CHANGE UP (ref 0.5–2)
LYMPHOCYTES # BLD AUTO: 1.04 K/UL — LOW (ref 1.2–3.4)
LYMPHOCYTES # BLD AUTO: 10.7 % — LOW (ref 20.5–51.1)
MAGNESIUM SERPL-MCNC: 2.4 MG/DL — SIGNIFICANT CHANGE UP (ref 1.8–2.4)
MCHC RBC-ENTMCNC: 26.5 PG — LOW (ref 27–31)
MCHC RBC-ENTMCNC: 31 G/DL — LOW (ref 32–37)
MCV RBC AUTO: 85.4 FL — SIGNIFICANT CHANGE UP (ref 81–99)
MONOCYTES # BLD AUTO: 0.35 K/UL — SIGNIFICANT CHANGE UP (ref 0.1–0.6)
MONOCYTES NFR BLD AUTO: 3.6 % — SIGNIFICANT CHANGE UP (ref 1.7–9.3)
NEUTROPHILS # BLD AUTO: 8.32 K/UL — HIGH (ref 1.4–6.5)
NEUTROPHILS NFR BLD AUTO: 85.2 % — HIGH (ref 42.2–75.2)
NRBC # BLD: 0 /100 WBCS — SIGNIFICANT CHANGE UP (ref 0–0)
PCO2 BLDV: 40 MMHG — SIGNIFICANT CHANGE UP (ref 39–42)
PH BLDV: 7.33 — SIGNIFICANT CHANGE UP (ref 7.32–7.43)
PLATELET # BLD AUTO: 159 K/UL — SIGNIFICANT CHANGE UP (ref 130–400)
PMV BLD: 9.9 FL — SIGNIFICANT CHANGE UP (ref 7.4–10.4)
PO2 BLDV: 39 MMHG — SIGNIFICANT CHANGE UP
POTASSIUM BLDV-SCNC: 4.6 MMOL/L — SIGNIFICANT CHANGE UP (ref 3.5–5.1)
POTASSIUM SERPL-MCNC: 4.7 MMOL/L — SIGNIFICANT CHANGE UP (ref 3.5–5)
POTASSIUM SERPL-SCNC: 4.7 MMOL/L — SIGNIFICANT CHANGE UP (ref 3.5–5)
PROT SERPL-MCNC: 5.4 G/DL — LOW (ref 6–8)
RBC # BLD: 3.02 M/UL — LOW (ref 4.2–5.4)
RBC # FLD: 22 % — HIGH (ref 11.5–14.5)
SAO2 % BLDV: 67.1 % — SIGNIFICANT CHANGE UP
SODIUM SERPL-SCNC: 144 MMOL/L — SIGNIFICANT CHANGE UP (ref 135–146)
WBC # BLD: 9.76 K/UL — SIGNIFICANT CHANGE UP (ref 4.8–10.8)
WBC # FLD AUTO: 9.76 K/UL — SIGNIFICANT CHANGE UP (ref 4.8–10.8)

## 2023-08-31 PROCEDURE — 93010 ELECTROCARDIOGRAM REPORT: CPT

## 2023-08-31 PROCEDURE — 93970 EXTREMITY STUDY: CPT | Mod: 26

## 2023-08-31 PROCEDURE — 99233 SBSQ HOSP IP/OBS HIGH 50: CPT

## 2023-08-31 PROCEDURE — 71045 X-RAY EXAM CHEST 1 VIEW: CPT | Mod: 26

## 2023-08-31 PROCEDURE — 71045 X-RAY EXAM CHEST 1 VIEW: CPT | Mod: 26,77

## 2023-08-31 RX ORDER — ASPIRIN/CALCIUM CARB/MAGNESIUM 324 MG
325 TABLET ORAL DAILY
Refills: 0 | Status: DISCONTINUED | OUTPATIENT
Start: 2023-08-31 | End: 2023-09-03

## 2023-08-31 RX ORDER — METOPROLOL TARTRATE 50 MG
12.5 TABLET ORAL EVERY 12 HOURS
Refills: 0 | Status: DISCONTINUED | OUTPATIENT
Start: 2023-08-31 | End: 2023-09-06

## 2023-08-31 RX ORDER — FENTANYL CITRATE 50 UG/ML
25 INJECTION INTRAVENOUS ONCE
Refills: 0 | Status: DISCONTINUED | OUTPATIENT
Start: 2023-08-31 | End: 2023-08-31

## 2023-08-31 RX ORDER — HEPARIN SODIUM 5000 [USP'U]/ML
5000 INJECTION INTRAVENOUS; SUBCUTANEOUS EVERY 8 HOURS
Refills: 0 | Status: DISCONTINUED | OUTPATIENT
Start: 2023-08-31 | End: 2023-09-03

## 2023-08-31 RX ORDER — KETOROLAC TROMETHAMINE 30 MG/ML
15 SYRINGE (ML) INJECTION ONCE
Refills: 0 | Status: DISCONTINUED | OUTPATIENT
Start: 2023-08-31 | End: 2023-08-31

## 2023-08-31 RX ORDER — ATORVASTATIN CALCIUM 80 MG/1
40 TABLET, FILM COATED ORAL AT BEDTIME
Refills: 0 | Status: DISCONTINUED | OUTPATIENT
Start: 2023-08-31 | End: 2023-09-06

## 2023-08-31 RX ORDER — PANTOPRAZOLE SODIUM 20 MG/1
40 TABLET, DELAYED RELEASE ORAL
Refills: 0 | Status: DISCONTINUED | OUTPATIENT
Start: 2023-08-31 | End: 2023-09-06

## 2023-08-31 RX ORDER — IPRATROPIUM/ALBUTEROL SULFATE 18-103MCG
3 AEROSOL WITH ADAPTER (GRAM) INHALATION EVERY 6 HOURS
Refills: 0 | Status: DISCONTINUED | OUTPATIENT
Start: 2023-08-31 | End: 2023-09-02

## 2023-08-31 RX ADMIN — Medication 100 MILLIGRAM(S): at 06:21

## 2023-08-31 RX ADMIN — Medication 15 MILLIGRAM(S): at 17:35

## 2023-08-31 RX ADMIN — OXYCODONE HYDROCHLORIDE 5 MILLIGRAM(S): 5 TABLET ORAL at 14:31

## 2023-08-31 RX ADMIN — Medication 1000 MILLIGRAM(S): at 06:51

## 2023-08-31 RX ADMIN — Medication 400 MILLIGRAM(S): at 11:36

## 2023-08-31 RX ADMIN — Medication 15 MILLIGRAM(S): at 02:52

## 2023-08-31 RX ADMIN — FENTANYL CITRATE 25 MICROGRAM(S): 50 INJECTION INTRAVENOUS at 06:30

## 2023-08-31 RX ADMIN — POLYETHYLENE GLYCOL 3350 17 GRAM(S): 17 POWDER, FOR SOLUTION ORAL at 11:36

## 2023-08-31 RX ADMIN — FAMOTIDINE 20 MILLIGRAM(S): 10 INJECTION INTRAVENOUS at 17:17

## 2023-08-31 RX ADMIN — OXYCODONE HYDROCHLORIDE 5 MILLIGRAM(S): 5 TABLET ORAL at 23:00

## 2023-08-31 RX ADMIN — SENNA PLUS 2 TABLET(S): 8.6 TABLET ORAL at 22:14

## 2023-08-31 RX ADMIN — Medication 1000 MILLIGRAM(S): at 00:24

## 2023-08-31 RX ADMIN — Medication 325 MILLIGRAM(S): at 11:36

## 2023-08-31 RX ADMIN — Medication 400 MILLIGRAM(S): at 17:40

## 2023-08-31 RX ADMIN — Medication 15 MILLIGRAM(S): at 09:00

## 2023-08-31 RX ADMIN — HEPARIN SODIUM 5000 UNIT(S): 5000 INJECTION INTRAVENOUS; SUBCUTANEOUS at 22:14

## 2023-08-31 RX ADMIN — CHLORHEXIDINE GLUCONATE 1 APPLICATION(S): 213 SOLUTION TOPICAL at 11:35

## 2023-08-31 RX ADMIN — Medication 2 PUFF(S): at 20:01

## 2023-08-31 RX ADMIN — FENTANYL CITRATE 25 MICROGRAM(S): 50 INJECTION INTRAVENOUS at 07:00

## 2023-08-31 RX ADMIN — HEPARIN SODIUM 5000 UNIT(S): 5000 INJECTION INTRAVENOUS; SUBCUTANEOUS at 14:11

## 2023-08-31 RX ADMIN — Medication 2 PUFF(S): at 09:42

## 2023-08-31 RX ADMIN — ALBUTEROL 2 PUFF(S): 90 AEROSOL, METERED ORAL at 09:45

## 2023-08-31 RX ADMIN — Medication 1000 MILLIGRAM(S): at 18:10

## 2023-08-31 RX ADMIN — Medication 1000 MILLIGRAM(S): at 12:06

## 2023-08-31 RX ADMIN — Medication 2 PUFF(S): at 15:42

## 2023-08-31 RX ADMIN — ALBUTEROL 2 PUFF(S): 90 AEROSOL, METERED ORAL at 19:56

## 2023-08-31 RX ADMIN — Medication 400 MILLIGRAM(S): at 06:21

## 2023-08-31 RX ADMIN — Medication 15 MILLIGRAM(S): at 17:20

## 2023-08-31 RX ADMIN — ALBUTEROL 2 PUFF(S): 90 AEROSOL, METERED ORAL at 15:41

## 2023-08-31 RX ADMIN — Medication 12.5 MILLIGRAM(S): at 18:10

## 2023-08-31 RX ADMIN — OXYCODONE HYDROCHLORIDE 5 MILLIGRAM(S): 5 TABLET ORAL at 22:14

## 2023-08-31 RX ADMIN — FAMOTIDINE 20 MILLIGRAM(S): 10 INJECTION INTRAVENOUS at 06:21

## 2023-08-31 RX ADMIN — Medication 15 MILLIGRAM(S): at 09:30

## 2023-08-31 RX ADMIN — ATORVASTATIN CALCIUM 40 MILLIGRAM(S): 80 TABLET, FILM COATED ORAL at 22:15

## 2023-08-31 RX ADMIN — Medication 15 MILLIGRAM(S): at 03:22

## 2023-08-31 RX ADMIN — OXYCODONE HYDROCHLORIDE 5 MILLIGRAM(S): 5 TABLET ORAL at 14:11

## 2023-08-31 NOTE — DISCHARGE NOTE PROVIDER - NSDCFUSCHEDAPPT_GEN_ALL_CORE_FT
Nga Mayberry  [facility]  ELECTROPH [address] [place] [address] Av  Scheduled Appointment: 11/06/2023    [facility]  CARDIOLOGY [address] [place] [address] Av  Scheduled Appointment: 11/29/2023    Isai Wright  [facility]  CARDIOLOGY [address] [place] [address] Av  Scheduled Appointment: 01/05/2024

## 2023-08-31 NOTE — DISCHARGE NOTE PROVIDER - NSDCFUADDINST_GEN_ALL_CORE_FT
Activities/Restrictions  1.	Do not – drive, lift, pull or push anything over 10 pounds for 8 weeks.  2.	Shower every night and carefully wash wound, pat dry.  Cover wound with dry sterile dressing if some minimal drainage exists. No baths or swimming for two months.   3.	Apply support stockings /ace wraps to legs as soon as you get out of bed in the morning, remove in evening.   4.	Do progressive walking exercises every day, gradually increasing to 30 to 40 minutes/day, five days a week and incentive spirometer 10 times every hour while awake  5.	DO NOT DRIVE OR CONSUME ALCOHOL WHILE TAKING PAIN MEDICATION.  Contact your Physician promptly if:  1.	Signs of wound infection, such as increasing redness, swelling, pain or drainage from incision.  2.	Progressive shortness of breath or increasing difficulty with breathing when lying down.  3.	Excessive nausea, vomiting, diarrhea or coughing.  4.	Increase swelling of legs that does not resolve with leg elevation.  5.	Chest pain that spreads to arms, jaw or back or sudden development of numbness, weakness, difficulty speaking or facial droop – Call 911.  6.	Diabetics who are unable to keep finger stick glucose under 150 for three consecutive readings.  Instructions:  1.	 Keep a daily log for Temperature, pulse, blood pressure, and weight twice a day and Glucose if diabetic with each meal. Call office for Temp > 101, pulse greater than 110 or less than 55, BP first # greater than 160 or less than 100, 3 pound weight gain in 1 day or 5 pounds in 3 days  2.	Hold pillow to chest and grab elbows when you need to cough.  3.                A discussion was conducted with patient/family regarding importance of joining a cardiac rehabilitation program.

## 2023-08-31 NOTE — PHYSICAL THERAPY INITIAL EVALUATION ADULT - PHYSICAL ASSIST/NONPHYSICAL ASSIST: GAIT, REHAB EVAL
+Chair follow, +assist with IV pole and  tablet./verbal cues/1 person + 1 person to manage equipment
1 person assist

## 2023-08-31 NOTE — PROGRESS NOTE ADULT - ASSESSMENT
Assessment/Plan:  NSTEMI/CAD-s/p CABG x 3-POD #1  1-BP control-start beta-blockers   2-serum glucose control-insulin infusion  3-fluid overload-gentle diuresis  4-acute blood loss anemia-stable, monitor Hb/Hct daily  5-chronic systolic heart failure-continue inotropic support  6-D/C pulmonary artery catheter    35 minutes of critical care services provided

## 2023-08-31 NOTE — DISCHARGE NOTE PROVIDER - NSDCCPCAREPLAN_GEN_ALL_CORE_FT
PRINCIPAL DISCHARGE DIAGNOSIS  Diagnosis: CAD (coronary artery disease)  Assessment and Plan of Treatment:       SECONDARY DISCHARGE DIAGNOSES  Diagnosis: Lactic acidosis  Assessment and Plan of Treatment:     Diagnosis: Acute CHF  Assessment and Plan of Treatment:

## 2023-08-31 NOTE — DISCHARGE NOTE PROVIDER - NSDCCPTREATMENT_GEN_ALL_CORE_FT
PRINCIPAL PROCEDURE  Procedure: CABG, with RAJIV  Findings and Treatment: Activities/Restrictions  1.	Do not – drive, lift, pull or push anything over 10 pounds for 8 weeks.  2.	Shower every night and carefully wash wound, pat dry.  Cover is wound is draining with dry sterile dressing. No baths or swimming for two months.   3.	Apply support stockings /ace wraps to legs as soon as you get out of bed in the morning, remove in evening.   4.	Do progressive walking exercises every day, gradually increasing to 30 to 40 minutes/day, five days a week and incentive spirometer 10 times every hour while awake  5.	DO NOT DRIVE OR CONSUME ALCOHOL WHILE TAKING PAIN MEDICATION.  Contact your Physician promptly if:  1.	 Signs of wound infection, such as increasing redness, swelling, pain or drainage from incision.  2.	Progressive shortness of breath or increasing difficulty with breathing when lying down.  3.	Excessive nausea, vomiting, diarrhea or coughing.  4.	Increase swelling of legs that does not resolve with leg elevation.  5.	Chest pain that spreads to arms, jaw or back or sudden development of numbness, weakness, difficulty speaking or facial droop – Call 911.  6.	Diabetics who are unable to keep finger stick glucose under 150 for three consecutive readings.  Instructions:  1.	 Keep a daily log for Temperature, pulse, blood pressure, and weight twice a day and Glucose if diabetic with each meal. Call office for Temp > 101, pulse greater than 110 or less than 55, BP first # greater than 160 or less than 100, 3 pound weight gain in 1 day or 5 pounds in 3 days  2.	Hold pillow to chest and grab elbows when you need to cough.

## 2023-08-31 NOTE — PHYSICAL THERAPY INITIAL EVALUATION ADULT - GAIT DEVIATIONS NOTED, PT EVAL
decreased melba/decreased step length/decreased stride length
Decreased speed, mild forward flexed posture, +PURDY that resolves with rest./decreased melba/decreased step length/decreased stride length

## 2023-08-31 NOTE — PHYSICAL THERAPY INITIAL EVALUATION ADULT - IMPAIRMENTS FOUND, PT EVAL
gait, locomotion, and balance/muscle strength
aerobic capacity/endurance/ergonomics and body mechanics/gait, locomotion, and balance

## 2023-08-31 NOTE — DISCHARGE NOTE PROVIDER - HOSPITAL COURSE
67yo F w/a PMH of Type II DM, HTN, DLD, obesity, lateral epicondylitis presented to the ED w/ Left Arm pain.  This arm started about two months ago and has continued to progress but in the last few days became severe. She went to the ED a few days prior to admission and, was given pain medications then sent home. Per son, pain still continued to progress after this. No fevers, chills, nausea, vomiting, diarrhea, constipation, SOB, CP. On  this admission she again presented with severe left arm pain w findings of LBBB on ECG (unsure if new or not). Code STEMI was called and then canceled because troponin negative and no chest pain. Patient was then noted to have acute mental status change, SOB, lactate elevation, and acidosis on abg.. Patient had two seizure type episodes and was treated with benzodiazepine. Intubated by ER for airway protection.     Stoke code called -  no CVA but patient developed elevated troponins and TTE revealed EF of 30% with CCTA revealing score of 134 and LAD disease. Patient went for Cardiac cath that revealed multi-vessel CAD. Hospital course thus far required treatment for Cardiomyopathy (EF 30%), uncontrolled DM (A1c 8.2), acute DVT (R peroneal L femoral/posterior tibial / peroneal veins)  , acute PE (b/l segmental). She underwent a LHC which revealed severe CAD. On 08/30/23, she underwent surgical myocardial revascularization. Post-operatively, 69yo F w/a PMH of Type II DM, HTN, DLD, obesity, lateral epicondylitis presented to the ED w/ Left Arm pain.  This arm started about two months ago and has continued to progress but in the last few days became severe. She went to the ED a few days prior to admission and, was given pain medications then sent home. Per son, pain still continued to progress after this. No fevers, chills, nausea, vomiting, diarrhea, constipation, SOB, CP. On  this admission she again presented with severe left arm pain w findings of LBBB on ECG (unsure if new or not). Code STEMI was called and then canceled because troponin negative and no chest pain. Patient was then noted to have acute mental status change, SOB, lactate elevation, and acidosis on abg.. Patient had two seizure type episodes and was treated with benzodiazepine. Intubated by ER for airway protection.     Stoke code called -  no CVA but patient developed elevated troponins and TTE revealed EF of 30% with CCTA revealing score of 134 and LAD disease. Patient went for Cardiac cath that revealed multi-vessel CAD. Hospital course thus far required treatment for Cardiomyopathy (EF 30%), uncontrolled DM (A1c 8.2), acute DVT (R peroneal L femoral/posterior tibial / peroneal veins)  , acute PE (b/l segmental). She underwent a LHC which revealed severe CAD. On 08/30/23, she underwent surgical myocardial revascularization. Post-operatively, the patient's hospitalization was prolonged due to hypoxia, ischemic cardiomyopathy, and DVT right peroneal vein. A venous duplex was performed, which revealed a right peroneal DVT. The patient was assessed by vascular, who recommended the patient be started on anticoagulation. The patient was started on Elquis BID. The patient was given nebulizer treatments and practiced cough and deep breathing exercises, the patient was weaned off supplemental O2. An echocardiogram was performed 9/4, which revealed an EF 25%. EP was consulted who recommended the patient be fitted for a wearable defibrillator. The patient was fitted and recieved the vest today. The patient is medically optimized and is being discharged home with home care services. The patient and family was educated on post-op instruction via [facility]  [employer] #[id number]. 67yo F w/a PMH of Type II DM, HTN, DLD, obesity, lateral epicondylitis presented to the ED w/ Left Arm pain.  This arm started about two months ago and has continued to progress but in the last few days became severe. She went to the ED a few days prior to admission and, was given pain medications then sent home. Per son, pain still continued to progress after this. No fevers, chills, nausea, vomiting, diarrhea, constipation, SOB, CP. On  this admission she again presented with severe left arm pain w findings of LBBB on ECG (unsure if new or not). Code STEMI was called and then canceled because troponin negative and no chest pain. Patient was then noted to have acute mental status change, SOB, lactate elevation, and acidosis on abg.. Patient had two seizure type episodes and was treated with benzodiazepine. Intubated by ER for airway protection.     Stoke code called -  no CVA but patient developed elevated troponins and TTE revealed EF of 30% with CCTA revealing score of 134 and LAD disease. The patient showed symptoms, imaging, and given IV diuretics supportive of acute decompensated heart failure. Patient went for Cardiac cath that revealed multi-vessel CAD. Hospital course thus far required treatment for Cardiomyopathy (EF 30%), uncontrolled DM (A1c 8.2), acute DVT (R peroneal L femoral/posterior tibial / peroneal veins)  , acute PE (b/l segmental). She underwent a LHC which revealed severe CAD. On 08/30/23, she underwent surgical myocardial revascularization. Post-operatively, the patient's hospitalization was prolonged due to hypoxia, ischemic cardiomyopathy, and DVT right peroneal vein. A venous duplex was performed, which revealed a right peroneal DVT. The patient was assessed by vascular, who recommended the patient be started on anticoagulation. The patient was started on Elquis BID. The patient was given nebulizer treatments and practiced cough and deep breathing exercises, the patient was weaned off supplemental O2. An echocardiogram was performed 9/4, which revealed an EF 25%. EP was consulted who recommended the patient be fitted for a wearable defibrillator. The patient was fitted and recieved the vest today. The patient is medically optimized and is being discharged home with home care services. The patient and family was educated on post-op instruction via [facility]  [employer] #[id number].

## 2023-08-31 NOTE — DISCHARGE NOTE PROVIDER - NSDCHHCONTACT_GEN_ALL_CORE_FT
As certified below, I, or a nurse practitioner or physician assistant working with me, had a face-to-face encounter that meets the physician face-to-face encounter requirements.
Yes

## 2023-08-31 NOTE — DISCHARGE NOTE PROVIDER - CARE PROVIDERS DIRECT ADDRESSES
,[email or web address]

## 2023-08-31 NOTE — PROGRESS NOTE ADULT - SUBJECTIVE AND OBJECTIVE BOX
OPERATIVE PROCEDURE(s): CABG x3                POD # 1                      SURGEON(s): ANIRUDH Coon      SUBJECTIVE ASSESSMENT:68yFemale patient seen and examined at bedside.     Vital Signs Last 24 Hrs  T(F): 98.4 (31 Aug 2023 04:00), Max: 100.8 (30 Aug 2023 18:00)  HR: 101 (31 Aug 2023 07:00) (81 - 101)  BP: 97/52 (31 Aug 2023 05:00) (88/48 - 119/57)  BP(mean): 71 (31 Aug 2023 05:00) (65 - 81)  ABP: 141/40 (31 Aug 2023 07:00) (92/39 - 297/-38)  ABP(mean): 66 (31 Aug 2023 07:00)  RR: 27 (31 Aug 2023 07:00) (16 - 35)  SpO2: 100% (31 Aug 2023 07:00) (85% - 100%)  CVP(mm Hg): 14 (31 Aug 2023 07:00)  CO: 4.6 (31 Aug 2023 06:00)  CI: 2.9 (31 Aug 2023 06:00)  PA: 29/9 (31 Aug 2023 07:00)  SVR: 937 (31 Aug 2023 06:00)      I&O's Detail    30 Aug 2023 07:01  -  31 Aug 2023 07:00  --------------------------------------------------------  IN:    Albumin 5%  - 500 mL: 100 mL    Insulin: 31 mL    IV PiggyBack: 550 mL    Milrinone: 124.2 mL    sodium chloride 0.9%: 360 mL    Vasopressin: 18 mL  Total IN: 1183.2 mL    OUT:    Chest Tube (mL): 64 mL    Chest Tube (mL): 150 mL    Dexmedetomidine: 0 mL    Indwelling Catheter - Urethral (mL): 665 mL    Nasogastric/Oral tube (mL): 0 mL    NiCARdipine: 0 mL    Nitroglycerin: 0 mL    Norepinephrine: 0 mL    Propofol: 0 mL  Total OUT: 879 mL        Net: I&O's Detail    29 Aug 2023 07:01  -  30 Aug 2023 07:00  --------------------------------------------------------  Total NET: 526 mL      30 Aug 2023 07:01  -  31 Aug 2023 07:00  --------------------------------------------------------  Total NET: 304.2 mL        CAPILLARY BLOOD GLUCOSE      POCT Blood Glucose.: 139 mg/dL (31 Aug 2023 05:49)  POCT Blood Glucose.: 102 mg/dL (31 Aug 2023 02:15)  POCT Blood Glucose.: 107 mg/dL (30 Aug 2023 23:53)  POCT Blood Glucose.: 113 mg/dL (30 Aug 2023 22:57)  POCT Blood Glucose.: 106 mg/dL (30 Aug 2023 22:03)  POCT Blood Glucose.: 102 mg/dL (30 Aug 2023 20:55)  POCT Blood Glucose.: 101 mg/dL (30 Aug 2023 19:57)  POCT Blood Glucose.: 110 mg/dL (30 Aug 2023 19:02)  POCT Blood Glucose.: 121 mg/dL (30 Aug 2023 17:55)  POCT Blood Glucose.: 121 mg/dL (30 Aug 2023 17:09)  POCT Blood Glucose.: 140 mg/dL (30 Aug 2023 16:12)  POCT Blood Glucose.: 151 mg/dL (30 Aug 2023 15:06)    A1C with Estimated Average Glucose Result: 8.2 % (08-14-23 @ 04:40)      Physical Exam:  General: NAD; A&Ox3  Cardiac: S1/S2, RRR, no murmur, no rubs  Lungs: unlabored respirations, CTA b/l, no wheeze, no rales, no crackles  Abdomen: Soft/NT/ND; positive bowel sounds x 4  Sternum: Intact, no click, incision healing well with no drainage  Incisions: Incisions clean/dry/intact  Extremities: No edema b/l lower extremities; good capillary refill; no cyanosis; palpable 1+ pedal pulses b/l    Central Venous Catheter: Yes: critical illness, intravenous access  Day #  Johnson Catheter: Yes: critical illness; monitor strict i/o's                    Day #  EPICARDIAL WIRES:  YES                                                                         Day #  BOWEL MOVEMENT:  [] YES [] NO, If No, Timing since last BM Day #  CHEST TUBE(MS/Left/Right):  [] YES [] NO, If yes -  AIR LEAKS:  YES/NO        LABS:                        8.0<L>  9.76  )-----------( 159      ( 31 Aug 2023 01:05 )             25.8<L>                        8.8<L>  12.80<H> )-----------( 179      ( 30 Aug 2023 15:28 )             27.9<L>    08-31    144  |  115<H>  |  10  ----------------------------<  112<H>  4.7   |  21  |  0.6<L>  08-30    143  |  112<H>  |  8<L>  ----------------------------<  153<H>  4.0   |  22  |  0.5<L>    Ca    8.3<L>      31 Aug 2023 01:05  Mg     2.4     08-31    TPro  5.4<L> [6.0 - 8.0]  /  Alb  3.7 [3.5 - 5.2]  /  TBili  0.7 [0.2 - 1.2]  /  DBili  x   /  AST  22 [0 - 41]  /  ALT  11 [0 - 41]  /  AlkPhos  47 [30 - 115]  08-31    PT/INR - ( 30 Aug 2023 15:28 )   PT: ;   INR: 1.27 ratio         PT/INR - ( 29 Aug 2023 22:33 )   PT: ;   INR: 0.93 ratio         PTT - ( 30 Aug 2023 15:28 )  PTT:29.6 sec, PTT - ( 29 Aug 2023 22:33 )  PTT:31.2 sec  Urinalysis Basic - ( 31 Aug 2023 01:05 )    Color: x / Appearance: x / SG: x / pH: x  Gluc: 112 mg/dL / Ketone: x  / Bili: x / Urobili: x   Blood: x / Protein: x / Nitrite: x   Leuk Esterase: x / RBC: x / WBC x   Sq Epi: x / Non Sq Epi: x / Bacteria: x      ABG - ( 31 Aug 2023 03:25 )  pH: 7.35  /  pCO2: 37    /  pO2: 133   / HCO3: 20    / Base Excess: -4.8  /  SaO2: 98.7  /  LA: 0.60             RADIOLOGY & ADDITIONAL TESTS:  CXR:     EKG:    Allergies    No Known Allergies    Intolerances      MEDICATIONS  (STANDING):  acetaminophen   IVPB .. 1000 milliGRAM(s) IV Intermittent once  acetaminophen   IVPB .. 1000 milliGRAM(s) IV Intermittent once  acetaminophen   IVPB .. 1000 milliGRAM(s) IV Intermittent every 6 hours  albuterol    90 MICROgram(s) HFA Inhaler 2 Puff(s) Inhalation every 6 hours  aspirin Suppository 300 milliGRAM(s) Rectal daily  chlorhexidine 0.12% Liquid 5 milliLiter(s) Oral Mucosa two times a day  chlorhexidine 2% Cloths 1 Application(s) Topical daily  dexMEDEtomidine Infusion 0.2 MICROgram(s)/kG/Hr (3.09 mL/Hr) IV Continuous <Continuous>  dextrose 5%. 1000 milliLiter(s) (50 mL/Hr) IV Continuous <Continuous>  dextrose 50% Injectable 50 milliLiter(s) IV Push every 15 minutes  famotidine Injectable 20 milliGRAM(s) IV Push every 12 hours  glucagon  Injectable 1 milliGRAM(s) IntraMuscular once  insulin regular Infusion 1 Unit(s)/Hr (1 mL/Hr) IV Continuous <Continuous>  ipratropium 17 MICROgram(s) HFA Inhaler 2 Puff(s) Inhalation every 6 hours  meperidine     Injectable 25 milliGRAM(s) IV Push once  milrinone Infusion 0.375 MICROgram(s)/kG/Min (6.94 mL/Hr) IV Continuous <Continuous>  niCARdipine Infusion 5 mG/Hr (25 mL/Hr) IV Continuous <Continuous>  nitroglycerin  Infusion 5 MICROgram(s)/Min (1.5 mL/Hr) IV Continuous <Continuous>  norepinephrine Infusion 0.05 MICROgram(s)/kG/Min (5.78 mL/Hr) IV Continuous <Continuous>  polyethylene glycol 3350 17 Gram(s) Oral daily  propofol Infusion 1 MICROgram(s)/kG/Min (0.37 mL/Hr) IV Continuous <Continuous>  senna 2 Tablet(s) Oral at bedtime  sodium chloride 0.9%. 1000 milliLiter(s) (10 mL/Hr) IV Continuous <Continuous>  vasopressin Infusion 0.04 Unit(s)/Min (6 mL/Hr) IV Continuous <Continuous>    MEDICATIONS  (PRN):  dextrose Oral Gel 15 Gram(s) Oral once PRN Blood Glucose LESS THAN 70 milliGRAM(s)/deciliter  ketorolac   Injectable 15 milliGRAM(s) IV Push every 4 hours PRN Severe Pain (7 - 10)  oxyCODONE    IR 5 milliGRAM(s) Oral every 4 hours PRN Moderate Pain (4 - 6)  oxyCODONE    IR 10 milliGRAM(s) Oral every 4 hours PRN Severe Pain (7 - 10)    Home Medications:  aspirin 81 mg oral tablet, chewable: 1 chewed once a day (13 Aug 2023 19:53)  atorvastatin 10 mg oral tablet: 1 orally once a day (at bedtime) (13 Aug 2023 19:52)  Jardiance 10 mg oral tablet: 1 orally once a day (13 Aug 2023 19:53)  MetFORMIN (Eqv-Glumetza) 500 mg oral tablet, extended release: 1 orally 2 times a day (13 Aug 2023 19:51)  pioglitazone 30 mg oral tablet: 1 orally once a day (13 Aug 2023 19:52)      Pharmacologic DVT Prophylaxis [] YES, [] NO: Contraindication:  SCD's: YES b/l    GI Prophylaxis:     Post-Op Beta-Blockers: [] Yes, [] No: contraindication:  Post-Op CCB: [] Yes, [] No: contraindication:  Post-Op Aspirin:  [] Yes, [] No: contraindication:  Post-Op Statin:  [] Yes, [] No: contraindication:    Ambulation/Activity Status:     Assessment/Plan:  68y Female status-post  - Case and plan discussed with CTU Intensivist and CT Surgeon - Dr. Simental/Jaymie/Jim/Reshma  - Continue CTU supportive care and ongoing plan of care as per continuing CTU rounds.   - Continue DVT/GI prophylaxis  - Incentive Spirometry 10 times an hour  - Continue to advance physical activity as tolerated and continue PT/OT as directed  1. CAD s/p CABG: Continue ASA, statin, BB  2. HTN:   3. Post-op A. Fib ppx:   4. COPD/Hypoxia:   5. DM/Glucose Control:     Social Service Disposition:     OPERATIVE PROCEDURE(s): CABG x3                POD # 1                      SURGEON(s): ANIRUDH Coon    SUBJECTIVE ASSESSMENT: 68y Female patient seen and examined at bedside. There were no acute events overnight and patient had no current complaints.     Vital Signs Last 24 Hrs  T(F): 98.4 (31 Aug 2023 04:00), Max: 100.8 (30 Aug 2023 18:00)  HR: 101 (31 Aug 2023 07:00) (81 - 101)  BP: 97/52 (31 Aug 2023 05:00) (88/48 - 119/57)  BP(mean): 71 (31 Aug 2023 05:00) (65 - 81)  ABP: 141/40 (31 Aug 2023 07:00) (92/39 - 297/-38)  ABP(mean): 66 (31 Aug 2023 07:00)  RR: 27 (31 Aug 2023 07:00) (16 - 35)  SpO2: 100% (31 Aug 2023 07:00) (85% - 100%)  CVP(mm Hg): 14 (31 Aug 2023 07:00)  CO: 4.6 (31 Aug 2023 06:00)  CI: 2.9 (31 Aug 2023 06:00)  PA: 29/9 (31 Aug 2023 07:00)  SVR: 937 (31 Aug 2023 06:00)    I&O's Detail    30 Aug 2023 07:01  -  31 Aug 2023 07:00  --------------------------------------------------------  IN:    Albumin 5%  - 500 mL: 100 mL    Insulin: 31 mL    IV PiggyBack: 550 mL    Milrinone: 124.2 mL    sodium chloride 0.9%: 360 mL    Vasopressin: 18 mL  Total IN: 1183.2 mL    OUT:    Chest Tube (mL): 64 mL    Chest Tube (mL): 150 mL    Dexmedetomidine: 0 mL    Indwelling Catheter - Urethral (mL): 665 mL    Nasogastric/Oral tube (mL): 0 mL    NiCARdipine: 0 mL    Nitroglycerin: 0 mL    Norepinephrine: 0 mL    Propofol: 0 mL  Total OUT: 879 mL    Net: I&O's Detail    29 Aug 2023 07:01  -  30 Aug 2023 07:00  --------------------------------------------------------  Total NET: 526 mL    30 Aug 2023 07:01  -  31 Aug 2023 07:00  --------------------------------------------------------  Total NET: 304.2 mL    CAPILLARY BLOOD GLUCOSE  POCT Blood Glucose.: 139 mg/dL (31 Aug 2023 05:49)  POCT Blood Glucose.: 102 mg/dL (31 Aug 2023 02:15)  POCT Blood Glucose.: 107 mg/dL (30 Aug 2023 23:53)  POCT Blood Glucose.: 113 mg/dL (30 Aug 2023 22:57)  POCT Blood Glucose.: 106 mg/dL (30 Aug 2023 22:03)  POCT Blood Glucose.: 102 mg/dL (30 Aug 2023 20:55)  POCT Blood Glucose.: 101 mg/dL (30 Aug 2023 19:57)  POCT Blood Glucose.: 110 mg/dL (30 Aug 2023 19:02)  POCT Blood Glucose.: 121 mg/dL (30 Aug 2023 17:55)  POCT Blood Glucose.: 121 mg/dL (30 Aug 2023 17:09)  POCT Blood Glucose.: 140 mg/dL (30 Aug 2023 16:12)  POCT Blood Glucose.: 151 mg/dL (30 Aug 2023 15:06)    A1C with Estimated Average Glucose Result: 8.2 % (08-14-23 @ 04:40)    Physical Exam:  General: NAD; A&Ox3  Cardiac: S1/S2, RRR, no murmur, no rubs  Lungs: unlabored respirations, CTA b/l, no wheeze, no rales, no crackles  Abdomen: Soft/NT/ND; positive bowel sounds x 4  Sternum: Intact, no click, incision healing well with no drainage  Incisions: Incisions clean/dry/intact  Extremities: No edema b/l lower extremities; good capillary refill; no cyanosis; palpable 1+ pedal pulses b/l    Central Venous Catheter: Yes: critical illness, intravenous access     Day # 1  Johnson Catheter: Yes: critical illness; monitor strict i/o's                    Day # 1  EPICARDIAL WIRES:  YES                                                              Day # 1  BOWEL MOVEMENT:  [] YES [X] NO, If No, Timing since last BM Day #  CHEST TUBE(MS/Left/Right):  [X] YES [] NO, If yes -  AIR LEAKS:  YES/NO        LABS:                        8.0<L>  9.76  )-----------( 159      ( 31 Aug 2023 01:05 )             25.8<L>                        8.8<L>  12.80<H> )-----------( 179      ( 30 Aug 2023 15:28 )             27.9<L>    08-31    144  |  115<H>  |  10  ----------------------------<  112<H>  4.7   |  21  |  0.6<L>  08-30    143  |  112<H>  |  8<L>  ----------------------------<  153<H>  4.0   |  22  |  0.5<L>    Ca    8.3<L>      31 Aug 2023 01:05  Mg     2.4     08-31    TPro  5.4<L> [6.0 - 8.0]  /  Alb  3.7 [3.5 - 5.2]  /  TBili  0.7 [0.2 - 1.2]  /  DBili  x   /  AST  22 [0 - 41]  /  ALT  11 [0 - 41]  /  AlkPhos  47 [30 - 115]  08-31    PT/INR - ( 30 Aug 2023 15:28 )   PT: ;   INR: 1.27 ratio         PT/INR - ( 29 Aug 2023 22:33 )   PT: ;   INR: 0.93 ratio       PTT - ( 30 Aug 2023 15:28 )  PTT:29.6 sec, PTT - ( 29 Aug 2023 22:33 )  PTT:31.2 sec    Urinalysis Basic - ( 31 Aug 2023 01:05 )  Color: x / Appearance: x / SG: x / pH: x  Gluc: 112 mg/dL / Ketone: x  / Bili: x / Urobili: x   Blood: x / Protein: x / Nitrite: x   Leuk Esterase: x / RBC: x / WBC x   Sq Epi: x / Non Sq Epi: x / Bacteria: x    ABG - ( 31 Aug 2023 03:25 )  pH: 7.35  /  pCO2: 37    /  pO2: 133   / HCO3: 20    / Base Excess: -4.8  /  SaO2: 98.7  /  LA: 0.60     RADIOLOGY & ADDITIONAL TESTS:  CXR:   < from: Xray Chest 1 View- PORTABLE-Routine (08.31.23 @ 05:55) >    PROCEDURE DATE:  08/31/2023      INTERPRETATION:  Clinical History / Reason for exam: Dyspnea    Comparison : Chest radiograph 8/30/2023.    Technique/Positioning: Frontal.    Findings:    Support devices: [place]-Lili catheter left pulmonary artery chest tube in place    Cardiac/mediastinum/hilum: Indeterminate    Lung parenchyma/Pleura: Small left apical pneumothorax. Small left pleural effusion. No parenchymal opacity. Mild pulmonary vascular congestion.    Skeleton/soft tissues: Unremarkable.    Impression:    Small left apical pneumothorax. Small left pleural effusion. No parenchymal opacity. Mild pulmonary vascular congestion    --- End of Report ---    EKG:  < from: 12 Lead ECG (08.30.23 @ 13:58) >  Ventricular Rate 87 BPM    Atrial Rate 87 BPM    P-R Interval 140 ms    QRS Duration 142 ms    Q-T Interval 426 ms    QTC Calculation(Bazett) 512 ms    P Axis 84 degrees    R Axis -47 degrees    T Axis 179 degrees    Diagnosis Line Normal sinus rhythm  Left axis deviation  Left bundle branch block  Abnormal ECG    Allergies  No Known Allergies    MEDICATIONS  (STANDING):  acetaminophen   IVPB .. 1000 milliGRAM(s) IV Intermittent once  acetaminophen   IVPB .. 1000 milliGRAM(s) IV Intermittent once  acetaminophen   IVPB .. 1000 milliGRAM(s) IV Intermittent every 6 hours  albuterol    90 MICROgram(s) HFA Inhaler 2 Puff(s) Inhalation every 6 hours  aspirin Suppository 300 milliGRAM(s) Rectal daily  chlorhexidine 0.12% Liquid 5 milliLiter(s) Oral Mucosa two times a day  chlorhexidine 2% Cloths 1 Application(s) Topical daily  dexMEDEtomidine Infusion 0.2 MICROgram(s)/kG/Hr (3.09 mL/Hr) IV Continuous <Continuous>  dextrose 5%. 1000 milliLiter(s) (50 mL/Hr) IV Continuous <Continuous>  dextrose 50% Injectable 50 milliLiter(s) IV Push every 15 minutes  famotidine Injectable 20 milliGRAM(s) IV Push every 12 hours  glucagon  Injectable 1 milliGRAM(s) IntraMuscular once  insulin regular Infusion 1 Unit(s)/Hr (1 mL/Hr) IV Continuous <Continuous>  ipratropium 17 MICROgram(s) HFA Inhaler 2 Puff(s) Inhalation every 6 hours  meperidine     Injectable 25 milliGRAM(s) IV Push once  milrinone Infusion 0.375 MICROgram(s)/kG/Min (6.94 mL/Hr) IV Continuous <Continuous>  niCARdipine Infusion 5 mG/Hr (25 mL/Hr) IV Continuous <Continuous>  nitroglycerin  Infusion 5 MICROgram(s)/Min (1.5 mL/Hr) IV Continuous <Continuous>  norepinephrine Infusion 0.05 MICROgram(s)/kG/Min (5.78 mL/Hr) IV Continuous <Continuous>  polyethylene glycol 3350 17 Gram(s) Oral daily  propofol Infusion 1 MICROgram(s)/kG/Min (0.37 mL/Hr) IV Continuous <Continuous>  senna 2 Tablet(s) Oral at bedtime  sodium chloride 0.9%. 1000 milliLiter(s) (10 mL/Hr) IV Continuous <Continuous>  vasopressin Infusion 0.04 Unit(s)/Min (6 mL/Hr) IV Continuous <Continuous>    MEDICATIONS  (PRN):  dextrose Oral Gel 15 Gram(s) Oral once PRN Blood Glucose LESS THAN 70 milliGRAM(s)/deciliter  ketorolac   Injectable 15 milliGRAM(s) IV Push every 4 hours PRN Severe Pain (7 - 10)  oxyCODONE    IR 5 milliGRAM(s) Oral every 4 hours PRN Moderate Pain (4 - 6)  oxyCODONE    IR 10 milliGRAM(s) Oral every 4 hours PRN Severe Pain (7 - 10)    Home Medications:  aspirin 81 mg oral tablet, chewable: 1 chewed once a day (13 Aug 2023 19:53)  atorvastatin 10 mg oral tablet: 1 orally once a day (at bedtime) (13 Aug 2023 19:52)  Jardiance 10 mg oral tablet: 1 orally once a day (13 Aug 2023 19:53)  MetFORMIN (Eqv-Glumetza) 500 mg oral tablet, extended release: 1 orally 2 times a day (13 Aug 2023 19:51)  pioglitazone 30 mg oral tablet: 1 orally once a day (13 Aug 2023 19:52)    Pharmacologic DVT Prophylaxis [X] YES, [] NO: Contraindication:  SCD's: YES b/l    GI Prophylaxis: famotidine 20 mg    Post-Op Beta-Blockers: [] Yes, [X] No: contraindication: pt on milrinone   Post-Op Aspirin:  [X] Yes, [] No: contraindication:  Post-Op Statin:  [X] Yes, [] No: contraindication:    Ambulation/Activity Status: Ambulate pt as tolerated     Assessment/Plan:  68y Female status-post CABG x3            POD # 1         - Case and plan discussed with CTU Intensivist and CT Surgeon - Dr. Fernandez  - Continue CTU supportive care and ongoing plan of care as per continuing CTU rounds.   - Continue DVT/GI prophylaxis  - Incentive Spirometry 10 times an hour  - Continue to advance physical activity as tolerated and continue PT/OT as directed  1. CAD s/p CABG: Continue  mg , statin, BB being held while patient is on milrinone   2. Hypoxia: SPO2 100% on 3L nasal cannula; continue to encourage incentive spirometry   5. DM/Glucose Control: A1C 8.2; FS well controlled ranging 101-151 on regular insulin infusion  6. Neuro: Consult on patient's necessity for Keppra   -[place] U/S  Social Service Disposition:  TBD   OPERATIVE PROCEDURE(s): CABG x3                POD # 1                      SURGEON(s): ANIRUDH Coon    SUBJECTIVE ASSESSMENT: 68y Female patient seen and examined at bedside. There were no acute events overnight and patient had no current complaints.     Vital Signs Last 24 Hrs  T(F): 98.4 (31 Aug 2023 04:00), Max: 100.8 (30 Aug 2023 18:00)  HR: 101 (31 Aug 2023 07:00) (81 - 101)  BP: 97/52 (31 Aug 2023 05:00) (88/48 - 119/57)  BP(mean): 71 (31 Aug 2023 05:00) (65 - 81)  ABP: 141/40 (31 Aug 2023 07:00) (92/39 - 297/-38)  ABP(mean): 66 (31 Aug 2023 07:00)  RR: 27 (31 Aug 2023 07:00) (16 - 35)  SpO2: 100% (31 Aug 2023 07:00) (85% - 100%)  CVP(mm Hg): 14 (31 Aug 2023 07:00)  CO: 4.6 (31 Aug 2023 06:00)  CI: 2.9 (31 Aug 2023 06:00)  PA: 29/9 (31 Aug 2023 07:00)  SVR: 937 (31 Aug 2023 06:00)    I&O's Detail    30 Aug 2023 07:01  -  31 Aug 2023 07:00  --------------------------------------------------------  IN:    Albumin 5%  - 500 mL: 100 mL    Insulin: 31 mL    IV PiggyBack: 550 mL    Milrinone: 124.2 mL    sodium chloride 0.9%: 360 mL    Vasopressin: 18 mL  Total IN: 1183.2 mL    OUT:    Chest Tube (mL): 64 mL    Chest Tube (mL): 150 mL    Dexmedetomidine: 0 mL    Indwelling Catheter - Urethral (mL): 665 mL    Nasogastric/Oral tube (mL): 0 mL    NiCARdipine: 0 mL    Nitroglycerin: 0 mL    Norepinephrine: 0 mL    Propofol: 0 mL  Total OUT: 879 mL    Net: I&O's Detail    29 Aug 2023 07:01  -  30 Aug 2023 07:00  --------------------------------------------------------  Total NET: 526 mL    30 Aug 2023 07:01  -  31 Aug 2023 07:00  --------------------------------------------------------  Total NET: 304.2 mL    CAPILLARY BLOOD GLUCOSE  POCT Blood Glucose.: 139 mg/dL (31 Aug 2023 05:49)  POCT Blood Glucose.: 102 mg/dL (31 Aug 2023 02:15)  POCT Blood Glucose.: 107 mg/dL (30 Aug 2023 23:53)  POCT Blood Glucose.: 113 mg/dL (30 Aug 2023 22:57)  POCT Blood Glucose.: 106 mg/dL (30 Aug 2023 22:03)  POCT Blood Glucose.: 102 mg/dL (30 Aug 2023 20:55)  POCT Blood Glucose.: 101 mg/dL (30 Aug 2023 19:57)  POCT Blood Glucose.: 110 mg/dL (30 Aug 2023 19:02)  POCT Blood Glucose.: 121 mg/dL (30 Aug 2023 17:55)  POCT Blood Glucose.: 121 mg/dL (30 Aug 2023 17:09)  POCT Blood Glucose.: 140 mg/dL (30 Aug 2023 16:12)  POCT Blood Glucose.: 151 mg/dL (30 Aug 2023 15:06)    A1C with Estimated Average Glucose Result: 8.2 % (08-14-23 @ 04:40)    Physical Exam:  General: NAD; A&Ox3  Cardiac: S1/S2, RRR, no murmur, no rubs  Lungs: unlabored respirations, CTA b/l, no wheeze, no rales, no crackles  Abdomen: Soft/NT/ND; positive bowel sounds x 4  Sternum: Intact, no click, incision healing well with no drainage  Incisions: Incisions clean/dry/intact  Extremities: No edema b/l lower extremities; good capillary refill; no cyanosis; palpable 1+ pedal pulses b/l    Central Venous Catheter: Yes: critical illness, intravenous access     Day # 1  Johnson Catheter: Yes: critical illness; monitor strict i/o's                    Day # 1  EPICARDIAL WIRES:  YES                                                              Day # 1  BOWEL MOVEMENT:  [] YES [X] NO, If No, Timing since last BM Day #      LABS:                        8.0<L>  9.76  )-----------( 159      ( 31 Aug 2023 01:05 )             25.8<L>                        8.8<L>  12.80<H> )-----------( 179      ( 30 Aug 2023 15:28 )             27.9<L>    08-31    144  |  115<H>  |  10  ----------------------------<  112<H>  4.7   |  21  |  0.6<L>  08-30    143  |  112<H>  |  8<L>  ----------------------------<  153<H>  4.0   |  22  |  0.5<L>    Ca    8.3<L>      31 Aug 2023 01:05  Mg     2.4     08-31    TPro  5.4<L> [6.0 - 8.0]  /  Alb  3.7 [3.5 - 5.2]  /  TBili  0.7 [0.2 - 1.2]  /  DBili  x   /  AST  22 [0 - 41]  /  ALT  11 [0 - 41]  /  AlkPhos  47 [30 - 115]  08-31    PT/INR - ( 30 Aug 2023 15:28 )   PT: ;   INR: 1.27 ratio         PT/INR - ( 29 Aug 2023 22:33 )   PT: ;   INR: 0.93 ratio       PTT - ( 30 Aug 2023 15:28 )  PTT:29.6 sec, PTT - ( 29 Aug 2023 22:33 )  PTT:31.2 sec    Urinalysis Basic - ( 31 Aug 2023 01:05 )  Color: x / Appearance: x / SG: x / pH: x  Gluc: 112 mg/dL / Ketone: x  / Bili: x / Urobili: x   Blood: x / Protein: x / Nitrite: x   Leuk Esterase: x / RBC: x / WBC x   Sq Epi: x / Non Sq Epi: x / Bacteria: x    ABG - ( 31 Aug 2023 03:25 )  pH: 7.35  /  pCO2: 37    /  pO2: 133   / HCO3: 20    / Base Excess: -4.8  /  SaO2: 98.7  /  LA: 0.60     RADIOLOGY & ADDITIONAL TESTS:  CXR:   < from: Xray Chest 1 View- PORTABLE-Routine (08.31.23 @ 05:55) >    PROCEDURE DATE:  08/31/2023      INTERPRETATION:  Clinical History / Reason for exam: Dyspnea    Comparison : Chest radiograph 8/30/2023.    Technique/Positioning: Frontal.    Findings:    Support devices: [place]-Lili catheter left pulmonary artery chest tube in place    Cardiac/mediastinum/hilum: Indeterminate    Lung parenchyma/Pleura: Small left apical pneumothorax. Small left pleural effusion. No parenchymal opacity. Mild pulmonary vascular congestion.    Skeleton/soft tissues: Unremarkable.    Impression:    Small left apical pneumothorax. Small left pleural effusion. No parenchymal opacity. Mild pulmonary vascular congestion    --- End of Report ---    EKG:  < from: 12 Lead ECG (08.30.23 @ 13:58) >  Ventricular Rate 87 BPM    Atrial Rate 87 BPM    P-R Interval 140 ms    QRS Duration 142 ms    Q-T Interval 426 ms    QTC Calculation(Bazett) 512 ms    P Axis 84 degrees    R Axis -47 degrees    T Axis 179 degrees    Diagnosis Line Normal sinus rhythm  Left axis deviation  Left bundle branch block  Abnormal ECG    Allergies  No Known Allergies    MEDICATIONS  (STANDING):  acetaminophen   IVPB .. 1000 milliGRAM(s) IV Intermittent once  acetaminophen   IVPB .. 1000 milliGRAM(s) IV Intermittent once  acetaminophen   IVPB .. 1000 milliGRAM(s) IV Intermittent every 6 hours  albuterol    90 MICROgram(s) HFA Inhaler 2 Puff(s) Inhalation every 6 hours  aspirin Suppository 300 milliGRAM(s) Rectal daily  chlorhexidine 0.12% Liquid 5 milliLiter(s) Oral Mucosa two times a day  chlorhexidine 2% Cloths 1 Application(s) Topical daily  dexMEDEtomidine Infusion 0.2 MICROgram(s)/kG/Hr (3.09 mL/Hr) IV Continuous <Continuous>  dextrose 5%. 1000 milliLiter(s) (50 mL/Hr) IV Continuous <Continuous>  dextrose 50% Injectable 50 milliLiter(s) IV Push every 15 minutes  famotidine Injectable 20 milliGRAM(s) IV Push every 12 hours  glucagon  Injectable 1 milliGRAM(s) IntraMuscular once  insulin regular Infusion 1 Unit(s)/Hr (1 mL/Hr) IV Continuous <Continuous>  ipratropium 17 MICROgram(s) HFA Inhaler 2 Puff(s) Inhalation every 6 hours  meperidine     Injectable 25 milliGRAM(s) IV Push once  milrinone Infusion 0.375 MICROgram(s)/kG/Min (6.94 mL/Hr) IV Continuous <Continuous>  niCARdipine Infusion 5 mG/Hr (25 mL/Hr) IV Continuous <Continuous>  nitroglycerin  Infusion 5 MICROgram(s)/Min (1.5 mL/Hr) IV Continuous <Continuous>  norepinephrine Infusion 0.05 MICROgram(s)/kG/Min (5.78 mL/Hr) IV Continuous <Continuous>  polyethylene glycol 3350 17 Gram(s) Oral daily  propofol Infusion 1 MICROgram(s)/kG/Min (0.37 mL/Hr) IV Continuous <Continuous>  senna 2 Tablet(s) Oral at bedtime  sodium chloride 0.9%. 1000 milliLiter(s) (10 mL/Hr) IV Continuous <Continuous>  vasopressin Infusion 0.04 Unit(s)/Min (6 mL/Hr) IV Continuous <Continuous>    MEDICATIONS  (PRN):  dextrose Oral Gel 15 Gram(s) Oral once PRN Blood Glucose LESS THAN 70 milliGRAM(s)/deciliter  ketorolac   Injectable 15 milliGRAM(s) IV Push every 4 hours PRN Severe Pain (7 - 10)  oxyCODONE    IR 5 milliGRAM(s) Oral every 4 hours PRN Moderate Pain (4 - 6)  oxyCODONE    IR 10 milliGRAM(s) Oral every 4 hours PRN Severe Pain (7 - 10)    Home Medications:  aspirin 81 mg oral tablet, chewable: 1 chewed once a day (13 Aug 2023 19:53)  atorvastatin 10 mg oral tablet: 1 orally once a day (at bedtime) (13 Aug 2023 19:52)  Jardiance 10 mg oral tablet: 1 orally once a day (13 Aug 2023 19:53)  MetFORMIN (Eqv-Glumetza) 500 mg oral tablet, extended release: 1 orally 2 times a day (13 Aug 2023 19:51)  pioglitazone 30 mg oral tablet: 1 orally once a day (13 Aug 2023 19:52)    Pharmacologic DVT Prophylaxis [X] YES, [] NO: Contraindication:  SCD's: YES b/l    GI Prophylaxis: famotidine 20 mg    Post-Op Beta-Blockers: [] Yes, [X] No: contraindication: pt on milrinone   Post-Op Aspirin:  [X] Yes, [] No: contraindication:  Post-Op Statin:  [X] Yes, [] No: contraindication:    Ambulation/Activity Status: Ambulate pt as tolerated     Assessment/Plan:  68y Female status-post CABG x3            POD # 1         - Case and plan discussed with CTU Intensivist and CT Surgeon - Dr. Fernandez  - Continue CTU supportive care and ongoing plan of care as per continuing CTU rounds.   - Continue DVT/GI prophylaxis  - Incentive Spirometry 10 times an hour  - Continue to advance physical activity as tolerated and continue PT/OT as directed  - Chest tube d/c'ed, patient tolerated process well   1. CAD s/p CABG: Continue  mg , statin, BB being held while patient is on milrinone   2. Hypoxia: SPO2 100% on 3L nasal cannula; continue to encourage incentive spirometry   3. DM/Glucose Control: A1C 8.2; FS well controlled ranging 101-151 on regular insulin infusion  4. Neuro: Patient was previously taking Keppra, consult on whether it is necessary to resume   5. Vascular: duplex U/S of lower extremities due to swelling and hx of DVT    Social Service Disposition:  TBD   OPERATIVE PROCEDURE(s): CABG x3                POD # 1                      SURGEON(s): ANIRUDH Coon    SUBJECTIVE ASSESSMENT: 68y Female patient seen and examined at bedside. There were no acute events overnight and patient had no current complaints.     Vital Signs Last 24 Hrs  T(F): 98.4 (31 Aug 2023 04:00), Max: 100.8 (30 Aug 2023 18:00)  HR: 101 (31 Aug 2023 07:00) (81 - 101)  BP: 97/52 (31 Aug 2023 05:00) (88/48 - 119/57)  BP(mean): 71 (31 Aug 2023 05:00) (65 - 81)  ABP: 141/40 (31 Aug 2023 07:00) (92/39 - 297/-38)  ABP(mean): 66 (31 Aug 2023 07:00)  RR: 27 (31 Aug 2023 07:00) (16 - 35)  SpO2: 100% (31 Aug 2023 07:00) (85% - 100%)  CVP(mm Hg): 14 (31 Aug 2023 07:00)  CO: 4.6 (31 Aug 2023 06:00)  CI: 2.9 (31 Aug 2023 06:00)  PA: 29/9 (31 Aug 2023 07:00)  SVR: 937 (31 Aug 2023 06:00)    I&O's Detail    30 Aug 2023 07:01  -  31 Aug 2023 07:00  --------------------------------------------------------  IN:    Albumin 5%  - 500 mL: 100 mL    Insulin: 31 mL    IV PiggyBack: 550 mL    Milrinone: 124.2 mL    sodium chloride 0.9%: 360 mL    Vasopressin: 18 mL  Total IN: 1183.2 mL    OUT:    Chest Tube (mL): 64 mL    Chest Tube (mL): 150 mL    Dexmedetomidine: 0 mL    Indwelling Catheter - Urethral (mL): 665 mL    Nasogastric/Oral tube (mL): 0 mL    NiCARdipine: 0 mL    Nitroglycerin: 0 mL    Norepinephrine: 0 mL    Propofol: 0 mL  Total OUT: 879 mL    Net: I&O's Detail    29 Aug 2023 07:01  -  30 Aug 2023 07:00  --------------------------------------------------------  Total NET: 526 mL    30 Aug 2023 07:01  -  31 Aug 2023 07:00  --------------------------------------------------------  Total NET: 304.2 mL    CAPILLARY BLOOD GLUCOSE  POCT Blood Glucose.: 139 mg/dL (31 Aug 2023 05:49)  POCT Blood Glucose.: 102 mg/dL (31 Aug 2023 02:15)  POCT Blood Glucose.: 107 mg/dL (30 Aug 2023 23:53)  POCT Blood Glucose.: 113 mg/dL (30 Aug 2023 22:57)  POCT Blood Glucose.: 106 mg/dL (30 Aug 2023 22:03)  POCT Blood Glucose.: 102 mg/dL (30 Aug 2023 20:55)  POCT Blood Glucose.: 101 mg/dL (30 Aug 2023 19:57)  POCT Blood Glucose.: 110 mg/dL (30 Aug 2023 19:02)  POCT Blood Glucose.: 121 mg/dL (30 Aug 2023 17:55)  POCT Blood Glucose.: 121 mg/dL (30 Aug 2023 17:09)  POCT Blood Glucose.: 140 mg/dL (30 Aug 2023 16:12)  POCT Blood Glucose.: 151 mg/dL (30 Aug 2023 15:06)    A1C with Estimated Average Glucose Result: 8.2 % (08-14-23 @ 04:40)    Physical Exam:  General: NAD; A&Ox3  Cardiac: S1/S2, RRR, no murmur, no rubs  Lungs: unlabored respirations, CTA b/l, no wheeze, no rales, no crackles  Abdomen: Soft/NT/ND; positive bowel sounds x 4  Sternum: Intact, no click, incision healing well with no drainage  Incisions: Incisions clean/dry/intact  Extremities: mild edema b/l lower extremities; no cyanosis    Central Venous Catheter: Yes: critical illness, intravenous access     Day # 1  Johnson Catheter: Yes: critical illness; monitor strict i/o's                    Day # 1  EPICARDIAL WIRES:  YES                                                              Day # 1  BOWEL MOVEMENT:  [] YES [X] NO, If No, Timing since last BM Day #      LABS:                        8.0<L>  9.76  )-----------( 159      ( 31 Aug 2023 01:05 )             25.8<L>                        8.8<L>  12.80<H> )-----------( 179      ( 30 Aug 2023 15:28 )             27.9<L>    08-31    144  |  115<H>  |  10  ----------------------------<  112<H>  4.7   |  21  |  0.6<L>  08-30    143  |  112<H>  |  8<L>  ----------------------------<  153<H>  4.0   |  22  |  0.5<L>    Ca    8.3<L>      31 Aug 2023 01:05  Mg     2.4     08-31    TPro  5.4<L> [6.0 - 8.0]  /  Alb  3.7 [3.5 - 5.2]  /  TBili  0.7 [0.2 - 1.2]  /  DBili  x   /  AST  22 [0 - 41]  /  ALT  11 [0 - 41]  /  AlkPhos  47 [30 - 115]  08-31    PT/INR - ( 30 Aug 2023 15:28 )   PT: ;   INR: 1.27 ratio         PT/INR - ( 29 Aug 2023 22:33 )   PT: ;   INR: 0.93 ratio       PTT - ( 30 Aug 2023 15:28 )  PTT:29.6 sec, PTT - ( 29 Aug 2023 22:33 )  PTT:31.2 sec    Urinalysis Basic - ( 31 Aug 2023 01:05 )  Color: x / Appearance: x / SG: x / pH: x  Gluc: 112 mg/dL / Ketone: x  / Bili: x / Urobili: x   Blood: x / Protein: x / Nitrite: x   Leuk Esterase: x / RBC: x / WBC x   Sq Epi: x / Non Sq Epi: x / Bacteria: x    ABG - ( 31 Aug 2023 03:25 )  pH: 7.35  /  pCO2: 37    /  pO2: 133   / HCO3: 20    / Base Excess: -4.8  /  SaO2: 98.7  /  LA: 0.60     RADIOLOGY & ADDITIONAL TESTS:  CXR:   < from: Xray Chest 1 View- PORTABLE-Routine (08.31.23 @ 05:55) >    PROCEDURE DATE:  08/31/2023      INTERPRETATION:  Clinical History / Reason for exam: Dyspnea    Comparison : Chest radiograph 8/30/2023.    Technique/Positioning: Frontal.    Findings:    Support devices: Gile-Lili catheter left pulmonary artery chest tube in place    Cardiac/mediastinum/hilum: Indeterminate    Lung parenchyma/Pleura: Small left apical pneumothorax. Small left pleural effusion. No parenchymal opacity. Mild pulmonary vascular congestion.    Skeleton/soft tissues: Unremarkable.    Impression:    Small left apical pneumothorax. Small left pleural effusion. No parenchymal opacity. Mild pulmonary vascular congestion    --- End of Report ---    EKG:  < from: 12 Lead ECG (08.30.23 @ 13:58) >  Ventricular Rate 87 BPM    Atrial Rate 87 BPM    P-R Interval 140 ms    QRS Duration 142 ms    Q-T Interval 426 ms    QTC Calculation(Bazett) 512 ms    P Axis 84 degrees    R Axis -47 degrees    T Axis 179 degrees    Diagnosis Line Normal sinus rhythm  Left axis deviation  Left bundle branch block  Abnormal ECG    Allergies  No Known Allergies    MEDICATIONS  (STANDING):  acetaminophen   IVPB .. 1000 milliGRAM(s) IV Intermittent once  acetaminophen   IVPB .. 1000 milliGRAM(s) IV Intermittent once  acetaminophen   IVPB .. 1000 milliGRAM(s) IV Intermittent every 6 hours  albuterol    90 MICROgram(s) HFA Inhaler 2 Puff(s) Inhalation every 6 hours  aspirin Suppository 300 milliGRAM(s) Rectal daily  chlorhexidine 0.12% Liquid 5 milliLiter(s) Oral Mucosa two times a day  chlorhexidine 2% Cloths 1 Application(s) Topical daily  dexMEDEtomidine Infusion 0.2 MICROgram(s)/kG/Hr (3.09 mL/Hr) IV Continuous <Continuous>  dextrose 5%. 1000 milliLiter(s) (50 mL/Hr) IV Continuous <Continuous>  dextrose 50% Injectable 50 milliLiter(s) IV Push every 15 minutes  famotidine Injectable 20 milliGRAM(s) IV Push every 12 hours  glucagon  Injectable 1 milliGRAM(s) IntraMuscular once  insulin regular Infusion 1 Unit(s)/Hr (1 mL/Hr) IV Continuous <Continuous>  ipratropium 17 MICROgram(s) HFA Inhaler 2 Puff(s) Inhalation every 6 hours  meperidine     Injectable 25 milliGRAM(s) IV Push once  milrinone Infusion 0.375 MICROgram(s)/kG/Min (6.94 mL/Hr) IV Continuous <Continuous>  niCARdipine Infusion 5 mG/Hr (25 mL/Hr) IV Continuous <Continuous>  nitroglycerin  Infusion 5 MICROgram(s)/Min (1.5 mL/Hr) IV Continuous <Continuous>  norepinephrine Infusion 0.05 MICROgram(s)/kG/Min (5.78 mL/Hr) IV Continuous <Continuous>  polyethylene glycol 3350 17 Gram(s) Oral daily  propofol Infusion 1 MICROgram(s)/kG/Min (0.37 mL/Hr) IV Continuous <Continuous>  senna 2 Tablet(s) Oral at bedtime  sodium chloride 0.9%. 1000 milliLiter(s) (10 mL/Hr) IV Continuous <Continuous>  vasopressin Infusion 0.04 Unit(s)/Min (6 mL/Hr) IV Continuous <Continuous>    MEDICATIONS  (PRN):  dextrose Oral Gel 15 Gram(s) Oral once PRN Blood Glucose LESS THAN 70 milliGRAM(s)/deciliter  ketorolac   Injectable 15 milliGRAM(s) IV Push every 4 hours PRN Severe Pain (7 - 10)  oxyCODONE    IR 5 milliGRAM(s) Oral every 4 hours PRN Moderate Pain (4 - 6)  oxyCODONE    IR 10 milliGRAM(s) Oral every 4 hours PRN Severe Pain (7 - 10)    Home Medications:  aspirin 81 mg oral tablet, chewable: 1 chewed once a day (13 Aug 2023 19:53)  atorvastatin 10 mg oral tablet: 1 orally once a day (at bedtime) (13 Aug 2023 19:52)  Jardiance 10 mg oral tablet: 1 orally once a day (13 Aug 2023 19:53)  MetFORMIN (Eqv-Glumetza) 500 mg oral tablet, extended release: 1 orally 2 times a day (13 Aug 2023 19:51)  pioglitazone 30 mg oral tablet: 1 orally once a day (13 Aug 2023 19:52)    Pharmacologic DVT Prophylaxis [X] YES, [] NO: Contraindication:  SCD's: YES b/l    GI Prophylaxis: famotidine 20 mg    Post-Op Beta-Blockers: [] Yes, [X] No: contraindication: pt on milrinone   Post-Op Aspirin:  [X] Yes, [] No: contraindication:  Post-Op Statin:  [X] Yes, [] No: contraindication:    Ambulation/Activity Status: Ambulate pt as tolerated     Assessment/Plan:  68y Female status-post CABG x3            POD # 1         - Case and plan discussed with CTU Intensivist and CT Surgeon - Dr. Fernandez  - Continue CTU supportive care and ongoing plan of care as per continuing CTU rounds.   - Continue DVT/GI prophylaxis  - Incentive Spirometry 10 times an hour  - Continue to advance physical activity as tolerated and continue PT/OT as directed  - Chest tube d/c'ed, patient tolerated process well   1. CAD s/p CABG: Continue  mg , statin, BB being held while patient is on milrinone   2. Hypoxia: SPO2 100% on 3L nasal cannula; continue to encourage incentive spirometry   3. DM/Glucose Control: A1C 8.2; FS well controlled ranging 101-151 on regular insulin infusion  4. Neuro: Patient was previously taking Keppra, consult on whether it is necessary to resume   5. Vascular: duplex U/S of lower extremities due to swelling and hx of DVT    Social Service Disposition:  TBD   OPERATIVE PROCEDURE(s): CABG x3                POD # 1                      SURGEON(s): ANIRUDH Coon    SUBJECTIVE ASSESSMENT: 68y Female patient seen and examined at bedside. There were no acute events overnight and patient had no current complaints.     Vital Signs Last 24 Hrs  T(F): 98.4 (31 Aug 2023 04:00), Max: 100.8 (30 Aug 2023 18:00)  HR: 101 (31 Aug 2023 07:00) (81 - 101)  BP: 97/52 (31 Aug 2023 05:00) (88/48 - 119/57)  BP(mean): 71 (31 Aug 2023 05:00) (65 - 81)  ABP: 141/40 (31 Aug 2023 07:00) (92/39 - 297/-38)  ABP(mean): 66 (31 Aug 2023 07:00)  RR: 27 (31 Aug 2023 07:00) (16 - 35)  SpO2: 100% (31 Aug 2023 07:00) (85% - 100%)  CVP(mm Hg): 14 (31 Aug 2023 07:00)  CO: 4.6 (31 Aug 2023 06:00)  CI: 2.9 (31 Aug 2023 06:00)  PA: 29/9 (31 Aug 2023 07:00)  SVR: 937 (31 Aug 2023 06:00)    I&O's Detail    30 Aug 2023 07:01  -  31 Aug 2023 07:00  --------------------------------------------------------  IN:    Albumin 5%  - 500 mL: 100 mL    Insulin: 31 mL    IV PiggyBack: 550 mL    Milrinone: 124.2 mL    sodium chloride 0.9%: 360 mL    Vasopressin: 18 mL  Total IN: 1183.2 mL    OUT:    Chest Tube (mL): 64 mL    Chest Tube (mL): 150 mL    Dexmedetomidine: 0 mL    Indwelling Catheter - Urethral (mL): 665 mL    Nasogastric/Oral tube (mL): 0 mL    NiCARdipine: 0 mL    Nitroglycerin: 0 mL    Norepinephrine: 0 mL    Propofol: 0 mL  Total OUT: 879 mL    Net: I&O's Detail    29 Aug 2023 07:01  -  30 Aug 2023 07:00  --------------------------------------------------------  Total NET: 526 mL    30 Aug 2023 07:01  -  31 Aug 2023 07:00  --------------------------------------------------------  Total NET: 304.2 mL    CAPILLARY BLOOD GLUCOSE  POCT Blood Glucose.: 139 mg/dL (31 Aug 2023 05:49)  POCT Blood Glucose.: 102 mg/dL (31 Aug 2023 02:15)  POCT Blood Glucose.: 107 mg/dL (30 Aug 2023 23:53)  POCT Blood Glucose.: 113 mg/dL (30 Aug 2023 22:57)  POCT Blood Glucose.: 106 mg/dL (30 Aug 2023 22:03)  POCT Blood Glucose.: 102 mg/dL (30 Aug 2023 20:55)  POCT Blood Glucose.: 101 mg/dL (30 Aug 2023 19:57)  POCT Blood Glucose.: 110 mg/dL (30 Aug 2023 19:02)  POCT Blood Glucose.: 121 mg/dL (30 Aug 2023 17:55)  POCT Blood Glucose.: 121 mg/dL (30 Aug 2023 17:09)  POCT Blood Glucose.: 140 mg/dL (30 Aug 2023 16:12)  POCT Blood Glucose.: 151 mg/dL (30 Aug 2023 15:06)    A1C with Estimated Average Glucose Result: 8.2 % (08-14-23 @ 04:40)    Physical Exam:  General: NAD; A&Ox3  Cardiac: S1/S2, RRR, no murmur, no rubs  Lungs: unlabored respirations, CTA b/l, no wheeze, no rales, no crackles  Abdomen: Soft/NT/ND; positive bowel sounds x 4  Sternum: Intact, no click, incision healing well with no drainage  Incisions: Incisions clean/dry/intact  Extremities: mild edema b/l lower extremities; no cyanosis    Central Venous Catheter: Yes: critical illness, intravenous access     Day # 1  Johnson Catheter: Yes: critical illness; monitor strict i/o's                    Day # 1  EPICARDIAL WIRES:  YES                                                              Day # 1  BOWEL MOVEMENT:  [] YES [X] NO, If No, Timing since last BM Day #      LABS:                        8.0<L>  9.76  )-----------( 159      ( 31 Aug 2023 01:05 )             25.8<L>                        8.8<L>  12.80<H> )-----------( 179      ( 30 Aug 2023 15:28 )             27.9<L>    08-31    144  |  115<H>  |  10  ----------------------------<  112<H>  4.7   |  21  |  0.6<L>  08-30    143  |  112<H>  |  8<L>  ----------------------------<  153<H>  4.0   |  22  |  0.5<L>    Ca    8.3<L>      31 Aug 2023 01:05  Mg     2.4     08-31    TPro  5.4<L> [6.0 - 8.0]  /  Alb  3.7 [3.5 - 5.2]  /  TBili  0.7 [0.2 - 1.2]  /  DBili  x   /  AST  22 [0 - 41]  /  ALT  11 [0 - 41]  /  AlkPhos  47 [30 - 115]  08-31    PT/INR - ( 30 Aug 2023 15:28 )   PT: ;   INR: 1.27 ratio         PT/INR - ( 29 Aug 2023 22:33 )   PT: ;   INR: 0.93 ratio       PTT - ( 30 Aug 2023 15:28 )  PTT:29.6 sec, PTT - ( 29 Aug 2023 22:33 )  PTT:31.2 sec    Urinalysis Basic - ( 31 Aug 2023 01:05 )  Color: x / Appearance: x / SG: x / pH: x  Gluc: 112 mg/dL / Ketone: x  / Bili: x / Urobili: x   Blood: x / Protein: x / Nitrite: x   Leuk Esterase: x / RBC: x / WBC x   Sq Epi: x / Non Sq Epi: x / Bacteria: x    ABG - ( 31 Aug 2023 03:25 )  pH: 7.35  /  pCO2: 37    /  pO2: 133   / HCO3: 20    / Base Excess: -4.8  /  SaO2: 98.7  /  LA: 0.60     RADIOLOGY & ADDITIONAL TESTS:  CXR:   < from: Xray Chest 1 View-PORTABLE IMMEDIATE (Xray Chest 1 View-PORTABLE IMMEDIATE .) (08.31.23 @ 16:12) >    INTERPRETATION:  Clinical History / Reason for exam: Shortness of breath    Comparison : Chest radiograph August 31, 2023.    Technique/Positioning: Single AP view of the chest.    Findings:    Support devices: Right IJ introducer sheath    Cardiac/mediastinum/hilum: Poststernotomy, unchanged    Lung parenchyma/Pleura: Bilateral effusions and pulmonary vascular   congestion, overall unchanged. No definite pneumothorax    Skeleton/soft tissues: Unchanged    Impression:    Bilateral effusions and pulmonary vascular congestion, overall unchanged.    No definite pneumothorax.    --- End of Report ---          < end of copied text >      EKG:  < from: 12 Lead ECG (08.30.23 @ 13:58) >  Ventricular Rate 87 BPM    Atrial Rate 87 BPM    P-R Interval 140 ms    QRS Duration 142 ms    Q-T Interval 426 ms    QTC Calculation(Bazett) 512 ms    P Axis 84 degrees    R Axis -47 degrees    T Axis 179 degrees    Diagnosis Line Normal sinus rhythm  Left axis deviation  Left bundle branch block  Abnormal ECG    Allergies  No Known Allergies    MEDICATIONS  (STANDING):  acetaminophen   IVPB .. 1000 milliGRAM(s) IV Intermittent once  acetaminophen   IVPB .. 1000 milliGRAM(s) IV Intermittent once  acetaminophen   IVPB .. 1000 milliGRAM(s) IV Intermittent every 6 hours  albuterol    90 MICROgram(s) HFA Inhaler 2 Puff(s) Inhalation every 6 hours  aspirin Suppository 300 milliGRAM(s) Rectal daily  chlorhexidine 0.12% Liquid 5 milliLiter(s) Oral Mucosa two times a day  chlorhexidine 2% Cloths 1 Application(s) Topical daily  dexMEDEtomidine Infusion 0.2 MICROgram(s)/kG/Hr (3.09 mL/Hr) IV Continuous <Continuous>  dextrose 5%. 1000 milliLiter(s) (50 mL/Hr) IV Continuous <Continuous>  dextrose 50% Injectable 50 milliLiter(s) IV Push every 15 minutes  famotidine Injectable 20 milliGRAM(s) IV Push every 12 hours  glucagon  Injectable 1 milliGRAM(s) IntraMuscular once  insulin regular Infusion 1 Unit(s)/Hr (1 mL/Hr) IV Continuous <Continuous>  ipratropium 17 MICROgram(s) HFA Inhaler 2 Puff(s) Inhalation every 6 hours  meperidine     Injectable 25 milliGRAM(s) IV Push once  milrinone Infusion 0.375 MICROgram(s)/kG/Min (6.94 mL/Hr) IV Continuous <Continuous>  niCARdipine Infusion 5 mG/Hr (25 mL/Hr) IV Continuous <Continuous>  nitroglycerin  Infusion 5 MICROgram(s)/Min (1.5 mL/Hr) IV Continuous <Continuous>  norepinephrine Infusion 0.05 MICROgram(s)/kG/Min (5.78 mL/Hr) IV Continuous <Continuous>  polyethylene glycol 3350 17 Gram(s) Oral daily  propofol Infusion 1 MICROgram(s)/kG/Min (0.37 mL/Hr) IV Continuous <Continuous>  senna 2 Tablet(s) Oral at bedtime  sodium chloride 0.9%. 1000 milliLiter(s) (10 mL/Hr) IV Continuous <Continuous>  vasopressin Infusion 0.04 Unit(s)/Min (6 mL/Hr) IV Continuous <Continuous>    MEDICATIONS  (PRN):  dextrose Oral Gel 15 Gram(s) Oral once PRN Blood Glucose LESS THAN 70 milliGRAM(s)/deciliter  ketorolac   Injectable 15 milliGRAM(s) IV Push every 4 hours PRN Severe Pain (7 - 10)  oxyCODONE    IR 5 milliGRAM(s) Oral every 4 hours PRN Moderate Pain (4 - 6)  oxyCODONE    IR 10 milliGRAM(s) Oral every 4 hours PRN Severe Pain (7 - 10)    Home Medications:  aspirin 81 mg oral tablet, chewable: 1 chewed once a day (13 Aug 2023 19:53)  atorvastatin 10 mg oral tablet: 1 orally once a day (at bedtime) (13 Aug 2023 19:52)  Jardiance 10 mg oral tablet: 1 orally once a day (13 Aug 2023 19:53)  MetFORMIN (Eqv-Glumetza) 500 mg oral tablet, extended release: 1 orally 2 times a day (13 Aug 2023 19:51)  pioglitazone 30 mg oral tablet: 1 orally once a day (13 Aug 2023 19:52)    Pharmacologic DVT Prophylaxis [X] YES, [] NO: Contraindication:  SCD's: YES b/l    GI Prophylaxis: famotidine 20 mg    Post-Op Beta-Blockers: [] Yes, [X] No: contraindication: pt on milrinone   Post-Op Aspirin:  [X] Yes, [] No: contraindication:  Post-Op Statin:  [X] Yes, [] No: contraindication:    Ambulation/Activity Status: Ambulate pt as tolerated     Assessment/Plan:  68y Female status-post CABG x3            POD # 1         - Case and plan discussed with CTU Intensivist and CT Surgeon - Dr. Fernandez  - Continue CTU supportive care and ongoing plan of care as per continuing CTU rounds.   - Continue DVT/GI prophylaxis  - Incentive Spirometry 10 times an hour  - Continue to advance physical activity as tolerated and continue PT/OT as directed  - Chest tube d/c'ed, patient tolerated process well   1. CAD s/p CABG: Continue  mg , statin, BB being held while patient is on milrinone   2. Hypoxia: SPO2 100% on 3L nasal cannula; continue to encourage incentive spirometry   3. DM/Glucose Control: A1C 8.2; FS well controlled ranging 101-151 on regular insulin infusion  4. Neuro: Patient was previously taking Keppra, consult on whether it is necessary to resume   5. Vascular: duplex U/S of lower extremities due to swelling and hx of DVT    Social Service Disposition:  TBD   OPERATIVE PROCEDURE(s): CABG x3                POD # 1                      SURGEON(s): ANIRUDH Coon    SUBJECTIVE ASSESSMENT: 68y Female patient seen and examined at bedside. There were no acute events overnight and patient had no current complaints.     Vital Signs Last 24 Hrs  T(F): 98.4 (31 Aug 2023 04:00), Max: 100.8 (30 Aug 2023 18:00)  HR: 101 (31 Aug 2023 07:00) (81 - 101)  BP: 97/52 (31 Aug 2023 05:00) (88/48 - 119/57)  BP(mean): 71 (31 Aug 2023 05:00) (65 - 81)  ABP: 141/40 (31 Aug 2023 07:00) (92/39 - 297/-38)  ABP(mean): 66 (31 Aug 2023 07:00)  RR: 27 (31 Aug 2023 07:00) (16 - 35)  SpO2: 100% (31 Aug 2023 07:00) (85% - 100%)  CVP(mm Hg): 14 (31 Aug 2023 07:00)  CO: 4.6 (31 Aug 2023 06:00)  CI: 2.9 (31 Aug 2023 06:00)  PA: 29/9 (31 Aug 2023 07:00)  SVR: 937 (31 Aug 2023 06:00)    I&O's Detail    30 Aug 2023 07:01  -  31 Aug 2023 07:00  --------------------------------------------------------  IN:    Albumin 5%  - 500 mL: 100 mL    Insulin: 31 mL    IV PiggyBack: 550 mL    Milrinone: 124.2 mL    sodium chloride 0.9%: 360 mL    Vasopressin: 18 mL  Total IN: 1183.2 mL    OUT:    Chest Tube (mL): 64 mL    Chest Tube (mL): 150 mL    Dexmedetomidine: 0 mL    Indwelling Catheter - Urethral (mL): 665 mL    Nasogastric/Oral tube (mL): 0 mL    NiCARdipine: 0 mL    Nitroglycerin: 0 mL    Norepinephrine: 0 mL    Propofol: 0 mL  Total OUT: 879 mL    Net: I&O's Detail    29 Aug 2023 07:01  -  30 Aug 2023 07:00  --------------------------------------------------------  Total NET: 526 mL    30 Aug 2023 07:01  -  31 Aug 2023 07:00  --------------------------------------------------------  Total NET: 304.2 mL    CAPILLARY BLOOD GLUCOSE  POCT Blood Glucose.: 139 mg/dL (31 Aug 2023 05:49)  POCT Blood Glucose.: 102 mg/dL (31 Aug 2023 02:15)  POCT Blood Glucose.: 107 mg/dL (30 Aug 2023 23:53)  POCT Blood Glucose.: 113 mg/dL (30 Aug 2023 22:57)  POCT Blood Glucose.: 106 mg/dL (30 Aug 2023 22:03)  POCT Blood Glucose.: 102 mg/dL (30 Aug 2023 20:55)  POCT Blood Glucose.: 101 mg/dL (30 Aug 2023 19:57)  POCT Blood Glucose.: 110 mg/dL (30 Aug 2023 19:02)  POCT Blood Glucose.: 121 mg/dL (30 Aug 2023 17:55)  POCT Blood Glucose.: 121 mg/dL (30 Aug 2023 17:09)  POCT Blood Glucose.: 140 mg/dL (30 Aug 2023 16:12)  POCT Blood Glucose.: 151 mg/dL (30 Aug 2023 15:06)    A1C with Estimated Average Glucose Result: 8.2 % (08-14-23 @ 04:40)    Physical Exam:  General: NAD; A&Ox3  Cardiac: S1/S2, RRR, no murmur, no rubs  Lungs: unlabored respirations, CTA b/l, no wheeze, no rales, no crackles  Abdomen: Soft/NT/ND; positive bowel sounds x 4  Sternum: Intact, no click, incision healing well with no drainage  Incisions: Incisions clean/dry/intact  Extremities: 1+ edema b/l lower extremities; no cyanosis    Central Venous Catheter: Yes: critical illness, intravenous access     Day # 1  Johnson Catheter: Yes: critical illness; monitor strict i/o's                    Day # 1  EPICARDIAL WIRES:  YES                                                              Day # 1  BOWEL MOVEMENT:  [] YES [X] NO, If No, Timing since last BM Day #      LABS:                        8.0<L>  9.76  )-----------( 159      ( 31 Aug 2023 01:05 )             25.8<L>                        8.8<L>  12.80<H> )-----------( 179      ( 30 Aug 2023 15:28 )             27.9<L>    08-31    144  |  115<H>  |  10  ----------------------------<  112<H>  4.7   |  21  |  0.6<L>  08-30    143  |  112<H>  |  8<L>  ----------------------------<  153<H>  4.0   |  22  |  0.5<L>    Ca    8.3<L>      31 Aug 2023 01:05  Mg     2.4     08-31    TPro  5.4<L> [6.0 - 8.0]  /  Alb  3.7 [3.5 - 5.2]  /  TBili  0.7 [0.2 - 1.2]  /  DBili  x   /  AST  22 [0 - 41]  /  ALT  11 [0 - 41]  /  AlkPhos  47 [30 - 115]  08-31    PT/INR - ( 30 Aug 2023 15:28 )   PT: ;   INR: 1.27 ratio         PT/INR - ( 29 Aug 2023 22:33 )   PT: ;   INR: 0.93 ratio       PTT - ( 30 Aug 2023 15:28 )  PTT:29.6 sec, PTT - ( 29 Aug 2023 22:33 )  PTT:31.2 sec    Urinalysis Basic - ( 31 Aug 2023 01:05 )  Color: x / Appearance: x / SG: x / pH: x  Gluc: 112 mg/dL / Ketone: x  / Bili: x / Urobili: x   Blood: x / Protein: x / Nitrite: x   Leuk Esterase: x / RBC: x / WBC x   Sq Epi: x / Non Sq Epi: x / Bacteria: x    ABG - ( 31 Aug 2023 03:25 )  pH: 7.35  /  pCO2: 37    /  pO2: 133   / HCO3: 20    / Base Excess: -4.8  /  SaO2: 98.7  /  LA: 0.60     RADIOLOGY & ADDITIONAL TESTS:  CXR:   < from: Xray Chest 1 View-PORTABLE IMMEDIATE (Xray Chest 1 View-PORTABLE IMMEDIATE .) (08.31.23 @ 16:12) >    INTERPRETATION:  Clinical History / Reason for exam: Shortness of breath    Comparison : Chest radiograph August 31, 2023.    Technique/Positioning: Single AP view of the chest.    Findings:    Support devices: Right IJ introducer sheath    Cardiac/mediastinum/hilum: Poststernotomy, unchanged    Lung parenchyma/Pleura: Bilateral effusions and pulmonary vascular   congestion, overall unchanged. No definite pneumothorax    Skeleton/soft tissues: Unchanged    Impression:    Bilateral effusions and pulmonary vascular congestion, overall unchanged.    No definite pneumothorax.    --- End of Report ---      EKG:  < from: 12 Lead ECG (08.30.23 @ 13:58) >  Ventricular Rate 87 BPM    Atrial Rate 87 BPM    P-R Interval 140 ms    QRS Duration 142 ms    Q-T Interval 426 ms    QTC Calculation(Bazett) 512 ms    P Axis 84 degrees    R Axis -47 degrees    T Axis 179 degrees    Diagnosis Line Normal sinus rhythm  Left axis deviation  Left bundle branch block  Abnormal ECG    Allergies  No Known Allergies    MEDICATIONS  (STANDING):  acetaminophen   IVPB .. 1000 milliGRAM(s) IV Intermittent once  acetaminophen   IVPB .. 1000 milliGRAM(s) IV Intermittent once  acetaminophen   IVPB .. 1000 milliGRAM(s) IV Intermittent every 6 hours  albuterol    90 MICROgram(s) HFA Inhaler 2 Puff(s) Inhalation every 6 hours  aspirin Suppository 300 milliGRAM(s) Rectal daily  chlorhexidine 2% Cloths 1 Application(s) Topical daily  dextrose 5%. 1000 milliLiter(s) (50 mL/Hr) IV Continuous <Continuous>  dextrose 50% Injectable 50 milliLiter(s) IV Push every 15 minutes  famotidine Injectable 20 milliGRAM(s) IV Push every 12 hours  insulin regular Infusion 1 Unit(s)/Hr (1 mL/Hr) IV Continuous <Continuous>  ipratropium 17 MICROgram(s) HFA Inhaler 2 Puff(s) Inhalation every 6 hours  meperidine     Injectable 25 milliGRAM(s) IV Push once  milrinone Infusion 0.375 MICROgram(s)/kG/Min (6.94 mL/Hr) IV Continuous <Continuous>  nitroglycerin  Infusion 5 MICROgram(s)/Min (1.5 mL/Hr) IV Continuous <Continuous>  polyethylene glycol 3350 17 Gram(s) Oral daily  senna 2 Tablet(s) Oral at bedtime  sodium chloride 0.9%. 1000 milliLiter(s) (10 mL/Hr) IV Continuous <Continuous>      MEDICATIONS  (PRN):  dextrose Oral Gel 15 Gram(s) Oral once PRN Blood Glucose LESS THAN 70 milliGRAM(s)/deciliter  ketorolac   Injectable 15 milliGRAM(s) IV Push every 4 hours PRN Severe Pain (7 - 10)  oxyCODONE    IR 5 milliGRAM(s) Oral every 4 hours PRN Moderate Pain (4 - 6)  oxyCODONE    IR 10 milliGRAM(s) Oral every 4 hours PRN Severe Pain (7 - 10)    Home Medications:  aspirin 81 mg oral tablet, chewable: 1 chewed once a day (13 Aug 2023 19:53)  atorvastatin 10 mg oral tablet: 1 orally once a day (at bedtime) (13 Aug 2023 19:52)  Jardiance 10 mg oral tablet: 1 orally once a day (13 Aug 2023 19:53)  MetFORMIN (Eqv-Glumetza) 500 mg oral tablet, extended release: 1 orally 2 times a day (13 Aug 2023 19:51)  pioglitazone 30 mg oral tablet: 1 orally once a day (13 Aug 2023 19:52)    Pharmacologic DVT Prophylaxis [X] YES, [] NO: Contraindication:  SCD's: YES b/l    GI Prophylaxis: famotidine 20 mg    Post-Op Beta-Blockers: [] Yes, [X] No: contraindication: pt on milrinone   Post-Op Aspirin:  [X] Yes, [] No: contraindication:  Post-Op Statin:  [X] Yes, [] No: contraindication:    Ambulation/Activity Status: Ambulate pt as tolerated     Assessment/Plan:  68y Female status-post CABG x3            POD # 1         - Case and plan discussed with CTU Intensivist and CT Surgeon - Dr. Fernandez  - Continue CTU supportive care and ongoing plan of care as per continuing CTU rounds.   - Continue DVT/GI prophylaxis  - Incentive Spirometry 10 times an hour  - Continue to advance physical activity as tolerated and continue PT/OT as directed  - Chest tube d/c'ed, patient tolerated process well   1. CAD s/p CABG: Continue  mg , statin, BB being held while patient is on milrinone   2. Hypoxia: SPO2 100% on 3L nasal cannula; encourage cough and deep breathing, nebulizer tx ordered  3. DM/Glucose Control: A1C 8.2; FS well controlled ranging 101-151 on regular insulin infusion  4. Neuro: Patient was previously taking Keppra, consult on whether it is necessary to resume   5. Vascular: duplex U/S of lower extremities due to swelling and hx of DVT -> subq heparin ordered q8h, will add Plavix tomorrow    Social Service Disposition:  TBD   OPERATIVE PROCEDURE(s): CABG x3                POD # 1                      SURGEON(s): ANIRUDH Coon    SUBJECTIVE ASSESSMENT: 68y Female patient seen and examined at bedside. There were no acute events overnight and patient had no current complaints.     Vital Signs Last 24 Hrs  T(F): 98.4 (31 Aug 2023 04:00), Max: 100.8 (30 Aug 2023 18:00)  HR: 101 (31 Aug 2023 07:00) (81 - 101)  BP: 97/52 (31 Aug 2023 05:00) (88/48 - 119/57)  BP(mean): 71 (31 Aug 2023 05:00) (65 - 81)  ABP: 141/40 (31 Aug 2023 07:00) (92/39 - 297/-38)  ABP(mean): 66 (31 Aug 2023 07:00)  RR: 27 (31 Aug 2023 07:00) (16 - 35)  SpO2: 100% (31 Aug 2023 07:00) (85% - 100%)  CVP(mm Hg): 14 (31 Aug 2023 07:00)  CO: 4.6 (31 Aug 2023 06:00)  CI: 2.9 (31 Aug 2023 06:00)  PA: 29/9 (31 Aug 2023 07:00)  SVR: 937 (31 Aug 2023 06:00)    I&O's Detail    30 Aug 2023 07:01  -  31 Aug 2023 07:00  --------------------------------------------------------  IN:    Albumin 5%  - 500 mL: 100 mL    Insulin: 31 mL    IV PiggyBack: 550 mL    Milrinone: 124.2 mL    sodium chloride 0.9%: 360 mL    Vasopressin: 18 mL  Total IN: 1183.2 mL    OUT:    Chest Tube (mL): 64 mL    Chest Tube (mL): 150 mL    Dexmedetomidine: 0 mL    Indwelling Catheter - Urethral (mL): 665 mL    Nasogastric/Oral tube (mL): 0 mL    NiCARdipine: 0 mL    Nitroglycerin: 0 mL    Norepinephrine: 0 mL    Propofol: 0 mL  Total OUT: 879 mL    Net: I&O's Detail    29 Aug 2023 07:01  -  30 Aug 2023 07:00  --------------------------------------------------------  Total NET: 526 mL    30 Aug 2023 07:01  -  31 Aug 2023 07:00  --------------------------------------------------------  Total NET: 304.2 mL    CAPILLARY BLOOD GLUCOSE  POCT Blood Glucose.: 139 mg/dL (31 Aug 2023 05:49)  POCT Blood Glucose.: 102 mg/dL (31 Aug 2023 02:15)  POCT Blood Glucose.: 107 mg/dL (30 Aug 2023 23:53)  POCT Blood Glucose.: 113 mg/dL (30 Aug 2023 22:57)  POCT Blood Glucose.: 106 mg/dL (30 Aug 2023 22:03)  POCT Blood Glucose.: 102 mg/dL (30 Aug 2023 20:55)  POCT Blood Glucose.: 101 mg/dL (30 Aug 2023 19:57)  POCT Blood Glucose.: 110 mg/dL (30 Aug 2023 19:02)  POCT Blood Glucose.: 121 mg/dL (30 Aug 2023 17:55)  POCT Blood Glucose.: 121 mg/dL (30 Aug 2023 17:09)  POCT Blood Glucose.: 140 mg/dL (30 Aug 2023 16:12)  POCT Blood Glucose.: 151 mg/dL (30 Aug 2023 15:06)    A1C with Estimated Average Glucose Result: 8.2 % (08-14-23 @ 04:40)    Physical Exam:  General: NAD; A&Ox3  Cardiac: S1/S2, RRR, no murmur, no rubs  Lungs: unlabored respirations, CTA b/l, no wheeze, no rales, no crackles  Abdomen: Soft/NT/ND; positive bowel sounds x 4  Sternum: Intact, no click, incision healing well with no drainage  Incisions: Incisions clean/dry/intact  Extremities: 1+ edema b/l lower extremities; no cyanosis    Central Venous Catheter: Yes: critical illness, intravenous access     Day # 1  Johnson Catheter: Yes: critical illness; monitor strict i/o's                    Day # 1  EPICARDIAL WIRES:  YES                                                              Day # 1  BOWEL MOVEMENT:  [] YES [X] NO, If No, Timing since last BM Day #      LABS:                        8.0<L>  9.76  )-----------( 159      ( 31 Aug 2023 01:05 )             25.8<L>                        8.8<L>  12.80<H> )-----------( 179      ( 30 Aug 2023 15:28 )             27.9<L>    08-31    144  |  115<H>  |  10  ----------------------------<  112<H>  4.7   |  21  |  0.6<L>  08-30    143  |  112<H>  |  8<L>  ----------------------------<  153<H>  4.0   |  22  |  0.5<L>    Ca    8.3<L>      31 Aug 2023 01:05  Mg     2.4     08-31    TPro  5.4<L> [6.0 - 8.0]  /  Alb  3.7 [3.5 - 5.2]  /  TBili  0.7 [0.2 - 1.2]  /  DBili  x   /  AST  22 [0 - 41]  /  ALT  11 [0 - 41]  /  AlkPhos  47 [30 - 115]  08-31    PT/INR - ( 30 Aug 2023 15:28 )   PT: ;   INR: 1.27 ratio         PT/INR - ( 29 Aug 2023 22:33 )   PT: ;   INR: 0.93 ratio       PTT - ( 30 Aug 2023 15:28 )  PTT:29.6 sec, PTT - ( 29 Aug 2023 22:33 )  PTT:31.2 sec    Urinalysis Basic - ( 31 Aug 2023 01:05 )  Color: x / Appearance: x / SG: x / pH: x  Gluc: 112 mg/dL / Ketone: x  / Bili: x / Urobili: x   Blood: x / Protein: x / Nitrite: x   Leuk Esterase: x / RBC: x / WBC x   Sq Epi: x / Non Sq Epi: x / Bacteria: x    ABG - ( 31 Aug 2023 03:25 )  pH: 7.35  /  pCO2: 37    /  pO2: 133   / HCO3: 20    / Base Excess: -4.8  /  SaO2: 98.7  /  LA: 0.60     RADIOLOGY & ADDITIONAL TESTS:  CXR:   < from: Xray Chest 1 View-PORTABLE IMMEDIATE (Xray Chest 1 View-PORTABLE IMMEDIATE .) (08.31.23 @ 16:12) >    INTERPRETATION:  Clinical History / Reason for exam: Shortness of breath    Comparison : Chest radiograph August 31, 2023.    Technique/Positioning: Single AP view of the chest.    Findings:    Support devices: Right IJ introducer sheath    Cardiac/mediastinum/hilum: Poststernotomy, unchanged    Lung parenchyma/Pleura: Bilateral effusions and pulmonary vascular   congestion, overall unchanged. No definite pneumothorax    Skeleton/soft tissues: Unchanged    Impression:    Bilateral effusions and pulmonary vascular congestion, overall unchanged.    No definite pneumothorax.    --- End of Report ---      EKG:  < from: 12 Lead ECG (08.30.23 @ 13:58) >  Ventricular Rate 87 BPM    Atrial Rate 87 BPM    P-R Interval 140 ms    QRS Duration 142 ms    Q-T Interval 426 ms    QTC Calculation(Bazett) 512 ms    P Axis 84 degrees    R Axis -47 degrees    T Axis 179 degrees    Diagnosis Line Normal sinus rhythm  Left axis deviation  Left bundle branch block  Abnormal ECG    Allergies  No Known Allergies    MEDICATIONS  (STANDING):  acetaminophen   IVPB .. 1000 milliGRAM(s) IV Intermittent once  acetaminophen   IVPB .. 1000 milliGRAM(s) IV Intermittent once  acetaminophen   IVPB .. 1000 milliGRAM(s) IV Intermittent every 6 hours  albuterol    90 MICROgram(s) HFA Inhaler 2 Puff(s) Inhalation every 6 hours  aspirin Suppository 300 milliGRAM(s) Rectal daily  chlorhexidine 2% Cloths 1 Application(s) Topical daily  dextrose 5%. 1000 milliLiter(s) (50 mL/Hr) IV Continuous <Continuous>  dextrose 50% Injectable 50 milliLiter(s) IV Push every 15 minutes  famotidine Injectable 20 milliGRAM(s) IV Push every 12 hours  insulin regular Infusion 1 Unit(s)/Hr (1 mL/Hr) IV Continuous <Continuous>  ipratropium 17 MICROgram(s) HFA Inhaler 2 Puff(s) Inhalation every 6 hours  meperidine     Injectable 25 milliGRAM(s) IV Push once  milrinone Infusion 0.375 MICROgram(s)/kG/Min (6.94 mL/Hr) IV Continuous <Continuous>  nitroglycerin  Infusion 5 MICROgram(s)/Min (1.5 mL/Hr) IV Continuous <Continuous>  polyethylene glycol 3350 17 Gram(s) Oral daily  senna 2 Tablet(s) Oral at bedtime  sodium chloride 0.9%. 1000 milliLiter(s) (10 mL/Hr) IV Continuous <Continuous>      MEDICATIONS  (PRN):  dextrose Oral Gel 15 Gram(s) Oral once PRN Blood Glucose LESS THAN 70 milliGRAM(s)/deciliter  ketorolac   Injectable 15 milliGRAM(s) IV Push every 4 hours PRN Severe Pain (7 - 10)  oxyCODONE    IR 5 milliGRAM(s) Oral every 4 hours PRN Moderate Pain (4 - 6)  oxyCODONE    IR 10 milliGRAM(s) Oral every 4 hours PRN Severe Pain (7 - 10)    Home Medications:  aspirin 81 mg oral tablet, chewable: 1 chewed once a day (13 Aug 2023 19:53)  atorvastatin 10 mg oral tablet: 1 orally once a day (at bedtime) (13 Aug 2023 19:52)  Jardiance 10 mg oral tablet: 1 orally once a day (13 Aug 2023 19:53)  MetFORMIN (Eqv-Glumetza) 500 mg oral tablet, extended release: 1 orally 2 times a day (13 Aug 2023 19:51)  pioglitazone 30 mg oral tablet: 1 orally once a day (13 Aug 2023 19:52)    Pharmacologic DVT Prophylaxis [X] YES, [] NO: Contraindication:  SCD's: YES b/l    GI Prophylaxis: famotidine 20 mg    Post-Op Beta-Blockers: [] Yes, [X] No: contraindication: pt on milrinone   Post-Op Aspirin:  [X] Yes, [] No: contraindication:  Post-Op Statin:  [X] Yes, [] No: contraindication:    Ambulation/Activity Status: Ambulate pt as tolerated     Assessment/Plan:  68y Female status-post CABG x3            POD # 1         - Case and plan discussed with CTU Intensivist and CT Surgeon - Dr. Fernandez  - Continue CTU supportive care and ongoing plan of care as per continuing CTU rounds.   - Continue DVT/GI prophylaxis  - Incentive Spirometry 10 times an hour  - Continue to advance physical activity as tolerated and continue PT/OT as directed  - Chest tube d/c'ed, patient tolerated process well   1. CAD s/p CABG: Continue  mg , statin, BB being held while patient is on milrinone   2. Hypoxia: SPO2 100% on 3L nasal cannula; encourage cough and deep breathing, nebulizer tx ordered  3. DM/Glucose Control: A1C 8.2; FS well controlled ranging 101-151 on regular insulin infusion  4. Neuro: Patient was previously Keprra -> neurology following, Keppra weaned off 8/22  5. Vascular: duplex U/S of lower extremities due to swelling and hx of DVT -> subq heparin ordered q8h, will add Plavix tomorrow    Social Service Disposition:  TBD

## 2023-08-31 NOTE — PHYSICAL THERAPY INITIAL EVALUATION ADULT - GAIT TRAINING, PT EVAL
by discharge: Amb 400 ft with supervision using RW and negotiating 10 steps using handrail with supervision.
Pt will ambulate using RW or least restrictive AD for 150 ft with supervision by discharge to facilitate return to PLOF. Pt will negotiate 12 steps using 1 HR under supervision

## 2023-08-31 NOTE — PHYSICAL THERAPY INITIAL EVALUATION ADULT - PERTINENT HX OF CURRENT PROBLEM, REHAB EVAL
Pt presents for b/s PT Re-evaluation now s/p CABG x 3.
67yo F w/a PMH of Type II DM, HTN, DLD, obesity, lateral epicondylitis who initially presented to the ED w/ Left Arm pain and subsequently was found to have 2 episodes of brief loss of consiousness associated with some abnormal movements, originally thought to be due to seizures, s/p 4mg ativan total, Keppra load, intubated, now extubated today. Neurology following for further work up. On exam today, no focal deficits noted. Thus far, no seizures captured on vEEG.  Would need to rule out any structural abnormalities given focal slowing noted on eeg.

## 2023-08-31 NOTE — PROGRESS NOTE ADULT - SUBJECTIVE AND OBJECTIVE BOX
MARCIANO RODRIGUES  MRN#: [id number]  Subjective:  Patient was seen and evalauted on AM rounds offerring no specific compliants at this time.    OBJECTIVE:  ICU Vital Signs Last 24 Hrs  T(C): 37.7 (31 Aug 2023 12:00), Max: 38.2 (30 Aug 2023 18:00)  T(F): 99.9 (31 Aug 2023 12:00), Max: 100.8 (30 Aug 2023 18:00)  HR: 106 (31 Aug 2023 12:00) (81 - 110)  BP: 121/55 (31 Aug 2023 08:15) (88/48 - 121/55)  BP(mean): 75 (31 Aug 2023 08:15) (65 - 81)  ABP: 112/41 (31 Aug 2023 12:00) (92/39 - 297/-38)  ABP(mean): 59 (31 Aug 2023 12:00) (54 - 295)  RR: 9 (31 Aug 2023 12:) (9 - 35)  SpO2: 99% (31 Aug 2023 12:00) (85% - 100%)    O2 Parameters below as of 31 Aug 2023 12:00  Patient On (Oxygen Delivery Method): nasal cannula w/ humidification  O2 Flow (L/min): 2           @ :  -   @ 07:00  --------------------------------------------------------  IN: 1183.2 mL / OUT: 879 mL / NET: 304.2 mL     @ :  -   @ 12:29  --------------------------------------------------------  IN: 239.6 mL / OUT: 205 mL / NET: 34.6 mL      CAPILLARY BLOOD GLUCOSE      POCT Blood Glucose.: 175 mg/dL (31 Aug 2023 12:09)      PHYSICAL EXAM:Daily     Daily Weight in k.4 (31 Aug 2023 07:00)  General: WN/WD NAD    HEENT:     + NCAT  + EOMI  - Conjuctival edema   - Icterus   - Thrush   - ETT  - NGT/OGT    Neck:         + FROM    - JVD     - Nodes     - Masses    + Mid-line trachea   - Tracheostomy    Chest:         - Sternal click  - Sternal drainage  + Pacing wires  + Chest tubes  - SubQ emphysema    Lungs:          + CTA   - Rhonchi    - Rales    - Wheezing     - Decreased BS   - Dullness R L    Cardiac:       + S1 + S2    + RRR   - Irregular   - S3  - S4    - Murmurs   - Rub   - Hamman’s sign     Abdomen:    + BS     + Soft    + Non-tender     - Distended    - Organomegaly  - PEG    Extremities:   - Cyanosis U/L   - Clubbing  U/L  - LE/UE Edema   + Capillary refill    + Pulses     Neuro:        + Awake   +  Alert   - Confused   - Lethargic   - Sedated   - Generalized Weakness    Skin:        - Rashes    - Erythema   + Normal incisions   + IV sites intact  - Sacral decubitus    HOSPITAL MEDICATIONS:  MEDICATIONS  (STANDING):  acetaminophen   IVPB .. 1000 milliGRAM(s) IV Intermittent once  acetaminophen   IVPB .. 1000 milliGRAM(s) IV Intermittent every 6 hours  albuterol    90 MICROgram(s) HFA Inhaler 2 Puff(s) Inhalation every 6 hours  aspirin 325 milliGRAM(s) Oral daily  atorvastatin 40 milliGRAM(s) Oral at bedtime  chlorhexidine 0.12% Liquid 5 milliLiter(s) Oral Mucosa two times a day  chlorhexidine 2% Cloths 1 Application(s) Topical daily  dexMEDEtomidine Infusion 0.2 MICROgram(s)/kG/Hr (3.09 mL/Hr) IV Continuous <Continuous>  dextrose 5%. 1000 milliLiter(s) (50 mL/Hr) IV Continuous <Continuous>  dextrose 50% Injectable 50 milliLiter(s) IV Push every 15 minutes  famotidine Injectable 20 milliGRAM(s) IV Push every 12 hours  glucagon  Injectable 1 milliGRAM(s) IntraMuscular once  heparin   Injectable 5000 Unit(s) SubCutaneous every 8 hours  insulin regular Infusion 1 Unit(s)/Hr (1 mL/Hr) IV Continuous <Continuous>  ipratropium 17 MICROgram(s) HFA Inhaler 2 Puff(s) Inhalation every 6 hours  meperidine     Injectable 25 milliGRAM(s) IV Push once  milrinone Infusion 0.375 MICROgram(s)/kG/Min (6.94 mL/Hr) IV Continuous <Continuous>  niCARdipine Infusion 5 mG/Hr (25 mL/Hr) IV Continuous <Continuous>  nitroglycerin  Infusion 5 MICROgram(s)/Min (1.5 mL/Hr) IV Continuous <Continuous>  norepinephrine Infusion 0.05 MICROgram(s)/kG/Min (5.78 mL/Hr) IV Continuous <Continuous>  polyethylene glycol 3350 17 Gram(s) Oral daily  propofol Infusion 1 MICROgram(s)/kG/Min (0.37 mL/Hr) IV Continuous <Continuous>  senna 2 Tablet(s) Oral at bedtime  sodium chloride 0.9%. 1000 milliLiter(s) (10 mL/Hr) IV Continuous <Continuous>  vasopressin Infusion 0.04 Unit(s)/Min (6 mL/Hr) IV Continuous <Continuous>    MEDICATIONS  (PRN):  dextrose Oral Gel 15 Gram(s) Oral once PRN Blood Glucose LESS THAN 70 milliGRAM(s)/deciliter  ketorolac   Injectable 15 milliGRAM(s) IV Push every 4 hours PRN Severe Pain (7 - 10)  oxyCODONE    IR 5 milliGRAM(s) Oral every 4 hours PRN Moderate Pain (4 - 6)  oxyCODONE    IR 10 milliGRAM(s) Oral every 4 hours PRN Severe Pain (7 - 10)      LABS:                        8.0    9.76  )-----------( 159      ( 31 Aug 2023 01:05 )             25.8        144  |  115<H>  |  10  ----------------------------<  112<H>  4.7   |  21  |  0.6<L>    Ca    8.3<L>      31 Aug 2023 01:05  Mg     2.4         TPro  5.4<L>  /  Alb  3.7  /  TBili  0.7  /  DBili  x   /  AST  22  /  ALT  11  /  AlkPhos  47      PT/INR - ( 30 Aug 2023 15:28 )   PT: 14.60 sec;   INR: 1.27 ratio         PTT - ( 30 Aug 2023 15:28 )  PTT:29.6 sec LIVER FUNCTIONS - ( 31 Aug 2023 01:05 )  Alb: 3.7 g/dL / Pro: 5.4 g/dL / ALK PHOS: 47 U/L / ALT: 11 U/L / AST: 22 U/L / GGT: x           Urinalysis Basic - ( 31 Aug 2023 01:05 )    Color: x / Appearance: x / SG: x / pH: x  Gluc: 112 mg/dL / Ketone: x  / Bili: x / Urobili: x   Blood: x / Protein: x / Nitrite: x   Leuk Esterase: x / RBC: x / WBC x   Sq Epi: x / Non Sq Epi: x / Bacteria: x        RADIOLOGY:  X Reviewed and interpreted by me: (-) PTX, (-) focal consolidation    CARDIOPULMONARY DYSFUNCTION  - Respiratory status required supplemental oxygen & the following of continuous pulse oximetry for support & to prevent decompensation  - Continued early mobilization as tolerated  - Addressed analgesic regimen to optimize function    PREVENTION-PROPHYLAXIS  - ASA continued for graft occlusion-thromboembolism prophylaxis  - Lipitor was also started for long term graft patency  - Heparin initiated for VTE prophylaxis in addition to Venodyne boots  - Pepcid maintained for GI bleeding prophylaxis  - Lopressor initiated/continued for atrial fibrillation prophylaxis  - Metabolic stability & infection prophylaxis required review and adjustment of regular Insulin sliding scale and gylcemic regimen while following serial glucose levels to help achieve & maintain euglycemia  - Reviewed & addressed surgical site infection prophylaxis regimen

## 2023-08-31 NOTE — PHYSICAL THERAPY INITIAL EVALUATION ADULT - ADDITIONAL COMMENTS
Pt resides with family in a private house using 5 steps to enter and a flight of stairs to access bedroom. Pt used to be independent with overall functional mobility, ambulate using cane at baseline.
Pt resides with family in a private house using 5 steps to enter and a flight of stairs to access bedroom. Pt used to be independent with overall functional mobility, ambulate using cane at baseline

## 2023-08-31 NOTE — DISCHARGE NOTE PROVIDER - NSDCMRMEDTOKEN_GEN_ALL_CORE_FT
aspirin 81 mg oral tablet, chewable: 1 chewed once a day  atorvastatin 10 mg oral tablet: 1 orally once a day (at bedtime)  Jardiance 10 mg oral tablet: 1 orally once a day  MetFORMIN (Eqv-Glumetza) 500 mg oral tablet, extended release: 1 orally 2 times a day  pioglitazone 30 mg oral tablet: 1 orally once a day   Albuterol (Eqv-Proventil HFA) 90 mcg/inh inhalation aerosol: 2 puff(s) inhaled every 6 hours as needed for  shortness of breath and/or wheezing  amiodarone 200 mg oral tablet: 1 tab(s) orally every 12 hours  apixaban 2.5 mg oral tablet: 1 tab(s) orally every 12 hours  aspirin 81 mg oral delayed release tablet: 1 tab(s) orally once a day  atorvastatin 40 mg oral tablet: 1 tab(s) orally once a day (at bedtime)  clopidogrel 75 mg oral tablet: 1 tab(s) orally once a day  ferrous sulfate 325 mg (65 mg elemental iron) oral tablet: 1 tab(s) orally once a day  folic acid 1 mg oral tablet: 1 tab(s) orally once a day  furosemide 40 mg oral tablet: 1 tab(s) orally once a day  ipratropium 17 mcg/inh inhalation aerosol: 2 puff(s) by metered dose inhaler every 6 hours as needed for  shortness of breath and/or wheezing  Jardiance 10 mg oral tablet: 1 orally once a day  MetFORMIN (Eqv-Glumetza) 500 mg oral tablet, extended release: 1 orally 2 times a day  metoprolol tartrate 25 mg oral tablet: 0.5 tab(s) orally every 12 hours  Multiple Vitamins with Minerals oral tablet: 1 tab(s) orally once a day  pantoprazole 40 mg oral delayed release tablet: 1 tab(s) orally once a day (before a meal)  pioglitazone 30 mg oral tablet: 1 orally once a day  potassium chloride 20 mEq oral tablet, extended release: 1 tab(s) orally once a day  senna leaf extract oral tablet: 2 tab(s) orally once a day (at bedtime) as needed for  constipation   Albuterol (Eqv-Proventil HFA) 90 mcg/inh inhalation aerosol: 2 puff(s) inhaled every 6 hours as needed for  shortness of breath and/or wheezing  amiodarone 200 mg oral tablet: 1 tab(s) orally once a day  apixaban 2.5 mg oral tablet: 1 tab(s) orally every 12 hours  aspirin 81 mg oral delayed release tablet: 1 tab(s) orally once a day  atorvastatin 40 mg oral tablet: 1 tab(s) orally once a day (at bedtime)  cephalexin 500 mg oral tablet: 1 tab(s) orally 2 times a day  clopidogrel 75 mg oral tablet: 1 tab(s) orally once a day  ferrous sulfate 325 mg (65 mg elemental iron) oral tablet: 1 tab(s) orally once a day  folic acid 1 mg oral tablet: 1 tab(s) orally once a day  furosemide 40 mg oral tablet: 1 tab(s) orally once a day  ipratropium 17 mcg/inh inhalation aerosol: 2 puff(s) by metered dose inhaler every 6 hours as needed for  shortness of breath and/or wheezing  Jardiance 10 mg oral tablet: 1 orally once a day  lisinopril 5 mg oral tablet: 1 tab(s) orally once a day  MetFORMIN (Eqv-Glumetza) 500 mg oral tablet, extended release: 1 orally 2 times a day  metoprolol tartrate 25 mg oral tablet: 1 tab(s) orally 2 times a day  Multiple Vitamins with Minerals oral tablet: 1 tab(s) orally once a day  pantoprazole 40 mg oral delayed release tablet: 1 tab(s) orally once a day (before a meal)  pioglitazone 30 mg oral tablet: 1 orally once a day  potassium chloride 20 mEq oral tablet, extended release: 1 tab(s) orally once a day  senna leaf extract oral tablet: 2 tab(s) orally once a day (at bedtime) as needed for  constipation

## 2023-08-31 NOTE — PHYSICAL THERAPY INITIAL EVALUATION ADULT - TRANSFER TRAINING, PT EVAL
by discharge: All transfers: Supervision with RW.
Pt will transfer from bed to chair and reverse using RW with supervision to facilitate return to PLOF.

## 2023-08-31 NOTE — PHYSICAL THERAPY INITIAL EVALUATION ADULT - GENERAL OBSERVATIONS, REHAB EVAL
Pt remains fully lined including +[place] shadia line, PT to f/u when pt is medically cleared for OOB with PT.
1500 - 1540 PT Re-evaluation note; Pt rec in b/s recliner with +tele, +chest tube, +NC 2 L/m, +IV's, +A line, +felder, +privina/vac, in NAD, BRIAN Acosta present t/o session with use of Persian  via tablet, ID# [id number]. As per CTU team/NP Kylee, pt cleared for amb with PT, to update activity orders.
8:40-9:15. chart reviewed. Pt received semi-velazquez at B/S, alert, oriented, able to follow one step instructions and agreeable to PT evaluation.  Pt is Polish speaking, son was present for translation. + VEEG, + monitoring, + foly, denies pain or discomfort, -ve orthostatic, VSS, NAD.

## 2023-08-31 NOTE — DISCHARGE NOTE PROVIDER - PROVIDER TOKENS
PROVIDER:[TOKEN:[[id number]]],PROVIDER:[TOKEN:[[id number]]] PROVIDER:[TOKEN:[[id number]]],PROVIDER:[TOKEN:[[id number]],SCHEDULEDAPPT:[09/13/2023],SCHEDULEDAPPTTIME:[10:30 AM]],PROVIDER:[TOKEN:[[id number]],FOLLOWUP:[2 months]]

## 2023-08-31 NOTE — PHYSICAL THERAPY INITIAL EVALUATION ADULT - TINETTI GAIT TEST, REHAB EVAL
As per Tinetti test, pt is a high risk for falls.
Based on Tinetti score, Pt at high risk of falling

## 2023-08-31 NOTE — DISCHARGE NOTE PROVIDER - CARE PROVIDER_API CALL
Isai Wright  Interventional Cardiology  [address] [place] [address], [address]  [place] [address]  Phone: [phone]  Fax: [phone]  Follow Up Time:     Glynn Coon  Thoracic and Cardiac Surgery  [address] [place] [address], [address]  [place] [address]  Phone: [phone]  Fax: [phone]  Follow Up Time:    Isai Wright  Interventional Cardiology  [address] [facility], [address]  [place] [address]  Phone: [phone]  Fax: [phone]  Follow Up Time:     Glynn Coon  Thoracic and Cardiac Surgery  [address] [place] [address], [address]  [place] [address]  Phone: [phone]  Fax: [phone]  Scheduled Appointment: 09/13/2023 10:30 AM    Nga Mayberry  Cardiovascular Disease  [address] [place] [address]  Phone: [phone]  Fax: [phone]  Follow Up Time: 2 months

## 2023-08-31 NOTE — PHYSICAL THERAPY INITIAL EVALUATION ADULT - BED MOBILITY TRAINING, PT EVAL
TBD
Pt will participate in supine to sit and reverse using side rails with supervision by discharge to facilitate return to PLOF.

## 2023-09-01 LAB
ALBUMIN SERPL ELPH-MCNC: 3.3 G/DL — LOW (ref 3.5–5.2)
ALP SERPL-CCNC: 65 U/L — SIGNIFICANT CHANGE UP (ref 30–115)
ALT FLD-CCNC: 12 U/L — SIGNIFICANT CHANGE UP (ref 0–41)
ANION GAP SERPL CALC-SCNC: 9 MMOL/L — SIGNIFICANT CHANGE UP (ref 7–14)
AST SERPL-CCNC: 27 U/L — SIGNIFICANT CHANGE UP (ref 0–41)
BASOPHILS # BLD AUTO: 0.03 K/UL — SIGNIFICANT CHANGE UP (ref 0–0.2)
BASOPHILS NFR BLD AUTO: 0.3 % — SIGNIFICANT CHANGE UP (ref 0–1)
BILIRUB SERPL-MCNC: 0.6 MG/DL — SIGNIFICANT CHANGE UP (ref 0.2–1.2)
BUN SERPL-MCNC: 15 MG/DL — SIGNIFICANT CHANGE UP (ref 10–20)
CALCIUM SERPL-MCNC: 8.1 MG/DL — LOW (ref 8.4–10.5)
CHLORIDE SERPL-SCNC: 111 MMOL/L — HIGH (ref 98–110)
CO2 SERPL-SCNC: 22 MMOL/L — SIGNIFICANT CHANGE UP (ref 17–32)
CREAT SERPL-MCNC: 0.5 MG/DL — LOW (ref 0.7–1.5)
EGFR: 101 ML/MIN/1.73M2 — SIGNIFICANT CHANGE UP
EOSINOPHIL # BLD AUTO: 0.02 K/UL — SIGNIFICANT CHANGE UP (ref 0–0.7)
EOSINOPHIL NFR BLD AUTO: 0.2 % — SIGNIFICANT CHANGE UP (ref 0–8)
GAS PNL BLDA: SIGNIFICANT CHANGE UP
GLUCOSE BLDC GLUCOMTR-MCNC: 100 MG/DL — HIGH (ref 70–99)
GLUCOSE BLDC GLUCOMTR-MCNC: 110 MG/DL — HIGH (ref 70–99)
GLUCOSE BLDC GLUCOMTR-MCNC: 170 MG/DL — HIGH (ref 70–99)
GLUCOSE BLDC GLUCOMTR-MCNC: 175 MG/DL — HIGH (ref 70–99)
GLUCOSE BLDC GLUCOMTR-MCNC: 176 MG/DL — HIGH (ref 70–99)
GLUCOSE BLDC GLUCOMTR-MCNC: 188 MG/DL — HIGH (ref 70–99)
GLUCOSE BLDC GLUCOMTR-MCNC: 201 MG/DL — HIGH (ref 70–99)
GLUCOSE BLDC GLUCOMTR-MCNC: 206 MG/DL — HIGH (ref 70–99)
GLUCOSE BLDC GLUCOMTR-MCNC: 212 MG/DL — HIGH (ref 70–99)
GLUCOSE BLDC GLUCOMTR-MCNC: 227 MG/DL — HIGH (ref 70–99)
GLUCOSE BLDC GLUCOMTR-MCNC: 270 MG/DL — HIGH (ref 70–99)
GLUCOSE BLDC GLUCOMTR-MCNC: 87 MG/DL — SIGNIFICANT CHANGE UP (ref 70–99)
GLUCOSE SERPL-MCNC: 96 MG/DL — SIGNIFICANT CHANGE UP (ref 70–99)
HCT VFR BLD CALC: 25 % — LOW (ref 37–47)
HGB BLD-MCNC: 7.7 G/DL — LOW (ref 12–16)
IMM GRANULOCYTES NFR BLD AUTO: 0.5 % — HIGH (ref 0.1–0.3)
LYMPHOCYTES # BLD AUTO: 1.16 K/UL — LOW (ref 1.2–3.4)
LYMPHOCYTES # BLD AUTO: 13.2 % — LOW (ref 20.5–51.1)
MAGNESIUM SERPL-MCNC: 2.5 MG/DL — HIGH (ref 1.8–2.4)
MCHC RBC-ENTMCNC: 26.9 PG — LOW (ref 27–31)
MCHC RBC-ENTMCNC: 30.8 G/DL — LOW (ref 32–37)
MCV RBC AUTO: 87.4 FL — SIGNIFICANT CHANGE UP (ref 81–99)
MONOCYTES # BLD AUTO: 0.37 K/UL — SIGNIFICANT CHANGE UP (ref 0.1–0.6)
MONOCYTES NFR BLD AUTO: 4.2 % — SIGNIFICANT CHANGE UP (ref 1.7–9.3)
NEUTROPHILS # BLD AUTO: 7.14 K/UL — HIGH (ref 1.4–6.5)
NEUTROPHILS NFR BLD AUTO: 81.6 % — HIGH (ref 42.2–75.2)
NRBC # BLD: 0 /100 WBCS — SIGNIFICANT CHANGE UP (ref 0–0)
PLATELET # BLD AUTO: 134 K/UL — SIGNIFICANT CHANGE UP (ref 130–400)
PMV BLD: 10.2 FL — SIGNIFICANT CHANGE UP (ref 7.4–10.4)
POTASSIUM SERPL-MCNC: 3.9 MMOL/L — SIGNIFICANT CHANGE UP (ref 3.5–5)
POTASSIUM SERPL-SCNC: 3.9 MMOL/L — SIGNIFICANT CHANGE UP (ref 3.5–5)
PROT SERPL-MCNC: 5.4 G/DL — LOW (ref 6–8)
RBC # BLD: 2.86 M/UL — LOW (ref 4.2–5.4)
RBC # FLD: 22.8 % — HIGH (ref 11.5–14.5)
SODIUM SERPL-SCNC: 142 MMOL/L — SIGNIFICANT CHANGE UP (ref 135–146)
WBC # BLD: 8.76 K/UL — SIGNIFICANT CHANGE UP (ref 4.8–10.8)
WBC # FLD AUTO: 8.76 K/UL — SIGNIFICANT CHANGE UP (ref 4.8–10.8)

## 2023-09-01 PROCEDURE — 71045 X-RAY EXAM CHEST 1 VIEW: CPT | Mod: 26

## 2023-09-01 RX ORDER — INSULIN GLARGINE 100 [IU]/ML
30 INJECTION, SOLUTION SUBCUTANEOUS EVERY MORNING
Refills: 0 | Status: DISCONTINUED | OUTPATIENT
Start: 2023-09-01 | End: 2023-09-02

## 2023-09-01 RX ORDER — AMIODARONE HYDROCHLORIDE 400 MG/1
150 TABLET ORAL ONCE
Refills: 0 | Status: COMPLETED | OUTPATIENT
Start: 2023-09-01 | End: 2023-09-01

## 2023-09-01 RX ORDER — AMIODARONE HYDROCHLORIDE 400 MG/1
1 TABLET ORAL
Qty: 450 | Refills: 0 | Status: DISCONTINUED | OUTPATIENT
Start: 2023-09-01 | End: 2023-09-02

## 2023-09-01 RX ORDER — POTASSIUM CHLORIDE 20 MEQ
20 PACKET (EA) ORAL ONCE
Refills: 0 | Status: COMPLETED | OUTPATIENT
Start: 2023-09-01 | End: 2023-09-01

## 2023-09-01 RX ORDER — MILRINONE LACTATE 1 MG/ML
0.25 INJECTION, SOLUTION INTRAVENOUS
Qty: 20 | Refills: 0 | Status: DISCONTINUED | OUTPATIENT
Start: 2023-09-01 | End: 2023-09-02

## 2023-09-01 RX ORDER — INSULIN LISPRO 100/ML
5 VIAL (ML) SUBCUTANEOUS
Refills: 0 | Status: DISCONTINUED | OUTPATIENT
Start: 2023-09-01 | End: 2023-09-02

## 2023-09-01 RX ORDER — LABETALOL HCL 100 MG
10 TABLET ORAL ONCE
Refills: 0 | Status: COMPLETED | OUTPATIENT
Start: 2023-09-01 | End: 2023-09-01

## 2023-09-01 RX ORDER — FUROSEMIDE 40 MG
40 TABLET ORAL DAILY
Refills: 0 | Status: DISCONTINUED | OUTPATIENT
Start: 2023-09-01 | End: 2023-09-03

## 2023-09-01 RX ORDER — ACETAMINOPHEN 500 MG
1000 TABLET ORAL ONCE
Refills: 0 | Status: COMPLETED | OUTPATIENT
Start: 2023-09-01 | End: 2023-09-01

## 2023-09-01 RX ORDER — INSULIN LISPRO 100/ML
VIAL (ML) SUBCUTANEOUS
Refills: 0 | Status: DISCONTINUED | OUTPATIENT
Start: 2023-09-01 | End: 2023-09-06

## 2023-09-01 RX ORDER — AMLODIPINE BESYLATE 2.5 MG/1
5 TABLET ORAL DAILY
Refills: 0 | Status: DISCONTINUED | OUTPATIENT
Start: 2023-09-01 | End: 2023-09-02

## 2023-09-01 RX ORDER — CLOPIDOGREL BISULFATE 75 MG/1
75 TABLET, FILM COATED ORAL DAILY
Refills: 0 | Status: DISCONTINUED | OUTPATIENT
Start: 2023-09-01 | End: 2023-09-06

## 2023-09-01 RX ORDER — BENZOCAINE 10 %
1 GEL (GRAM) MUCOUS MEMBRANE ONCE
Refills: 0 | Status: COMPLETED | OUTPATIENT
Start: 2023-09-01 | End: 2023-09-01

## 2023-09-01 RX ORDER — POTASSIUM CHLORIDE 20 MEQ
20 PACKET (EA) ORAL DAILY
Refills: 0 | Status: DISCONTINUED | OUTPATIENT
Start: 2023-09-01 | End: 2023-09-02

## 2023-09-01 RX ORDER — AMIODARONE HYDROCHLORIDE 400 MG/1
1 TABLET ORAL
Qty: 450 | Refills: 0 | Status: DISCONTINUED | OUTPATIENT
Start: 2023-09-01 | End: 2023-09-01

## 2023-09-01 RX ADMIN — FENTANYL CITRATE 25 MICROGRAM(S): 50 INJECTION INTRAVENOUS at 20:30

## 2023-09-01 RX ADMIN — Medication 20 MILLIEQUIVALENT(S): at 08:56

## 2023-09-01 RX ADMIN — FENTANYL CITRATE 25 MICROGRAM(S): 50 INJECTION INTRAVENOUS at 20:10

## 2023-09-01 RX ADMIN — INSULIN GLARGINE 30 UNIT(S): 100 INJECTION, SOLUTION SUBCUTANEOUS at 09:46

## 2023-09-01 RX ADMIN — AMLODIPINE BESYLATE 5 MILLIGRAM(S): 2.5 TABLET ORAL at 19:52

## 2023-09-01 RX ADMIN — Medication 3: at 17:31

## 2023-09-01 RX ADMIN — Medication 3 MILLILITER(S): at 13:18

## 2023-09-01 RX ADMIN — Medication 12.5 MILLIGRAM(S): at 17:32

## 2023-09-01 RX ADMIN — OXYCODONE HYDROCHLORIDE 10 MILLIGRAM(S): 5 TABLET ORAL at 21:06

## 2023-09-01 RX ADMIN — POLYETHYLENE GLYCOL 3350 17 GRAM(S): 17 POWDER, FOR SOLUTION ORAL at 12:07

## 2023-09-01 RX ADMIN — INSULIN HUMAN 1 UNIT(S)/HR: 100 INJECTION, SOLUTION SUBCUTANEOUS at 04:25

## 2023-09-01 RX ADMIN — HEPARIN SODIUM 5000 UNIT(S): 5000 INJECTION INTRAVENOUS; SUBCUTANEOUS at 21:06

## 2023-09-01 RX ADMIN — Medication 3 MILLILITER(S): at 19:32

## 2023-09-01 RX ADMIN — Medication 1000 MILLIGRAM(S): at 12:20

## 2023-09-01 RX ADMIN — HEPARIN SODIUM 5000 UNIT(S): 5000 INJECTION INTRAVENOUS; SUBCUTANEOUS at 05:19

## 2023-09-01 RX ADMIN — PANTOPRAZOLE SODIUM 40 MILLIGRAM(S): 20 TABLET, DELAYED RELEASE ORAL at 06:07

## 2023-09-01 RX ADMIN — OXYCODONE HYDROCHLORIDE 10 MILLIGRAM(S): 5 TABLET ORAL at 22:00

## 2023-09-01 RX ADMIN — ATORVASTATIN CALCIUM 40 MILLIGRAM(S): 80 TABLET, FILM COATED ORAL at 21:06

## 2023-09-01 RX ADMIN — HEPARIN SODIUM 5000 UNIT(S): 5000 INJECTION INTRAVENOUS; SUBCUTANEOUS at 14:30

## 2023-09-01 RX ADMIN — MILRINONE LACTATE 4.63 MICROGRAM(S)/KG/MIN: 1 INJECTION, SOLUTION INTRAVENOUS at 12:04

## 2023-09-01 RX ADMIN — Medication 400 MILLIGRAM(S): at 12:05

## 2023-09-01 RX ADMIN — AMIODARONE HYDROCHLORIDE 33.3 MG/MIN: 400 TABLET ORAL at 04:21

## 2023-09-01 RX ADMIN — MILRINONE LACTATE 4.63 MICROGRAM(S)/KG/MIN: 1 INJECTION, SOLUTION INTRAVENOUS at 20:11

## 2023-09-01 RX ADMIN — Medication 100 MILLIEQUIVALENT(S): at 04:22

## 2023-09-01 RX ADMIN — Medication 3 MILLILITER(S): at 01:46

## 2023-09-01 RX ADMIN — AMIODARONE HYDROCHLORIDE 600 MILLIGRAM(S): 400 TABLET ORAL at 04:00

## 2023-09-01 RX ADMIN — Medication 12.5 MILLIGRAM(S): at 05:19

## 2023-09-01 RX ADMIN — Medication 400 MILLIGRAM(S): at 04:20

## 2023-09-01 RX ADMIN — Medication 325 MILLIGRAM(S): at 12:05

## 2023-09-01 RX ADMIN — Medication 1000 MILLIGRAM(S): at 04:31

## 2023-09-01 RX ADMIN — SENNA PLUS 2 TABLET(S): 8.6 TABLET ORAL at 21:06

## 2023-09-01 RX ADMIN — Medication 1 SPRAY(S): at 13:41

## 2023-09-01 RX ADMIN — Medication 10 MILLIGRAM(S): at 13:42

## 2023-09-01 RX ADMIN — MILRINONE LACTATE 4.63 MICROGRAM(S)/KG/MIN: 1 INJECTION, SOLUTION INTRAVENOUS at 20:15

## 2023-09-01 RX ADMIN — Medication 1: at 12:05

## 2023-09-01 RX ADMIN — Medication 5 UNIT(S): at 17:10

## 2023-09-01 RX ADMIN — CLOPIDOGREL BISULFATE 75 MILLIGRAM(S): 75 TABLET, FILM COATED ORAL at 18:40

## 2023-09-01 RX ADMIN — AMIODARONE HYDROCHLORIDE 33.3 MG/MIN: 400 TABLET ORAL at 21:07

## 2023-09-01 RX ADMIN — Medication 40 MILLIGRAM(S): at 08:56

## 2023-09-01 RX ADMIN — Medication 3 MILLILITER(S): at 07:56

## 2023-09-01 NOTE — CONSULT NOTE ADULT - SUBJECTIVE AND OBJECTIVE BOX
EVENS RODRIGUES is a 69 y/o female who presented with a chief complaint of arm pain and AMS.       HPI: Patient is a 68F w/ PMHx of DM type 2, HTN, HLD, and obesity presented to the ED on 8/13/23 c/o severe left arm pain w/ findings of LBBB on EKG. Code STEMI called and subsequently canceled given negative troponin and no chest pain. Patient was then noted to have AMS, SOB, elevated lactate, acidosis on ABG, x2 seizure-like episodes that were treated with benzodiazepine. Was then intubated for airway protection. Admitted to MICU for airway monitoring. Extubated on 8/15.     Bedside echo was done which revealed EF ~30-35%, moderately to severely decreased global left ventricular systolic function. She underwent ischemic w/u per cardiology with a coronary CT angio on 8/18 which demonstrated a long segment of calcified plaque within the proximal LAD. She was incidentally found to have bilateral PEs, clinically asymptomatic. Patient diagnosed w/ NSTEMI/CAD and is now s/p CABG x3 on 8/30/23.       ROS: 10-system review is otherwise negative except HPI above.      PAST MEDICAL & SURGICAL HISTORY:  ·	Diabetes  ·	HTN  ·	HLD  ·	Obesity    FAMILY HISTORY:    [] Family history not pertinent as reviewed with the patient and family    SOCIAL HISTORY:  ***    ALLERGIES: No Known Allergies      HOME MEDICATIONS:  ***    CURRENT MEDICATIONS  MEDICATIONS (STANDING): albuterol/ipratropium for Nebulization 3 milliLiter(s) Nebulizer every 6 hours  aMIOdarone Infusion 1 mG/Min IV Continuous <Continuous>  amLODIPine   Tablet 5 milliGRAM(s) Oral daily  aspirin 325 milliGRAM(s) Oral daily  atorvastatin 40 milliGRAM(s) Oral at bedtime  bisacodyl Suppository 10 milliGRAM(s) Rectal once  dextrose 5%. 1000 milliLiter(s) IV Continuous <Continuous>  dextrose 50% Injectable 50 milliLiter(s) IV Push every 15 minutes  furosemide   Injectable 40 milliGRAM(s) IV Push daily  glucagon  Injectable 1 milliGRAM(s) IntraMuscular once  heparin   Injectable 5000 Unit(s) SubCutaneous every 8 hours  insulin glargine Injectable (LANTUS) 30 Unit(s) SubCutaneous every morning  insulin lispro (ADMELOG) corrective regimen sliding scale   SubCutaneous three times a day before meals  metoprolol tartrate 12.5 milliGRAM(s) Oral every 12 hours  milrinone Infusion 0.25 MICROgram(s)/kG/Min IV Continuous <Continuous>  pantoprazole    Tablet 40 milliGRAM(s) Oral before breakfast  polyethylene glycol 3350 17 Gram(s) Oral daily  potassium chloride    Tablet ER 20 milliEquivalent(s) Oral daily  senna 2 Tablet(s) Oral at bedtime  sodium chloride 0.9%. 1000 milliLiter(s) IV Continuous <Continuous>    MEDICATIONS (PRN):dextrose Oral Gel 15 Gram(s) Oral once PRN Blood Glucose LESS THAN 70 milliGRAM(s)/deciliter  oxyCODONE    IR 5 milliGRAM(s) Oral every 4 hours PRN Moderate Pain (4 - 6)  oxyCODONE    IR 10 milliGRAM(s) Oral every 4 hours PRN Severe Pain (7 - 10)    --------------------------------------------------------------------------------------------  VITALS:  T(C): 37.8 (09-01-23 @ 12:00), Max: 38 (09-01-23 @ 04:00)  HR: 86 (09-01-23 @ 13:00) (84 - 112)  BP: --  RR: 36 (09-01-23 @ 13:00) (14 - 43)  SpO2: 100% (09-01-23 @ 13:00) (95% - 100%)      CAPILLARY BLOOD GLUCOSE  POCT Blood Glucose.: 175 mg/dL (01 Sep 2023 11:41)  POCT Blood Glucose.: 176 mg/dL (01 Sep 2023 08:54)  POCT Blood Glucose.: 170 mg/dL (01 Sep 2023 07:46)  POCT Blood Glucose.: 188 mg/dL (01 Sep 2023 06:51)  POCT Blood Glucose.: 212 mg/dL (01 Sep 2023 06:08)  POCT Blood Glucose.: 206 mg/dL (01 Sep 2023 05:23)  POCT Blood Glucose.: 201 mg/dL (01 Sep 2023 04:26)  POCT Blood Glucose.: 110 mg/dL (01 Sep 2023 02:28)  POCT Blood Glucose.: 87 mg/dL (01 Sep 2023 01:01)  POCT Blood Glucose.: 100 mg/dL (01 Sep 2023 00:18)  POCT Blood Glucose.: 124 mg/dL (31 Aug 2023 23:16)  POCT Blood Glucose.: 158 mg/dL (31 Aug 2023 22:13)  POCT Blood Glucose.: 184 mg/dL (31 Aug 2023 20:58)  POCT Blood Glucose.: 169 mg/dL (31 Aug 2023 19:43)  POCT Blood Glucose.: 78 mg/dL (31 Aug 2023 17:01)  POCT Blood Glucose.: 115 mg/dL (31 Aug 2023 15:55)  POCT Blood Glucose.: 145 mg/dL (31 Aug 2023 14:14)        08-31 @ 07:01 - 09-01 @ 07:00  --------------------------------------------------------  IN:    Amiodarone: 133.2 mL    Insulin: 83 mL    IV PiggyBack: 200 mL    Milrinone: 165.6 mL    Oral Fluid: 850 mL    sodium chloride 0.9%: 290 mL  Total IN: 1721.8 mL    OUT:    Chest Tube (mL): 88 mL    Indwelling Catheter - Urethral (mL): 745 mL  Total OUT: 833 mL    Total NET: 888.8 mL      09-01 @ 07:01 - 09-01 @ 13:55  --------------------------------------------------------  IN:    Amiodarone: 166.5 mL    Insulin: 14 mL    IV PiggyBack: 100 mL    Milrinone: 6.9 mL    Milrinone: 18.4 mL    Oral Fluid: 360 mL    sodium chloride 0.9%: 30 mL  Total IN: 695.8 mL    OUT:    Indwelling Catheter - Urethral (mL): 325 mL  Total OUT: 325 mL    Total NET: 370.8 mL        Height (cm): 152 (08-30 @ 05:01)  Weight (kg): 61.7 (08-30 @ 05:01)  BMI (kg/m2): 26.7 (08-30 @ 05:01)  BSA (m2): 1.58 (08-30 @ 05:01)    PHYSICAL EXAM:  General: NAD, lying in bed, appears anxious   Neuro: AAOx3  HEENT: PERRL, EOMI  Cardio: RRR   Resp: Good effort, equal chest rise bilateral   GI/Abd: Soft, NT/ND, no rebound/guarding   Vascular: Extremities are normal in size, color and temperature    Bilateral DP/PT palpable   Skin: Intact, no breakdown  Lymphatic/Nodes: No palpable lymphadenopathy  Musculoskeletal: All 4 extremities moving spontaneously, no limitations.   --------------------------------------------------------------------------------------------    LABS  CBC (09-01 @ 01:20)                              7.7<L>                         8.76    )----------------(  134        81.6<H>% Neutrophils, 13.2<L>% Lymphocytes, ANC: 7.14<H>                              25.0<L>    BMP (09-01 @ 01:20)             142     |  111<H>  |  15    		Ca++ --      Ca 8.1<L>             ---------------------------------( 96    		Mg 2.5<H>             3.9     |  22      |  0.5<L>			Ph --        LFTs (09-01 @ 01:20)      TPro 5.4<L> / Alb 3.3<L> / TBili 0.6 / DBili -- / AST 27 / ALT 12 / AlkPhos 65        ABG (09-01 @ 02:30)     7.38 / 29 / 79<L> / 17<L> / -7.1<L> / 97.3%     Lactate:    ABG (08-31 @ 03:25)     7.35 / 37 / 133<H> / 20<L> / -4.8<L> / 98.7<H>%     Lactate:      VBG (08-31 @ 04:32)     7.33 / 40 / 39 / 21<L> / -4.5<L> / 67.1%     Lactate: 0.70      --------------------------------------------------------------------------------------------  IMAGING  - VA Duplex Lower Ext Vein Scan, Bilat (08.31.23 @ 13:12) >  FINDINGS:    RIGHT:  Normal compressibility of the RIGHT common femoral, femoral and popliteal   veins.  Doppler examination shows normal spontaneous and phasic flow.  Thrombosis of the right peroneal vein.    LEFT:  Normal compressibility of the LEFT common femoral, femoral and popliteal   veins.  Doppler examination shows normal spontaneous and phasic flow.  No LEFT calf vein thrombosis is detected.    IMPRESSION:  Thrombosis of the right peroneal vein.  No evidence of deep venous thrombosis in the left lower extremity.      - VA Duplex Lower Ext Vein Scan, Bilat (08.24.23 @ 22:43) >  FINDINGS:    RIGHT:  Normal compressibility of the RIGHT common femoral, femoral and popliteal   veins.  Doppler examination shows normal spontaneous and phasic flow.  Right peroneal vein is thrombosed    LEFT:  Normal compressibility of the LEFT common femoral and popliteal veins.  Doppler examination shows normal spontaneous and phasic flow.  No LEFT calf vein thrombosis is detected.  Left femoral vein is thrombosed      IMPRESSION:  Thrombus noted in the left femoral and right peroneal veins      - CT Angio Heart and Coronaries w/ IV Cont (08.18.23 @ 15:10) >  CORONARY CT ANGIOGRAM:    IMAGED AORTA: The thoracic aorta has a normal caliber. There are aortic   calcifications. There are aortic valvular calcifications.    There is a right dominant coronary arterial system.    Left Main Artery: Patent with no evidence ofplaque or stenosis.    Left Anterior Descending Artery: Long segment of calcified plaque within   the proximal LAD with indeterminant level of narrowing due to suboptimal   vasodilatation and blooming artifact. The proximal to mid LAD as well as   the proximal second diagonal branch poorly opacified; the terminus of   this is related to motion artifact versus underlying severe narrowing    Left Circumflex Artery: Calcified plaque resulting in minimal narrowing    Right Coronary Artery: Calcified plaque resulting in minimal narrowing    CARDIAC MORPHOLOGY: The left ventricle is enlarged. There is no   pericardial effusion.    There are filling defects within the segmental pulmonary arteries   bilaterally compatible pulmonary embolism.    IMAGED EXTRACARDIAC FINDINGS:  Diffuse bronchial wall thickening and opacity/debris. Trace pleural   effusion. Right basilar nodular opacities    IMPRESSION:  ---Long segment of calcified plaque within the proximal LAD with   indeterminant level of narrowing due to suboptimal vasodilatation and   blooming artifact.    Proximal to mid LAD as well as the proximal second diagonal branch poorly   opacified likely related to motion artifact. Significant   (moderate/severe) narrowing therefore cannot be entirely excluded and   further evaluation is recommended    The total Agatston coronary artery calcium score equals 130, which   corresponds to 76th percentile for age, gender and ethnicity.    CAD-RADS N(proximal/mid LAD).    ---Filling defects within the segmental pulmonary arteries bilaterally   compatible pulmonary embolism.    ---Diffuse bronchial wall thickening and opacity/debris. Trace pleural   effusion. Right basilar nodular opacities likely of infectious or   inflammatory etiology        CAD-RADS  Reporting and data system  ______________________________________________________________________    CAD-RADS 0  -    No plaque or stenosis                 Documented absence   of CAD  CAD-RADS 1  -    1-24% Minimal stenosis              Minimal   non-obstructive CAD  CAD-RADS 2  -    25 - 49% Mild stenosis                Mild   non-obstructive CAD  CAD-RADS 3  -    50 - 69% stenosis                        Moderate   Stenosis  CAD-RADS 4 -    A. 70 - 99% Stenosis  or             Severe Stenosis                                  B. Left main >50%  CAD-RADS 5  -    100% (total occlusion)                 Total coronary   occlusion  CAD-RADS N  -    Non-diagnostic studyObstructive CAD   cannot be excluded    --- End of Report ---           EVENS RODRIGUES is a 67 y/o female who presented with a chief complaint of arm pain and AMS.       HPI: Patient is a 68F w/ PMHx of DM type 2, HTN, HLD, and obesity presented to the ED on 8/13/23 c/o severe left arm pain w/ findings of LBBB on EKG. Code STEMI called and subsequently canceled given negative troponin and no chest pain. Patient was then noted to have AMS, SOB, elevated lactate, acidosis on ABG, x2 seizure-like episodes that were treated with benzodiazepine. Was then intubated for airway protection. Admitted to MICU for airway monitoring. Extubated on 8/15.     Bedside echo was done which revealed EF ~30-35%, moderately to severely decreased global left ventricular systolic function. She underwent ischemic w/u per cardiology with a coronary CT angio on 8/18 which demonstrated a long segment of calcified plaque within the proximal LAD. She was incidentally found to have bilateral PEs, clinically asymptomatic. Patient diagnosed w/ NSTEMI/CAD and is now s/p CABG x3 on 8/30/23.       ROS: 10-system review is otherwise negative except HPI above.      PAST MEDICAL & SURGICAL HISTORY:  ·	Diabetes  ·	HTN  ·	HLD  ·	Obesity    FAMILY HISTORY:    [] Family history not pertinent as reviewed with the patient and family    SOCIAL HISTORY:  ***    ALLERGIES: No Known Allergies      HOME MEDICATIONS:  ***    CURRENT MEDICATIONS  MEDICATIONS (STANDING): albuterol/ipratropium for Nebulization 3 milliLiter(s) Nebulizer every 6 hours  aMIOdarone Infusion 1 mG/Min IV Continuous <Continuous>  amLODIPine   Tablet 5 milliGRAM(s) Oral daily  aspirin 325 milliGRAM(s) Oral daily  atorvastatin 40 milliGRAM(s) Oral at bedtime  bisacodyl Suppository 10 milliGRAM(s) Rectal once  dextrose 5%. 1000 milliLiter(s) IV Continuous <Continuous>  dextrose 50% Injectable 50 milliLiter(s) IV Push every 15 minutes  furosemide   Injectable 40 milliGRAM(s) IV Push daily  glucagon  Injectable 1 milliGRAM(s) IntraMuscular once  heparin   Injectable 5000 Unit(s) SubCutaneous every 8 hours  insulin glargine Injectable (LANTUS) 30 Unit(s) SubCutaneous every morning  insulin lispro (ADMELOG) corrective regimen sliding scale   SubCutaneous three times a day before meals  metoprolol tartrate 12.5 milliGRAM(s) Oral every 12 hours  milrinone Infusion 0.25 MICROgram(s)/kG/Min IV Continuous <Continuous>  pantoprazole    Tablet 40 milliGRAM(s) Oral before breakfast  polyethylene glycol 3350 17 Gram(s) Oral daily  potassium chloride    Tablet ER 20 milliEquivalent(s) Oral daily  senna 2 Tablet(s) Oral at bedtime  sodium chloride 0.9%. 1000 milliLiter(s) IV Continuous <Continuous>    MEDICATIONS (PRN):dextrose Oral Gel 15 Gram(s) Oral once PRN Blood Glucose LESS THAN 70 milliGRAM(s)/deciliter  oxyCODONE    IR 5 milliGRAM(s) Oral every 4 hours PRN Moderate Pain (4 - 6)  oxyCODONE    IR 10 milliGRAM(s) Oral every 4 hours PRN Severe Pain (7 - 10)    --------------------------------------------------------------------------------------------  VITALS:  T(C): 37.8 (09-01-23 @ 12:00), Max: 38 (09-01-23 @ 04:00)  HR: 86 (09-01-23 @ 13:00) (84 - 112)  BP: --  RR: 36 (09-01-23 @ 13:00) (14 - 43)  SpO2: 100% (09-01-23 @ 13:00) (95% - 100%)      CAPILLARY BLOOD GLUCOSE  POCT Blood Glucose.: 175 mg/dL (01 Sep 2023 11:41)  POCT Blood Glucose.: 176 mg/dL (01 Sep 2023 08:54)  POCT Blood Glucose.: 170 mg/dL (01 Sep 2023 07:46)  POCT Blood Glucose.: 188 mg/dL (01 Sep 2023 06:51)  POCT Blood Glucose.: 212 mg/dL (01 Sep 2023 06:08)  POCT Blood Glucose.: 206 mg/dL (01 Sep 2023 05:23)  POCT Blood Glucose.: 201 mg/dL (01 Sep 2023 04:26)  POCT Blood Glucose.: 110 mg/dL (01 Sep 2023 02:28)  POCT Blood Glucose.: 87 mg/dL (01 Sep 2023 01:01)  POCT Blood Glucose.: 100 mg/dL (01 Sep 2023 00:18)  POCT Blood Glucose.: 124 mg/dL (31 Aug 2023 23:16)  POCT Blood Glucose.: 158 mg/dL (31 Aug 2023 22:13)  POCT Blood Glucose.: 184 mg/dL (31 Aug 2023 20:58)  POCT Blood Glucose.: 169 mg/dL (31 Aug 2023 19:43)  POCT Blood Glucose.: 78 mg/dL (31 Aug 2023 17:01)  POCT Blood Glucose.: 115 mg/dL (31 Aug 2023 15:55)  POCT Blood Glucose.: 145 mg/dL (31 Aug 2023 14:14)        08-31 @ 07:01 - 09-01 @ 07:00  --------------------------------------------------------  IN:    Amiodarone: 133.2 mL    Insulin: 83 mL    IV PiggyBack: 200 mL    Milrinone: 165.6 mL    Oral Fluid: 850 mL    sodium chloride 0.9%: 290 mL  Total IN: 1721.8 mL    OUT:    Chest Tube (mL): 88 mL    Indwelling Catheter - Urethral (mL): 745 mL  Total OUT: 833 mL    Total NET: 888.8 mL      09-01 @ 07:01 - 09-01 @ 13:55  --------------------------------------------------------  IN:    Amiodarone: 166.5 mL    Insulin: 14 mL    IV PiggyBack: 100 mL    Milrinone: 6.9 mL    Milrinone: 18.4 mL    Oral Fluid: 360 mL    sodium chloride 0.9%: 30 mL  Total IN: 695.8 mL    OUT:    Indwelling Catheter - Urethral (mL): 325 mL  Total OUT: 325 mL    Total NET: 370.8 mL        Height (cm): 152 (08-30 @ 05:01)  Weight (kg): 61.7 (08-30 @ 05:01)  BMI (kg/m2): 26.7 (08-30 @ 05:01)  BSA (m2): 1.58 (08-30 @ 05:01)    PHYSICAL EXAM:  General: NAD, lying in bed, appears anxious   Neuro: AAOx3  HEENT: PERRL, EOMI  Cardio: RRR   Resp: Good effort, equal chest rise bilateral   GI/Abd: Soft, NT/ND, no rebound/guarding   Vascular: Extremities are normal in size, color and temperature    Bilateral DP/PT palpable   Skin: Intact, no breakdown  Lymphatic/Nodes: No palpable lymphadenopathy  Musculoskeletal: All 4 extremities moving spontaneously, no limitations.   --------------------------------------------------------------------------------------------  LABS:   CBC (09-01 @ 01:20)                              7.7<L>                         8.76    )----------------(  134        81.6<H>% Neutrophils, 13.2<L>% Lymphocytes, ANC: 7.14<H>                              25.0<L>    BMP (09-01 @ 01:20)             142     |  111<H>  |  15    		Ca++ --      Ca 8.1<L>             ---------------------------------( 96    		Mg 2.5<H>             3.9     |  22      |  0.5<L>			Ph --        LFTs (09-01 @ 01:20)      TPro 5.4<L> / Alb 3.3<L> / TBili 0.6 / DBili -- / AST 27 / ALT 12 / AlkPhos 65        ABG (09-01 @ 02:30)     7.38 / 29 / 79<L> / 17<L> / -7.1<L> / 97.3%     Lactate:    ABG (08-31 @ 03:25)     7.35 / 37 / 133<H> / 20<L> / -4.8<L> / 98.7<H>%     Lactate:      VBG (08-31 @ 04:32)     7.33 / 40 / 39 / 21<L> / -4.5<L> / 67.1%     Lactate: 0.70      --------------------------------------------------------------------------------------------  IMAGING  - VA Duplex Lower Ext Vein Scan, Bilat (08.31.23 @ 13:12) >  FINDINGS:    RIGHT:  Normal compressibility of the RIGHT common femoral, femoral and popliteal   veins.  Doppler examination shows normal spontaneous and phasic flow.  Thrombosis of the right peroneal vein.    LEFT:  Normal compressibility of the LEFT common femoral, femoral and popliteal   veins.  Doppler examination shows normal spontaneous and phasic flow.  No LEFT calf vein thrombosis is detected.    IMPRESSION:  Thrombosis of the right peroneal vein.  No evidence of deep venous thrombosis in the left lower extremity.      - VA Duplex Lower Ext Vein Scan, Bilat (08.24.23 @ 22:43) >  FINDINGS:    RIGHT:  Normal compressibility of the RIGHT common femoral, femoral and popliteal   veins.  Doppler examination shows normal spontaneous and phasic flow.  Right peroneal vein is thrombosed    LEFT:  Normal compressibility of the LEFT common femoral and popliteal veins.  Doppler examination shows normal spontaneous and phasic flow.  No LEFT calf vein thrombosis is detected.  Left femoral vein is thrombosed      IMPRESSION:  Thrombus noted in the left femoral and right peroneal veins      - CT Angio Heart and Coronaries w/ IV Cont (08.18.23 @ 15:10) >  CORONARY CT ANGIOGRAM:    IMAGED AORTA: The thoracic aorta has a normal caliber. There are aortic   calcifications. There are aortic valvular calcifications.    There is a right dominant coronary arterial system.    Left Main Artery: Patent with no evidence ofplaque or stenosis.    Left Anterior Descending Artery: Long segment of calcified plaque within   the proximal LAD with indeterminant level of narrowing due to suboptimal   vasodilatation and blooming artifact. The proximal to mid LAD as well as   the proximal second diagonal branch poorly opacified; the terminus of   this is related to motion artifact versus underlying severe narrowing    Left Circumflex Artery: Calcified plaque resulting in minimal narrowing    Right Coronary Artery: Calcified plaque resulting in minimal narrowing    CARDIAC MORPHOLOGY: The left ventricle is enlarged. There is no   pericardial effusion.    There are filling defects within the segmental pulmonary arteries   bilaterally compatible pulmonary embolism.    IMAGED EXTRACARDIAC FINDINGS:  Diffuse bronchial wall thickening and opacity/debris. Trace pleural   effusion. Right basilar nodular opacities    IMPRESSION:  ---Long segment of calcified plaque within the proximal LAD with   indeterminant level of narrowing due to suboptimal vasodilatation and   blooming artifact.    Proximal to mid LAD as well as the proximal second diagonal branch poorly   opacified likely related to motion artifact. Significant   (moderate/severe) narrowing therefore cannot be entirely excluded and   further evaluation is recommended    The total Agatston coronary artery calcium score equals 130, which   corresponds to 76th percentile for age, gender and ethnicity.    CAD-RADS N(proximal/mid LAD).    ---Filling defects within the segmental pulmonary arteries bilaterally   compatible pulmonary embolism.    ---Diffuse bronchial wall thickening and opacity/debris. Trace pleural   effusion. Right basilar nodular opacities likely of infectious or   inflammatory etiology        CAD-RADS  Reporting and data system  ______________________________________________________________________    CAD-RADS 0  -    No plaque or stenosis                 Documented absence   of CAD  CAD-RADS 1  -    1-24% Minimal stenosis              Minimal   non-obstructive CAD  CAD-RADS 2  -    25 - 49% Mild stenosis                Mild   non-obstructive CAD  CAD-RADS 3  -    50 - 69% stenosis                        Moderate   Stenosis  CAD-RADS 4 -    A. 70 - 99% Stenosis  or             Severe Stenosis                                  B. Left main >50%  CAD-RADS 5  -    100% (total occlusion)                 Total coronary   occlusion  CAD-RADS N  -    Non-diagnostic studyObstructive CAD   cannot be excluded    --- End of Report ---

## 2023-09-01 NOTE — PROGRESS NOTE ADULT - ASSESSMENT
Assessment/Plan:  NSTEMI/CAD-s/p CABG x 3-POD #2  1-BP control-start beta-blockers   2-serum glucose control-insulin infusion  3-fluid overload-gentle diuresis  4-acute blood loss anemia-stable, monitor Hb/Hct daily  5-chronic systolic heart failure-continue inotropic support       Assessment/Plan:  NSTEMI/CAD-s/p CABG x 3-POD #2  1-BP control- beta-blockers   2-serum glucose control-insulin lantus/novlog  3-fluid overload-gentle diuresis lasix   4-acute blood loss anemia-stable, monitor Hb/Hct daily  5-chronic systolic heart failure-continue inotropic support and lasix    decrease milrinon to 0.25

## 2023-09-01 NOTE — PROGRESS NOTE ADULT - SUBJECTIVE AND OBJECTIVE BOX
OPERATIVE PROCEDURE(s): CABG               POD #2                     SURGEON(s): ANIRUDH Coon      SUBJECTIVE ASSESSMENT:68yFemale patient seen and examined at bedside.    Vital Signs Last 24 Hrs  T(F): 99.9 (01 Sep 2023 07:00), Max: 100.4 (01 Sep 2023 04:00)  HR: 84 (01 Sep 2023 07:00) (84 - 112)  ABP: 112/43 (01 Sep 2023 07:00) (100/40 - 152/43)  ABP(mean): 62 (01 Sep 2023 07:00)  RR: 29 (01 Sep 2023 07:00) (9 - 43)  SpO2: 100% (01 Sep 2023 07:00) (95% - 100%)  CVP(mm Hg): 13 (31 Aug 2023 10:00)    I&O's Detail    31 Aug 2023 07:01  -  01 Sep 2023 07:00  --------------------------------------------------------  IN:    Amiodarone: 133.2 mL    Insulin: 83 mL    IV PiggyBack: 200 mL    Milrinone: 165.6 mL    Oral Fluid: 850 mL    sodium chloride 0.9%: 290 mL  Total IN: 1721.8 mL    OUT:    Chest Tube (mL): 88 mL    Indwelling Catheter - Urethral (mL): 745 mL  Total OUT: 833 mL        Net: I&O's Detail    30 Aug 2023 07:01  -  31 Aug 2023 07:00  --------------------------------------------------------  Total NET: 304.2 mL      31 Aug 2023 07:01  -  01 Sep 2023 07:00  --------------------------------------------------------  Total NET: 888.8 mL        CAPILLARY BLOOD GLUCOSE      POCT Blood Glucose.: 170 mg/dL (01 Sep 2023 07:46)  POCT Blood Glucose.: 212 mg/dL (01 Sep 2023 06:08)  POCT Blood Glucose.: 206 mg/dL (01 Sep 2023 05:23)  POCT Blood Glucose.: 201 mg/dL (01 Sep 2023 04:26)  POCT Blood Glucose.: 110 mg/dL (01 Sep 2023 02:28)  POCT Blood Glucose.: 87 mg/dL (01 Sep 2023 01:01)  POCT Blood Glucose.: 100 mg/dL (01 Sep 2023 00:18)  POCT Blood Glucose.: 124 mg/dL (31 Aug 2023 23:16)  POCT Blood Glucose.: 158 mg/dL (31 Aug 2023 22:13)  POCT Blood Glucose.: 184 mg/dL (31 Aug 2023 20:58)  POCT Blood Glucose.: 169 mg/dL (31 Aug 2023 19:43)  POCT Blood Glucose.: 78 mg/dL (31 Aug 2023 17:01)  POCT Blood Glucose.: 115 mg/dL (31 Aug 2023 15:55)  POCT Blood Glucose.: 145 mg/dL (31 Aug 2023 14:14)  POCT Blood Glucose.: 175 mg/dL (31 Aug 2023 12:09)  POCT Blood Glucose.: 235 mg/dL (31 Aug 2023 10:31)  POCT Blood Glucose.: 169 mg/dL (31 Aug 2023 08:41)    A1C with Estimated Average Glucose Result: 8.2 % (08-14-23 @ 04:40)      Physical Exam:  General: NAD; A&Ox3  Cardiac: S1/S2, RRR, no murmur, no rubs  Lungs: unlabored respirations, CTA b/l, no wheeze, no rales, no crackles  Abdomen: Soft/NT/ND; positive bowel sounds x 4  Sternum: Intact, no click, incision healing well with no drainage  Incisions: Incisions clean/dry/intact  Extremities: No edema b/l lower extremities; good capillary refill; no cyanosis; palpable 1+ pedal pulses b/l    Central Venous Catheter: Yes: critical illness, intravenous access  Day #  Johnson Catheter: Yes: critical illness; monitor strict i/o's                    Day #  EPICARDIAL WIRES:  YES                                                                         Day #  BOWEL MOVEMENT:  [] YES [] NO, If No, Timing since last BM Day #  CHEST TUBE(MS/Left/Right):  [] YES [] NO, If yes -  AIR LEAKS:  YES/NO        LABS:                        7.7<L>  8.76  )-----------( 134      ( 01 Sep 2023 01:20 )             25.0<L>                        8.0<L>  9.76  )-----------( 159      ( 31 Aug 2023 01:05 )             25.8<L>    09-01    142  |  111<H>  |  15  ----------------------------<  96  3.9   |  22  |  0.5<L>  08-31    144  |  115<H>  |  10  ----------------------------<  112<H>  4.7   |  21  |  0.6<L>    Ca    8.1<L>      01 Sep 2023 01:20  Mg     2.5     09-01    TPro  5.4<L> [6.0 - 8.0]  /  Alb  3.3<L> [3.5 - 5.2]  /  TBili  0.6 [0.2 - 1.2]  /  DBili  x   /  AST  27 [0 - 41]  /  ALT  12 [0 - 41]  /  AlkPhos  65 [30 - 115]  09-01    PT/INR - ( 30 Aug 2023 15:28 )   PT: ;   INR: 1.27 ratio         PTT - ( 30 Aug 2023 15:28 )  PTT:29.6 sec  Urinalysis Basic - ( 01 Sep 2023 01:20 )    Color: x / Appearance: x / SG: x / pH: x  Gluc: 96 mg/dL / Ketone: x  / Bili: x / Urobili: x   Blood: x / Protein: x / Nitrite: x   Leuk Esterase: x / RBC: x / WBC x   Sq Epi: x / Non Sq Epi: x / Bacteria: x      ABG - ( 01 Sep 2023 02:30 )  pH: 7.38  /  pCO2: 29    /  pO2: 79    / HCO3: 17    / Base Excess: -7.1  /  SaO2: 97.3  /  LA: 0.80             RADIOLOGY & ADDITIONAL TESTS:  CXR:   EKG:    Allergies    No Known Allergies    Intolerances      MEDICATIONS  (STANDING):  albuterol/ipratropium for Nebulization 3 milliLiter(s) Nebulizer every 6 hours  aMIOdarone Infusion 1 mG/Min (33.3 mL/Hr) IV Continuous <Continuous>  aspirin 325 milliGRAM(s) Oral daily  atorvastatin 40 milliGRAM(s) Oral at bedtime  bisacodyl Suppository 10 milliGRAM(s) Rectal once  chlorhexidine 2% Cloths 1 Application(s) Topical daily  dextrose 5%. 1000 milliLiter(s) (50 mL/Hr) IV Continuous <Continuous>  dextrose 50% Injectable 50 milliLiter(s) IV Push every 15 minutes  furosemide   Injectable 40 milliGRAM(s) IV Push daily  glucagon  Injectable 1 milliGRAM(s) IntraMuscular once  heparin   Injectable 5000 Unit(s) SubCutaneous every 8 hours  insulin glargine Injectable (LANTUS) 30 Unit(s) SubCutaneous every morning  insulin lispro (ADMELOG) corrective regimen sliding scale   SubCutaneous three times a day before meals  metoprolol tartrate 12.5 milliGRAM(s) Oral every 12 hours  milrinone Infusion 0.25 MICROgram(s)/kG/Min (4.63 mL/Hr) IV Continuous <Continuous>  pantoprazole    Tablet 40 milliGRAM(s) Oral before breakfast  polyethylene glycol 3350 17 Gram(s) Oral daily  potassium chloride    Tablet ER 20 milliEquivalent(s) Oral daily  senna 2 Tablet(s) Oral at bedtime  sodium chloride 0.9%. 1000 milliLiter(s) (10 mL/Hr) IV Continuous <Continuous>    MEDICATIONS  (PRN):  dextrose Oral Gel 15 Gram(s) Oral once PRN Blood Glucose LESS THAN 70 milliGRAM(s)/deciliter  oxyCODONE    IR 10 milliGRAM(s) Oral every 4 hours PRN Severe Pain (7 - 10)  oxyCODONE    IR 5 milliGRAM(s) Oral every 4 hours PRN Moderate Pain (4 - 6)    Home Medications:  aspirin 81 mg oral tablet, chewable: 1 chewed once a day (13 Aug 2023 19:53)  atorvastatin 10 mg oral tablet: 1 orally once a day (at bedtime) (13 Aug 2023 19:52)  Jardiance 10 mg oral tablet: 1 orally once a day (13 Aug 2023 19:53)  MetFORMIN (Eqv-Glumetza) 500 mg oral tablet, extended release: 1 orally 2 times a day (13 Aug 2023 19:51)  pioglitazone 30 mg oral tablet: 1 orally once a day (13 Aug 2023 19:52)      Pharmacologic DVT Prophylaxis [] YES, [] NO: Contraindication:  SCD's: YES b/l    GI Prophylaxis:     Post-Op Beta-Blockers: [] Yes, [] No: contraindication:  Post-Op CCB: [] Yes, [] No: contraindication:  Post-Op Aspirin:  [] Yes, [] No: contraindication:  Post-Op Statin:  [] Yes, [] No: contraindication:    Ambulation/Activity Status:    Assessment/Plan:  68y Female status-post  - Case and plan discussed with CTU Intensivist and CT Surgeon - Dr. Simental/Jaymie/Jim/Reshma  - Continue CTU supportive care and ongoing plan of care as per continuing CTU rounds.   - Continue DVT/GI prophylaxis  - Incentive Spirometry 10 times an hour  - Continue to advance physical activity as tolerated and continue PT/OT as directed  1. CAD s/p CABG: Continue ASA, statin, BB  2. HTN:   3. Post-op A. Fib ppx:   4. COPD/Hypoxia:   5. DM/Glucose Control:     Social Service Disposition:     OPERATIVE PROCEDURE(s): CABG x3              POD #2                     SURGEON(s): ANIRUDH Coon      SUBJECTIVE ASSESSMENT: 68y Female patient seen and examined at bedside. Pt went into rapid A-fib last night which then converted to sinus rhythm. Patient is currently sinus rhythm and has no complaints at this time.    Vital Signs Last 24 Hrs  T(F): 99.9 (01 Sep 2023 07:00), Max: 100.4 (01 Sep 2023 04:00)  HR: 84 (01 Sep 2023 07:00) (84 - 112)  ABP: 112/43 (01 Sep 2023 07:00) (100/40 - 152/43)  ABP(mean): 62 (01 Sep 2023 07:00)  RR: 29 (01 Sep 2023 07:00) (9 - 43)  SpO2: 100% (01 Sep 2023 07:00) (95% - 100%)  CVP(mm Hg): 13 (31 Aug 2023 10:00)    I&O's Detail    31 Aug 2023 07:01  -  01 Sep 2023 07:00  --------------------------------------------------------  IN:    Amiodarone: 133.2 mL    Insulin: 83 mL    IV PiggyBack: 200 mL    Milrinone: 165.6 mL    Oral Fluid: 850 mL    sodium chloride 0.9%: 290 mL  Total IN: 1721.8 mL    OUT:    Chest Tube (mL): 88 mL    Indwelling Catheter - Urethral (mL): 745 mL  Total OUT: 833 mL    Net: I&O's Detail    30 Aug 2023 07:01  -  31 Aug 2023 07:00  --------------------------------------------------------  Total NET: 304.2 mL    31 Aug 2023 07:01  -  01 Sep 2023 07:00  --------------------------------------------------------  Total NET: 888.8 mL      CAPILLARY BLOOD GLUCOSE  POCT Blood Glucose.: 170 mg/dL (01 Sep 2023 07:46)  POCT Blood Glucose.: 212 mg/dL (01 Sep 2023 06:08)  POCT Blood Glucose.: 206 mg/dL (01 Sep 2023 05:23)  POCT Blood Glucose.: 201 mg/dL (01 Sep 2023 04:26)  POCT Blood Glucose.: 110 mg/dL (01 Sep 2023 02:28)  POCT Blood Glucose.: 87 mg/dL (01 Sep 2023 01:01)  POCT Blood Glucose.: 100 mg/dL (01 Sep 2023 00:18)  POCT Blood Glucose.: 124 mg/dL (31 Aug 2023 23:16)  POCT Blood Glucose.: 158 mg/dL (31 Aug 2023 22:13)  POCT Blood Glucose.: 184 mg/dL (31 Aug 2023 20:58)  POCT Blood Glucose.: 169 mg/dL (31 Aug 2023 19:43)  POCT Blood Glucose.: 78 mg/dL (31 Aug 2023 17:01)  POCT Blood Glucose.: 115 mg/dL (31 Aug 2023 15:55)  POCT Blood Glucose.: 145 mg/dL (31 Aug 2023 14:14)  POCT Blood Glucose.: 175 mg/dL (31 Aug 2023 12:09)  POCT Blood Glucose.: 235 mg/dL (31 Aug 2023 10:31)  POCT Blood Glucose.: 169 mg/dL (31 Aug 2023 08:41)    A1C with Estimated Average Glucose Result: 8.2 % (08-14-23 @ 04:40)    Physical Exam:  General: NAD; A&Ox3  Cardiac: S1/S2, RRR, no murmur, no rubs  Lungs: unlabored respirations, CTA b/l, no wheeze, no rales, no crackles  Abdomen: Soft/NT/ND; positive bowel sounds x 4  Sternum: Intact, no click, incision healing well with no drainage  Incisions: Incisions clean/dry/intact  Extremities: 1+ edema b/l lower extremities; no cyanosis    Central Venous Catheter: Yes: critical illness, intravenous access     Day # 2  Johnson Catheter: Yes: critical illness; monitor strict i/o's                    Day # 2  EPICARDIAL WIRES:  YES                                                              Day # 2  BOWEL MOVEMENT:  [] YES [X] NO, If No, Timing since last BM Day #      LABS:                        7.7<L>  8.76  )-----------( 134      ( 01 Sep 2023 01:20 )             25.0<L>                        8.0<L>  9.76  )-----------( 159      ( 31 Aug 2023 01:05 )             25.8<L>    09-01    142  |  111<H>  |  15  ----------------------------<  96  3.9   |  22  |  0.5<L>  08-31    144  |  115<H>  |  10  ----------------------------<  112<H>  4.7   |  21  |  0.6<L>    Ca    8.1<L>      01 Sep 2023 01:20  Mg     2.5     09-01    TPro  5.4<L> [6.0 - 8.0]  /  Alb  3.3<L> [3.5 - 5.2]  /  TBili  0.6 [0.2 - 1.2]  /  DBili  x   /  AST  27 [0 - 41]  /  ALT  12 [0 - 41]  /  AlkPhos  65 [30 - 115]  09-01    PT/INR - ( 30 Aug 2023 15:28 )   PT: ;   INR: 1.27 ratio       PTT - ( 30 Aug 2023 15:28 )  PTT:29.6 sec    Urinalysis Basic - ( 01 Sep 2023 01:20 )  Color: x / Appearance: x / SG: x / pH: x  Gluc: 96 mg/dL / Ketone: x  / Bili: x / Urobili: x   Blood: x / Protein: x / Nitrite: x   Leuk Esterase: x / RBC: x / WBC x   Sq Epi: x / Non Sq Epi: x / Bacteria: x    ABG - ( 01 Sep 2023 02:30 )  pH: 7.38  /  pCO2: 29    /  pO2: 79    / HCO3: 17    / Base Excess: -7.1  /  SaO2: 97.3  /  LA: 0.80     RADIOLOGY & ADDITIONAL TESTS:  CXR:   < from: Xray Chest 1 View-PORTABLE IMMEDIATE (Xray Chest 1 View-PORTABLE IMMEDIATE .) (08.31.23 @ 16:12) >  PROCEDURE DATE:  08/31/2023      INTERPRETATION:  Clinical History / Reason for exam: Shortness of breath    Comparison : Chest radiograph August 31, 2023.    Technique/Positioning: Single AP view of the chest.    Findings:    Support devices: Right IJ introducer sheath    Cardiac/mediastinum/hilum: Poststernotomy, unchanged    Lung parenchyma/Pleura: Bilateral effusions and pulmonary vascular congestion, overall unchanged. No definite pneumothorax    Skeleton/soft tissues: Unchanged    Impression:    Bilateral effusions and pulmonary vascular congestion, overall unchanged.    No definite pneumothorax.    --- End of Report ---    EKG:  < from: 12 Lead ECG (08.31.23 @ 08:22) >    Ventricular Rate 105 BPM    Atrial Rate 105 BPM    P-R Interval 138 ms    QRS Duration 134 ms    Q-T Interval 372 ms    QTC Calculation(Bazett) 491 ms    P Axis 78 degrees    R Axis -27 degrees    T Axis 171 degrees    Diagnosis Line Sinus tachycardia  Left bundle branch block  Abnormal ECG    Allergies  No Known Allergies    MEDICATIONS  (STANDING):  albuterol/ipratropium for Nebulization 3 milliLiter(s) Nebulizer every 6 hours  aMIOdarone Infusion 1 mG/Min (33.3 mL/Hr) IV Continuous <Continuous>  aspirin 325 milliGRAM(s) Oral daily  atorvastatin 40 milliGRAM(s) Oral at bedtime  bisacodyl Suppository 10 milliGRAM(s) Rectal once  chlorhexidine 2% Cloths 1 Application(s) Topical daily  dextrose 5%. 1000 milliLiter(s) (50 mL/Hr) IV Continuous <Continuous>  dextrose 50% Injectable 50 milliLiter(s) IV Push every 15 minutes  furosemide   Injectable 40 milliGRAM(s) IV Push daily  glucagon  Injectable 1 milliGRAM(s) IntraMuscular once  heparin   Injectable 5000 Unit(s) SubCutaneous every 8 hours  insulin glargine Injectable (LANTUS) 30 Unit(s) SubCutaneous every morning  insulin lispro (ADMELOG) corrective regimen sliding scale   SubCutaneous three times a day before meals  metoprolol tartrate 12.5 milliGRAM(s) Oral every 12 hours  milrinone Infusion 0.25 MICROgram(s)/kG/Min (4.63 mL/Hr) IV Continuous <Continuous>  pantoprazole    Tablet 40 milliGRAM(s) Oral before breakfast  polyethylene glycol 3350 17 Gram(s) Oral daily  potassium chloride    Tablet ER 20 milliEquivalent(s) Oral daily  senna 2 Tablet(s) Oral at bedtime  sodium chloride 0.9%. 1000 milliLiter(s) (10 mL/Hr) IV Continuous <Continuous>    MEDICATIONS  (PRN):  dextrose Oral Gel 15 Gram(s) Oral once PRN Blood Glucose LESS THAN 70 milliGRAM(s)/deciliter  oxyCODONE    IR 10 milliGRAM(s) Oral every 4 hours PRN Severe Pain (7 - 10)  oxyCODONE    IR 5 milliGRAM(s) Oral every 4 hours PRN Moderate Pain (4 - 6)    Home Medications:  aspirin 81 mg oral tablet, chewable: 1 chewed once a day (13 Aug 2023 19:53)  atorvastatin 10 mg oral tablet: 1 orally once a day (at bedtime) (13 Aug 2023 19:52)  Jardiance 10 mg oral tablet: 1 orally once a day (13 Aug 2023 19:53)  MetFORMIN (Eqv-Glumetza) 500 mg oral tablet, extended release: 1 orally 2 times a day (13 Aug 2023 19:51)  pioglitazone 30 mg oral tablet: 1 orally once a day (13 Aug 2023 19:52)    Pharmacologic DVT Prophylaxis [X] YES, [] NO: Contraindication:  SCD's: YES b/l    GI Prophylaxis: pantoprazole 40mg     Post-Op Beta-Blockers: [X] Yes, [] No: contraindication:  Post-Op Aspirin:  [X] Yes, [] No: contraindication:  Post-Op Statin:  [X] Yes, [] No: contraindication:    Ambulation/Activity Status: ambulate pt as tolerated     Assessment/Plan:  68y Female status-post CABG x3            POD # 2         - Case and plan discussed with CTU Intensivist and CT Surgeon - Dr. Fernandez  - Continue CTU supportive care and ongoing plan of care as per continuing CTU rounds.   - Continue DVT/GI prophylaxis  - Incentive Spirometry 10 times an hour  - Continue to advance physical activity as tolerated and continue PT/OT as directed  - Chest tube d/c'ed 08/31, repeat CXR clear and patient tolerated process well   1. CAD s/p CABG: Continue  mg , statin, BB   2. Hypoxia: SPO2 100% on 3L nasal cannula; albuterol/ipratropium 3ml Q6 for nebulization. Encourage incentive spirometry deep breathing, and coughing to bring up any secretions   3. DM/Glucose Control: A1C 8.2; insulin infusion dc'd, patient now on Lantus 30 and Lispro sliding scale. FS are ranging   4. Neuro: Patient was previously on Keppra and weaned of 08/22, neurology is following   5. Vascular: duplex U/S of lower extremities due to swelling and hx of DVT; Heparin 5000units Q8 ordered.  F/U vascular for possible need of IVC filter prior to starting plavix  6. Afib: patient had episode of rapid afib which converted to sinus rhythm, continue with amiodarone infusion, milrinone decreased to 0.25mcg/kg/min infusion and continue monitoring    Social Service Disposition:     OPERATIVE PROCEDURE(s): CABG x3              POD #2                     SURGEON(s): ANIRUDH Coon      SUBJECTIVE ASSESSMENT: 68y Female patient seen and examined at bedside. Pt went into rapid A-fib last night which then converted to sinus rhythm. Patient is currently sinus rhythm and has no complaints at this time.    Vital Signs Last 24 Hrs  T(F): 99.9 (01 Sep 2023 07:00), Max: 100.4 (01 Sep 2023 04:00)  HR: 84 (01 Sep 2023 07:00) (84 - 112)  ABP: 112/43 (01 Sep 2023 07:00) (100/40 - 152/43)  ABP(mean): 62 (01 Sep 2023 07:00)  RR: 29 (01 Sep 2023 07:00) (9 - 43)  SpO2: 100% (01 Sep 2023 07:00) (95% - 100%)  CVP(mm Hg): 13 (31 Aug 2023 10:00)    I&O's Detail    31 Aug 2023 07:01  -  01 Sep 2023 07:00  --------------------------------------------------------  IN:    Amiodarone: 133.2 mL    Insulin: 83 mL    IV PiggyBack: 200 mL    Milrinone: 165.6 mL    Oral Fluid: 850 mL    sodium chloride 0.9%: 290 mL  Total IN: 1721.8 mL    OUT:    Chest Tube (mL): 88 mL    Indwelling Catheter - Urethral (mL): 745 mL  Total OUT: 833 mL    Net: I&O's Detail    30 Aug 2023 07:01  -  31 Aug 2023 07:00  --------------------------------------------------------  Total NET: 304.2 mL    31 Aug 2023 07:01  -  01 Sep 2023 07:00  --------------------------------------------------------  Total NET: 888.8 mL      CAPILLARY BLOOD GLUCOSE  POCT Blood Glucose.: 170 mg/dL (01 Sep 2023 07:46)  POCT Blood Glucose.: 212 mg/dL (01 Sep 2023 06:08)  POCT Blood Glucose.: 206 mg/dL (01 Sep 2023 05:23)  POCT Blood Glucose.: 201 mg/dL (01 Sep 2023 04:26)  POCT Blood Glucose.: 110 mg/dL (01 Sep 2023 02:28)  POCT Blood Glucose.: 87 mg/dL (01 Sep 2023 01:01)  POCT Blood Glucose.: 100 mg/dL (01 Sep 2023 00:18)  POCT Blood Glucose.: 124 mg/dL (31 Aug 2023 23:16)  POCT Blood Glucose.: 158 mg/dL (31 Aug 2023 22:13)  POCT Blood Glucose.: 184 mg/dL (31 Aug 2023 20:58)  POCT Blood Glucose.: 169 mg/dL (31 Aug 2023 19:43)  POCT Blood Glucose.: 78 mg/dL (31 Aug 2023 17:01)  POCT Blood Glucose.: 115 mg/dL (31 Aug 2023 15:55)  POCT Blood Glucose.: 145 mg/dL (31 Aug 2023 14:14)  POCT Blood Glucose.: 175 mg/dL (31 Aug 2023 12:09)  POCT Blood Glucose.: 235 mg/dL (31 Aug 2023 10:31)  POCT Blood Glucose.: 169 mg/dL (31 Aug 2023 08:41)    A1C with Estimated Average Glucose Result: 8.2 % (08-14-23 @ 04:40)    Physical Exam:  General: NAD; A&Ox3  Cardiac: S1/S2, RRR, no murmur, no rubs  Lungs: unlabored respirations, CTA b/l, no wheeze, no rales, no crackles  Abdomen: Soft/NT/ND; positive bowel sounds x 4  Sternum: Intact, no click, incision healing well with no drainage  Incisions: Incisions clean/dry/intact  Extremities: 1+ edema b/l lower extremities; no cyanosis    Central Venous Catheter: Yes: critical illness, intravenous access     Day # 2  Johnson Catheter: Yes: critical illness; monitor strict i/o's                    Day # 2  EPICARDIAL WIRES:  YES                                                              Day # 2  BOWEL MOVEMENT:  [] YES [X] NO, If No, Timing since last BM Day #      LABS:                        7.7<L>  8.76  )-----------( 134      ( 01 Sep 2023 01:20 )             25.0<L>                        8.0<L>  9.76  )-----------( 159      ( 31 Aug 2023 01:05 )             25.8<L>    09-01    142  |  111<H>  |  15  ----------------------------<  96  3.9   |  22  |  0.5<L>  08-31    144  |  115<H>  |  10  ----------------------------<  112<H>  4.7   |  21  |  0.6<L>    Ca    8.1<L>      01 Sep 2023 01:20  Mg     2.5     09-01    TPro  5.4<L> [6.0 - 8.0]  /  Alb  3.3<L> [3.5 - 5.2]  /  TBili  0.6 [0.2 - 1.2]  /  DBili  x   /  AST  27 [0 - 41]  /  ALT  12 [0 - 41]  /  AlkPhos  65 [30 - 115]  09-01    PT/INR - ( 30 Aug 2023 15:28 )   PT: ;   INR: 1.27 ratio       PTT - ( 30 Aug 2023 15:28 )  PTT:29.6 sec    Urinalysis Basic - ( 01 Sep 2023 01:20 )  Color: x / Appearance: x / SG: x / pH: x  Gluc: 96 mg/dL / Ketone: x  / Bili: x / Urobili: x   Blood: x / Protein: x / Nitrite: x   Leuk Esterase: x / RBC: x / WBC x   Sq Epi: x / Non Sq Epi: x / Bacteria: x    ABG - ( 01 Sep 2023 02:30 )  pH: 7.38  /  pCO2: 29    /  pO2: 79    / HCO3: 17    / Base Excess: -7.1  /  SaO2: 97.3  /  LA: 0.80     RADIOLOGY & ADDITIONAL TESTS:  CXR:   < from: Xray Chest 1 View-PORTABLE IMMEDIATE (Xray Chest 1 View-PORTABLE IMMEDIATE .) (08.31.23 @ 16:12) >  PROCEDURE DATE:  08/31/2023      INTERPRETATION:  Clinical History / Reason for exam: Shortness of breath    Comparison : Chest radiograph August 31, 2023.    Technique/Positioning: Single AP view of the chest.    Findings:    Support devices: Right IJ introducer sheath    Cardiac/mediastinum/hilum: Poststernotomy, unchanged    Lung parenchyma/Pleura: Bilateral effusions and pulmonary vascular congestion, overall unchanged. No definite pneumothorax    Skeleton/soft tissues: Unchanged    Impression:    Bilateral effusions and pulmonary vascular congestion, overall unchanged.    No definite pneumothorax.    --- End of Report ---    EKG:  < from: 12 Lead ECG (08.31.23 @ 08:22) >    Ventricular Rate 105 BPM    Atrial Rate 105 BPM    P-R Interval 138 ms    QRS Duration 134 ms    Q-T Interval 372 ms    QTC Calculation(Bazett) 491 ms    P Axis 78 degrees    R Axis -27 degrees    T Axis 171 degrees    Diagnosis Line Sinus tachycardia  Left bundle branch block  Abnormal ECG    Allergies  No Known Allergies    MEDICATIONS  (STANDING):  albuterol/ipratropium for Nebulization 3 milliLiter(s) Nebulizer every 6 hours  aMIOdarone Infusion 1 mG/Min (33.3 mL/Hr) IV Continuous <Continuous>  aspirin 325 milliGRAM(s) Oral daily  atorvastatin 40 milliGRAM(s) Oral at bedtime  bisacodyl Suppository 10 milliGRAM(s) Rectal once  chlorhexidine 2% Cloths 1 Application(s) Topical daily  dextrose 5%. 1000 milliLiter(s) (50 mL/Hr) IV Continuous <Continuous>  dextrose 50% Injectable 50 milliLiter(s) IV Push every 15 minutes  furosemide   Injectable 40 milliGRAM(s) IV Push daily  glucagon  Injectable 1 milliGRAM(s) IntraMuscular once  heparin   Injectable 5000 Unit(s) SubCutaneous every 8 hours  insulin glargine Injectable (LANTUS) 30 Unit(s) SubCutaneous every morning  insulin lispro (ADMELOG) corrective regimen sliding scale   SubCutaneous three times a day before meals  metoprolol tartrate 12.5 milliGRAM(s) Oral every 12 hours  milrinone Infusion 0.25 MICROgram(s)/kG/Min (4.63 mL/Hr) IV Continuous <Continuous>  pantoprazole    Tablet 40 milliGRAM(s) Oral before breakfast  polyethylene glycol 3350 17 Gram(s) Oral daily  potassium chloride    Tablet ER 20 milliEquivalent(s) Oral daily  senna 2 Tablet(s) Oral at bedtime  sodium chloride 0.9%. 1000 milliLiter(s) (10 mL/Hr) IV Continuous <Continuous>    MEDICATIONS  (PRN):  dextrose Oral Gel 15 Gram(s) Oral once PRN Blood Glucose LESS THAN 70 milliGRAM(s)/deciliter  oxyCODONE    IR 10 milliGRAM(s) Oral every 4 hours PRN Severe Pain (7 - 10)  oxyCODONE    IR 5 milliGRAM(s) Oral every 4 hours PRN Moderate Pain (4 - 6)    Home Medications:  aspirin 81 mg oral tablet, chewable: 1 chewed once a day (13 Aug 2023 19:53)  atorvastatin 10 mg oral tablet: 1 orally once a day (at bedtime) (13 Aug 2023 19:52)  Jardiance 10 mg oral tablet: 1 orally once a day (13 Aug 2023 19:53)  MetFORMIN (Eqv-Glumetza) 500 mg oral tablet, extended release: 1 orally 2 times a day (13 Aug 2023 19:51)  pioglitazone 30 mg oral tablet: 1 orally once a day (13 Aug 2023 19:52)    Pharmacologic DVT Prophylaxis [X] YES, [] NO: Contraindication:  SCD's: YES b/l    GI Prophylaxis: pantoprazole 40mg     Post-Op Beta-Blockers: [X] Yes, [] No: contraindication:  Post-Op Aspirin:  [X] Yes, [] No: contraindication:  Post-Op Statin:  [X] Yes, [] No: contraindication:    Ambulation/Activity Status: ambulate pt as tolerated     Assessment/Plan:  68y Female status-post CABG x3            POD # 2         - Case and plan discussed with CTU Intensivist and CT Surgeon - Dr. Fernandez  - Continue CTU supportive care and ongoing plan of care as per continuing CTU rounds.   - Continue DVT/GI prophylaxis  - Incentive Spirometry 10 times an hour  - Continue to advance physical activity as tolerated and continue PT/OT as directed  - Chest tube d/c'ed 08/31, repeat CXR clear and patient tolerated process well   1. CAD s/p CABG: Continue  mg , statin, 12.5 lopressor q12h  2. HTN: amlodipine 5mg PO   3. Hypoxia: SPO2 100% on 3L nasal cannula; albuterol/ipratropium 3ml Q6 for nebulization. Encourage incentive spirometry deep breathing, and coughing to bring up any secretions   4. DM/Glucose Control: A1C 8.2; insulin infusion dc'd, patient now on Lantus 30, prandial 5, & sliding scale. FS are ranging   5. DVT/PE: venous duplex 8/30 -> DVT R peroneal vein. Vascular following-> No IVC filter needed, ok to start Eliquis 2.5mg q12h after wire removal.    6. Afib: patient had episode of rapid afib which converted to sinus rhythm, continue with amiodarone infusion, milrinone decreased to 0.25mcg/kg/min infusion and continue monitoring    Social Service Disposition:  TBD

## 2023-09-01 NOTE — CONSULT NOTE ADULT - ASSESSMENT
68F who presented to ED with arm pain, ischemic cardiac workup was done which demonstrated severe LV dysfunction. Patient is now S/P CABG on 8/30/23.   Vascular surgery consulted given incidental findings of DVT/PE noted on imaging, as described above.     #Thrombosis of right peroneal vein  - Patient is eligible for anticoagulation therapy   - Recommend repeating venous duplex in 1 week   - No indication for IVC filter at this time as left femoral thrombus was not visualized on most recent duplex (8/31) -- peroneal vein DVTs do not quality for this intervention     Patient seen/examined or Plan Discussed with Fellow, Dr. Álvarez  Plan will be discussed with Attending, Dr. Guerrier    [X]  was used during this evaluation - ID: [id number] ([facility])   *Patient speaks Frisian*   68F who presented to ED with arm pain, ischemic cardiac workup was done which demonstrated severe LV dysfunction. Patient is now S/P CABG on 8/30/23.   Vascular surgery consulted given incidental findings of DVT/PE noted on imaging, as described above.     #Thrombosis of right peroneal vein  - Patient is eligible for anticoagulation therapy if there are no contraindications   - Recommend repeating venous duplex in 1 week   - No indication for IVC filter at this time as left femoral thrombus was not visualized on most recent duplex (8/31) -- peroneal vein DVTs do not quality for this intervention   - Recall vascular PRN     Patient seen/examined or Plan Discussed with Fellow, Dr. Álvarez  Plan will be discussed with Attending, Dr. Guerrier    [X]  was used during this evaluation - ID: [id number] ([facility])   *Patient speaks Khmer*        [phone]  Vascular Surgery

## 2023-09-01 NOTE — PROGRESS NOTE ADULT - SUBJECTIVE AND OBJECTIVE BOX
CTU Attending Progress Daily Note     01 Sep 2023 08:07  POD# - 2  He has history of Diabetes      Interval event for past 24 hr:  MARCIANO RODRIGUES  68y had no event.   Current Complains:  MARCIANO RODRIGUES has no new complains  HPI:  67yo F w/a PMH of Type II DM, HTN, DLD, obesity, lateral epicondylitis presenting to the ED w/ Left Arm pain. At time of my exam, patient intubated so spoke with the son at bedside. This arm started about two months ago and has continued to progress but in the last few days became severe. She went to the ED a few days ago, was given pain medications then sent home. Per son, pain still continued to progress after this. No fevers, chills, nausea, vomiting, diarrhea, constipation, SOB, CP.   On admission to the ED today, she presented with severe left arm pain w findings of LBBB on ECG (unsure if new or not). Code STEMI was called and then canceled because troponin negative and no chest pain. Patient was then noted to have acute mental status change, SOB, lactate elevation, and acidosis on abg..Patient had two seizure episodes and was treated with benzodiazepine. Intubated by ER for airway protection.     On Admission  Vitals: /64, HR 75, RR 20, T 98.5, satting 99 percent on RA   Labs: WBC 14.86, Hgb 10, , Na 139, K 5.4, BUN 12, Cr .6, Trop <.01 --> .13 on repeat  Imaging:   CXR -  Patchy bibasilar opacities, right greater than left.  CT Brain Stroke protocol - No evidence of acute transcortical infarct, hydrocephalus, acute intracranial hemorrhage, or mass effect.  CT brain perfusion: No evidence of acute infarct or ischemia.  CTA neck: No stenosis. No dissection.  CTA brain: No stenosis or aneurysm.  CTA Neck: The distal internal carotid arteries are patent. The [place] of [place] is normal in appearance. The visualized cerebral arteries are unremarkable.The vertebrobasilar junction and basilar artery are normal.    In the ED, given Levaquin. Keppra, started on propofol  Admitted to MICU for airway monitoring  (13 Aug 2023 19:09)    OBJECTIVE:  ICU Vital Signs Last 24 Hrs  T(C): 37.7 (01 Sep 2023 07:00), Max: 38 (01 Sep 2023 04:00)  T(F): 99.9 (01 Sep 2023 07:00), Max: 100.4 (01 Sep 2023 04:00)  HR: 84 (01 Sep 2023 07:00) (84 - 112)  BP: 121/55 (31 Aug 2023 08:15) (121/55 - 121/55)  BP(mean): 75 (31 Aug 2023 08:15) (75 - 75)  ABP: 112/43 (01 Sep 2023 07:00) (100/40 - 152/43)  ABP(mean): 62 (01 Sep 2023 07:00) (57 - 75)  RR: 29 (01 Sep 2023 07:00) (9 - 43)  SpO2: 100% (01 Sep 2023 07:00) (95% - 100%)    O2 Parameters below as of 01 Sep 2023 07:00  Patient On (Oxygen Delivery Method): nasal cannula  O2 Flow (L/min): 4        I&O's Summary    31 Aug 2023 07:01  -  01 Sep 2023 07:00  --------------------------------------------------------  IN: 1721.8 mL / OUT: 833 mL / NET: 888.8 mL      I&O's Detail    31 Aug 2023 07:01  -  01 Sep 2023 07:00  --------------------------------------------------------  IN:    Amiodarone: 133.2 mL    Insulin: 83 mL    IV PiggyBack: 200 mL    Milrinone: 165.6 mL    Oral Fluid: 850 mL    sodium chloride 0.9%: 290 mL  Total IN: 1721.8 mL    OUT:    Chest Tube (mL): 88 mL    Indwelling Catheter - Urethral (mL): 745 mL  Total OUT: 833 mL    Total NET: 888.8 mL        Adult Advanced Hemodynamics Last 24 Hrs  CVP(mm Hg): 13 (31 Aug 2023 10:00) (11 - 18)  CVP(cm H2O): --  CO: --  CI: --  PA: 33/13 (31 Aug 2023 10:00) (29/9 - 39/19)  PA(mean): 22 (31 Aug 2023 10:00) (18 - 28)  PCWP: --  SVR: --  SVRI: --  PVR: --  PVRI: --    CAPILLARY BLOOD GLUCOSE      POCT Blood Glucose.: 170 mg/dL (01 Sep 2023 07:46)  POCT Blood Glucose.: 212 mg/dL (01 Sep 2023 06:08)  POCT Blood Glucose.: 206 mg/dL (01 Sep 2023 05:23)  POCT Blood Glucose.: 201 mg/dL (01 Sep 2023 04:26)  POCT Blood Glucose.: 110 mg/dL (01 Sep 2023 02:28)  POCT Blood Glucose.: 87 mg/dL (01 Sep 2023 01:01)  POCT Blood Glucose.: 100 mg/dL (01 Sep 2023 00:18)  POCT Blood Glucose.: 124 mg/dL (31 Aug 2023 23:16)  POCT Blood Glucose.: 158 mg/dL (31 Aug 2023 22:13)  POCT Blood Glucose.: 184 mg/dL (31 Aug 2023 20:58)  POCT Blood Glucose.: 169 mg/dL (31 Aug 2023 19:43)  POCT Blood Glucose.: 78 mg/dL (31 Aug 2023 17:01)  POCT Blood Glucose.: 115 mg/dL (31 Aug 2023 15:55)  POCT Blood Glucose.: 145 mg/dL (31 Aug 2023 14:14)  POCT Blood Glucose.: 175 mg/dL (31 Aug 2023 12:09)  POCT Blood Glucose.: 235 mg/dL (31 Aug 2023 10:31)  POCT Blood Glucose.: 169 mg/dL (31 Aug 2023 08:41)    LABS:  ABG - ( 01 Sep 2023 02:30 )  pH, Arterial: 7.38  pH, Blood: x     /  pCO2: 29    /  pO2: 79    / HCO3: 17    / Base Excess: -7.1  /  SaO2: 97.3                                    7.7    8.76  )-----------( 134      ( 01 Sep 2023 01:20 )             25.0     09-01    142  |  111<H>  |  15  ----------------------------<  96  3.9   |  22  |  0.5<L>    Ca    8.1<L>      01 Sep 2023 01:20  Mg     2.5     09-01    TPro  5.4<L>  /  Alb  3.3<L>  /  TBili  0.6  /  DBili  x   /  AST  27  /  ALT  12  /  AlkPhos  65  09-01    PT/INR - ( 30 Aug 2023 15:28 )   PT: 14.60 sec;   INR: 1.27 ratio         PTT - ( 30 Aug 2023 15:28 )  PTT:29.6 sec  Urinalysis Basic - ( 01 Sep 2023 01:20 )    Color: x / Appearance: x / SG: x / pH: x  Gluc: 96 mg/dL / Ketone: x  / Bili: x / Urobili: x   Blood: x / Protein: x / Nitrite: x   Leuk Esterase: x / RBC: x / WBC x   Sq Epi: x / Non Sq Epi: x / Bacteria: x        Home Medications:  aspirin 81 mg oral tablet, chewable: 1 chewed once a day (13 Aug 2023 19:53)  atorvastatin 10 mg oral tablet: 1 orally once a day (at bedtime) (13 Aug 2023 19:52)  Jardiance 10 mg oral tablet: 1 orally once a day (13 Aug 2023 19:53)  MetFORMIN (Eqv-Glumetza) 500 mg oral tablet, extended release: 1 orally 2 times a day (13 Aug 2023 19:51)  pioglitazone 30 mg oral tablet: 1 orally once a day (13 Aug 2023 19:52)    HOSPITAL MEDICATIONS:  MEDICATIONS  (STANDING):  albuterol/ipratropium for Nebulization 3 milliLiter(s) Nebulizer every 6 hours  aMIOdarone Infusion 1 mG/Min (33.3 mL/Hr) IV Continuous <Continuous>  aspirin 325 milliGRAM(s) Oral daily  atorvastatin 40 milliGRAM(s) Oral at bedtime  bisacodyl Suppository 10 milliGRAM(s) Rectal once  chlorhexidine 2% Cloths 1 Application(s) Topical daily  dextrose 5%. 1000 milliLiter(s) (50 mL/Hr) IV Continuous <Continuous>  dextrose 50% Injectable 50 milliLiter(s) IV Push every 15 minutes  glucagon  Injectable 1 milliGRAM(s) IntraMuscular once  heparin   Injectable 5000 Unit(s) SubCutaneous every 8 hours  insulin regular Infusion 1 Unit(s)/Hr (1 mL/Hr) IV Continuous <Continuous>  metoprolol tartrate 12.5 milliGRAM(s) Oral every 12 hours  milrinone Infusion 0.375 MICROgram(s)/kG/Min (6.94 mL/Hr) IV Continuous <Continuous>  pantoprazole    Tablet 40 milliGRAM(s) Oral before breakfast  polyethylene glycol 3350 17 Gram(s) Oral daily  senna 2 Tablet(s) Oral at bedtime  sodium chloride 0.9%. 1000 milliLiter(s) (10 mL/Hr) IV Continuous <Continuous>    MEDICATIONS  (PRN):  dextrose Oral Gel 15 Gram(s) Oral once PRN Blood Glucose LESS THAN 70 milliGRAM(s)/deciliter  oxyCODONE    IR 5 milliGRAM(s) Oral every 4 hours PRN Moderate Pain (4 - 6)  oxyCODONE    IR 10 milliGRAM(s) Oral every 4 hours PRN Severe Pain (7 - 10)      REVIEW OF SYSTEMS:  CONSTITUTIONAL: [X] all negative; [ ] weakness, [ ] fevers, [ ] chills  EYES/ENT: [X] all negative; [ ] visual changes, [ ] vertigo, [ ] throat pain   NECK: [X] all negative; [ ] pain, [ ] stiffness  RESPIRATORY: [] all negative, [ ] cough, [ ] wheezing, [ ] hemoptysis, [ ] shortness of breath  CARDIOVASCULAR: [] all negative; [ ] chest pain, [ ] palpitations, [ ] orthopnea  GASTROINTESTINAL: [X] all negative; [ ]abdominal pain, [ ] nausea, [ ] vomiting, [ ] hematemesis, [ ] diarrhea, [ ] constipation, [ ] melena, [ ] hematochezia.  GENITOURINARY: [X] all negative; [ ] dysuria, [ ] frequency, [ ] hematuria  NEUROLOGICAL: [X] all negative; [ ] numbness, [ ] weakness  SKIN: [X] all negative; [ ] itching, [ ] burning, [ ] rashes, [ ] lesions   All other review of systems is negative unless indicated above.    [  ] Unable to assess ROS because     PHYSICAL EXAM:          CONSTITUTIONAL: Well-developed; well-nourished; in no acute distress.   	SKIN: warm, dry  	HEAD: Normocephalic; atraumatic.  	EYES: PERRL, EOM, no conj injection, sclera clear  	ENT: No nasal discharge; airway clear.  	NECK: Supple; non tender.  No midline ttp ctls  	CARD: S1, S2 normal; no murmurs, gallops, or rubs. Regular rate and rhythm. 2+ RPs and DPs bilat, no carotid bruits, no pedal   edema, no calf pain b/l  	RESP: CTA  bilat good air movement No wheezes, rales or rhonchi.  	ABD: Soft, not tender, not distended, no CVA ttp no rebound or guarding, bowel sounds present  	EXT: Normal ROM.  No clubbing, cyanosis or edema.   	  	NEURO: Alert, awake, motor 5/5 R, 5/5 L        RADIOLOGY:  xray  < from: Xray Chest 1 View-PORTABLE IMMEDIATE (Xray Chest 1 View-PORTABLE IMMEDIATE .) (08.31.23 @ 16:12) >  Impression:    Bilateral effusions and pulmonary vascular congestion, overall unchanged.    No definite pneumothorax.    --- End of Report ---    < end of copied text >    I spent 45 minutes of critical care time ; more than 50% of visit was spent counseling and/or examining patient, reviewing vitals, labs, medications, imaging and discussing with the team goals of care to prevent life-threatening in this patient who is at high risk for deterioration or death due to:   CTU Attending Progress Daily Note     01 Sep 2023 08:07  POD# - 2  A fib over night now in RSR after amio drip  He has history of Diabetes      Interval event for past 24 hr:  MARCIANO RODRIGUES  68y had no event.   Current Complains:  MARCIANO RODRIGUES has no new complains  HPI:  67yo F w/a PMH of Type II DM, HTN, DLD, obesity, lateral epicondylitis presenting to the ED w/ Left Arm pain. At time of my exam, patient intubated so spoke with the son at bedside. This arm started about two months ago and has continued to progress but in the last few days became severe. She went to the ED a few days ago, was given pain medications then sent home. Per son, pain still continued to progress after this. No fevers, chills, nausea, vomiting, diarrhea, constipation, SOB, CP.   On admission to the ED today, she presented with severe left arm pain w findings of LBBB on ECG (unsure if new or not). Code STEMI was called and then canceled because troponin negative and no chest pain. Patient was then noted to have acute mental status change, SOB, lactate elevation, and acidosis on abg..Patient had two seizure episodes and was treated with benzodiazepine. Intubated by ER for airway protection.     On Admission  Vitals: /64, HR 75, RR 20, T 98.5, satting 99 percent on RA   Labs: WBC 14.86, Hgb 10, , Na 139, K 5.4, BUN 12, Cr .6, Trop <.01 --> .13 on repeat  Imaging:   CXR -  Patchy bibasilar opacities, right greater than left.  CT Brain Stroke protocol - No evidence of acute transcortical infarct, hydrocephalus, acute intracranial hemorrhage, or mass effect.  CT brain perfusion: No evidence of acute infarct or ischemia.  CTA neck: No stenosis. No dissection.  CTA brain: No stenosis or aneurysm.  CTA Neck: The distal internal carotid arteries are patent. The [place] is normal in appearance. The visualized cerebral arteries are unremarkable.The vertebrobasilar junction and basilar artery are normal.    In the ED, given Levaquin. Keppra, started on propofol  Admitted to MICU for airway monitoring  (13 Aug 2023 19:09)    OBJECTIVE:  ICU Vital Signs Last 24 Hrs  T(C): 37.7 (01 Sep 2023 07:00), Max: 38 (01 Sep 2023 04:00)  T(F): 99.9 (01 Sep 2023 07:00), Max: 100.4 (01 Sep 2023 04:00)  HR: 84 (01 Sep 2023 07:00) (84 - 112)  BP: 121/55 (31 Aug 2023 08:15) (121/55 - 121/55)  BP(mean): 75 (31 Aug 2023 08:15) (75 - 75)  ABP: 112/43 (01 Sep 2023 07:00) (100/40 - 152/43)  ABP(mean): 62 (01 Sep 2023 07:00) (57 - 75)  RR: 29 (01 Sep 2023 07:00) (9 - 43)  SpO2: 100% (01 Sep 2023 07:00) (95% - 100%)    O2 Parameters below as of 01 Sep 2023 07:00  Patient On (Oxygen Delivery Method): nasal cannula  O2 Flow (L/min): 4        I&O's Summary    31 Aug 2023 07:01  -  01 Sep 2023 07:00  --------------------------------------------------------  IN: 1721.8 mL / OUT: 833 mL / NET: 888.8 mL      I&O's Detail    31 Aug 2023 07:01  -  01 Sep 2023 07:00  --------------------------------------------------------  IN:    Amiodarone: 133.2 mL    Insulin: 83 mL    IV PiggyBack: 200 mL    Milrinone: 165.6 mL    Oral Fluid: 850 mL    sodium chloride 0.9%: 290 mL  Total IN: 1721.8 mL    OUT:    Chest Tube (mL): 88 mL    Indwelling Catheter - Urethral (mL): 745 mL  Total OUT: 833 mL    Total NET: 888.8 mL        Adult Advanced Hemodynamics Last 24 Hrs  CVP(mm Hg): 13 (31 Aug 2023 10:00) (11 - 18)  CVP(cm H2O): --  CO: --  CI: --  PA: 33/13 (31 Aug 2023 10:00) (29/9 - 39/19)  PA(mean): 22 (31 Aug 2023 10:00) (18 - 28)  PCWP: --  SVR: --  SVRI: --  PVR: --  PVRI: --    CAPILLARY BLOOD GLUCOSE      POCT Blood Glucose.: 170 mg/dL (01 Sep 2023 07:46)  POCT Blood Glucose.: 212 mg/dL (01 Sep 2023 06:08)  POCT Blood Glucose.: 206 mg/dL (01 Sep 2023 05:23)  POCT Blood Glucose.: 201 mg/dL (01 Sep 2023 04:26)  POCT Blood Glucose.: 110 mg/dL (01 Sep 2023 02:28)  POCT Blood Glucose.: 87 mg/dL (01 Sep 2023 01:01)  POCT Blood Glucose.: 100 mg/dL (01 Sep 2023 00:18)  POCT Blood Glucose.: 124 mg/dL (31 Aug 2023 23:16)  POCT Blood Glucose.: 158 mg/dL (31 Aug 2023 22:13)  POCT Blood Glucose.: 184 mg/dL (31 Aug 2023 20:58)  POCT Blood Glucose.: 169 mg/dL (31 Aug 2023 19:43)  POCT Blood Glucose.: 78 mg/dL (31 Aug 2023 17:01)  POCT Blood Glucose.: 115 mg/dL (31 Aug 2023 15:55)  POCT Blood Glucose.: 145 mg/dL (31 Aug 2023 14:14)  POCT Blood Glucose.: 175 mg/dL (31 Aug 2023 12:09)  POCT Blood Glucose.: 235 mg/dL (31 Aug 2023 10:31)  POCT Blood Glucose.: 169 mg/dL (31 Aug 2023 08:41)    LABS:  ABG - ( 01 Sep 2023 02:30 )  pH, Arterial: 7.38  pH, Blood: x     /  pCO2: 29    /  pO2: 79    / HCO3: 17    / Base Excess: -7.1  /  SaO2: 97.3                                    7.7    8.76  )-----------( 134      ( 01 Sep 2023 01:20 )             25.0     09-01    142  |  111<H>  |  15  ----------------------------<  96  3.9   |  22  |  0.5<L>    Ca    8.1<L>      01 Sep 2023 01:20  Mg     2.5     09-01    TPro  5.4<L>  /  Alb  3.3<L>  /  TBili  0.6  /  DBili  x   /  AST  27  /  ALT  12  /  AlkPhos  65  09-01    PT/INR - ( 30 Aug 2023 15:28 )   PT: 14.60 sec;   INR: 1.27 ratio         PTT - ( 30 Aug 2023 15:28 )  PTT:29.6 sec  Urinalysis Basic - ( 01 Sep 2023 01:20 )    Color: x / Appearance: x / SG: x / pH: x  Gluc: 96 mg/dL / Ketone: x  / Bili: x / Urobili: x   Blood: x / Protein: x / Nitrite: x   Leuk Esterase: x / RBC: x / WBC x   Sq Epi: x / Non Sq Epi: x / Bacteria: x        Home Medications:  aspirin 81 mg oral tablet, chewable: 1 chewed once a day (13 Aug 2023 19:53)  atorvastatin 10 mg oral tablet: 1 orally once a day (at bedtime) (13 Aug 2023 19:52)  Jardiance 10 mg oral tablet: 1 orally once a day (13 Aug 2023 19:53)  MetFORMIN (Eqv-Glumetza) 500 mg oral tablet, extended release: 1 orally 2 times a day (13 Aug 2023 19:51)  pioglitazone 30 mg oral tablet: 1 orally once a day (13 Aug 2023 19:52)    HOSPITAL MEDICATIONS:  MEDICATIONS  (STANDING):  albuterol/ipratropium for Nebulization 3 milliLiter(s) Nebulizer every 6 hours  aMIOdarone Infusion 1 mG/Min (33.3 mL/Hr) IV Continuous <Continuous>  aspirin 325 milliGRAM(s) Oral daily  atorvastatin 40 milliGRAM(s) Oral at bedtime  bisacodyl Suppository 10 milliGRAM(s) Rectal once  chlorhexidine 2% Cloths 1 Application(s) Topical daily  dextrose 5%. 1000 milliLiter(s) (50 mL/Hr) IV Continuous <Continuous>  dextrose 50% Injectable 50 milliLiter(s) IV Push every 15 minutes  glucagon  Injectable 1 milliGRAM(s) IntraMuscular once  heparin   Injectable 5000 Unit(s) SubCutaneous every 8 hours  insulin regular Infusion 1 Unit(s)/Hr (1 mL/Hr) IV Continuous <Continuous>  metoprolol tartrate 12.5 milliGRAM(s) Oral every 12 hours  milrinone Infusion 0.375 MICROgram(s)/kG/Min (6.94 mL/Hr) IV Continuous <Continuous>  pantoprazole    Tablet 40 milliGRAM(s) Oral before breakfast  polyethylene glycol 3350 17 Gram(s) Oral daily  senna 2 Tablet(s) Oral at bedtime  sodium chloride 0.9%. 1000 milliLiter(s) (10 mL/Hr) IV Continuous <Continuous>    MEDICATIONS  (PRN):  dextrose Oral Gel 15 Gram(s) Oral once PRN Blood Glucose LESS THAN 70 milliGRAM(s)/deciliter  oxyCODONE    IR 5 milliGRAM(s) Oral every 4 hours PRN Moderate Pain (4 - 6)  oxyCODONE    IR 10 milliGRAM(s) Oral every 4 hours PRN Severe Pain (7 - 10)      REVIEW OF SYSTEMS:  CONSTITUTIONAL: [X] all negative; [ ] weakness, [ ] fevers, [ ] chills  EYES/ENT: [X] all negative; [ ] visual changes, [ ] vertigo, [ ] throat pain   NECK: [X] all negative; [ ] pain, [ ] stiffness  RESPIRATORY: [] all negative, [ ] cough, [ ] wheezing, [ ] hemoptysis, [ ] shortness of breath  CARDIOVASCULAR: [] all negative; [ ] chest pain, [ ] palpitations, [ ] orthopnea  GASTROINTESTINAL: [X] all negative; [ ]abdominal pain, [ ] nausea, [ ] vomiting, [ ] hematemesis, [ ] diarrhea, [ ] constipation, [ ] melena, [ ] hematochezia.  GENITOURINARY: [X] all negative; [ ] dysuria, [ ] frequency, [ ] hematuria  NEUROLOGICAL: [X] all negative; [ ] numbness, [ ] weakness  SKIN: [X] all negative; [ ] itching, [ ] burning, [ ] rashes, [ ] lesions   All other review of systems is negative unless indicated above.    [  ] Unable to assess ROS because     PHYSICAL EXAM:          CONSTITUTIONAL: Well-developed; well-nourished; in no acute distress.   	SKIN: warm, dry  	HEAD: Normocephalic; atraumatic.  	EYES: PERRL, EOM, no conj injection, sclera clear  	ENT: No nasal discharge; airway clear.  	NECK: Supple; non tender.  No midline ttp ctls  	CARD: S1, S2 normal; no murmurs, gallops, or rubs. Regular rate and rhythm. 2+ RPs and DPs bilat, no carotid bruits, no pedal   edema, no calf pain b/l  	RESP: CTA  bilat good air movement No wheezes, rales or rhonchi.  	ABD: Soft, not tender, not distended, no CVA ttp no rebound or guarding, bowel sounds present  	EXT: Normal ROM.  No clubbing, cyanosis or edema.   	  	NEURO: Alert, awake, motor 5/5 R, 5/5 L        RADIOLOGY:  xray  < from: Xray Chest 1 View-PORTABLE IMMEDIATE (Xray Chest 1 View-PORTABLE IMMEDIATE .) (08.31.23 @ 16:12) >  Impression:    Bilateral effusions and pulmonary vascular congestion, overall unchanged.    No definite pneumothorax.    --- End of Report ---    < end of copied text >    I spent 45 minutes of critical care time ; more than 50% of visit was spent counseling and/or examining patient, reviewing vitals, labs, medications, imaging and discussing with the team goals of care to prevent life-threatening in this patient who is at high risk for deterioration or death due to:

## 2023-09-02 LAB
ANION GAP SERPL CALC-SCNC: 9 MMOL/L — SIGNIFICANT CHANGE UP (ref 7–14)
BASOPHILS # BLD AUTO: 0.03 K/UL — SIGNIFICANT CHANGE UP (ref 0–0.2)
BASOPHILS NFR BLD AUTO: 0.3 % — SIGNIFICANT CHANGE UP (ref 0–1)
BUN SERPL-MCNC: 19 MG/DL — SIGNIFICANT CHANGE UP (ref 10–20)
CALCIUM SERPL-MCNC: 7.9 MG/DL — LOW (ref 8.4–10.5)
CHLORIDE SERPL-SCNC: 108 MMOL/L — SIGNIFICANT CHANGE UP (ref 98–110)
CO2 SERPL-SCNC: 18 MMOL/L — SIGNIFICANT CHANGE UP (ref 17–32)
CREAT SERPL-MCNC: 0.7 MG/DL — SIGNIFICANT CHANGE UP (ref 0.7–1.5)
EGFR: 93 ML/MIN/1.73M2 — SIGNIFICANT CHANGE UP
EOSINOPHIL # BLD AUTO: 0.01 K/UL — SIGNIFICANT CHANGE UP (ref 0–0.7)
EOSINOPHIL NFR BLD AUTO: 0.1 % — SIGNIFICANT CHANGE UP (ref 0–8)
GAS PNL BLDA: SIGNIFICANT CHANGE UP
GLUCOSE BLDC GLUCOMTR-MCNC: 211 MG/DL — HIGH (ref 70–99)
GLUCOSE BLDC GLUCOMTR-MCNC: 242 MG/DL — HIGH (ref 70–99)
GLUCOSE BLDC GLUCOMTR-MCNC: 274 MG/DL — HIGH (ref 70–99)
GLUCOSE SERPL-MCNC: 260 MG/DL — HIGH (ref 70–99)
HCT VFR BLD CALC: 25.1 % — LOW (ref 37–47)
HGB BLD-MCNC: 7.7 G/DL — LOW (ref 12–16)
IMM GRANULOCYTES NFR BLD AUTO: 0.8 % — HIGH (ref 0.1–0.3)
LYMPHOCYTES # BLD AUTO: 1.29 K/UL — SIGNIFICANT CHANGE UP (ref 1.2–3.4)
LYMPHOCYTES # BLD AUTO: 13.6 % — LOW (ref 20.5–51.1)
MAGNESIUM SERPL-MCNC: 2.3 MG/DL — SIGNIFICANT CHANGE UP (ref 1.8–2.4)
MCHC RBC-ENTMCNC: 26.7 PG — LOW (ref 27–31)
MCHC RBC-ENTMCNC: 30.7 G/DL — LOW (ref 32–37)
MCV RBC AUTO: 87.2 FL — SIGNIFICANT CHANGE UP (ref 81–99)
MONOCYTES # BLD AUTO: 0.38 K/UL — SIGNIFICANT CHANGE UP (ref 0.1–0.6)
MONOCYTES NFR BLD AUTO: 4 % — SIGNIFICANT CHANGE UP (ref 1.7–9.3)
NEUTROPHILS # BLD AUTO: 7.69 K/UL — HIGH (ref 1.4–6.5)
NEUTROPHILS NFR BLD AUTO: 81.2 % — HIGH (ref 42.2–75.2)
NRBC # BLD: 0 /100 WBCS — SIGNIFICANT CHANGE UP (ref 0–0)
PLATELET # BLD AUTO: 151 K/UL — SIGNIFICANT CHANGE UP (ref 130–400)
PMV BLD: 10.3 FL — SIGNIFICANT CHANGE UP (ref 7.4–10.4)
POTASSIUM SERPL-MCNC: 5 MMOL/L — SIGNIFICANT CHANGE UP (ref 3.5–5)
POTASSIUM SERPL-SCNC: 5 MMOL/L — SIGNIFICANT CHANGE UP (ref 3.5–5)
RBC # BLD: 2.88 M/UL — LOW (ref 4.2–5.4)
RBC # FLD: 22.9 % — HIGH (ref 11.5–14.5)
SODIUM SERPL-SCNC: 135 MMOL/L — SIGNIFICANT CHANGE UP (ref 135–146)
WBC # BLD: 9.48 K/UL — SIGNIFICANT CHANGE UP (ref 4.8–10.8)
WBC # FLD AUTO: 9.48 K/UL — SIGNIFICANT CHANGE UP (ref 4.8–10.8)

## 2023-09-02 PROCEDURE — 71045 X-RAY EXAM CHEST 1 VIEW: CPT | Mod: 26

## 2023-09-02 PROCEDURE — 99233 SBSQ HOSP IP/OBS HIGH 50: CPT

## 2023-09-02 RX ORDER — INSULIN LISPRO 100/ML
7 VIAL (ML) SUBCUTANEOUS
Refills: 0 | Status: DISCONTINUED | OUTPATIENT
Start: 2023-09-02 | End: 2023-09-06

## 2023-09-02 RX ORDER — FENTANYL CITRATE 50 UG/ML
25 INJECTION INTRAVENOUS ONCE
Refills: 0 | Status: DISCONTINUED | OUTPATIENT
Start: 2023-09-02 | End: 2023-09-01

## 2023-09-02 RX ORDER — MILRINONE LACTATE 1 MG/ML
0.18 INJECTION, SOLUTION INTRAVENOUS
Qty: 20 | Refills: 0 | Status: DISCONTINUED | OUTPATIENT
Start: 2023-09-02 | End: 2023-09-03

## 2023-09-02 RX ORDER — FOLIC ACID 0.8 MG
1 TABLET ORAL DAILY
Refills: 0 | Status: DISCONTINUED | OUTPATIENT
Start: 2023-09-02 | End: 2023-09-06

## 2023-09-02 RX ORDER — INSULIN GLARGINE 100 [IU]/ML
10 INJECTION, SOLUTION SUBCUTANEOUS ONCE
Refills: 0 | Status: COMPLETED | OUTPATIENT
Start: 2023-09-02 | End: 2023-09-02

## 2023-09-02 RX ORDER — IPRATROPIUM BROMIDE 0.2 MG/ML
500 SOLUTION, NON-ORAL INHALATION EVERY 6 HOURS
Refills: 0 | Status: DISCONTINUED | OUTPATIENT
Start: 2023-09-02 | End: 2023-09-04

## 2023-09-02 RX ORDER — LISINOPRIL 2.5 MG/1
5 TABLET ORAL DAILY
Refills: 0 | Status: DISCONTINUED | OUTPATIENT
Start: 2023-09-02 | End: 2023-09-06

## 2023-09-02 RX ORDER — INSULIN GLARGINE 100 [IU]/ML
40 INJECTION, SOLUTION SUBCUTANEOUS EVERY MORNING
Refills: 0 | Status: DISCONTINUED | OUTPATIENT
Start: 2023-09-03 | End: 2023-09-06

## 2023-09-02 RX ORDER — ONDANSETRON 8 MG/1
4 TABLET, FILM COATED ORAL EVERY 8 HOURS
Refills: 0 | Status: DISCONTINUED | OUTPATIENT
Start: 2023-09-02 | End: 2023-09-06

## 2023-09-02 RX ORDER — FERROUS SULFATE 325(65) MG
325 TABLET ORAL DAILY
Refills: 0 | Status: DISCONTINUED | OUTPATIENT
Start: 2023-09-02 | End: 2023-09-06

## 2023-09-02 RX ORDER — AMIODARONE HYDROCHLORIDE 400 MG/1
0.5 TABLET ORAL
Qty: 450 | Refills: 0 | Status: DISCONTINUED | OUTPATIENT
Start: 2023-09-02 | End: 2023-09-03

## 2023-09-02 RX ADMIN — OXYCODONE HYDROCHLORIDE 10 MILLIGRAM(S): 5 TABLET ORAL at 05:09

## 2023-09-02 RX ADMIN — Medication 5 UNIT(S): at 07:34

## 2023-09-02 RX ADMIN — AMIODARONE HYDROCHLORIDE 16.7 MG/MIN: 400 TABLET ORAL at 09:00

## 2023-09-02 RX ADMIN — Medication 40 MILLIGRAM(S): at 05:08

## 2023-09-02 RX ADMIN — OXYCODONE HYDROCHLORIDE 10 MILLIGRAM(S): 5 TABLET ORAL at 04:17

## 2023-09-02 RX ADMIN — ONDANSETRON 4 MILLIGRAM(S): 8 TABLET, FILM COATED ORAL at 08:49

## 2023-09-02 RX ADMIN — Medication 1 MILLIGRAM(S): at 12:18

## 2023-09-02 RX ADMIN — Medication 325 MILLIGRAM(S): at 11:41

## 2023-09-02 RX ADMIN — CLOPIDOGREL BISULFATE 75 MILLIGRAM(S): 75 TABLET, FILM COATED ORAL at 11:41

## 2023-09-02 RX ADMIN — AMLODIPINE BESYLATE 5 MILLIGRAM(S): 2.5 TABLET ORAL at 05:09

## 2023-09-02 RX ADMIN — Medication 12.5 MILLIGRAM(S): at 17:27

## 2023-09-02 RX ADMIN — PANTOPRAZOLE SODIUM 40 MILLIGRAM(S): 20 TABLET, DELAYED RELEASE ORAL at 06:04

## 2023-09-02 RX ADMIN — Medication 12.5 MILLIGRAM(S): at 05:10

## 2023-09-02 RX ADMIN — Medication 2: at 11:42

## 2023-09-02 RX ADMIN — Medication 500 MICROGRAM(S): at 19:49

## 2023-09-02 RX ADMIN — HEPARIN SODIUM 5000 UNIT(S): 5000 INJECTION INTRAVENOUS; SUBCUTANEOUS at 13:43

## 2023-09-02 RX ADMIN — Medication 325 MILLIGRAM(S): at 12:19

## 2023-09-02 RX ADMIN — Medication 3: at 07:33

## 2023-09-02 RX ADMIN — CHLORHEXIDINE GLUCONATE 1 APPLICATION(S): 213 SOLUTION TOPICAL at 05:09

## 2023-09-02 RX ADMIN — Medication 5 UNIT(S): at 11:43

## 2023-09-02 RX ADMIN — INSULIN GLARGINE 10 UNIT(S): 100 INJECTION, SOLUTION SUBCUTANEOUS at 16:44

## 2023-09-02 RX ADMIN — MILRINONE LACTATE 3.33 MICROGRAM(S)/KG/MIN: 1 INJECTION, SOLUTION INTRAVENOUS at 09:00

## 2023-09-02 RX ADMIN — Medication 2: at 16:36

## 2023-09-02 RX ADMIN — SENNA PLUS 2 TABLET(S): 8.6 TABLET ORAL at 22:26

## 2023-09-02 RX ADMIN — ATORVASTATIN CALCIUM 40 MILLIGRAM(S): 80 TABLET, FILM COATED ORAL at 22:29

## 2023-09-02 RX ADMIN — Medication 7 UNIT(S): at 16:36

## 2023-09-02 RX ADMIN — HEPARIN SODIUM 5000 UNIT(S): 5000 INJECTION INTRAVENOUS; SUBCUTANEOUS at 22:27

## 2023-09-02 RX ADMIN — Medication 500 MICROGRAM(S): at 15:08

## 2023-09-02 RX ADMIN — MILRINONE LACTATE 3.33 MICROGRAM(S)/KG/MIN: 1 INJECTION, SOLUTION INTRAVENOUS at 19:31

## 2023-09-02 RX ADMIN — INSULIN GLARGINE 30 UNIT(S): 100 INJECTION, SOLUTION SUBCUTANEOUS at 07:35

## 2023-09-02 RX ADMIN — POLYETHYLENE GLYCOL 3350 17 GRAM(S): 17 POWDER, FOR SOLUTION ORAL at 11:45

## 2023-09-02 NOTE — PROGRESS NOTE ADULT - ASSESSMENT
Assessment/Plan:  NSTEMI/CAD-s/p CABG x 3-POD #3  1-BP control-beta-blockers, start ACE-I  2-serum glucose control-Lantus/Lispro with insulin sliding scale correction regimen  3-fluid overload-diuresis  4-acute blood loss anemia-stable, monitor Hb/Hct daily  5-chronic systolic heart failure-continue inotropic support  6-A-Fib, now NSR-continue amiodarone    35 minutes of critical care services provided

## 2023-09-02 NOTE — PROGRESS NOTE ADULT - SUBJECTIVE AND OBJECTIVE BOX
MARCIANO RODRIGUES  MRN#: [id number]  Subjective:  Patient was seen and evalauted on AM rounds offerring no specific compliants at this time.    OBJECTIVE:  ICU Vital Signs Last 24 Hrs  T(C): 37.2 (02 Sep 2023 08:00), Max: 37.8 (01 Sep 2023 12:00)  T(F): 99 (02 Sep 2023 08:00), Max: 100 (01 Sep 2023 12:00)  HR: 86 (02 Sep 2023 11:00) (69 - 90)  BP: 146/67 (02 Sep 2023 10:00) (91/46 - 150/70)  BP(mean): 97 (02 Sep 2023 10:00) (66 - 100)  ABP: 103/33 (02 Sep 2023 11:00) (83/36 - 160/55)  ABP(mean): 59 (02 Sep 2023 11:00) (52 - 95)  RR: 22 (02 Sep 2023 11:) (19 - 36)  SpO2: 97% (02 Sep 2023 11:) (96% - 100%)    O2 Parameters below as of 02 Sep 2023 11:00  Patient On (Oxygen Delivery Method): nasal cannula  O2 Flow (L/min): 3           @ 07:  -   @ 07:00  --------------------------------------------------------  IN: 1902.2 mL / OUT: 1490 mL / NET: 412.2 mL     @ 07:  -   @ 11:47  --------------------------------------------------------  IN: 237.9 mL / OUT: 550 mL / NET: -312.1 mL      CAPILLARY BLOOD GLUCOSE      POCT Blood Glucose.: 242 mg/dL (02 Sep 2023 11:26)      PHYSICAL EXAM:Daily     Daily Weight in k.5 (02 Sep 2023 06:00)  General: WN/WD NAD    HEENT:     + NCAT  + EOMI  - Conjuctival edema   - Icterus   - Thrush   - ETT  - NGT/OGT    Neck:         + FROM    - JVD     - Nodes     - Masses    + Mid-line trachea   - Tracheostomy    Chest:         - Sternal click  - Sternal drainage  + Pacing wires  - Chest tubes  - SubQ emphysema    Lungs:          + CTA   - Rhonchi    - Rales    - Wheezing     - Decreased BS   - Dullness R L    Cardiac:       + S1 + S2    + RRR   - Irregular   - S3  - S4    - Murmurs   - Rub   - Hamman’s sign     Abdomen:    + BS     + Soft    + Non-tender     - Distended    - Organomegaly  - PEG    Extremities:   - Cyanosis U/L   - Clubbing  U/L  - LE/UE Edema   + Capillary refill    + Pulses     Neuro:        + Awake   +  Alert   - Confused   - Lethargic   - Sedated   - Generalized Weakness    Skin:        - Rashes    - Erythema   + Normal incisions   + IV sites intact  - Sacral decubitus    HOSPITAL MEDICATIONS:  MEDICATIONS  (STANDING):  aMIOdarone Infusion 0.5 mG/Min (16.7 mL/Hr) IV Continuous <Continuous>  aspirin 325 milliGRAM(s) Oral daily  atorvastatin 40 milliGRAM(s) Oral at bedtime  bisacodyl Suppository 10 milliGRAM(s) Rectal once  chlorhexidine 2% Cloths 1 Application(s) Topical daily  clopidogrel Tablet 75 milliGRAM(s) Oral daily  dextrose 5%. 1000 milliLiter(s) (50 mL/Hr) IV Continuous <Continuous>  dextrose 50% Injectable 50 milliLiter(s) IV Push every 15 minutes  ferrous    sulfate 325 milliGRAM(s) Oral daily  folic acid 1 milliGRAM(s) Oral daily  furosemide   Injectable 40 milliGRAM(s) IV Push daily  glucagon  Injectable 1 milliGRAM(s) IntraMuscular once  heparin   Injectable 5000 Unit(s) SubCutaneous every 8 hours  insulin glargine Injectable (LANTUS) 30 Unit(s) SubCutaneous every morning  insulin lispro (ADMELOG) corrective regimen sliding scale   SubCutaneous three times a day before meals  insulin lispro Injectable (ADMELOG) 5 Unit(s) SubCutaneous before lunch  insulin lispro Injectable (ADMELOG) 5 Unit(s) SubCutaneous before dinner  insulin lispro Injectable (ADMELOG) 5 Unit(s) SubCutaneous before breakfast  ipratropium    for Nebulization 500 MICROGram(s) Nebulizer every 6 hours  lisinopril 5 milliGRAM(s) Oral daily  metoprolol tartrate 12.5 milliGRAM(s) Oral every 12 hours  milrinone Infusion 0.18 MICROgram(s)/kG/Min (3.33 mL/Hr) IV Continuous <Continuous>  pantoprazole    Tablet 40 milliGRAM(s) Oral before breakfast  polyethylene glycol 3350 17 Gram(s) Oral daily  senna 2 Tablet(s) Oral at bedtime    MEDICATIONS  (PRN):  dextrose Oral Gel 15 Gram(s) Oral once PRN Blood Glucose LESS THAN 70 milliGRAM(s)/deciliter  ondansetron Injectable 4 milliGRAM(s) IV Push every 8 hours PRN Nausea and/or Vomiting  oxyCODONE    IR 5 milliGRAM(s) Oral every 4 hours PRN Moderate Pain (4 - 6)  oxyCODONE    IR 10 milliGRAM(s) Oral every 4 hours PRN Severe Pain (7 - 10)      LABS:                        7.7    9.48  )-----------( 151      ( 02 Sep 2023 02:35 )             25.1    09-    135  |  108  |  19  ----------------------------<  260<H>  5.0   |  18  |  0.7    Ca    7.9<L>      02 Sep 2023 02:35  Mg     2.3     -    TPro  5.4<L>  /  Alb  3.3<L>  /  TBili  0.6  /  DBili  x   /  AST  27  /  ALT  12  /  AlkPhos  65  09-01     LIVER FUNCTIONS - ( 01 Sep 2023 01:20 )  Alb: 3.3 g/dL / Pro: 5.4 g/dL / ALK PHOS: 65 U/L / ALT: 12 U/L / AST: 27 U/L / GGT: x           Urinalysis Basic - ( 02 Sep 2023 02:35 )    Color: x / Appearance: x / SG: x / pH: x  Gluc: 260 mg/dL / Ketone: x  / Bili: x / Urobili: x   Blood: x / Protein: x / Nitrite: x   Leuk Esterase: x / RBC: x / WBC x   Sq Epi: x / Non Sq Epi: x / Bacteria: x        RADIOLOGY:  X Reviewed and interpreted by me: vascular congestion, bilateral effusions    CARDIOPULMONARY DYSFUNCTION  - Respiratory status required supplemental oxygen & the following of continuous pulse oximetry for support & to prevent decompensation  - Continued early mobilization as tolerated  - Addressed analgesic regimen to optimize function    PREVENTION-PROPHYLAXIS  - ASA continued for graft occlusion-thromboembolism prophylaxis  - Lipitor was also started for long term graft patency  - Heparin continued for VTE prophylaxis in addition to Venodyne boots  - Protonix maintained for GI bleeding prophylaxis  - Lopressor continued for atrial fibrillation prophylaxis  - Metabolic stability & infection prophylaxis required review and adjustment of regular Insulin sliding scale and gylcemic regimen while following serial glucose levels to help achieve & maintain euglycemia  - Reviewed & addressed surgical site infection prophylaxis regimen

## 2023-09-02 NOTE — PROGRESS NOTE ADULT - SUBJECTIVE AND OBJECTIVE BOX
OPERATIVE PROCEDURE(s): cabg x 3                POD # 3    SURGEON(s): beto      SUBJECTIVE ASSESSMENT: pt complains of nausea via  improved with zofran    Vital Signs Last 24 Hrs  T(C): 37.2 (02 Sep 2023 16:00), Max: 37.2 (01 Sep 2023 20:00)  T(F): 98.9 (02 Sep 2023 16:00), Max: 99 (02 Sep 2023 00:00)  HR: 87 (02 Sep 2023 16:00) (74 - 96)  BP: 146/67 (02 Sep 2023 10:00) (130/63 - 150/70)  BP(mean): 97 (02 Sep 2023 10:00) (82 - 100)  RR: 23 (02 Sep 2023 16:00) (19 - 35)  SpO2: 100% (02 Sep 2023 16:00) (96% - 100%)    Parameters below as of 02 Sep 2023 16:00  Patient On (Oxygen Delivery Method): nasal cannula  O2 Flow (L/min): 3    09-01-23 @ 07:01  -  09-02-23 @ 07:00  --------------------------------------------------------  IN: 1902.2 mL / OUT: 1490 mL / NET: 412.2 mL    09-02-23 @ 07:01  -  09-02-23 @ 17:19  --------------------------------------------------------  IN: 677.9 mL / OUT: 800 mL / NET: -122.1 mL      Physical Exam:  General: NAD; A&Ox3  Cardiac: S1/S2, RRR, no murmur, no rubs  Lungs: unlabored respirations, CTA b/l, no wheeze, no rales, no crackles  Abdomen: Soft/NT/ND; positive bowel sounds x 4  Sternum: Intact, no click, incision healing well with no drainage  Incisions: Incisions clean/dry/intact  Extremities: 1+ edema b/l lower extremities; no cyanosis    LABS:                        7.7    9.48  )-----------( 151      ( 02 Sep 2023 02:35 )             25.1     COUMADIN:   [ ] YES [ x] NO      09-02    135  |  108  |  19  ----------------------------<  260<H>  5.0   |  18  |  0.7    Ca    7.9<L>      02 Sep 2023 02:35  Mg     2.3     09-02    TPro  5.4<L>  /  Alb  3.3<L>  /  TBili  0.6  /  DBili  x   /  AST  27  /  ALT  12  /  AlkPhos  65  09-01    Urinalysis Basic - ( 02 Sep 2023 02:35 )    Color: x / Appearance: x / SG: x / pH: x  Gluc: 260 mg/dL / Ketone: x  / Bili: x / Urobili: x   Blood: x / Protein: x / Nitrite: x   Leuk Esterase: x / RBC: x / WBC x   Sq Epi: x / Non Sq Epi: x / Bacteria: x        MEDICATIONS  (STANDING):  aMIOdarone Infusion 0.5 mG/Min (16.7 mL/Hr) IV Continuous <Continuous>  aspirin 325 milliGRAM(s) Oral daily  atorvastatin 40 milliGRAM(s) Oral at bedtime  bisacodyl Suppository 10 milliGRAM(s) Rectal once  chlorhexidine 2% Cloths 1 Application(s) Topical daily  clopidogrel Tablet 75 milliGRAM(s) Oral daily  dextrose 5%. 1000 milliLiter(s) (50 mL/Hr) IV Continuous <Continuous>  dextrose 50% Injectable 50 milliLiter(s) IV Push every 15 minutes  ferrous    sulfate 325 milliGRAM(s) Oral daily  folic acid 1 milliGRAM(s) Oral daily  furosemide   Injectable 40 milliGRAM(s) IV Push daily  glucagon  Injectable 1 milliGRAM(s) IntraMuscular once  heparin   Injectable 5000 Unit(s) SubCutaneous every 8 hours  insulin lispro (ADMELOG) corrective regimen sliding scale   SubCutaneous three times a day before meals  insulin lispro Injectable (ADMELOG) 7 Unit(s) SubCutaneous three times a day before meals  ipratropium    for Nebulization 500 MICROGram(s) Nebulizer every 6 hours  lisinopril 5 milliGRAM(s) Oral daily  metoprolol tartrate 12.5 milliGRAM(s) Oral every 12 hours  milrinone Infusion 0.18 MICROgram(s)/kG/Min (3.33 mL/Hr) IV Continuous <Continuous>  pantoprazole    Tablet 40 milliGRAM(s) Oral before breakfast  polyethylene glycol 3350 17 Gram(s) Oral daily  senna 2 Tablet(s) Oral at bedtime    MEDICATIONS  (PRN):  dextrose Oral Gel 15 Gram(s) Oral once PRN Blood Glucose LESS THAN 70 milliGRAM(s)/deciliter  ondansetron Injectable 4 milliGRAM(s) IV Push every 8 hours PRN Nausea and/or Vomiting  oxyCODONE    IR 5 milliGRAM(s) Oral every 4 hours PRN Moderate Pain (4 - 6)  oxyCODONE    IR 10 milliGRAM(s) Oral every 4 hours PRN Severe Pain (7 - 10)      Allergies    No Known Allergies    Intolerances        Ambulation/Activity Status:  pt walks with assistance      RADIOLOGY & ADDITIONAL TESTS:  ACC: [id number] EXAM:  XR CHEST PORTABLE ROUTINE 1V   ORDERED BY: KUSHAL JACKSON     PROCEDURE DATE:  09/02/2023          INTERPRETATION:  Clinical History / Reason for exam: Dyspnea    Comparison : Chest radiograph 9/1/2023.    Technique/Positioning: Frontal.    Findings:    Support devices: Central venous line superior vena cava    Cardiac/mediastinum/hilum: Cardiomegaly unchanged    Lung parenchyma/Pleura: Decreased vascular congestion. No change   bilateral pleural effusions. No air leak.    Skeleton/soft tissues: Unremarkable.    Impression:    Decreased vascular congestion. No change bilateral pleural effusions. No   air leak.    --- End of Report ---    Assessment/Plan:  68y Female status-post CABG x3            POD # 3         - Case and plan discussed with CTU Intensivist and CT Surgeon - Dr. Fernandez  - Continue CTU supportive care and ongoing plan of care as per continuing CTU rounds.   - Continue DVT/GI prophylaxis  - Incentive Spirometry 10 times an hour  - Continue to advance physical activity as tolerated and continue PT/OT as directed  1. CAD s/p CABG: Continue  mg , statin, 12.5 lopressor q12h  2. HTN: amlodipine 5mg PO   3. Hypoxia: SPO2 100% on 3L nasal cannula; albuterol/ipratropium 3ml Q6 for nebulization. Encourage incentive spirometry deep breathing, and coughing to bring up any secretions   4. DM/Glucose Control: A1C 8.2; insulin infusion dc'd, patient now on Lantus 40, prandial 7, & sliding scale  5. DVT/PE: venous duplex 8/30 -> DVT R peroneal vein. Vascular following-> No IVC filter needed, ok to start Eliquis 2.5mg q12h after wire removal.    6. Afib: patient had episode of rapid afib which converted to sinus rhythm, continue with amiodarone infusion, milrinone decreased to 0.18g/kg/min infusion and continue monitoring  will dc pacing wires after pt completes Amio load  repeat echo once Primacor is d/c'ed -- pt has low EF and very well may need a Life Vest  chronic systolic dysfunction CHF- restart Lisinopril and initiate daily Lasix  Social Service Disposition:  TBD

## 2023-09-03 LAB
ANION GAP SERPL CALC-SCNC: 11 MMOL/L — SIGNIFICANT CHANGE UP (ref 7–14)
BASOPHILS # BLD AUTO: 0.01 K/UL — SIGNIFICANT CHANGE UP (ref 0–0.2)
BASOPHILS NFR BLD AUTO: 0.1 % — SIGNIFICANT CHANGE UP (ref 0–1)
BLD GP AB SCN SERPL QL: SIGNIFICANT CHANGE UP
BUN SERPL-MCNC: 12 MG/DL — SIGNIFICANT CHANGE UP (ref 10–20)
CALCIUM SERPL-MCNC: 8.3 MG/DL — LOW (ref 8.4–10.5)
CHLORIDE SERPL-SCNC: 107 MMOL/L — SIGNIFICANT CHANGE UP (ref 98–110)
CO2 SERPL-SCNC: 19 MMOL/L — SIGNIFICANT CHANGE UP (ref 17–32)
CREAT SERPL-MCNC: 0.5 MG/DL — LOW (ref 0.7–1.5)
EGFR: 101 ML/MIN/1.73M2 — SIGNIFICANT CHANGE UP
EOSINOPHIL # BLD AUTO: 0.01 K/UL — SIGNIFICANT CHANGE UP (ref 0–0.7)
EOSINOPHIL NFR BLD AUTO: 0.1 % — SIGNIFICANT CHANGE UP (ref 0–8)
GLUCOSE BLDC GLUCOMTR-MCNC: 148 MG/DL — HIGH (ref 70–99)
GLUCOSE BLDC GLUCOMTR-MCNC: 150 MG/DL — HIGH (ref 70–99)
GLUCOSE BLDC GLUCOMTR-MCNC: 179 MG/DL — HIGH (ref 70–99)
GLUCOSE BLDC GLUCOMTR-MCNC: 192 MG/DL — HIGH (ref 70–99)
GLUCOSE SERPL-MCNC: 183 MG/DL — HIGH (ref 70–99)
HCT VFR BLD CALC: 25.5 % — LOW (ref 37–47)
HGB BLD-MCNC: 8 G/DL — LOW (ref 12–16)
IMM GRANULOCYTES NFR BLD AUTO: 0.5 % — HIGH (ref 0.1–0.3)
LYMPHOCYTES # BLD AUTO: 1.18 K/UL — LOW (ref 1.2–3.4)
LYMPHOCYTES # BLD AUTO: 12.3 % — LOW (ref 20.5–51.1)
MAGNESIUM SERPL-MCNC: 2.3 MG/DL — SIGNIFICANT CHANGE UP (ref 1.8–2.4)
MCHC RBC-ENTMCNC: 27 PG — SIGNIFICANT CHANGE UP (ref 27–31)
MCHC RBC-ENTMCNC: 31.4 G/DL — LOW (ref 32–37)
MCV RBC AUTO: 86.1 FL — SIGNIFICANT CHANGE UP (ref 81–99)
MONOCYTES # BLD AUTO: 0.49 K/UL — SIGNIFICANT CHANGE UP (ref 0.1–0.6)
MONOCYTES NFR BLD AUTO: 5.1 % — SIGNIFICANT CHANGE UP (ref 1.7–9.3)
NEUTROPHILS # BLD AUTO: 7.84 K/UL — HIGH (ref 1.4–6.5)
NEUTROPHILS NFR BLD AUTO: 81.9 % — HIGH (ref 42.2–75.2)
NRBC # BLD: 0 /100 WBCS — SIGNIFICANT CHANGE UP (ref 0–0)
PLATELET # BLD AUTO: 186 K/UL — SIGNIFICANT CHANGE UP (ref 130–400)
PMV BLD: 10.5 FL — HIGH (ref 7.4–10.4)
POTASSIUM SERPL-MCNC: 4.5 MMOL/L — SIGNIFICANT CHANGE UP (ref 3.5–5)
POTASSIUM SERPL-SCNC: 4.5 MMOL/L — SIGNIFICANT CHANGE UP (ref 3.5–5)
RBC # BLD: 2.96 M/UL — LOW (ref 4.2–5.4)
RBC # FLD: 23 % — HIGH (ref 11.5–14.5)
SODIUM SERPL-SCNC: 137 MMOL/L — SIGNIFICANT CHANGE UP (ref 135–146)
WBC # BLD: 9.58 K/UL — SIGNIFICANT CHANGE UP (ref 4.8–10.8)
WBC # FLD AUTO: 9.58 K/UL — SIGNIFICANT CHANGE UP (ref 4.8–10.8)

## 2023-09-03 PROCEDURE — 99233 SBSQ HOSP IP/OBS HIGH 50: CPT

## 2023-09-03 PROCEDURE — 71045 X-RAY EXAM CHEST 1 VIEW: CPT | Mod: 26

## 2023-09-03 RX ORDER — ASPIRIN/CALCIUM CARB/MAGNESIUM 324 MG
81 TABLET ORAL DAILY
Refills: 0 | Status: DISCONTINUED | OUTPATIENT
Start: 2023-09-03 | End: 2023-09-06

## 2023-09-03 RX ORDER — FUROSEMIDE 40 MG
40 TABLET ORAL EVERY 12 HOURS
Refills: 0 | Status: DISCONTINUED | OUTPATIENT
Start: 2023-09-03 | End: 2023-09-06

## 2023-09-03 RX ORDER — APIXABAN 2.5 MG/1
2.5 TABLET, FILM COATED ORAL EVERY 12 HOURS
Refills: 0 | Status: DISCONTINUED | OUTPATIENT
Start: 2023-09-03 | End: 2023-09-06

## 2023-09-03 RX ORDER — AMIODARONE HYDROCHLORIDE 400 MG/1
200 TABLET ORAL EVERY 12 HOURS
Refills: 0 | Status: DISCONTINUED | OUTPATIENT
Start: 2023-09-03 | End: 2023-09-06

## 2023-09-03 RX ADMIN — OXYCODONE HYDROCHLORIDE 5 MILLIGRAM(S): 5 TABLET ORAL at 10:15

## 2023-09-03 RX ADMIN — Medication 500 MICROGRAM(S): at 13:59

## 2023-09-03 RX ADMIN — Medication 1: at 16:56

## 2023-09-03 RX ADMIN — INSULIN GLARGINE 40 UNIT(S): 100 INJECTION, SOLUTION SUBCUTANEOUS at 07:50

## 2023-09-03 RX ADMIN — Medication 40 MILLIGRAM(S): at 06:32

## 2023-09-03 RX ADMIN — HEPARIN SODIUM 5000 UNIT(S): 5000 INJECTION INTRAVENOUS; SUBCUTANEOUS at 06:32

## 2023-09-03 RX ADMIN — CHLORHEXIDINE GLUCONATE 1 APPLICATION(S): 213 SOLUTION TOPICAL at 06:33

## 2023-09-03 RX ADMIN — AMIODARONE HYDROCHLORIDE 200 MILLIGRAM(S): 400 TABLET ORAL at 10:15

## 2023-09-03 RX ADMIN — OXYCODONE HYDROCHLORIDE 5 MILLIGRAM(S): 5 TABLET ORAL at 10:45

## 2023-09-03 RX ADMIN — CLOPIDOGREL BISULFATE 75 MILLIGRAM(S): 75 TABLET, FILM COATED ORAL at 11:59

## 2023-09-03 RX ADMIN — LISINOPRIL 5 MILLIGRAM(S): 2.5 TABLET ORAL at 06:32

## 2023-09-03 RX ADMIN — Medication 40 MILLIGRAM(S): at 17:01

## 2023-09-03 RX ADMIN — ATORVASTATIN CALCIUM 40 MILLIGRAM(S): 80 TABLET, FILM COATED ORAL at 21:15

## 2023-09-03 RX ADMIN — Medication 7 UNIT(S): at 11:59

## 2023-09-03 RX ADMIN — Medication 1: at 07:48

## 2023-09-03 RX ADMIN — HEPARIN SODIUM 5000 UNIT(S): 5000 INJECTION INTRAVENOUS; SUBCUTANEOUS at 13:15

## 2023-09-03 RX ADMIN — Medication 12.5 MILLIGRAM(S): at 17:00

## 2023-09-03 RX ADMIN — Medication 7 UNIT(S): at 16:57

## 2023-09-03 RX ADMIN — SENNA PLUS 2 TABLET(S): 8.6 TABLET ORAL at 21:13

## 2023-09-03 RX ADMIN — Medication 500 MICROGRAM(S): at 20:13

## 2023-09-03 RX ADMIN — Medication 500 MICROGRAM(S): at 02:57

## 2023-09-03 RX ADMIN — Medication 7 UNIT(S): at 07:48

## 2023-09-03 RX ADMIN — APIXABAN 2.5 MILLIGRAM(S): 2.5 TABLET, FILM COATED ORAL at 17:00

## 2023-09-03 RX ADMIN — AMIODARONE HYDROCHLORIDE 200 MILLIGRAM(S): 400 TABLET ORAL at 17:00

## 2023-09-03 RX ADMIN — Medication 12.5 MILLIGRAM(S): at 06:32

## 2023-09-03 RX ADMIN — Medication 1 MILLIGRAM(S): at 11:58

## 2023-09-03 RX ADMIN — PANTOPRAZOLE SODIUM 40 MILLIGRAM(S): 20 TABLET, DELAYED RELEASE ORAL at 06:32

## 2023-09-03 RX ADMIN — Medication 325 MILLIGRAM(S): at 11:59

## 2023-09-03 RX ADMIN — Medication 81 MILLIGRAM(S): at 11:58

## 2023-09-03 NOTE — PROGRESS NOTE ADULT - SUBJECTIVE AND OBJECTIVE BOX
Over Night Events:  off milrinone, remains on amio, looks comfortable      ROS:  See HPI    PHYSICAL EXAM    ICU Vital Signs Last 24 Hrs  T(C): 37.2 (03 Sep 2023 04:00), Max: 37.2 (02 Sep 2023 16:00)  T(F): 98.9 (03 Sep 2023 04:00), Max: 98.9 (02 Sep 2023 16:00)  HR: 93 (03 Sep 2023 10:00) (76 - 102)  BP: 100/54 (02 Sep 2023 18:00) (100/54 - 122/56)  BP(mean): 75 (02 Sep 2023 18:00) (75 - 81)  ABP: 129/51 (03 Sep 2023 10:00) (100/35 - 146/65)  ABP(mean): 77 (03 Sep 2023 10:00) (55 - 93)  RR: 23 (03 Sep 2023 10:00) (20 - 48)  SpO2: 95% (03 Sep 2023 10:00) (91% - 100%)    O2 Parameters below as of 03 Sep 2023 07:00  Patient On (Oxygen Delivery Method): nasal cannula  O2 Flow (L/min): 3          General: chronically ill  HEENT: EPIFANIO             Lungs: Bilateral BS, decreased at the bases, prevena   Cardiovascular: Regular   Abdomen: Soft, Positive BS  Extremities: No edema  Skin: Warm  Neurological: Non focal       09-02-23 @ 07:01  -  09-03-23 @ 07:00  --------------------------------------------------------  IN:    Amiodarone: 33.3 mL    Amiodarone: 384.1 mL    Milrinone: 4.6 mL    Milrinone: 75.9 mL    Oral Fluid: 1260 mL  Total IN: 1757.9 mL    OUT:    Indwelling Catheter - Urethral (mL): 1100 mL    Voided (mL): 400 mL  Total OUT: 1500 mL    Total NET: 257.9 mL          LABS:                          8.0    9.58  )-----------( 186      ( 03 Sep 2023 02:58 )             25.5                                               09-03    137  |  107  |  12  ----------------------------<  183<H>  4.5   |  19  |  0.5<L>    Ca    8.3<L>      03 Sep 2023 02:58  Mg     2.3     09-03                                               Urinalysis Basic - ( 03 Sep 2023 02:58 )    Color: x / Appearance: x / SG: x / pH: x  Gluc: 183 mg/dL / Ketone: x  / Bili: x / Urobili: x   Blood: x / Protein: x / Nitrite: x   Leuk Esterase: x / RBC: x / WBC x   Sq Epi: x / Non Sq Epi: x / Bacteria: x                                                                                                                                                                            ABG - ( 02 Sep 2023 03:26 )  pH, Arterial: 7.36  pH, Blood: x     /  pCO2: 31    /  pO2: 111   / HCO3: 18    / Base Excess: -7.2  /  SaO2: 98.9                MEDICATIONS  (STANDING):  aMIOdarone    Tablet 200 milliGRAM(s) Oral every 12 hours  aspirin 325 milliGRAM(s) Oral daily  atorvastatin 40 milliGRAM(s) Oral at bedtime  bisacodyl Suppository 10 milliGRAM(s) Rectal once  chlorhexidine 2% Cloths 1 Application(s) Topical daily  clopidogrel Tablet 75 milliGRAM(s) Oral daily  dextrose 5%. 1000 milliLiter(s) (50 mL/Hr) IV Continuous <Continuous>  dextrose 50% Injectable 50 milliLiter(s) IV Push every 15 minutes  ferrous    sulfate 325 milliGRAM(s) Oral daily  folic acid 1 milliGRAM(s) Oral daily  furosemide   Injectable 40 milliGRAM(s) IV Push every 12 hours  glucagon  Injectable 1 milliGRAM(s) IntraMuscular once  heparin   Injectable 5000 Unit(s) SubCutaneous every 8 hours  insulin glargine Injectable (LANTUS) 40 Unit(s) SubCutaneous every morning  insulin lispro (ADMELOG) corrective regimen sliding scale   SubCutaneous three times a day before meals  insulin lispro Injectable (ADMELOG) 7 Unit(s) SubCutaneous three times a day before meals  ipratropium    for Nebulization 500 MICROGram(s) Nebulizer every 6 hours  lisinopril 5 milliGRAM(s) Oral daily  metoprolol tartrate 12.5 milliGRAM(s) Oral every 12 hours  pantoprazole    Tablet 40 milliGRAM(s) Oral before breakfast  polyethylene glycol 3350 17 Gram(s) Oral daily  senna 2 Tablet(s) Oral at bedtime    MEDICATIONS  (PRN):  dextrose Oral Gel 15 Gram(s) Oral once PRN Blood Glucose LESS THAN 70 milliGRAM(s)/deciliter  ondansetron Injectable 4 milliGRAM(s) IV Push every 8 hours PRN Nausea and/or Vomiting  oxyCODONE    IR 5 milliGRAM(s) Oral every 4 hours PRN Moderate Pain (4 - 6)  oxyCODONE    IR 10 milliGRAM(s) Oral every 4 hours PRN Severe Pain (7 - 10)      Xrays:                                                                                     ECHO  B/l opacities/effusion(improved)

## 2023-09-03 NOTE — PROGRESS NOTE ADULT - SUBJECTIVE AND OBJECTIVE BOX
OPERATIVE PROCEDURE(s):                POD #                       SURGEON(s): ANIRUDH Coon    HPI:  67yo F w/a PMH of Type II DM, HTN, DLD, obesity, lateral epicondylitis presenting to the ED w/ Left Arm pain. At time of my exam, patient intubated so spoke with the son at bedside. This arm started about two months ago and has continued to progress but in the last few days became severe. She went to the ED a few days ago, was given pain medications then sent home. Per son, pain still continued to progress after this. No fevers, chills, nausea, vomiting, diarrhea, constipation, SOB, CP.   On admission to the ED today, she presented with severe left arm pain w findings of LBBB on ECG (unsure if new or not). Code STEMI was called and then canceled because troponin negative and no chest pain. Patient was then noted to have acute mental status change, SOB, lactate elevation, and acidosis on abg..Patient had two seizure episodes and was treated with benzodiazepine. Intubated by ER for airway protection.     On Admission  Vitals: /64, HR 75, RR 20, T 98.5, satting 99 percent on RA   Labs: WBC 14.86, Hgb 10, , Na 139, K 5.4, BUN 12, Cr .6, Trop <.01 --> .13 on repeat  Imaging:   CXR -  Patchy bibasilar opacities, right greater than left.  CT Brain Stroke protocol - No evidence of acute transcortical infarct, hydrocephalus, acute intracranial hemorrhage, or mass effect.  CT brain perfusion: No evidence of acute infarct or ischemia.  CTA neck: No stenosis. No dissection.  CTA brain: No stenosis or aneurysm.  CTA Neck: The distal internal carotid arteries are patent. The [place] is normal in appearance. The visualized cerebral arteries are unremarkable.The vertebrobasilar junction and basilar artery are normal.    In the ED, given Levaquin. Keppra, started on propofol  Admitted to MICU for airway monitoring  (13 Aug 2023 19:09)      SUBJECTIVE ASSESSMENT:68yFemale patient seen and examined at bedside.    Vital Signs Last 24 Hrs  T(F): 98.9 (03 Sep 2023 04:00), Max: 99 (02 Sep 2023 08:00)  HR: 100 (03 Sep 2023 07:00) (76 - 102)  BP: 100/54 (02 Sep 2023 18:00) (100/54 - 146/67)  BP(mean): 75 (02 Sep 2023 18:00) (75 - 97)  ABP: 138/57 (03 Sep 2023 07:00) (100/35 - 146/65)  ABP(mean): 86 (03 Sep 2023 07:00)  RR: 35 (03 Sep 2023 07:00) (20 - 43)  SpO2: 97% (03 Sep 2023 07:00) (97% - 100%)  CVP(mm Hg): --  CVP(cm H2O): --  CO: --  CI: --  PA: --  SVR: --    I&O's Detail    02 Sep 2023 07:01  -  03 Sep 2023 07:00  --------------------------------------------------------  IN:    Amiodarone: 33.3 mL    Amiodarone: 384.1 mL    Milrinone: 4.6 mL    Milrinone: 75.9 mL    Oral Fluid: 1260 mL  Total IN: 1757.9 mL    OUT:    Indwelling Catheter - Urethral (mL): 1100 mL    Voided (mL): 400 mL  Total OUT: 1500 mL        Net: I&O's Detail    01 Sep 2023 07:01  -  02 Sep 2023 07:00  --------------------------------------------------------  Total NET: 412.2 mL      02 Sep 2023 07:01  -  03 Sep 2023 07:00  --------------------------------------------------------  Total NET: 257.9 mL        CAPILLARY BLOOD GLUCOSE      POCT Blood Glucose.: 211 mg/dL (02 Sep 2023 16:18)  POCT Blood Glucose.: 242 mg/dL (02 Sep 2023 11:26)    A1C with Estimated Average Glucose Result: 8.2 % (08-14-23 @ 04:40)      Physical Exam:  General: NAD; A&Ox3  Cardiac: S1/S2, RRR, no murmur, no rubs  Lungs: unlabored respirations, CTA b/l, no wheeze, no rales, no crackles  Abdomen: Soft/NT/ND; positive bowel sounds x 4  Sternum: Intact, no click, incision healing well with no drainage  Incisions: Incisions clean/dry/intact  Extremities: No edema b/l lower extremities; good capillary refill; no cyanosis; palpable 1+ pedal pulses b/l    Central Venous Catheter: Yes: critical illness, intravenous access  Day #  Johnson Catheter: Yes: critical illness; monitor strict i/o's                    Day #  EPICARDIAL WIRES:  YES                                                                         Day #  BOWEL MOVEMENT:  [] YES [] NO, If No, Timing since last BM Day #  CHEST TUBE(MS/Left/Right):  [] YES [] NO, If yes -  AIR LEAKS:  YES/NO        LABS:                        8.0<L>  9.58  )-----------( 186      ( 03 Sep 2023 02:58 )             25.5<L>                        7.7<L>  9.48  )-----------( 151      ( 02 Sep 2023 02:35 )             25.1<L>    09-03    137  |  107  |  12  ----------------------------<  183<H>  4.5   |  19  |  0.5<L>  09-02    135  |  108  |  19  ----------------------------<  260<H>  5.0   |  18  |  0.7    Ca    8.3<L>      03 Sep 2023 02:58  Mg     2.3     09-03    TPro  5.4<L> [6.0 - 8.0]  /  Alb  3.3<L> [3.5 - 5.2]  /  TBili  0.6 [0.2 - 1.2]  /  DBili  x   /  AST  27 [0 - 41]  /  ALT  12 [0 - 41]  /  AlkPhos  65 [30 - 115]  09-01      Urinalysis Basic - ( 03 Sep 2023 02:58 )    Color: x / Appearance: x / SG: x / pH: x  Gluc: 183 mg/dL / Ketone: x  / Bili: x / Urobili: x   Blood: x / Protein: x / Nitrite: x   Leuk Esterase: x / RBC: x / WBC x   Sq Epi: x / Non Sq Epi: x / Bacteria: x      ABG - ( 02 Sep 2023 03:26 )  pH: 7.36  /  pCO2: 31    /  pO2: 111   / HCO3: 18    / Base Excess: -7.2  /  SaO2: 98.9  /  LA: 1.10             RADIOLOGY & ADDITIONAL TESTS:  CXR:   EKG:    Allergies    No Known Allergies    Intolerances      MEDICATIONS  (STANDING):  aMIOdarone Infusion 0.5 mG/Min (16.7 mL/Hr) IV Continuous <Continuous>  aspirin 325 milliGRAM(s) Oral daily  atorvastatin 40 milliGRAM(s) Oral at bedtime  bisacodyl Suppository 10 milliGRAM(s) Rectal once  chlorhexidine 2% Cloths 1 Application(s) Topical daily  clopidogrel Tablet 75 milliGRAM(s) Oral daily  dextrose 5%. 1000 milliLiter(s) (50 mL/Hr) IV Continuous <Continuous>  dextrose 50% Injectable 50 milliLiter(s) IV Push every 15 minutes  ferrous    sulfate 325 milliGRAM(s) Oral daily  folic acid 1 milliGRAM(s) Oral daily  furosemide   Injectable 40 milliGRAM(s) IV Push daily  glucagon  Injectable 1 milliGRAM(s) IntraMuscular once  heparin   Injectable 5000 Unit(s) SubCutaneous every 8 hours  insulin glargine Injectable (LANTUS) 40 Unit(s) SubCutaneous every morning  insulin lispro (ADMELOG) corrective regimen sliding scale   SubCutaneous three times a day before meals  insulin lispro Injectable (ADMELOG) 7 Unit(s) SubCutaneous three times a day before meals  ipratropium    for Nebulization 500 MICROGram(s) Nebulizer every 6 hours  lisinopril 5 milliGRAM(s) Oral daily  metoprolol tartrate 12.5 milliGRAM(s) Oral every 12 hours  pantoprazole    Tablet 40 milliGRAM(s) Oral before breakfast  polyethylene glycol 3350 17 Gram(s) Oral daily  senna 2 Tablet(s) Oral at bedtime    MEDICATIONS  (PRN):  dextrose Oral Gel 15 Gram(s) Oral once PRN Blood Glucose LESS THAN 70 milliGRAM(s)/deciliter  ondansetron Injectable 4 milliGRAM(s) IV Push every 8 hours PRN Nausea and/or Vomiting  oxyCODONE    IR 5 milliGRAM(s) Oral every 4 hours PRN Moderate Pain (4 - 6)  oxyCODONE    IR 10 milliGRAM(s) Oral every 4 hours PRN Severe Pain (7 - 10)    Home Medications:  aspirin 81 mg oral tablet, chewable: 1 chewed once a day (13 Aug 2023 19:53)  atorvastatin 10 mg oral tablet: 1 orally once a day (at bedtime) (13 Aug 2023 19:52)  Jardiance 10 mg oral tablet: 1 orally once a day (13 Aug 2023 19:53)  MetFORMIN (Eqv-Glumetza) 500 mg oral tablet, extended release: 1 orally 2 times a day (13 Aug 2023 19:51)  pioglitazone 30 mg oral tablet: 1 orally once a day (13 Aug 2023 19:52)      Pharmacologic DVT Prophylaxis [] YES, [] NO: Contraindication:  SCD's: YES b/l    GI Prophylaxis:     Post-Op Beta-Blockers: [] Yes, [] No: contraindication:  Post-Op CCB: [] Yes, [] No: contraindication:  Post-Op Aspirin:  [] Yes, [] No: contraindication:  Post-Op Statin:  [] Yes, [] No: contraindication:    Ambulation/Activity Status:    Assessment/Plan:  68y Female status-post  - Case and plan discussed with CTU Intensivist and CT Surgeon - Dr. Simental/Jaymie/Jim/Reshma  - Continue CTU supportive care and ongoing plan of care as per continuing CTU rounds.   - Continue DVT/GI prophylaxis  - Incentive Spirometry 10 times an hour  - Continue to advance physical activity as tolerated and continue PT/OT as directed  1. CAD s/p CABG: Continue ASA, statin, BB  2. HTN:   3. Post-op A. Fib ppx:   4. COPD/Hypoxia:   5. DM/Glucose Control:     Social Service Disposition:     OPERATIVE PROCEDURE(s): CABGx3               POD #  4                     SURGEON(s): ANIRUDH Coon    HPI: 68 F w/a PMH of Type II DM, HTN, DLD, obesity, lateral epicondylitis presented to the ED w/ Left Arm pain.  This arm started about two months ago and has continued to progress but in the last few days became severe. She went to the ED a few days prior to admission and, was given pain medications then sent home. Per son, pain still continued to progress after this. No fevers, chills, nausea, vomiting, diarrhea, constipation, SOB, CP. On  this admission she again presented with severe left arm pain w findings of LBBB on ECG (unsure if new or not). Code STEMI was called and then canceled because troponin negative and no chest pain. Patient was then noted to have acute mental status change, SOB, lactate elevation, and acidosis on abg. Patient had two seizure type episodes and was treated with benzodiazepine. Intubated by ER for airway protection.     Stoke code called - no CVA but patient developed elevated troponins and TTE revealed EF of 30% with CCTA revealing score of 134 and LAD disease. Patient went for Cardiac cath that revealed multi-vessel CAD. Hospital course thus far required treatment for Cardiomyopathy (EF 30%), uncontrolled DM (A1c 8.2), acute DVT (R peroneal L femoral/posterior tibial / peroneal veins)  , acute PE (b/l segmental). She underwent a LHC which revealed severe CAD. On 08/30/23, she underwent surgical myocardial revascularization. Post-operatively, her hospital course was significant for brief episode of Atrial fibrillation and was converted pharmacologically.      SUBJECTIVE ASSESSMENT: 68y Female patient seen and examined at bedside.    Vital Signs Last 24 Hrs  T(F): 98.9 (03 Sep 2023 04:00), Max: 99 (02 Sep 2023 08:00)  HR: 100 (03 Sep 2023 07:00) (76 - 102)  BP: 100/54 (02 Sep 2023 18:00) (100/54 - 146/67)  BP(mean): 75 (02 Sep 2023 18:00) (75 - 97)  ABP: 138/57 (03 Sep 2023 07:00) (100/35 - 146/65)  ABP(mean): 86 (03 Sep 2023 07:00)  RR: 35 (03 Sep 2023 07:00) (20 - 43)  SpO2: 97% (03 Sep 2023 07:00) (97% - 100%) 3L NC    I&O's Detail    02 Sep 2023 07:01  -  03 Sep 2023 07:00  --------------------------------------------------------  IN:    Amiodarone: 33.3 mL    Amiodarone: 384.1 mL    Oral Fluid: 1260 mL  Total IN: 1757.9 mL    OUT:    Indwelling Catheter - Urethral (mL): 1100 mL    Voided (mL): 400 mL  Total OUT: 1500 mL    Net: I&O's Detail    01 Sep 2023 07:01  -  02 Sep 2023 07:00  --------------------------------------------------------  Total NET: 412.2 mL    02 Sep 2023 07:01  -  03 Sep 2023 07:00  --------------------------------------------------------  Total NET: 257.9 mL    CAPILLARY BLOOD GLUCOSE  POCT Blood Glucose.: 211 mg/dL (02 Sep 2023 16:18)  POCT Blood Glucose.: 242 mg/dL (02 Sep 2023 11:26)  A1C with Estimated Average Glucose Result: 8.2 % (08-14-23 @ 04:40)      Physical Exam:  General: NAD; A&Ox3  Cardiac: S1/S2, RRR, no murmur, no rubs  Lungs: unlabored respirations, CTA b/l, no wheeze, no rales, no crackles  Abdomen: Soft/NT/ND; positive bowel sounds x 4  Sternum: Intact, no click, incision healing well with no drainage  Incisions: Incisions clean/dry/intact  Extremities: 1+ edema b/l lower extremities;         LABS:                        8.0<L>  9.58  )-----------( 186      ( 03 Sep 2023 02:58 )             25.5<L>                        7.7<L>  9.48  )-----------( 151      ( 02 Sep 2023 02:35 )             25.1<L>    09-03    137  |  107  |  12  ----------------------------<  183<H>  4.5   |  19  |  0.5<L>  09-02    135  |  108  |  19  ----------------------------<  260<H>  5.0   |  18  |  0.7    Ca    8.3<L>      03 Sep 2023 02:58  Mg     2.3     09-03    TPro  5.4<L> [6.0 - 8.0]  /  Alb  3.3<L> [3.5 - 5.2]  /  TBili  0.6 [0.2 - 1.2]  /  DBili  x   /  AST  27 [0 - 41]  /  ALT  12 [0 - 41]  /  AlkPhos  65 [30 - 115]  09-01    Urinalysis Basic - ( 03 Sep 2023 02:58 )  Color: x / Appearance: x / SG: x / pH: x  Gluc: 183 mg/dL / Ketone: x  / Bili: x / Urobili: x   Blood: x / Protein: x / Nitrite: x   Leuk Esterase: x / RBC: x / WBC x   Sq Epi: x / Non Sq Epi: x / Bacteria: x      ABG - ( 02 Sep 2023 03:26 )  pH: 7.36  /  pCO2: 31    /  pO2: 111   / HCO3: 18    / Base Excess: -7.2  /  SaO2: 98.9  /  LA: 1.10       Allergies  No Known Allergies  Intolerances      MEDICATIONS  (STANDING):  aMIOdarone Infusion 0.5 mG/Min (16.7 mL/Hr) IV Continuous <Continuous>  aspirin 325 milliGRAM(s) Oral daily  atorvastatin 40 milliGRAM(s) Oral at bedtime  bisacodyl Suppository 10 milliGRAM(s) Rectal once  chlorhexidine 2% Cloths 1 Application(s) Topical daily  clopidogrel Tablet 75 milliGRAM(s) Oral daily  dextrose 5%. 1000 milliLiter(s) (50 mL/Hr) IV Continuous <Continuous>  dextrose 50% Injectable 50 milliLiter(s) IV Push every 15 minutes  ferrous    sulfate 325 milliGRAM(s) Oral daily  folic acid 1 milliGRAM(s) Oral daily  furosemide   Injectable 40 milliGRAM(s) IV Push daily  glucagon  Injectable 1 milliGRAM(s) IntraMuscular once  heparin   Injectable 5000 Unit(s) SubCutaneous every 8 hours  insulin glargine Injectable (LANTUS) 40 Unit(s) SubCutaneous every morning  insulin lispro (ADMELOG) corrective regimen sliding scale   SubCutaneous three times a day before meals  insulin lispro Injectable (ADMELOG) 7 Unit(s) SubCutaneous three times a day before meals  ipratropium    for Nebulization 500 MICROGram(s) Nebulizer every 6 hours  lisinopril 5 milliGRAM(s) Oral daily  metoprolol tartrate 12.5 milliGRAM(s) Oral every 12 hours  pantoprazole    Tablet 40 milliGRAM(s) Oral before breakfast  polyethylene glycol 3350 17 Gram(s) Oral daily  senna 2 Tablet(s) Oral at bedtime    MEDICATIONS  (PRN):  dextrose Oral Gel 15 Gram(s) Oral once PRN Blood Glucose LESS THAN 70 milliGRAM(s)/deciliter  ondansetron Injectable 4 milliGRAM(s) IV Push every 8 hours PRN Nausea and/or Vomiting  oxyCODONE    IR 5 milliGRAM(s) Oral every 4 hours PRN Moderate Pain (4 - 6)  oxyCODONE    IR 10 milliGRAM(s) Oral every 4 hours PRN Severe Pain (7 - 10)    Home Medications:  aspirin 81 mg oral tablet, chewable: 1 chewed once a day (13 Aug 2023 19:53)  atorvastatin 10 mg oral tablet: 1 orally once a day (at bedtime) (13 Aug 2023 19:52)  Jardiance 10 mg oral tablet: 1 orally once a day (13 Aug 2023 19:53)  MetFORMIN (Eqv-Glumetza) 500 mg oral tablet, extended release: 1 orally 2 times a day (13 Aug 2023 19:51)  pioglitazone 30 mg oral tablet: 1 orally once a day (13 Aug 2023 19:52)      Assessment/Plan:  68y Female status-post CABGx3               POD #  4  - Case and plan discussed with CTU Intensivist and CT Surgeon - Dr. Simental/Jaymie  - Continue CTU supportive care and ongoing plan of care as per continuing CTU rounds.   - Continue DVT/GI prophylaxis  - Incentive Spirometry 10 times an hour  - Continue to advance physical activity as tolerated and continue PT/OT as directed  1. CAD s/p CABG: Continue ASA 81mg po qd, statin, and lopressor 12.5 mg po q12h  2. HTN: lisinopril 5 milliGRAM(s) Oral daily  3. Hypoxia: SpO2 97% - 100% on 3L NCS; Cont albuterol/ipratropium 3ml Q6 for nebulization. Encourage incentive spirometry deep breathing, and coughing to bring up any secretions   4. DM/Glucose Control: A1C 8.2; FS well controlled on Lantus 40, prandial 7, & sliding scale.  5. DVT/PE: venous duplex 8/30 -> DVT R peroneal vein. Vascular following-> No IVC filter needed. Started ok Eliquis 2.5mg q12h after wire removal today.    6. Afib: patient had episode of rapid afib which converted to sinus rhythm 9/1/23. Currently NSR, transition to amiodarone PO for maintenance.   7. Chronic systolic dysfunction: weaned off milrinone; tolerating lisinopril for after-load reduction. Will repeat echo tomorrow am to assess EF: may need a Life Vest

## 2023-09-03 NOTE — PROGRESS NOTE ADULT - ASSESSMENT
IMPRESSION:  CABG c3L  NSTEMI/CAD  acute systolic heart failure 2ry to acute MI  Acute PE  DVT  acute respiratory failure improved  Afib  PLAN:    CNS: avoid CNS depressants    HEENT:  Oral care    PULMONARY:  HOB @ 45 degrees, chest PT, ISx10/hr    CARDIOVASCULAR: switch amio to po, hold milrinone, restart BB later during the day.  dc wires today, will start eliquis, IV lasix 40 IV bid, decrease asa to 81, continue plavix, lipitor    GI: GI prophylaxis                                          Feeding po diet, fluid restriction    RENAL:  F/u  lytes.  Correct as needed. accurate I/O    INFECTIOUS DISEASE:monitor off abx    HEMATOLOGICAL:  DVT prophylaxis. hold for wire removal    ENDOCRINE:  Follow up FS.      MUSCULOSKELETAL: oob to chair    CODE STATUS: FULL CODE    DISPOSITION: Pt requires continued monitoring in the CTU    case discussed with ct surgery

## 2023-09-04 LAB
ANION GAP SERPL CALC-SCNC: 9 MMOL/L — SIGNIFICANT CHANGE UP (ref 7–14)
BASOPHILS # BLD AUTO: 0.02 K/UL — SIGNIFICANT CHANGE UP (ref 0–0.2)
BASOPHILS NFR BLD AUTO: 0.3 % — SIGNIFICANT CHANGE UP (ref 0–1)
BUN SERPL-MCNC: 10 MG/DL — SIGNIFICANT CHANGE UP (ref 10–20)
CALCIUM SERPL-MCNC: 7.7 MG/DL — LOW (ref 8.4–10.5)
CHLORIDE SERPL-SCNC: 106 MMOL/L — SIGNIFICANT CHANGE UP (ref 98–110)
CO2 SERPL-SCNC: 23 MMOL/L — SIGNIFICANT CHANGE UP (ref 17–32)
CREAT SERPL-MCNC: 0.5 MG/DL — LOW (ref 0.7–1.5)
EGFR: 101 ML/MIN/1.73M2 — SIGNIFICANT CHANGE UP
EOSINOPHIL # BLD AUTO: 0.03 K/UL — SIGNIFICANT CHANGE UP (ref 0–0.7)
EOSINOPHIL NFR BLD AUTO: 0.5 % — SIGNIFICANT CHANGE UP (ref 0–8)
GLUCOSE BLDC GLUCOMTR-MCNC: 103 MG/DL — HIGH (ref 70–99)
GLUCOSE BLDC GLUCOMTR-MCNC: 129 MG/DL — HIGH (ref 70–99)
GLUCOSE BLDC GLUCOMTR-MCNC: 135 MG/DL — HIGH (ref 70–99)
GLUCOSE BLDC GLUCOMTR-MCNC: 159 MG/DL — HIGH (ref 70–99)
GLUCOSE BLDC GLUCOMTR-MCNC: 73 MG/DL — SIGNIFICANT CHANGE UP (ref 70–99)
GLUCOSE SERPL-MCNC: 143 MG/DL — HIGH (ref 70–99)
HCT VFR BLD CALC: 26.2 % — LOW (ref 37–47)
HGB BLD-MCNC: 8 G/DL — LOW (ref 12–16)
IMM GRANULOCYTES NFR BLD AUTO: 0.3 % — SIGNIFICANT CHANGE UP (ref 0.1–0.3)
LYMPHOCYTES # BLD AUTO: 1.56 K/UL — SIGNIFICANT CHANGE UP (ref 1.2–3.4)
LYMPHOCYTES # BLD AUTO: 23.6 % — SIGNIFICANT CHANGE UP (ref 20.5–51.1)
MAGNESIUM SERPL-MCNC: 2 MG/DL — SIGNIFICANT CHANGE UP (ref 1.8–2.4)
MCHC RBC-ENTMCNC: 27.2 PG — SIGNIFICANT CHANGE UP (ref 27–31)
MCHC RBC-ENTMCNC: 30.5 G/DL — LOW (ref 32–37)
MCV RBC AUTO: 89.1 FL — SIGNIFICANT CHANGE UP (ref 81–99)
MONOCYTES # BLD AUTO: 0.5 K/UL — SIGNIFICANT CHANGE UP (ref 0.1–0.6)
MONOCYTES NFR BLD AUTO: 7.6 % — SIGNIFICANT CHANGE UP (ref 1.7–9.3)
NEUTROPHILS # BLD AUTO: 4.47 K/UL — SIGNIFICANT CHANGE UP (ref 1.4–6.5)
NEUTROPHILS NFR BLD AUTO: 67.7 % — SIGNIFICANT CHANGE UP (ref 42.2–75.2)
NRBC # BLD: 0 /100 WBCS — SIGNIFICANT CHANGE UP (ref 0–0)
PLATELET # BLD AUTO: 188 K/UL — SIGNIFICANT CHANGE UP (ref 130–400)
PMV BLD: 10.4 FL — SIGNIFICANT CHANGE UP (ref 7.4–10.4)
POTASSIUM SERPL-MCNC: 4.1 MMOL/L — SIGNIFICANT CHANGE UP (ref 3.5–5)
POTASSIUM SERPL-SCNC: 4.1 MMOL/L — SIGNIFICANT CHANGE UP (ref 3.5–5)
RBC # BLD: 2.94 M/UL — LOW (ref 4.2–5.4)
RBC # FLD: 23.4 % — HIGH (ref 11.5–14.5)
SODIUM SERPL-SCNC: 138 MMOL/L — SIGNIFICANT CHANGE UP (ref 135–146)
WBC # BLD: 6.6 K/UL — SIGNIFICANT CHANGE UP (ref 4.8–10.8)
WBC # FLD AUTO: 6.6 K/UL — SIGNIFICANT CHANGE UP (ref 4.8–10.8)

## 2023-09-04 PROCEDURE — 71045 X-RAY EXAM CHEST 1 VIEW: CPT | Mod: 26

## 2023-09-04 PROCEDURE — 99233 SBSQ HOSP IP/OBS HIGH 50: CPT

## 2023-09-04 PROCEDURE — 93306 TTE W/DOPPLER COMPLETE: CPT | Mod: 26

## 2023-09-04 RX ORDER — MULTIVIT-MIN/FERROUS GLUCONATE 9 MG/15 ML
1 LIQUID (ML) ORAL DAILY
Refills: 0 | Status: DISCONTINUED | OUTPATIENT
Start: 2023-09-04 | End: 2023-09-06

## 2023-09-04 RX ORDER — IPRATROPIUM BROMIDE 0.2 MG/ML
500 SOLUTION, NON-ORAL INHALATION EVERY 6 HOURS
Refills: 0 | Status: DISCONTINUED | OUTPATIENT
Start: 2023-09-04 | End: 2023-09-06

## 2023-09-04 RX ADMIN — Medication 1 MILLIGRAM(S): at 11:04

## 2023-09-04 RX ADMIN — AMIODARONE HYDROCHLORIDE 200 MILLIGRAM(S): 400 TABLET ORAL at 06:18

## 2023-09-04 RX ADMIN — CLOPIDOGREL BISULFATE 75 MILLIGRAM(S): 75 TABLET, FILM COATED ORAL at 11:05

## 2023-09-04 RX ADMIN — Medication 325 MILLIGRAM(S): at 11:04

## 2023-09-04 RX ADMIN — Medication 12.5 MILLIGRAM(S): at 06:19

## 2023-09-04 RX ADMIN — Medication 7 UNIT(S): at 08:54

## 2023-09-04 RX ADMIN — AMIODARONE HYDROCHLORIDE 200 MILLIGRAM(S): 400 TABLET ORAL at 17:08

## 2023-09-04 RX ADMIN — CHLORHEXIDINE GLUCONATE 1 APPLICATION(S): 213 SOLUTION TOPICAL at 06:18

## 2023-09-04 RX ADMIN — Medication 500 MICROGRAM(S): at 08:33

## 2023-09-04 RX ADMIN — OXYCODONE HYDROCHLORIDE 5 MILLIGRAM(S): 5 TABLET ORAL at 20:14

## 2023-09-04 RX ADMIN — Medication 1: at 11:03

## 2023-09-04 RX ADMIN — INSULIN GLARGINE 40 UNIT(S): 100 INJECTION, SOLUTION SUBCUTANEOUS at 08:54

## 2023-09-04 RX ADMIN — Medication 12.5 MILLIGRAM(S): at 17:08

## 2023-09-04 RX ADMIN — Medication 40 MILLIGRAM(S): at 06:19

## 2023-09-04 RX ADMIN — Medication 40 MILLIGRAM(S): at 17:09

## 2023-09-04 RX ADMIN — POLYETHYLENE GLYCOL 3350 17 GRAM(S): 17 POWDER, FOR SOLUTION ORAL at 11:04

## 2023-09-04 RX ADMIN — Medication 500 MICROGRAM(S): at 13:55

## 2023-09-04 RX ADMIN — OXYCODONE HYDROCHLORIDE 5 MILLIGRAM(S): 5 TABLET ORAL at 21:00

## 2023-09-04 RX ADMIN — PANTOPRAZOLE SODIUM 40 MILLIGRAM(S): 20 TABLET, DELAYED RELEASE ORAL at 06:20

## 2023-09-04 RX ADMIN — LISINOPRIL 5 MILLIGRAM(S): 2.5 TABLET ORAL at 06:23

## 2023-09-04 RX ADMIN — APIXABAN 2.5 MILLIGRAM(S): 2.5 TABLET, FILM COATED ORAL at 06:18

## 2023-09-04 RX ADMIN — ATORVASTATIN CALCIUM 40 MILLIGRAM(S): 80 TABLET, FILM COATED ORAL at 21:46

## 2023-09-04 RX ADMIN — Medication 7 UNIT(S): at 11:02

## 2023-09-04 RX ADMIN — Medication 1 TABLET(S): at 11:04

## 2023-09-04 RX ADMIN — Medication 81 MILLIGRAM(S): at 11:04

## 2023-09-04 RX ADMIN — APIXABAN 2.5 MILLIGRAM(S): 2.5 TABLET, FILM COATED ORAL at 17:08

## 2023-09-04 NOTE — PROGRESS NOTE ADULT - ASSESSMENT
Assessment/Plan:  NSTEMI/CAD-s/p CABG x 3-POD #5  1-BP control-beta-blockers, ACE-I  2-serum glucose control-Lantus/Lispro with insulin sliding scale correction regimen  3-fluid overload-diuresis  4-acute blood loss anemia-stable, monitor Hb/Hct daily  5-chronic systolic heart failure-s/p revascularization-repeat 2D ECHO  6-A-Fib, now NSR-continue amiodarone  7-Hx PE/DVT-continue Eliquis    35 minutes of critical care services provided

## 2023-09-04 NOTE — PROGRESS NOTE ADULT - SUBJECTIVE AND OBJECTIVE BOX
MARCIANO RODRIGUES  MRN#: [id number]  Subjective:  Patient was seen and evalauted on AM rounds offerring no specific compliants at this time.    OBJECTIVE:  ICU Vital Signs Last 24 Hrs  T(C): 36.4 (04 Sep 2023 11:00), Max: 36.9 (03 Sep 2023 20:00)  T(F): 97.6 (04 Sep 2023 11:00), Max: 98.4 (03 Sep 2023 20:00)  HR: 85 (04 Sep 2023 12:00) (84 - 93)  BP: 102/60 (04 Sep 2023 12:00) (96/52 - 150/68)  BP(mean): 67 (04 Sep 2023 12:00) (67 - 98)  ABP: 110/49 (03 Sep 2023 15:00) (110/43 - 131/55)  ABP(mean): 72 (03 Sep 2023 15:00) (65 - 81)  RR: 25 (04 Sep 2023 12:00) (15 - 39)  SpO2: 100% (04 Sep 2023 12:00) (94% - 100%)    O2 Parameters below as of 04 Sep 2023 12:00  Patient On (Oxygen Delivery Method): nasal cannula  O2 Flow (L/min): 2           @ 07:  -   @ 07:00  --------------------------------------------------------  IN: 886.7 mL / OUT: 1950 mL / NET: -1063.3 mL     @ 07:  -   @ 12:59  --------------------------------------------------------  IN: 360 mL / OUT: 800 mL / NET: -440 mL      CAPILLARY BLOOD GLUCOSE      POCT Blood Glucose.: 159 mg/dL (04 Sep 2023 11:01)      PHYSICAL EXAM:Daily     Daily Weight in k.4 (04 Sep 2023 07:00)  General: WN/WD NAD    HEENT:     + NCAT  + EOMI  - Conjuctival edema   - Icterus   - Thrush   - ETT  - NGT/OGT    Neck:         + FROM    - JVD     - Nodes     - Masses    + Mid-line trachea   - Tracheostomy    Chest:         - Sternal click  - Sternal drainage  + Pacing wires  - Chest tubes  - SubQ emphysema    Lungs:          + CTA   - Rhonchi    - Rales    - Wheezing     - Decreased BS   - Dullness R L    Cardiac:       + S1 + S2    + RRR   - Irregular   - S3  - S4    - Murmurs   - Rub   - Hamman’s sign     Abdomen:    + BS     + Soft    + Non-tender     - Distended    - Organomegaly  - PEG    Extremities:   - Cyanosis U/L   - Clubbing  U/L  - LE/UE Edema   + Capillary refill    + Pulses     Neuro:        + Awake   +  Alert   - Confused   - Lethargic   - Sedated   - Generalized Weakness    Skin:        - Rashes    - Erythema   + Normal incisions   + IV sites intact  - Sacral decubitus    HOSPITAL MEDICATIONS:  MEDICATIONS  (STANDING):  aMIOdarone    Tablet 200 milliGRAM(s) Oral every 12 hours  apixaban 2.5 milliGRAM(s) Oral every 12 hours  aspirin enteric coated 81 milliGRAM(s) Oral daily  atorvastatin 40 milliGRAM(s) Oral at bedtime  bisacodyl Suppository 10 milliGRAM(s) Rectal once  chlorhexidine 2% Cloths 1 Application(s) Topical daily  clopidogrel Tablet 75 milliGRAM(s) Oral daily  dextrose 5%. 1000 milliLiter(s) (50 mL/Hr) IV Continuous <Continuous>  dextrose 50% Injectable 50 milliLiter(s) IV Push every 15 minutes  ferrous    sulfate 325 milliGRAM(s) Oral daily  folic acid 1 milliGRAM(s) Oral daily  furosemide   Injectable 40 milliGRAM(s) IV Push every 12 hours  glucagon  Injectable 1 milliGRAM(s) IntraMuscular once  insulin glargine Injectable (LANTUS) 40 Unit(s) SubCutaneous every morning  insulin lispro (ADMELOG) corrective regimen sliding scale   SubCutaneous three times a day before meals  insulin lispro Injectable (ADMELOG) 7 Unit(s) SubCutaneous three times a day before meals  ipratropium    for Nebulization 500 MICROGram(s) Nebulizer every 6 hours  lisinopril 5 milliGRAM(s) Oral daily  metoprolol tartrate 12.5 milliGRAM(s) Oral every 12 hours  multivitamin/minerals 1 Tablet(s) Oral daily  pantoprazole    Tablet 40 milliGRAM(s) Oral before breakfast  polyethylene glycol 3350 17 Gram(s) Oral daily  senna 2 Tablet(s) Oral at bedtime    MEDICATIONS  (PRN):  dextrose Oral Gel 15 Gram(s) Oral once PRN Blood Glucose LESS THAN 70 milliGRAM(s)/deciliter  ondansetron Injectable 4 milliGRAM(s) IV Push every 8 hours PRN Nausea and/or Vomiting  oxyCODONE    IR 5 milliGRAM(s) Oral every 4 hours PRN Moderate Pain (4 - 6)  oxyCODONE    IR 10 milliGRAM(s) Oral every 4 hours PRN Severe Pain (7 - 10)      LABS:                        8.0    6.60  )-----------( 188      ( 04 Sep 2023 01:40 )             26.2    09-04    138  |  106  |  10  ----------------------------<  143<H>  4.1   |  23  |  0.5<L>    Ca    7.7<L>      04 Sep 2023 01:40  Mg     2.0     09-04         Urinalysis Basic - ( 04 Sep 2023 01:40 )    Color: x / Appearance: x / SG: x / pH: x  Gluc: 143 mg/dL / Ketone: x  / Bili: x / Urobili: x   Blood: x / Protein: x / Nitrite: x   Leuk Esterase: x / RBC: x / WBC x   Sq Epi: x / Non Sq Epi: x / Bacteria: x        RADIOLOGY:  X Reviewed and interpreted by me: bilateral opacities/effusions    CARDIOPULMONARY DYSFUNCTION  - Respiratory status required supplemental oxygen & the following of continuous pulse oximetry for support & to prevent decompensation  - Continued early mobilization as tolerated  - Addressed analgesic regimen to optimize function    PREVENTION-PROPHYLAXIS  - ASA continued for graft occlusion-thromboembolism prophylaxis  - Lipitor was also started for long term graft patency  - VTE prophylaxis-Venodyne boots  - Protonix maintained for GI bleeding prophylaxis  - Lopressor continued for atrial fibrillation prophylaxis  - Metabolic stability & infection prophylaxis required review and adjustment of regular Insulin sliding scale and gylcemic regimen while following serial glucose levels to help achieve & maintain euglycemia  - Reviewed & addressed surgical site infection prophylaxis regimen

## 2023-09-04 NOTE — PROGRESS NOTE ADULT - SUBJECTIVE AND OBJECTIVE BOX
OPERATIVE PROCEDURE(s):  OPERATIVE PROCEDURE(s): CABGx3               POD #  5                     SURGEON(s): ANIRUDH Coon    HPI: 68 F w/a PMH of Type II DM, HTN, DLD, obesity, lateral epicondylitis presented to the ED w/ Left Arm pain.  This arm started about two months ago and has continued to progress but in the last few days became severe. She went to the ED a few days prior to admission and, was given pain medications then sent home. Per son, pain still continued to progress after this. No fevers, chills, nausea, vomiting, diarrhea, constipation, SOB, CP. On  this admission she again presented with severe left arm pain w findings of LBBB on ECG (unsure if new or not). Code STEMI was called and then canceled because troponin negative and no chest pain. Patient was then noted to have acute mental status change, SOB, lactate elevation, and acidosis on abg. Patient had two seizure type episodes and was treated with benzodiazepine. Intubated by ER for airway protection.     Stoke code called - no CVA but patient developed elevated troponins and TTE revealed EF of 30% with CCTA revealing score of 134 and LAD disease. Patient went for Cardiac cath that revealed multi-vessel CAD. Hospital course thus far required treatment for Cardiomyopathy (EF 30%), uncontrolled DM (A1c 8.2), acute DVT (R peroneal L femoral/posterior tibial / peroneal veins)  , acute PE (b/l segmental). She underwent a LHC which revealed severe CAD. On 08/30/23, she underwent surgical myocardial revascularization. Post-operatively, her hospital course was significant for brief episode of Atrial fibrillation and was converted pharmacologically.        SUBJECTIVE ASSESSMENT:68yFemale patient seen and examined at bedside.    Vital Signs Last 24 Hrs  T(F): 97.8 (04 Sep 2023 00:00), Max: 98.4 (03 Sep 2023 20:00)  HR: 87 (04 Sep 2023 01:00) (84 - 93)  BP: 116/53 (04 Sep 2023 01:00) (104/53 - 136/79)  BP(mean): 77 (04 Sep 2023 01:00) (75 - 95)  ABP: 110/49 (03 Sep 2023 15:00) (109/40 - 131/55)  ABP(mean): 72 (03 Sep 2023 15:00)  RR: 48 (04 Sep 2023 01:00) (15 - 48)  SpO2: 100% (04 Sep 2023 01:00) (91% - 100%)      I&O's Detail    03 Sep 2023 07:01  -  04 Sep 2023 07:00  --------------------------------------------------------  IN:    Amiodarone: 16.7 mL    Oral Fluid: 770 mL  Total IN: 786.7 mL    OUT:    Voided (mL): 1150 mL  Total OUT: 1150 mL    Net: I&O's Detail    02 Sep 2023 07:01  -  03 Sep 2023 07:00  --------------------------------------------------------  Total NET: 257.9 mL    03 Sep 2023 07:01  -  04 Sep 2023 07:00  --------------------------------------------------------  Total NET: -363.3 mL      CAPILLARY BLOOD GLUCOSE  POCT Blood Glucose.: 129 mg/dL (04 Sep 2023 06:38)  POCT Blood Glucose.: 150 mg/dL (03 Sep 2023 21:19)  POCT Blood Glucose.: 179 mg/dL (03 Sep 2023 16:41)  POCT Blood Glucose.: 148 mg/dL (03 Sep 2023 11:25)  POCT Blood Glucose.: 192 mg/dL (03 Sep 2023 07:47)  A1C with Estimated Average Glucose Result: 8.2 % (08-14-23 @ 04:40)      Physical Exam:  General: NAD; A&Ox3  Cardiac: S1/S2, RRR, no murmur, no rubs  Lungs: unlabored respirations, CTA b/l, no wheeze, no rales, no crackles  Abdomen: Soft/NT/ND; positive bowel sounds x 4  Sternum: Intact, no click, incision healing well with no drainage  Incisions: Incisions clean/dry/intact  Extremities: No edema b/l lower extremities; good capillary refill; no cyanosis; palpable 1+ pedal pulses b/l    Central Venous Catheter: Yes: critical illness, intravenous access  Day #  Johnson Catheter: Yes: critical illness; monitor strict i/o's                    Day #  EPICARDIAL WIRES:  YES                                                                         Day #  BOWEL MOVEMENT:  [] YES [] NO, If No, Timing since last BM Day #  CHEST TUBE(MS/Left/Right):  [] YES [] NO, If yes -  AIR LEAKS:  YES/NO        LABS:                        8.0<L>  6.60  )-----------( 188      ( 04 Sep 2023 01:40 )             26.2<L>                        8.0<L>  9.58  )-----------( 186      ( 03 Sep 2023 02:58 )             25.5<L>    09-04    138  |  106  |  10  ----------------------------<  143<H>  4.1   |  23  |  0.5<L>  09-03    137  |  107  |  12  ----------------------------<  183<H>  4.5   |  19  |  0.5<L>    Ca    7.7<L>      04 Sep 2023 01:40  Mg     2.0     09-04    Urinalysis Basic - ( 04 Sep 2023 01:40 )    Allergies  No Known Allergies  Intolerances      MEDICATIONS  (STANDING):  aMIOdarone    Tablet 200 milliGRAM(s) Oral every 12 hours  apixaban 2.5 milliGRAM(s) Oral every 12 hours  aspirin enteric coated 81 milliGRAM(s) Oral daily  atorvastatin 40 milliGRAM(s) Oral at bedtime  bisacodyl Suppository 10 milliGRAM(s) Rectal once  chlorhexidine 2% Cloths 1 Application(s) Topical daily  clopidogrel Tablet 75 milliGRAM(s) Oral daily  dextrose 5%. 1000 milliLiter(s) (50 mL/Hr) IV Continuous <Continuous>  dextrose 50% Injectable 50 milliLiter(s) IV Push every 15 minutes  ferrous    sulfate 325 milliGRAM(s) Oral daily  folic acid 1 milliGRAM(s) Oral daily  furosemide   Injectable 40 milliGRAM(s) IV Push every 12 hours  glucagon  Injectable 1 milliGRAM(s) IntraMuscular once  insulin glargine Injectable (LANTUS) 40 Unit(s) SubCutaneous every morning  insulin lispro (ADMELOG) corrective regimen sliding scale   SubCutaneous three times a day before meals  insulin lispro Injectable (ADMELOG) 7 Unit(s) SubCutaneous three times a day before meals  ipratropium    for Nebulization 500 MICROGram(s) Nebulizer every 6 hours  lisinopril 5 milliGRAM(s) Oral daily  metoprolol tartrate 12.5 milliGRAM(s) Oral every 12 hours  pantoprazole    Tablet 40 milliGRAM(s) Oral before breakfast  polyethylene glycol 3350 17 Gram(s) Oral daily  senna 2 Tablet(s) Oral at bedtime    MEDICATIONS  (PRN):  dextrose Oral Gel 15 Gram(s) Oral once PRN Blood Glucose LESS THAN 70 milliGRAM(s)/deciliter  ondansetron Injectable 4 milliGRAM(s) IV Push every 8 hours PRN Nausea and/or Vomiting  oxyCODONE    IR 5 milliGRAM(s) Oral every 4 hours PRN Moderate Pain (4 - 6)  oxyCODONE    IR 10 milliGRAM(s) Oral every 4 hours PRN Severe Pain (7 - 10)    Home Medications:  aspirin 81 mg oral tablet, chewable: 1 chewed once a day (13 Aug 2023 19:53)  atorvastatin 10 mg oral tablet: 1 orally once a day (at bedtime) (13 Aug 2023 19:52)  Jardiance 10 mg oral tablet: 1 orally once a day (13 Aug 2023 19:53)  MetFORMIN (Eqv-Glumetza) 500 mg oral tablet, extended release: 1 orally 2 times a day (13 Aug 2023 19:51)  pioglitazone 30 mg oral tablet: 1 orally once a day (13 Aug 2023 19:52)          Assessment/Plan:  68y Female status-post CABGx3               POD #  5  - Case and plan discussed with CTU Intensivist and CT Surgeon - Dr. Simental/Jaymie  - Continue CTU supportive care and ongoing plan of care as per continuing CTU rounds.   - Continue DVT/GI prophylaxis  - Incentive Spirometry 10 times an hour  - Continue to advance physical activity as tolerated and continue PT/OT as directed  1. CAD s/p CABG: Continue ASA 81mg po qd, statin, and lopressor 12.5 mg po q12h  2. HTN: lisinopril 5 milliGRAM(s) Oral daily  3. Hypoxia: SpO2 97% - 100% on 3L NCS; Cont albuterol/ipratropium 3ml Q6 for nebulization. Encourage incentive spirometry deep breathing, and coughing to bring up any secretions   4. DM/Glucose Control: A1C 8.2; FS well controlled on Lantus 40, prandial 7, & sliding scale.  5. DVT/PE: venous duplex 8/30 -> DVT R peroneal vein. Vascular following-> No IVC filter needed. Cont Eliquis 2.5mg q12h.   6. Afib: patient had episode of rapid afib which converted to sinus rhythm 9/1/23. Currently NSR, transition to amiodarone PO for maintenance.   7. Chronic systolic dysfunction: weaned off milrinone; tolerating lisinopril for after-load reduction. Will repeat echo today am to assess EF: may need a Life Vest

## 2023-09-05 LAB
ANION GAP SERPL CALC-SCNC: 7 MMOL/L — SIGNIFICANT CHANGE UP (ref 7–14)
BASOPHILS # BLD AUTO: 0.02 K/UL — SIGNIFICANT CHANGE UP (ref 0–0.2)
BASOPHILS NFR BLD AUTO: 0.4 % — SIGNIFICANT CHANGE UP (ref 0–1)
BUN SERPL-MCNC: 9 MG/DL — LOW (ref 10–20)
CALCIUM SERPL-MCNC: 7.5 MG/DL — LOW (ref 8.4–10.5)
CHLORIDE SERPL-SCNC: 105 MMOL/L — SIGNIFICANT CHANGE UP (ref 98–110)
CO2 SERPL-SCNC: 25 MMOL/L — SIGNIFICANT CHANGE UP (ref 17–32)
CREAT SERPL-MCNC: 0.6 MG/DL — LOW (ref 0.7–1.5)
EGFR: 96 ML/MIN/1.73M2 — SIGNIFICANT CHANGE UP
EOSINOPHIL # BLD AUTO: 0.1 K/UL — SIGNIFICANT CHANGE UP (ref 0–0.7)
EOSINOPHIL NFR BLD AUTO: 1.8 % — SIGNIFICANT CHANGE UP (ref 0–8)
GLUCOSE BLDC GLUCOMTR-MCNC: 184 MG/DL — HIGH (ref 70–99)
GLUCOSE BLDC GLUCOMTR-MCNC: 194 MG/DL — HIGH (ref 70–99)
GLUCOSE BLDC GLUCOMTR-MCNC: 199 MG/DL — HIGH (ref 70–99)
GLUCOSE BLDC GLUCOMTR-MCNC: 78 MG/DL — SIGNIFICANT CHANGE UP (ref 70–99)
GLUCOSE SERPL-MCNC: 66 MG/DL — LOW (ref 70–99)
HCT VFR BLD CALC: 25.1 % — LOW (ref 37–47)
HGB BLD-MCNC: 7.6 G/DL — LOW (ref 12–16)
IMM GRANULOCYTES NFR BLD AUTO: 0.4 % — HIGH (ref 0.1–0.3)
LYMPHOCYTES # BLD AUTO: 1.37 K/UL — SIGNIFICANT CHANGE UP (ref 1.2–3.4)
LYMPHOCYTES # BLD AUTO: 24.3 % — SIGNIFICANT CHANGE UP (ref 20.5–51.1)
MAGNESIUM SERPL-MCNC: 1.9 MG/DL — SIGNIFICANT CHANGE UP (ref 1.8–2.4)
MCHC RBC-ENTMCNC: 26.6 PG — LOW (ref 27–31)
MCHC RBC-ENTMCNC: 30.3 G/DL — LOW (ref 32–37)
MCV RBC AUTO: 87.8 FL — SIGNIFICANT CHANGE UP (ref 81–99)
MONOCYTES # BLD AUTO: 0.48 K/UL — SIGNIFICANT CHANGE UP (ref 0.1–0.6)
MONOCYTES NFR BLD AUTO: 8.5 % — SIGNIFICANT CHANGE UP (ref 1.7–9.3)
NEUTROPHILS # BLD AUTO: 3.64 K/UL — SIGNIFICANT CHANGE UP (ref 1.4–6.5)
NEUTROPHILS NFR BLD AUTO: 64.6 % — SIGNIFICANT CHANGE UP (ref 42.2–75.2)
NRBC # BLD: 0 /100 WBCS — SIGNIFICANT CHANGE UP (ref 0–0)
PLATELET # BLD AUTO: 209 K/UL — SIGNIFICANT CHANGE UP (ref 130–400)
PMV BLD: 9.9 FL — SIGNIFICANT CHANGE UP (ref 7.4–10.4)
POTASSIUM SERPL-MCNC: 3.5 MMOL/L — SIGNIFICANT CHANGE UP (ref 3.5–5)
POTASSIUM SERPL-SCNC: 3.5 MMOL/L — SIGNIFICANT CHANGE UP (ref 3.5–5)
RBC # BLD: 2.86 M/UL — LOW (ref 4.2–5.4)
RBC # FLD: 23.8 % — HIGH (ref 11.5–14.5)
SODIUM SERPL-SCNC: 137 MMOL/L — SIGNIFICANT CHANGE UP (ref 135–146)
WBC # BLD: 5.63 K/UL — SIGNIFICANT CHANGE UP (ref 4.8–10.8)
WBC # FLD AUTO: 5.63 K/UL — SIGNIFICANT CHANGE UP (ref 4.8–10.8)

## 2023-09-05 PROCEDURE — 99232 SBSQ HOSP IP/OBS MODERATE 35: CPT

## 2023-09-05 PROCEDURE — 99223 1ST HOSP IP/OBS HIGH 75: CPT

## 2023-09-05 PROCEDURE — 71046 X-RAY EXAM CHEST 2 VIEWS: CPT | Mod: 26

## 2023-09-05 PROCEDURE — 71045 X-RAY EXAM CHEST 1 VIEW: CPT | Mod: 26,59

## 2023-09-05 RX ORDER — POTASSIUM CHLORIDE 20 MEQ
20 PACKET (EA) ORAL ONCE
Refills: 0 | Status: COMPLETED | OUTPATIENT
Start: 2023-09-05 | End: 2023-09-05

## 2023-09-05 RX ORDER — POTASSIUM CHLORIDE 20 MEQ
40 PACKET (EA) ORAL DAILY
Refills: 0 | Status: DISCONTINUED | OUTPATIENT
Start: 2023-09-06 | End: 2023-09-06

## 2023-09-05 RX ORDER — MAGNESIUM SULFATE 500 MG/ML
2 VIAL (ML) INJECTION ONCE
Refills: 0 | Status: COMPLETED | OUTPATIENT
Start: 2023-09-05 | End: 2023-09-05

## 2023-09-05 RX ORDER — POTASSIUM CHLORIDE 20 MEQ
20 PACKET (EA) ORAL
Refills: 0 | Status: COMPLETED | OUTPATIENT
Start: 2023-09-05 | End: 2023-09-05

## 2023-09-05 RX ADMIN — Medication 20 MILLIEQUIVALENT(S): at 12:42

## 2023-09-05 RX ADMIN — Medication 20 MILLIEQUIVALENT(S): at 05:07

## 2023-09-05 RX ADMIN — AMIODARONE HYDROCHLORIDE 200 MILLIGRAM(S): 400 TABLET ORAL at 05:08

## 2023-09-05 RX ADMIN — Medication 500 MICROGRAM(S): at 14:46

## 2023-09-05 RX ADMIN — Medication 20 MILLIEQUIVALENT(S): at 07:36

## 2023-09-05 RX ADMIN — AMIODARONE HYDROCHLORIDE 200 MILLIGRAM(S): 400 TABLET ORAL at 17:13

## 2023-09-05 RX ADMIN — Medication 325 MILLIGRAM(S): at 12:40

## 2023-09-05 RX ADMIN — SENNA PLUS 2 TABLET(S): 8.6 TABLET ORAL at 21:12

## 2023-09-05 RX ADMIN — Medication 1: at 17:14

## 2023-09-05 RX ADMIN — Medication 1: at 12:39

## 2023-09-05 RX ADMIN — Medication 500 MICROGRAM(S): at 08:18

## 2023-09-05 RX ADMIN — Medication 81 MILLIGRAM(S): at 12:39

## 2023-09-05 RX ADMIN — CLOPIDOGREL BISULFATE 75 MILLIGRAM(S): 75 TABLET, FILM COATED ORAL at 12:40

## 2023-09-05 RX ADMIN — Medication 25 GRAM(S): at 04:55

## 2023-09-05 RX ADMIN — Medication 40 MILLIGRAM(S): at 05:09

## 2023-09-05 RX ADMIN — APIXABAN 2.5 MILLIGRAM(S): 2.5 TABLET, FILM COATED ORAL at 17:13

## 2023-09-05 RX ADMIN — Medication 12.5 MILLIGRAM(S): at 06:16

## 2023-09-05 RX ADMIN — Medication 40 MILLIGRAM(S): at 17:13

## 2023-09-05 RX ADMIN — Medication 1 TABLET(S): at 12:39

## 2023-09-05 RX ADMIN — Medication 12.5 MILLIGRAM(S): at 17:13

## 2023-09-05 RX ADMIN — ATORVASTATIN CALCIUM 40 MILLIGRAM(S): 80 TABLET, FILM COATED ORAL at 21:12

## 2023-09-05 RX ADMIN — PANTOPRAZOLE SODIUM 40 MILLIGRAM(S): 20 TABLET, DELAYED RELEASE ORAL at 06:16

## 2023-09-05 RX ADMIN — LISINOPRIL 5 MILLIGRAM(S): 2.5 TABLET ORAL at 05:08

## 2023-09-05 RX ADMIN — Medication 7 UNIT(S): at 17:14

## 2023-09-05 RX ADMIN — Medication 7 UNIT(S): at 12:39

## 2023-09-05 RX ADMIN — Medication 1 MILLIGRAM(S): at 12:39

## 2023-09-05 RX ADMIN — APIXABAN 2.5 MILLIGRAM(S): 2.5 TABLET, FILM COATED ORAL at 05:08

## 2023-09-05 RX ADMIN — CHLORHEXIDINE GLUCONATE 1 APPLICATION(S): 213 SOLUTION TOPICAL at 05:09

## 2023-09-05 NOTE — CONSULT NOTE ADULT - ASSESSMENT
67yo F w/a PMH of Type II DM, HTN, DLD, obesity, lateral epicondylitis presenting to the ED w/ Left Arm pain. Patient was then noted to have acute mental status change, SOB, lactate elevation, and acidosis on abg. Patient had two seizure type episodes and was treated with benzodiazepine. Intubated by ER for airway protection. Stoke code called - no CVA but patient developed elevated troponins and TTE revealed EF of 30% with CCTA revealing score of 134 and LAD disease. Patient went for Cardiac cath that revealed multi-vessel CAD. Hospital course thus far required treatment for Cardiomyopathy (EF 30%), uncontrolled DM (A1c 8.2), acute DVT (R peroneal L femoral/posterior tibial / peroneal veins)  , acute PE (b/l segmental). She underwent a LHC which revealed severe CAD. On 08/30/23, she underwent surgical myocardial revascularization. Post-operatively, her hospital course was significant for brief episode of Atrial fibrillation and was converted pharmacologically.     67yo F w/a PMH of Type II DM, HTN, DLD, obesity, lateral epicondylitis presenting to the ED w/ Left Arm pain. Patient was then noted to have acute mental status change, SOB, lactate elevation, and acidosis on abg. Patient had two seizure type episodes and was treated with benzodiazepine. Intubated by ER for airway protection. Stoke code called - no CVA but patient developed elevated troponins and TTE revealed EF of 30% with CCTA revealing score of 134 and LAD disease. Patient went for Cardiac cath that revealed multi-vessel CAD. Hospital course thus far required treatment for Cardiomyopathy (EF 30%), uncontrolled DM (A1c 8.2), acute DVT (R peroneal L femoral/posterior tibial / peroneal veins)  , acute PE (b/l segmental). She underwent a LHC which revealed severe CAD. On 08/30/23, she underwent CABG x 3. Post-operatively, her hospital course was significant for brief episode of Atrial fibrillation and was converted pharmacologically. EF remains low 25%.    TSH 5.12    LFT: WNL   EF 25%     Impression:  CAD s/p CABG x 3 (8/30/2023  Postop AF, brief  ICM EF 25%  Acute DVT  Acute PE  Uncontrolled DM II  Hx HTN,. HLD    Plan:  - Recommend wearable cardiac defibrillator for prevention of SCD  - Paper work sent  - Cont GDMT  - Would increase Eliquis to 5 mg PO Q 12 (does not meet criteria for reduced dosing)  - Cont amio 200 mg Po Q 12 x 2 weeks total f/b 200 mg daily x 2 weeks then discontinue  - TSH elevated, recheck as outpatient  - LFTs noted  - Outpatient fu in 2 months fo repeat echo  - Please recall EPS as needed, [id number]

## 2023-09-05 NOTE — CONSULT NOTE ADULT - NS ATTEND AMEND GEN_ALL_CORE FT
Cardio: None    69 yo Persian speaking F with h/o HTN, HL, DM-2, presenting with left arm pain Left Arm pain, two seizure episodes, with elevated troponins and echo with EF of 30%. CCTA score 134 and LAD disease. Cardiac cath revealed multi-vessel CAD. A1c 8.2. Hospital course with acute DVT (R peroneal L femoral/posterior tibial / peroneal veins), acute PE (b/l segmental).  On 8/30/23, she underwent CABG x 3. Post-operatively, hospital course notable for brief episode of atrial fibrillation, converted pharmacologically. EF remains low 25%. We were consulted for wearable defibrillator vest.     Rec  - Monitor on tele  - GDMT  - Increase Eliquis to 5 mg PO Q 12 (does not meet criteria for reduced dosing)  - Cont amio 200 mg Po Q 12 x 2 weeks total f/b 200 mg daily x 2 weeks then discontinue. MCOT as outpatient to assess for AF  - TSH elevated but free T4 normal, recheck as outpatient  - LFTs noted  - Will initiate wearable defibrillator for ICM (EF < 35%) given recent revascularization. Patient will need repeat echo in 3 mo to assess EF.   - Outpatient fu with me in 2 mo to order repeat imaging  - Please recall EPS as needed, [id number]

## 2023-09-05 NOTE — PROGRESS NOTE ADULT - SUBJECTIVE AND OBJECTIVE BOX
Over Night Events:  off oxygen, doing well, denied any SOB.      ROS:  See HPI    PHYSICAL EXAM    ICU Vital Signs Last 24 Hrs  T(C): 36.6 (05 Sep 2023 08:00), Max: 36.9 (04 Sep 2023 20:00)  T(F): 97.9 (05 Sep 2023 08:00), Max: 98.4 (04 Sep 2023 20:00)  HR: 97 (05 Sep 2023 09:00) (75 - 97)  BP: 122/60 (05 Sep 2023 09:00) (102/60 - 151/61)  BP(mean): 83 (05 Sep 2023 08:00) (67 - 90)  RR: 19 (05 Sep 2023 09:00) (18 - 35)  SpO2: 98% (05 Sep 2023 09:00) (88% - 100%)    O2 Parameters below as of 05 Sep 2023 09:00  Patient On (Oxygen Delivery Method): room air            General: NARD  HEENT: EPIFANIO             Lungs: Bilateral BS decreased at the bases  Cardiovascular: Regular   Abdomen: Soft, Positive BS  Extremities: LE edema  Skin: Warm  Neurological: Non focal       09-04-23 @ 07:01  -  09-05-23 @ 07:00  --------------------------------------------------------  IN:    Oral Fluid: 900 mL  Total IN: 900 mL    OUT:    Voided (mL): 2750 mL  Total OUT: 2750 mL    Total NET: -1850 mL      09-05-23 @ 07:01  -  09-05-23 @ 10:39  --------------------------------------------------------  IN:    Oral Fluid: 240 mL  Total IN: 240 mL    OUT:    Voided (mL): 900 mL  Total OUT: 900 mL    Total NET: -660 mL          LABS:                          7.6    5.63  )-----------( 209      ( 05 Sep 2023 01:45 )             25.1                                               09-05    137  |  105  |  9<L>  ----------------------------<  66<L>  3.5   |  25  |  0.6<L>    Ca    7.5<L>      05 Sep 2023 01:45  Mg     1.9     09-05                                               Urinalysis Basic - ( 05 Sep 2023 01:45 )    Color: x / Appearance: x / SG: x / pH: x  Gluc: 66 mg/dL / Ketone: x  / Bili: x / Urobili: x   Blood: x / Protein: x / Nitrite: x   Leuk Esterase: x / RBC: x / WBC x   Sq Epi: x / Non Sq Epi: x / Bacteria: x                                                                                                                                                                                MEDICATIONS  (STANDING):  aMIOdarone    Tablet 200 milliGRAM(s) Oral every 12 hours  apixaban 2.5 milliGRAM(s) Oral every 12 hours  aspirin enteric coated 81 milliGRAM(s) Oral daily  atorvastatin 40 milliGRAM(s) Oral at bedtime  bisacodyl Suppository 10 milliGRAM(s) Rectal once  chlorhexidine 2% Cloths 1 Application(s) Topical daily  clopidogrel Tablet 75 milliGRAM(s) Oral daily  dextrose 5%. 1000 milliLiter(s) (50 mL/Hr) IV Continuous <Continuous>  dextrose 50% Injectable 50 milliLiter(s) IV Push every 15 minutes  ferrous    sulfate 325 milliGRAM(s) Oral daily  folic acid 1 milliGRAM(s) Oral daily  furosemide   Injectable 40 milliGRAM(s) IV Push every 12 hours  glucagon  Injectable 1 milliGRAM(s) IntraMuscular once  insulin glargine Injectable (LANTUS) 40 Unit(s) SubCutaneous every morning  insulin lispro (ADMELOG) corrective regimen sliding scale   SubCutaneous three times a day before meals  insulin lispro Injectable (ADMELOG) 7 Unit(s) SubCutaneous three times a day before meals  ipratropium    for Nebulization 500 MICROGram(s) Nebulizer every 6 hours  lisinopril 5 milliGRAM(s) Oral daily  metoprolol tartrate 12.5 milliGRAM(s) Oral every 12 hours  multivitamin/minerals 1 Tablet(s) Oral daily  pantoprazole    Tablet 40 milliGRAM(s) Oral before breakfast  polyethylene glycol 3350 17 Gram(s) Oral daily  potassium chloride    Tablet ER 20 milliEquivalent(s) Oral once  senna 2 Tablet(s) Oral at bedtime    MEDICATIONS  (PRN):  dextrose Oral Gel 15 Gram(s) Oral once PRN Blood Glucose LESS THAN 70 milliGRAM(s)/deciliter  ondansetron Injectable 4 milliGRAM(s) IV Push every 8 hours PRN Nausea and/or Vomiting  oxyCODONE    IR 5 milliGRAM(s) Oral every 4 hours PRN Moderate Pain (4 - 6)  oxyCODONE    IR 10 milliGRAM(s) Oral every 4 hours PRN Severe Pain (7 - 10)      Xrays:                                                                                     ECHO  congestion/effusion

## 2023-09-05 NOTE — CONSULT NOTE ADULT - CONSULT REQUESTED DATE/TIME
18-Aug-2023 21:18
21-Aug-2023 19:13
01-Sep-2023 13:54
14-Aug-2023 07:35
15-Aug-2023 05:54
22-Aug-2023 14:00
05-Sep-2023 09:52
13-Aug-2023 20:59
13-Aug-2023 10:33
15-Aug-2023 13:56

## 2023-09-05 NOTE — PROGRESS NOTE ADULT - ASSESSMENT
IMPRESSION:  CABG c3L  NSTEMI/CAD  Fluid overload  acute systolic heart failure 2ry to acute MI  Acute PE  DVT  acute respiratory failure improved  Paroxysmal Afib  PLAN:    CNS: avoid CNS depressants    HEENT:  Oral care    PULMONARY:  HOB @ 45 degrees, chest PT, ISx10/hr    CARDIOVASCULAR: amio ,  metoprolol. lipitor, ACEI   eliquis, Continue IV lasix 40 IV bid to keep 1.5-2L -ve, asa  81, continue plavix, lipitor  Ep eval for lifevest. dc MAC  GI: GI prophylaxis                                          Feeding po diet, fluid restriction 1L    RENAL:  F/u  lytes.  Correct as needed. accurate I/O    INFECTIOUS DISEASE:monitor off abx    HEMATOLOGICAL:  DVT prophylaxis.    ENDOCRINE:  Follow up FS.      MUSCULOSKELETAL: oob to chair    CODE STATUS: FULL CODE    DISPOSITION: Pt requires continued monitoring in the CTU    case discussed with ct surgery

## 2023-09-05 NOTE — CONSULT NOTE ADULT - CONSULT REASON
coronary artery disease
Seizures, AMS, intubated
Transient loss of conciousness
iron deficiency anemia
sepsis
suspected status epilepticus
Dec EF
Evaluate for WCD
IVC filter
Suspected STEMI

## 2023-09-05 NOTE — CONSULT NOTE ADULT - PROVIDER SPECIALTY LIST ADULT
Electrophysiology
Neurology
CT Surgery
Cardiology
Critical Care
Gastroenterology
Infectious Disease
Cardiology
NSICU
Vascular Surgery

## 2023-09-05 NOTE — CONSULT NOTE ADULT - REASON FOR ADMISSION
Arm Pain, AMS
Mental status changes, dyspnea
Arm Pain, AMS

## 2023-09-05 NOTE — CONSULT NOTE ADULT - SUBJECTIVE AND OBJECTIVE BOX
Patient is a 68y old  Female who presents with a chief complaint of Arm Pain, AMS (04 Sep 2023 12:59)    HPI:  69yo F w/a PMH of Type II DM, HTN, DLD, obesity, lateral epicondylitis presenting to the ED w/ Left Arm pain. At time of my exam, patient intubated so spoke with the son at bedside. This arm started about two months ago and has continued to progress but in the last few days became severe. She went to the ED a few days ago, was given pain medications then sent home. Per son, pain still continued to progress after this. No fevers, chills, nausea, vomiting, diarrhea, constipation, SOB, CP.   On admission to the ED today, she presented with severe left arm pain w findings of LBBB on ECG (unsure if new or not). Code STEMI was called and then canceled because troponin negative and no chest pain. Patient was then noted to have acute mental status change, SOB, lactate elevation, and acidosis on abg..Patient had two seizure episodes and was treated with benzodiazepine. Intubated by ER for airway protection.     On Admission  Vitals: /64, HR 75, RR 20, T 98.5, satting 99 percent on RA   Labs: WBC 14.86, Hgb 10, , Na 139, K 5.4, BUN 12, Cr .6, Trop <.01 --> .13 on repeat  Imaging:   CXR -  Patchy bibasilar opacities, right greater than left.  CT Brain Stroke protocol - No evidence of acute transcortical infarct, hydrocephalus, acute intracranial hemorrhage, or mass effect.  CT brain perfusion: No evidence of acute infarct or ischemia.  CTA neck: No stenosis. No dissection.  CTA brain: No stenosis or aneurysm.  CTA Neck: The distal internal carotid arteries are patent. The [place] is normal in appearance. The visualized cerebral arteries are unremarkable.The vertebrobasilar junction and basilar artery are normal.    In the ED, given Levaquin. Keppra, started on propofol  Admitted to MICU for airway monitoring  (13 Aug 2023 19:09)      EP consulted for evaluation for wearable cardiac defibrillator.     REVIEW OF SYSTEMS    [x] A ten-point review of systems was otherwise negative except as noted.  [ ] Due to altered mental status/intubation, subjective information were not able to be obtained from the patient. History was obtained, to the extent possible, from review of the chart and collateral sources of information.      PAST MEDICAL & SURGICAL HISTORY:  Diabetes          Home Medications:  aspirin 81 mg oral tablet, chewable: 1 chewed once a day (13 Aug 2023 19:53)  atorvastatin 10 mg oral tablet: 1 orally once a day (at bedtime) (13 Aug 2023 19:52)  Jardiance 10 mg oral tablet: 1 orally once a day (13 Aug 2023 19:53)  MetFORMIN (Eqv-Glumetza) 500 mg oral tablet, extended release: 1 orally 2 times a day (13 Aug 2023 19:51)  pioglitazone 30 mg oral tablet: 1 orally once a day (13 Aug 2023 19:52)      Allergies:    No Known Allergies      FAMILY HISTORY:      SOCIAL HISTORY:  CIGARETTES: denies  ALCOHOL: denies    MEDICATIONS  (STANDING):  aMIOdarone    Tablet 200 milliGRAM(s) Oral every 12 hours  apixaban 2.5 milliGRAM(s) Oral every 12 hours  aspirin enteric coated 81 milliGRAM(s) Oral daily  atorvastatin 40 milliGRAM(s) Oral at bedtime  bisacodyl Suppository 10 milliGRAM(s) Rectal once  chlorhexidine 2% Cloths 1 Application(s) Topical daily  clopidogrel Tablet 75 milliGRAM(s) Oral daily  dextrose 5%. 1000 milliLiter(s) (50 mL/Hr) IV Continuous <Continuous>  dextrose 50% Injectable 50 milliLiter(s) IV Push every 15 minutes  ferrous    sulfate 325 milliGRAM(s) Oral daily  folic acid 1 milliGRAM(s) Oral daily  furosemide   Injectable 40 milliGRAM(s) IV Push every 12 hours  glucagon  Injectable 1 milliGRAM(s) IntraMuscular once  insulin glargine Injectable (LANTUS) 40 Unit(s) SubCutaneous every morning  insulin lispro (ADMELOG) corrective regimen sliding scale   SubCutaneous three times a day before meals  insulin lispro Injectable (ADMELOG) 7 Unit(s) SubCutaneous three times a day before meals  ipratropium    for Nebulization 500 MICROGram(s) Nebulizer every 6 hours  lisinopril 5 milliGRAM(s) Oral daily  metoprolol tartrate 12.5 milliGRAM(s) Oral every 12 hours  multivitamin/minerals 1 Tablet(s) Oral daily  pantoprazole    Tablet 40 milliGRAM(s) Oral before breakfast  polyethylene glycol 3350 17 Gram(s) Oral daily  potassium chloride    Tablet ER 20 milliEquivalent(s) Oral once  senna 2 Tablet(s) Oral at bedtime    MEDICATIONS  (PRN):  dextrose Oral Gel 15 Gram(s) Oral once PRN Blood Glucose LESS THAN 70 milliGRAM(s)/deciliter  ondansetron Injectable 4 milliGRAM(s) IV Push every 8 hours PRN Nausea and/or Vomiting  oxyCODONE    IR 5 milliGRAM(s) Oral every 4 hours PRN Moderate Pain (4 - 6)  oxyCODONE    IR 10 milliGRAM(s) Oral every 4 hours PRN Severe Pain (7 - 10)      Vital Signs Last 24 Hrs  T(C): 36.6 (05 Sep 2023 08:00), Max: 36.9 (04 Sep 2023 20:00)  T(F): 97.9 (05 Sep 2023 08:00), Max: 98.4 (04 Sep 2023 20:00)  HR: 97 (05 Sep 2023 09:00) (75 - 97)  BP: 122/60 (05 Sep 2023 09:00) (96/52 - 151/61)  BP(mean): 83 (05 Sep 2023 08:00) (67 - 90)  RR: 19 (05 Sep 2023 09:00) (18 - 35)  SpO2: 98% (05 Sep 2023 09:00) (88% - 100%)    Parameters below as of 05 Sep 2023 09:00  Patient On (Oxygen Delivery Method): room air        PHYSICAL EXAM:    GENERAL: In no apparent distress, well nourished, and hydrated.  HEART: Regular rate and rhythm; No murmurs, rubs, or gallops.  PULMONARY: Clear to auscultation and perfusion.  No rales, wheezing, or rhonchi bilaterally.  ABDOMEN: Soft, Nontender, Nondistended; Bowel sounds present  EXTREMITIES:  2+ Peripheral Pulses, No clubbing, cyanosis, or edema  NEUROLOGICAL: AO x4, LEONARD, speech clear    INTERPRETATION OF TELEMETRY:    ECG:  < from: 12 Lead ECG (08.31.23 @ 08:22) >  Ventricular Rate 105 BPM    Atrial Rate 105 BPM    P-R Interval 138 ms    QRS Duration 134 ms    Q-T Interval 372 ms    QTC Calculation(Bazett) 491 ms    P Axis 78 degrees    R Axis -27 degrees    T Axis 171 degrees    Diagnosis Line Sinus tachycardia  Left bundle branch block  Abnormal ECG    < end of copied text >      I&O's Detail    04 Sep 2023 07:01  -  05 Sep 2023 07:00  --------------------------------------------------------  IN:    Oral Fluid: 900 mL  Total IN: 900 mL    OUT:    Voided (mL): 2750 mL  Total OUT: 2750 mL    Total NET: -1850 mL      05 Sep 2023 07:01  -  05 Sep 2023 09:52  --------------------------------------------------------  IN:    Oral Fluid: 240 mL  Total IN: 240 mL    OUT:    Voided (mL): 900 mL  Total OUT: 900 mL    Total NET: -660 mL          LABS:                        7.6    5.63  )-----------( 209      ( 05 Sep 2023 01:45 )             25.1     09-05    137  |  105  |  9<L>  ----------------------------<  66<L>  3.5   |  25  |  0.6<L>    Ca    7.5<L>      05 Sep 2023 01:45  Mg     1.9     09-05      I&O's Detail    04 Sep 2023 07:01  -  05 Sep 2023 07:00  --------------------------------------------------------  IN:    Oral Fluid: 900 mL  Total IN: 900 mL    OUT:    Voided (mL): 2750 mL  Total OUT: 2750 mL    Total NET: -1850 mL      05 Sep 2023 07:01  -  05 Sep 2023 09:52  --------------------------------------------------------  IN:    Oral Fluid: 240 mL  Total IN: 240 mL    OUT:    Voided (mL): 900 mL  Total OUT: 900 mL    Total NET: -660 mL      RADIOLOGY:  < from: TTE Echo Complete w/o Contrast w/ Doppler (09.04.23 @ 08:01) >  Summary:   1. Severely decreased global left ventricular systolic function.   2. LV Ejection Fraction by Diggs's Method with a biplane EF of 25 %.   3. RCA distribution, entire inferior septum, basal and mid anterior   septum, and basal and mid inferolateral wall are abnormal as described   above.   4. Abnormal septal motion consistent with post-operative status.   5. Spectral Doppler shows pseudonormal pattern of left ventricular   myocardial filling (Grade II diastolic dysfunction).   6. Moderately reduced RV systolic function. TAPSE 1.1 cm.   7. Mildly enlarged left atrium.   8. Normal right atrial size.   9. Mild thickening of the anterior and posterior mitral valve leaflets.  10. Mild to moderate mitral valve regurgitation.  11. Mild tricuspid regurgitation.  12. Trivial pericardial effusion.    PHYSICIAN INTERPRETATION:  Left Ventricle: The left ventricular internal cavity size is normal. Left   ventricular wall thickness is normal. Global LV systolic function was   severely decreased. Abnormal (paradoxical) septal motion consistent with   post-operative status. Spectral Doppler shows pseudonormal pattern of   left ventricular myocardial filling (Grade II diastolic dysfunction).      LV Wall Scoring:  The RCA distribution and entire inferior septum are akinetic. The basal   and  mid anterior septum and basal and mid inferolateral wall are hypokinetic.   All  remaining scored segments are normal.    Right Ventricle: The right ventricular size is normal. RV systolic   function is moderately reduced.  Left Atrium: Mildly enlarged left atrium.  Right Atrium: Normal right atrial size.  Pericardium: Trivial pericardial effusion is present. There is a small   pleural effusion in both left and right lateral regions.  Mitral Valve: Mild thickening of the anterior and posterior mitral valve   leaflets. Mild to moderate mitral valve regurgitation is seen.  Tricuspid Valve: The tricuspid valve is normal in structure. Mild   tricuspid regurgitation is visualized.  Aortic Valve: The aortic valve is trileaflet. Aortic valve thickening   with normal leaflet opening. Trivial aortic valve regurgitation is seen.  Pulmonic Valve: Structurally normal pulmonic valve, with normal leaflet   excursion. No indication of pulmonic valve regurgitation.  Aorta: The aortic root and ascending aorta are structurally normal, with   no evidence of dilitation.  Pulmonary Artery: The pulmonary artery is of normal size and origin.    < end of copied text >      < from: TTE Echo Complete w/o Contrast w/ Doppler (08.14.23 @ 08:23) >  Summary:   1. Left ventricular ejection fraction, by visual estimation, is 30 to   35%.   2. Moderately to severely decreased global left ventricular systolic   function.   3. The left ventricular diastolic function could not be assessed in this   study.   4. Left atrial enlargement.   5. Mild mitral regurgitation.   6. Adequate TR velocity was not obtained to accurately assess RVSP.    PHYSICIAN INTERPRETATION:  Left Ventricle: The left ventricular internal cavity size is moderately   increased. Global LV systolic function was moderately to severely   decreased. Left ventricular ejection fraction, by visual estimation, is   30 to 35%. The left ventriculardiastolic function could not be assessed   in this study.  Right Ventricle: Normal right ventricular size and function.  Left Atrium: Left atrial enlargement.  Right Atrium: Normal right atrial size.  Pericardium: There is no evidence of pericardial effusion.  Mitral Valve: Mild thickening of the anterior and posterior mitral valve   leaflets. No evidence of mitral stenosis. Mild mitral regurgitation.  Tricuspid Valve: The tricuspid valve is not well visualized. No tricuspid   regurgitation visualized. Adequate TR velocity was not obtained to   accurately assess RVSP.  Aortic Valve: The aortic valve was not well visualized. No evidence of   aortic stenosis. No aortic regurgitation.  Pulmonic Valve: The pulmonic valve was not well visualized.  Aorta: The aortic root and ascending aorta are normal in size.  Venous: The inferior vena cava size is normal.    < end of copied text >    FINDINGS:  [facility] 8/21/2023  Coronary Dominance: Right  LM: Distal segment with 50% stenosis  LAD: Ostium with 80% stenosis  CX: Ostium with 80% stenosis   RCA: Ostium with 90% stenosis    Syntax Score: 30    LVEDP:  13 mmHg       POST-OP DIAGNOSIS:    [x] Left Main + 3 Vessel Coronary Artery Disease  Patient is a 68y old  Female who presents with a chief complaint of Arm Pain, AMS (04 Sep 2023 12:59)    HPI:  69yo F w/a PMH of Type II DM, HTN, DLD, obesity, lateral epicondylitis presenting to the ED w/ Left Arm pain. At time of my exam, patient intubated so spoke with the son at bedside. This arm started about two months ago and has continued to progress but in the last few days became severe. She went to the ED a few days ago, was given pain medications then sent home. Per son, pain still continued to progress after this. No fevers, chills, nausea, vomiting, diarrhea, constipation, SOB, CP.   On admission to the ED today, she presented with severe left arm pain w findings of LBBB on ECG (unsure if new or not). Code STEMI was called and then canceled because troponin negative and no chest pain. Patient was then noted to have acute mental status change, SOB, lactate elevation, and acidosis on abg. Patient had two seizure episodes and was treated with benzodiazepine. Intubated by ER for airway protection.     On Admission  Vitals: /64, HR 75, RR 20, T 98.5, satting 99 percent on RA   Labs: WBC 14.86, Hgb 10, , Na 139, K 5.4, BUN 12, Cr .6, Trop <.01 --> .13 on repeat  Imaging:   CXR -  Patchy bibasilar opacities, right greater than left.  CT Brain Stroke protocol - No evidence of acute transcortical infarct, hydrocephalus, acute intracranial hemorrhage, or mass effect.  CT brain perfusion: No evidence of acute infarct or ischemia.  CTA neck: No stenosis. No dissection.  CTA brain: No stenosis or aneurysm.  CTA Neck: The distal internal carotid arteries are patent. The [place] is normal in appearance. The visualized cerebral arteries are unremarkable.The vertebrobasilar junction and basilar artery are normal.    In the ED, given Levaquin. Keppra, started on propofol  Admitted to MICU for airway monitoring  (13 Aug 2023 19:09)      EP consulted for evaluation for wearable cardiac defibrillator.     REVIEW OF SYSTEMS    [x] A ten-point review of systems was otherwise negative except as noted.  [ ] Due to altered mental status/intubation, subjective information were not able to be obtained from the patient. History was obtained, to the extent possible, from review of the chart and collateral sources of information.      PAST MEDICAL & SURGICAL HISTORY:  Diabetes    Home Medications:  aspirin 81 mg oral tablet, chewable: 1 chewed once a day (13 Aug 2023 19:53)  atorvastatin 10 mg oral tablet: 1 orally once a day (at bedtime) (13 Aug 2023 19:52)  Jardiance 10 mg oral tablet: 1 orally once a day (13 Aug 2023 19:53)  MetFORMIN (Eqv-Glumetza) 500 mg oral tablet, extended release: 1 orally 2 times a day (13 Aug 2023 19:51)  pioglitazone 30 mg oral tablet: 1 orally once a day (13 Aug 2023 19:52)      Allergies:  No Known Allergies      FAMILY HISTORY:      SOCIAL HISTORY:  CIGARETTES: denies  ALCOHOL: denies    MEDICATIONS  (STANDING):  aMIOdarone    Tablet 200 milliGRAM(s) Oral every 12 hours  apixaban 2.5 milliGRAM(s) Oral every 12 hours  aspirin enteric coated 81 milliGRAM(s) Oral daily  atorvastatin 40 milliGRAM(s) Oral at bedtime  bisacodyl Suppository 10 milliGRAM(s) Rectal once  chlorhexidine 2% Cloths 1 Application(s) Topical daily  clopidogrel Tablet 75 milliGRAM(s) Oral daily  dextrose 5%. 1000 milliLiter(s) (50 mL/Hr) IV Continuous <Continuous>  dextrose 50% Injectable 50 milliLiter(s) IV Push every 15 minutes  ferrous    sulfate 325 milliGRAM(s) Oral daily  folic acid 1 milliGRAM(s) Oral daily  furosemide   Injectable 40 milliGRAM(s) IV Push every 12 hours  glucagon  Injectable 1 milliGRAM(s) IntraMuscular once  insulin glargine Injectable (LANTUS) 40 Unit(s) SubCutaneous every morning  insulin lispro (ADMELOG) corrective regimen sliding scale   SubCutaneous three times a day before meals  insulin lispro Injectable (ADMELOG) 7 Unit(s) SubCutaneous three times a day before meals  ipratropium    for Nebulization 500 MICROGram(s) Nebulizer every 6 hours  lisinopril 5 milliGRAM(s) Oral daily  metoprolol tartrate 12.5 milliGRAM(s) Oral every 12 hours  multivitamin/minerals 1 Tablet(s) Oral daily  pantoprazole    Tablet 40 milliGRAM(s) Oral before breakfast  polyethylene glycol 3350 17 Gram(s) Oral daily  potassium chloride    Tablet ER 20 milliEquivalent(s) Oral once  senna 2 Tablet(s) Oral at bedtime    MEDICATIONS  (PRN):  dextrose Oral Gel 15 Gram(s) Oral once PRN Blood Glucose LESS THAN 70 milliGRAM(s)/deciliter  ondansetron Injectable 4 milliGRAM(s) IV Push every 8 hours PRN Nausea and/or Vomiting  oxyCODONE    IR 5 milliGRAM(s) Oral every 4 hours PRN Moderate Pain (4 - 6)  oxyCODONE    IR 10 milliGRAM(s) Oral every 4 hours PRN Severe Pain (7 - 10)      Vital Signs Last 24 Hrs  T(C): 36.6 (05 Sep 2023 08:00), Max: 36.9 (04 Sep 2023 20:00)  T(F): 97.9 (05 Sep 2023 08:00), Max: 98.4 (04 Sep 2023 20:00)  HR: 97 (05 Sep 2023 09:00) (75 - 97)  BP: 122/60 (05 Sep 2023 09:00) (96/52 - 151/61)  BP(mean): 83 (05 Sep 2023 08:00) (67 - 90)  RR: 19 (05 Sep 2023 09:00) (18 - 35)  SpO2: 98% (05 Sep 2023 09:00) (88% - 100%)    Parameters below as of 05 Sep 2023 09:00  Patient On (Oxygen Delivery Method): room air        PHYSICAL EXAM:    GENERAL: In no apparent distress, well nourished, and hydrated.  HEART: Regular rate and rhythm; No murmurs, rubs, or gallops.  PULMONARY: Clear to auscultation and perfusion.  No rales, wheezing, or rhonchi bilaterally.  CHEST: Midsternal surgical wound with Vac dressing in place  ABDOMEN: Soft, Nontender, Nondistended; Bowel sounds present  EXTREMITIES:  2+ Peripheral Pulses, No clubbing, cyanosis, or edema  NEUROLOGICAL: AO x4, LEONARD    INTERPRETATION OF TELEMETRY: SR 90 bpm    ECG:  < from: 12 Lead ECG (08.31.23 @ 08:22) >  Ventricular Rate 105 BPM    Atrial Rate 105 BPM    P-R Interval 138 ms    QRS Duration 134 ms    Q-T Interval 372 ms    QTC Calculation(Bazett) 491 ms    P Axis 78 degrees    R Axis -27 degrees    T Axis 171 degrees    Diagnosis Line Sinus tachycardia  Left bundle branch block  Abnormal ECG    < end of copied text >      I&O's Detail    04 Sep 2023 07:01  -  05 Sep 2023 07:00  --------------------------------------------------------  IN:    Oral Fluid: 900 mL  Total IN: 900 mL    OUT:    Voided (mL): 2750 mL  Total OUT: 2750 mL    Total NET: -1850 mL      05 Sep 2023 07:01  -  05 Sep 2023 09:52  --------------------------------------------------------  IN:    Oral Fluid: 240 mL  Total IN: 240 mL    OUT:    Voided (mL): 900 mL  Total OUT: 900 mL    Total NET: -660 mL          LABS:                        7.6    5.63  )-----------( 209      ( 05 Sep 2023 01:45 )             25.1     09-05    137  |  105  |  9<L>  ----------------------------<  66<L>  3.5   |  25  |  0.6<L>    Ca    7.5<L>      05 Sep 2023 01:45  Mg     1.9     09-05      I&O's Detail    04 Sep 2023 07:01  -  05 Sep 2023 07:00  --------------------------------------------------------  IN:    Oral Fluid: 900 mL  Total IN: 900 mL    OUT:    Voided (mL): 2750 mL  Total OUT: 2750 mL    Total NET: -1850 mL      05 Sep 2023 07:01  -  05 Sep 2023 09:52  --------------------------------------------------------  IN:    Oral Fluid: 240 mL  Total IN: 240 mL    OUT:    Voided (mL): 900 mL  Total OUT: 900 mL    Total NET: -660 mL      RADIOLOGY:  < from: TTE Echo Complete w/o Contrast w/ Doppler (09.04.23 @ 08:01) >  Summary:   1. Severely decreased global left ventricular systolic function.   2. LV Ejection Fraction by Diggs's Method with a biplane EF of 25 %.   3. RCA distribution, entire inferior septum, basal and mid anterior   septum, and basal and mid inferolateral wall are abnormal as described   above.   4. Abnormal septal motion consistent with post-operative status.   5. Spectral Doppler shows pseudonormal pattern of left ventricular   myocardial filling (Grade II diastolic dysfunction).   6. Moderately reduced RV systolic function. TAPSE 1.1 cm.   7. Mildly enlarged left atrium.   8. Normal right atrial size.   9. Mild thickening of the anterior and posterior mitral valve leaflets.  10. Mild to moderate mitral valve regurgitation.  11. Mild tricuspid regurgitation.  12. Trivial pericardial effusion.    PHYSICIAN INTERPRETATION:  Left Ventricle: The left ventricular internal cavity size is normal. Left   ventricular wall thickness is normal. Global LV systolic function was   severely decreased. Abnormal (paradoxical) septal motion consistent with   post-operative status. Spectral Doppler shows pseudonormal pattern of   left ventricular myocardial filling (Grade II diastolic dysfunction).      LV Wall Scoring:  The RCA distribution and entire inferior septum are akinetic. The basal   and  mid anterior septum and basal and mid inferolateral wall are hypokinetic.   All  remaining scored segments are normal.    Right Ventricle: The right ventricular size is normal. RV systolic   function is moderately reduced.  Left Atrium: Mildly enlarged left atrium.  Right Atrium: Normal right atrial size.  Pericardium: Trivial pericardial effusion is present. There is a small   pleural effusion in both left and right lateral regions.  Mitral Valve: Mild thickening of the anterior and posterior mitral valve   leaflets. Mild to moderate mitral valve regurgitation is seen.  Tricuspid Valve: The tricuspid valve is normal in structure. Mild   tricuspid regurgitation is visualized.  Aortic Valve: The aortic valve is trileaflet. Aortic valve thickening   with normal leaflet opening. Trivial aortic valve regurgitation is seen.  Pulmonic Valve: Structurally normal pulmonic valve, with normal leaflet   excursion. No indication of pulmonic valve regurgitation.  Aorta: The aortic root and ascending aorta are structurally normal, with   no evidence of dilitation.  Pulmonary Artery: The pulmonary artery is of normal size and origin.    < end of copied text >      < from: TTE Echo Complete w/o Contrast w/ Doppler (08.14.23 @ 08:23) >  Summary:   1. Left ventricular ejection fraction, by visual estimation, is 30 to   35%.   2. Moderately to severely decreased global left ventricular systolic   function.   3. The left ventricular diastolic function could not be assessed in this   study.   4. Left atrial enlargement.   5. Mild mitral regurgitation.   6. Adequate TR velocity was not obtained to accurately assess RVSP.    PHYSICIAN INTERPRETATION:  Left Ventricle: The left ventricular internal cavity size is moderately   increased. Global LV systolic function was moderately to severely   decreased. Left ventricular ejection fraction, by visual estimation, is   30 to 35%. The left ventriculardiastolic function could not be assessed   in this study.  Right Ventricle: Normal right ventricular size and function.  Left Atrium: Left atrial enlargement.  Right Atrium: Normal right atrial size.  Pericardium: There is no evidence of pericardial effusion.  Mitral Valve: Mild thickening of the anterior and posterior mitral valve   leaflets. No evidence of mitral stenosis. Mild mitral regurgitation.  Tricuspid Valve: The tricuspid valve is not well visualized. No tricuspid   regurgitation visualized. Adequate TR velocity was not obtained to   accurately assess RVSP.  Aortic Valve: The aortic valve was not well visualized. No evidence of   aortic stenosis. No aortic regurgitation.  Pulmonic Valve: The pulmonic valve was not well visualized.  Aorta: The aortic root and ascending aorta are normal in size.  Venous: The inferior vena cava size is normal.    < end of copied text >    FINDINGS:  [facility] 8/21/2023  Coronary Dominance: Right  LM: Distal segment with 50% stenosis  LAD: Ostium with 80% stenosis  CX: Ostium with 80% stenosis   RCA: Ostium with 90% stenosis    Syntax Score: 30    LVEDP:  13 mmHg       POST-OP DIAGNOSIS:    [x] Left Main + 3 Vessel Coronary Artery Disease  Patient is a 68y old  Female who presents with a chief complaint of Arm Pain, AMS (04 Sep 2023 12:59)    HPI:  67yo F w/a PMH of Type II DM, HTN, DLD, obesity, lateral epicondylitis presenting to the ED w/ Left Arm pain. At time of my exam, patient intubated so spoke with the son at bedside. This arm started about two months ago and has continued to progress but in the last few days became severe. She went to the ED a few days ago, was given pain medications then sent home. Per son, pain still continued to progress after this. No fevers, chills, nausea, vomiting, diarrhea, constipation, SOB, CP.   On admission to the ED today, she presented with severe left arm pain w findings of LBBB on ECG (unsure if new or not). Code STEMI was called and then canceled because troponin negative and no chest pain. Patient was then noted to have acute mental status change, SOB, lactate elevation, and acidosis on abg. Patient had two seizure episodes and was treated with benzodiazepine. Intubated by ER for airway protection.     On Admission  Vitals: /64, HR 75, RR 20, T 98.5, satting 99 percent on RA   Labs: WBC 14.86, Hgb 10, , Na 139, K 5.4, BUN 12, Cr .6, Trop <.01 --> .13 on repeat  Imaging:   CXR -  Patchy bibasilar opacities, right greater than left.  CT Brain Stroke protocol - No evidence of acute transcortical infarct, hydrocephalus, acute intracranial hemorrhage, or mass effect.  CT brain perfusion: No evidence of acute infarct or ischemia.  CTA neck: No stenosis. No dissection.  CTA brain: No stenosis or aneurysm.  CTA Neck: The distal internal carotid arteries are patent. The [place] is normal in appearance. The visualized cerebral arteries are unremarkable.The vertebrobasilar junction and basilar artery are normal.    In the ED, given Levaquin. Keppra, started on propofol  Admitted to MICU for airway monitoring  (13 Aug 2023 19:09)      EP consulted for evaluation for wearable cardiac defibrillator.     REVIEW OF SYSTEMS  [x] A ten-point review of systems was otherwise negative except as noted.  [ ] Due to altered mental status/intubation, subjective information were not able to be obtained from the patient. History was obtained, to the extent possible, from review of the chart and collateral sources of information.      PAST MEDICAL & SURGICAL HISTORY:  Diabetes    Home Medications:  aspirin 81 mg oral tablet, chewable: 1 chewed once a day (13 Aug 2023 19:53)  atorvastatin 10 mg oral tablet: 1 orally once a day (at bedtime) (13 Aug 2023 19:52)  Jardiance 10 mg oral tablet: 1 orally once a day (13 Aug 2023 19:53)  MetFORMIN (Eqv-Glumetza) 500 mg oral tablet, extended release: 1 orally 2 times a day (13 Aug 2023 19:51)  pioglitazone 30 mg oral tablet: 1 orally once a day (13 Aug 2023 19:52)      Allergies:  No Known Allergies      FAMILY HISTORY:      SOCIAL HISTORY:  CIGARETTES: denies  ALCOHOL: denies    MEDICATIONS  (STANDING):  aMIOdarone    Tablet 200 milliGRAM(s) Oral every 12 hours  apixaban 2.5 milliGRAM(s) Oral every 12 hours  aspirin enteric coated 81 milliGRAM(s) Oral daily  atorvastatin 40 milliGRAM(s) Oral at bedtime  bisacodyl Suppository 10 milliGRAM(s) Rectal once  chlorhexidine 2% Cloths 1 Application(s) Topical daily  clopidogrel Tablet 75 milliGRAM(s) Oral daily  dextrose 5%. 1000 milliLiter(s) (50 mL/Hr) IV Continuous <Continuous>  dextrose 50% Injectable 50 milliLiter(s) IV Push every 15 minutes  ferrous    sulfate 325 milliGRAM(s) Oral daily  folic acid 1 milliGRAM(s) Oral daily  furosemide   Injectable 40 milliGRAM(s) IV Push every 12 hours  glucagon  Injectable 1 milliGRAM(s) IntraMuscular once  insulin glargine Injectable (LANTUS) 40 Unit(s) SubCutaneous every morning  insulin lispro (ADMELOG) corrective regimen sliding scale   SubCutaneous three times a day before meals  insulin lispro Injectable (ADMELOG) 7 Unit(s) SubCutaneous three times a day before meals  ipratropium    for Nebulization 500 MICROGram(s) Nebulizer every 6 hours  lisinopril 5 milliGRAM(s) Oral daily  metoprolol tartrate 12.5 milliGRAM(s) Oral every 12 hours  multivitamin/minerals 1 Tablet(s) Oral daily  pantoprazole    Tablet 40 milliGRAM(s) Oral before breakfast  polyethylene glycol 3350 17 Gram(s) Oral daily  potassium chloride    Tablet ER 20 milliEquivalent(s) Oral once  senna 2 Tablet(s) Oral at bedtime    MEDICATIONS  (PRN):  dextrose Oral Gel 15 Gram(s) Oral once PRN Blood Glucose LESS THAN 70 milliGRAM(s)/deciliter  ondansetron Injectable 4 milliGRAM(s) IV Push every 8 hours PRN Nausea and/or Vomiting  oxyCODONE    IR 5 milliGRAM(s) Oral every 4 hours PRN Moderate Pain (4 - 6)  oxyCODONE    IR 10 milliGRAM(s) Oral every 4 hours PRN Severe Pain (7 - 10)      Vital Signs Last 24 Hrs  T(C): 36.6 (05 Sep 2023 08:00), Max: 36.9 (04 Sep 2023 20:00)  T(F): 97.9 (05 Sep 2023 08:00), Max: 98.4 (04 Sep 2023 20:00)  HR: 97 (05 Sep 2023 09:00) (75 - 97)  BP: 122/60 (05 Sep 2023 09:00) (96/52 - 151/61)  BP(mean): 83 (05 Sep 2023 08:00) (67 - 90)  RR: 19 (05 Sep 2023 09:00) (18 - 35)  SpO2: 98% (05 Sep 2023 09:00) (88% - 100%)    Parameters below as of 05 Sep 2023 09:00  Patient On (Oxygen Delivery Method): room air        PHYSICAL EXAM:    GENERAL: In no apparent distress, well nourished, and hydrated.  HEART: Regular rate and rhythm; No murmurs, rubs, or gallops.  PULMONARY: Clear to auscultation and perfusion.  No rales, wheezing, or rhonchi bilaterally.  CHEST: Midsternal surgical wound with Vac dressing in place  ABDOMEN: Soft, Nontender, Nondistended; Bowel sounds present  EXTREMITIES:  2+ Peripheral Pulses, No clubbing, cyanosis, or edema  NEUROLOGICAL: AO x4, LEONARD    INTERPRETATION OF TELEMETRY: SR 90 bpm    ECG:  < from: 12 Lead ECG (08.31.23 @ 08:22) >  Ventricular Rate 105 BPM    Atrial Rate 105 BPM    P-R Interval 138 ms    QRS Duration 134 ms    Q-T Interval 372 ms    QTC Calculation(Bazett) 491 ms    P Axis 78 degrees    R Axis -27 degrees    T Axis 171 degrees    Diagnosis Line Sinus tachycardia  Left bundle branch block  Abnormal ECG    < end of copied text >      I&O's Detail    04 Sep 2023 07:01  -  05 Sep 2023 07:00  --------------------------------------------------------  IN:    Oral Fluid: 900 mL  Total IN: 900 mL    OUT:    Voided (mL): 2750 mL  Total OUT: 2750 mL    Total NET: -1850 mL      05 Sep 2023 07:01  -  05 Sep 2023 09:52  --------------------------------------------------------  IN:    Oral Fluid: 240 mL  Total IN: 240 mL    OUT:    Voided (mL): 900 mL  Total OUT: 900 mL    Total NET: -660 mL          LABS:                        7.6    5.63  )-----------( 209      ( 05 Sep 2023 01:45 )             25.1     09-05    137  |  105  |  9<L>  ----------------------------<  66<L>  3.5   |  25  |  0.6<L>    Ca    7.5<L>      05 Sep 2023 01:45  Mg     1.9     09-05      I&O's Detail    04 Sep 2023 07:01  -  05 Sep 2023 07:00  --------------------------------------------------------  IN:    Oral Fluid: 900 mL  Total IN: 900 mL    OUT:    Voided (mL): 2750 mL  Total OUT: 2750 mL    Total NET: -1850 mL      05 Sep 2023 07:01  -  05 Sep 2023 09:52  --------------------------------------------------------  IN:    Oral Fluid: 240 mL  Total IN: 240 mL    OUT:    Voided (mL): 900 mL  Total OUT: 900 mL    Total NET: -660 mL      RADIOLOGY:  < from: TTE Echo Complete w/o Contrast w/ Doppler (09.04.23 @ 08:01) >  Summary:   1. Severely decreased global left ventricular systolic function.   2. LV Ejection Fraction by Diggs's Method with a biplane EF of 25 %.   3. RCA distribution, entire inferior septum, basal and mid anterior   septum, and basal and mid inferolateral wall are abnormal as described   above.   4. Abnormal septal motion consistent with post-operative status.   5. Spectral Doppler shows pseudonormal pattern of left ventricular   myocardial filling (Grade II diastolic dysfunction).   6. Moderately reduced RV systolic function. TAPSE 1.1 cm.   7. Mildly enlarged left atrium.   8. Normal right atrial size.   9. Mild thickening of the anterior and posterior mitral valve leaflets.  10. Mild to moderate mitral valve regurgitation.  11. Mild tricuspid regurgitation.  12. Trivial pericardial effusion.    PHYSICIAN INTERPRETATION:  Left Ventricle: The left ventricular internal cavity size is normal. Left   ventricular wall thickness is normal. Global LV systolic function was   severely decreased. Abnormal (paradoxical) septal motion consistent with   post-operative status. Spectral Doppler shows pseudonormal pattern of   left ventricular myocardial filling (Grade II diastolic dysfunction).      LV Wall Scoring:  The RCA distribution and entire inferior septum are akinetic. The basal   and  mid anterior septum and basal and mid inferolateral wall are hypokinetic.   All  remaining scored segments are normal.    Right Ventricle: The right ventricular size is normal. RV systolic   function is moderately reduced.  Left Atrium: Mildly enlarged left atrium.  Right Atrium: Normal right atrial size.  Pericardium: Trivial pericardial effusion is present. There is a small   pleural effusion in both left and right lateral regions.  Mitral Valve: Mild thickening of the anterior and posterior mitral valve   leaflets. Mild to moderate mitral valve regurgitation is seen.  Tricuspid Valve: The tricuspid valve is normal in structure. Mild   tricuspid regurgitation is visualized.  Aortic Valve: The aortic valve is trileaflet. Aortic valve thickening   with normal leaflet opening. Trivial aortic valve regurgitation is seen.  Pulmonic Valve: Structurally normal pulmonic valve, with normal leaflet   excursion. No indication of pulmonic valve regurgitation.  Aorta: The aortic root and ascending aorta are structurally normal, with   no evidence of dilitation.  Pulmonary Artery: The pulmonary artery is of normal size and origin.    < end of copied text >      < from: TTE Echo Complete w/o Contrast w/ Doppler (08.14.23 @ 08:23) >  Summary:   1. Left ventricular ejection fraction, by visual estimation, is 30 to   35%.   2. Moderately to severely decreased global left ventricular systolic   function.   3. The left ventricular diastolic function could not be assessed in this   study.   4. Left atrial enlargement.   5. Mild mitral regurgitation.   6. Adequate TR velocity was not obtained to accurately assess RVSP.    PHYSICIAN INTERPRETATION:  Left Ventricle: The left ventricular internal cavity size is moderately   increased. Global LV systolic function was moderately to severely   decreased. Left ventricular ejection fraction, by visual estimation, is   30 to 35%. The left ventriculardiastolic function could not be assessed   in this study.  Right Ventricle: Normal right ventricular size and function.  Left Atrium: Left atrial enlargement.  Right Atrium: Normal right atrial size.  Pericardium: There is no evidence of pericardial effusion.  Mitral Valve: Mild thickening of the anterior and posterior mitral valve   leaflets. No evidence of mitral stenosis. Mild mitral regurgitation.  Tricuspid Valve: The tricuspid valve is not well visualized. No tricuspid   regurgitation visualized. Adequate TR velocity was not obtained to   accurately assess RVSP.  Aortic Valve: The aortic valve was not well visualized. No evidence of   aortic stenosis. No aortic regurgitation.  Pulmonic Valve: The pulmonic valve was not well visualized.  Aorta: The aortic root and ascending aorta are normal in size.  Venous: The inferior vena cava size is normal.    < end of copied text >    FINDINGS:  [facility] 8/21/2023  Coronary Dominance: Right  LM: Distal segment with 50% stenosis  LAD: Ostium with 80% stenosis  CX: Ostium with 80% stenosis   RCA: Ostium with 90% stenosis    Syntax Score: 30    LVEDP:  13 mmHg       POST-OP DIAGNOSIS:    [x] Left Main + 3 Vessel Coronary Artery Disease

## 2023-09-05 NOTE — PROGRESS NOTE ADULT - SUBJECTIVE AND OBJECTIVE BOX
OPERATIVE PROCEDURE(s):  cabg x 3               POD # 6    SURGEON(s): jaymie    SUBJECTIVE ASSESSMENT: pt is without complaints    Vital Signs Last 24 Hrs  T(C): 36.6 (05 Sep 2023 08:00), Max: 36.9 (04 Sep 2023 20:00)  T(F): 97.9 (05 Sep 2023 08:00), Max: 98.4 (04 Sep 2023 20:00)  HR: 87 (05 Sep 2023 11:00) (75 - 97)  BP: 112/53 (05 Sep 2023 11:00) (102/60 - 151/61)  BP(mean): 76 (05 Sep 2023 11:00) (67 - 90)  RR: 20 (05 Sep 2023 11:00) (18 - 35)  SpO2: 95% (05 Sep 2023 11:00) (88% - 100%)    Parameters below as of 05 Sep 2023 11:00  Patient On (Oxygen Delivery Method): room air    09-04-23 @ 07:01  -  09-05-23 @ 07:00  --------------------------------------------------------  IN: 900 mL / OUT: 2750 mL / NET: -1850 mL    09-05-23 @ 07:01  -  09-05-23 @ 11:49  --------------------------------------------------------  IN: 240 mL / OUT: 900 mL / NET: -660 mL    Physical Exam:  General: NAD; A&Ox3  Cardiac: S1/S2, RRR, no murmur, no rubs  Lungs: unlabored respirations, CTA b/l, no wheeze, no rales, no crackles  Abdomen: Soft/NT/ND; positive bowel sounds x 4  Sternum: Intact, no click, incision healing well with no drainage  Incisions: Incisions clean/dry/intact  Extremities: No edema b/l lower extremities; good capillary refill; no cyanosis; palpable 1+ pedal pulses b/l    LABS:                        7.6    5.63  )-----------( 209      ( 05 Sep 2023 01:45 )             25.1     COUMADIN:   [ ] YES [x ] NO      09-05    137  |  105  |  9<L>  ----------------------------<  66<L>  3.5   |  25  |  0.6<L>    Ca    7.5<L>      05 Sep 2023 01:45  Mg     1.9     09-05      Urinalysis Basic - ( 05 Sep 2023 01:45 )    Color: x / Appearance: x / SG: x / pH: x  Gluc: 66 mg/dL / Ketone: x  / Bili: x / Urobili: x   Blood: x / Protein: x / Nitrite: x   Leuk Esterase: x / RBC: x / WBC x   Sq Epi: x / Non Sq Epi: x / Bacteria: x      MEDICATIONS  (STANDING):  aMIOdarone    Tablet 200 milliGRAM(s) Oral every 12 hours  apixaban 2.5 milliGRAM(s) Oral every 12 hours  aspirin enteric coated 81 milliGRAM(s) Oral daily  atorvastatin 40 milliGRAM(s) Oral at bedtime  bisacodyl Suppository 10 milliGRAM(s) Rectal once  chlorhexidine 2% Cloths 1 Application(s) Topical daily  clopidogrel Tablet 75 milliGRAM(s) Oral daily  dextrose 5%. 1000 milliLiter(s) (50 mL/Hr) IV Continuous <Continuous>  dextrose 50% Injectable 50 milliLiter(s) IV Push every 15 minutes  ferrous    sulfate 325 milliGRAM(s) Oral daily  folic acid 1 milliGRAM(s) Oral daily  furosemide   Injectable 40 milliGRAM(s) IV Push every 12 hours  glucagon  Injectable 1 milliGRAM(s) IntraMuscular once  insulin glargine Injectable (LANTUS) 40 Unit(s) SubCutaneous every morning  insulin lispro (ADMELOG) corrective regimen sliding scale   SubCutaneous three times a day before meals  insulin lispro Injectable (ADMELOG) 7 Unit(s) SubCutaneous three times a day before meals  ipratropium    for Nebulization 500 MICROGram(s) Nebulizer every 6 hours  lisinopril 5 milliGRAM(s) Oral daily  metoprolol tartrate 12.5 milliGRAM(s) Oral every 12 hours  multivitamin/minerals 1 Tablet(s) Oral daily  pantoprazole    Tablet 40 milliGRAM(s) Oral before breakfast  polyethylene glycol 3350 17 Gram(s) Oral daily  potassium chloride    Tablet ER 20 milliEquivalent(s) Oral once  senna 2 Tablet(s) Oral at bedtime    MEDICATIONS  (PRN):  dextrose Oral Gel 15 Gram(s) Oral once PRN Blood Glucose LESS THAN 70 milliGRAM(s)/deciliter  ondansetron Injectable 4 milliGRAM(s) IV Push every 8 hours PRN Nausea and/or Vomiting  oxyCODONE    IR 5 milliGRAM(s) Oral every 4 hours PRN Moderate Pain (4 - 6)  oxyCODONE    IR 10 milliGRAM(s) Oral every 4 hours PRN Severe Pain (7 - 10)      Allergies    No Known Allergies    Intolerances      Ambulation/Activity Status:  amb with assistance    RADIOLOGY & ADDITIONAL TESTS:  ACC: [id number] EXAM:  XR CHEST PA LAT 2V   ORDERED BY: HUSSAIN CRUZ     PROCEDURE DATE:  09/05/2023      INTERPRETATION:  Clinical History / Reason for exam: Shortness of breath    Comparison : Chest radiograph earlier in the same day.    Technique/Positioning: Frontal and lateral.    Findings:    Support devices: Telemetry leads. Right neck central catheter.    Cardiac/mediastinum/hilum: Cardiomegaly. Status post median sternotomy.    Lung parenchyma/Pleura: CHF.    Skeleton/soft tissues: Unchanged    Impression:    CHF. Cardiomegaly.    --- End of Report ---    Assessment/Plan:  68y Female status-post CABGx3               POD #  6  - Case and plan discussed with CTU Intensivist and CT Surgeon - Dr. Simental/Jaymie  - Continue CTU supportive care and ongoing plan of care as per continuing CTU rounds.   - Continue DVT/GI prophylaxis  - Incentive Spirometry 10 times an hour  - Continue to advance physical activity as tolerated and continue PT/OT as directed  1. CAD s/p CABG: Continue ASA 81mg po qd, statin, and lopressor 12.5 mg po q12h  2. HTN: lisinopril 5 milliGRAM(s) Oral daily  3. Hypoxia: SpO2 97% - 100% on 3L NCS; Cont albuterol/ipratropium 3ml Q6 for nebulization. Encourage incentive spirometry deep breathing, and coughing to bring up any secretions   4. DM/Glucose Control: A1C 8.2; FS well controlled on Lantus 40, prandial 7, & sliding scale.  5. DVT/PE: venous duplex 8/30 -> DVT R peroneal vein. Vascular following-> No IVC filter needed. Cont Eliquis 2.5mg q12h.   6. Afib: patient had episode of rapid afib which converted to sinus rhythm 9/1/23. Currently NSR, transition to amiodarone PO for maintenance.   7. Chronic systolic dysfunction: weaned off milrinone; tolerating lisinopril for after-load reduction. Repeat echo shows an EF of 25% ; EP consult appreciated ; pt will need [employer]- process has been started by EP team    transfer to

## 2023-09-06 ENCOUNTER — TRANSCRIPTION ENCOUNTER (OUTPATIENT)
Age: 68
End: 2023-09-06

## 2023-09-06 VITALS
DIASTOLIC BLOOD PRESSURE: 68 MMHG | HEART RATE: 84 BPM | RESPIRATION RATE: 21 BRPM | TEMPERATURE: 98 F | SYSTOLIC BLOOD PRESSURE: 115 MMHG | OXYGEN SATURATION: 95 %

## 2023-09-06 LAB
GLUCOSE BLDC GLUCOMTR-MCNC: 170 MG/DL — HIGH (ref 70–99)
GLUCOSE BLDC GLUCOMTR-MCNC: 241 MG/DL — HIGH (ref 70–99)

## 2023-09-06 PROCEDURE — 71045 X-RAY EXAM CHEST 1 VIEW: CPT | Mod: 26

## 2023-09-06 RX ORDER — FUROSEMIDE 40 MG
1 TABLET ORAL
Qty: 30 | Refills: 0
Start: 2023-09-06 | End: 2023-10-20

## 2023-09-06 RX ORDER — POTASSIUM CHLORIDE 20 MEQ
1 PACKET (EA) ORAL
Qty: 7 | Refills: 0
Start: 2023-09-06 | End: 2023-09-12

## 2023-09-06 RX ORDER — APIXABAN 2.5 MG/1
1 TABLET, FILM COATED ORAL
Qty: 60 | Refills: 0
Start: 2023-09-06 | End: 2023-10-05

## 2023-09-06 RX ORDER — ASPIRIN/CALCIUM CARB/MAGNESIUM 324 MG
1 TABLET ORAL
Refills: 0 | DISCHARGE

## 2023-09-06 RX ORDER — FUROSEMIDE 40 MG
1 TABLET ORAL
Qty: 7 | Refills: 0
Start: 2023-09-06 | End: 2023-09-12

## 2023-09-06 RX ORDER — SENNA PLUS 8.6 MG/1
2 TABLET ORAL
Qty: 28 | Refills: 0
Start: 2023-09-06 | End: 2023-09-19

## 2023-09-06 RX ORDER — AMIODARONE HYDROCHLORIDE 400 MG/1
1 TABLET ORAL
Qty: 60 | Refills: 0
Start: 2023-09-06 | End: 2023-10-05

## 2023-09-06 RX ORDER — IPRATROPIUM BROMIDE 0.2 MG/ML
2 SOLUTION, NON-ORAL INHALATION
Qty: 1 | Refills: 0
Start: 2023-09-06 | End: 2023-10-05

## 2023-09-06 RX ORDER — ALBUTEROL 90 UG/1
2 AEROSOL, METERED ORAL
Qty: 1 | Refills: 0
Start: 2023-09-06 | End: 2023-10-05

## 2023-09-06 RX ORDER — ASPIRIN/CALCIUM CARB/MAGNESIUM 324 MG
1 TABLET ORAL
Qty: 30 | Refills: 0
Start: 2023-09-06 | End: 2023-10-05

## 2023-09-06 RX ORDER — CLOPIDOGREL BISULFATE 75 MG/1
1 TABLET, FILM COATED ORAL
Qty: 30 | Refills: 0
Start: 2023-09-06 | End: 2023-10-05

## 2023-09-06 RX ORDER — METOPROLOL TARTRATE 50 MG
0.5 TABLET ORAL
Qty: 30 | Refills: 0
Start: 2023-09-06 | End: 2023-10-05

## 2023-09-06 RX ORDER — MULTIVIT-MIN/FERROUS GLUCONATE 9 MG/15 ML
1 LIQUID (ML) ORAL
Qty: 30 | Refills: 0
Start: 2023-09-06 | End: 2023-10-05

## 2023-09-06 RX ORDER — ATORVASTATIN CALCIUM 80 MG/1
1 TABLET, FILM COATED ORAL
Qty: 30 | Refills: 0
Start: 2023-09-06 | End: 2023-10-05

## 2023-09-06 RX ORDER — FERROUS SULFATE 325(65) MG
1 TABLET ORAL
Qty: 30 | Refills: 0
Start: 2023-09-06 | End: 2023-10-05

## 2023-09-06 RX ORDER — APIXABAN 2.5 MG/1
2 TABLET, FILM COATED ORAL
Qty: 60 | Refills: 0 | DISCHARGE
Start: 2023-09-06 | End: 2023-10-05

## 2023-09-06 RX ORDER — FUROSEMIDE 40 MG
0.5 TABLET ORAL
Qty: 30 | Refills: 0 | DISCHARGE
Start: 2023-09-06 | End: 2023-10-20

## 2023-09-06 RX ORDER — POTASSIUM CHLORIDE 20 MEQ
1 PACKET (EA) ORAL
Qty: 30 | Refills: 0
Start: 2023-09-06 | End: 2023-10-20

## 2023-09-06 RX ORDER — ATORVASTATIN CALCIUM 80 MG/1
1 TABLET, FILM COATED ORAL
Refills: 0 | DISCHARGE

## 2023-09-06 RX ORDER — PANTOPRAZOLE SODIUM 20 MG/1
1 TABLET, DELAYED RELEASE ORAL
Qty: 30 | Refills: 0
Start: 2023-09-06 | End: 2023-10-05

## 2023-09-06 RX ORDER — FOLIC ACID 0.8 MG
1 TABLET ORAL
Qty: 30 | Refills: 0
Start: 2023-09-06 | End: 2023-10-05

## 2023-09-06 RX ADMIN — OXYCODONE HYDROCHLORIDE 5 MILLIGRAM(S): 5 TABLET ORAL at 11:59

## 2023-09-06 RX ADMIN — APIXABAN 2.5 MILLIGRAM(S): 2.5 TABLET, FILM COATED ORAL at 16:46

## 2023-09-06 RX ADMIN — PANTOPRAZOLE SODIUM 40 MILLIGRAM(S): 20 TABLET, DELAYED RELEASE ORAL at 06:12

## 2023-09-06 RX ADMIN — Medication 1 TABLET(S): at 11:59

## 2023-09-06 RX ADMIN — Medication 1: at 12:30

## 2023-09-06 RX ADMIN — Medication 7 UNIT(S): at 09:11

## 2023-09-06 RX ADMIN — Medication 12.5 MILLIGRAM(S): at 06:12

## 2023-09-06 RX ADMIN — CLOPIDOGREL BISULFATE 75 MILLIGRAM(S): 75 TABLET, FILM COATED ORAL at 11:59

## 2023-09-06 RX ADMIN — Medication 325 MILLIGRAM(S): at 11:58

## 2023-09-06 RX ADMIN — Medication 81 MILLIGRAM(S): at 11:58

## 2023-09-06 RX ADMIN — Medication 40 MILLIGRAM(S): at 06:12

## 2023-09-06 RX ADMIN — CHLORHEXIDINE GLUCONATE 1 APPLICATION(S): 213 SOLUTION TOPICAL at 06:11

## 2023-09-06 RX ADMIN — Medication 500 MICROGRAM(S): at 10:57

## 2023-09-06 RX ADMIN — AMIODARONE HYDROCHLORIDE 200 MILLIGRAM(S): 400 TABLET ORAL at 06:12

## 2023-09-06 RX ADMIN — Medication 7 UNIT(S): at 12:30

## 2023-09-06 RX ADMIN — AMIODARONE HYDROCHLORIDE 200 MILLIGRAM(S): 400 TABLET ORAL at 16:46

## 2023-09-06 RX ADMIN — Medication 12.5 MILLIGRAM(S): at 16:46

## 2023-09-06 RX ADMIN — Medication 40 MILLIEQUIVALENT(S): at 11:58

## 2023-09-06 RX ADMIN — POLYETHYLENE GLYCOL 3350 17 GRAM(S): 17 POWDER, FOR SOLUTION ORAL at 11:58

## 2023-09-06 RX ADMIN — Medication 2: at 09:10

## 2023-09-06 RX ADMIN — LISINOPRIL 5 MILLIGRAM(S): 2.5 TABLET ORAL at 06:12

## 2023-09-06 RX ADMIN — OXYCODONE HYDROCHLORIDE 5 MILLIGRAM(S): 5 TABLET ORAL at 12:30

## 2023-09-06 RX ADMIN — APIXABAN 2.5 MILLIGRAM(S): 2.5 TABLET, FILM COATED ORAL at 06:12

## 2023-09-06 RX ADMIN — Medication 1 MILLIGRAM(S): at 11:59

## 2023-09-06 RX ADMIN — INSULIN GLARGINE 40 UNIT(S): 100 INJECTION, SOLUTION SUBCUTANEOUS at 12:00

## 2023-09-06 NOTE — DISCHARGE NOTE NURSING/CASE MANAGEMENT/SOCIAL WORK - PATIENT PORTAL LINK FT
You can access the FollowMyHealth Patient Portal offered by [facility] by registering at the following website: [email or web address]. By joining [facility]’s FollowMyHealth portal, you will also be able to view your health information using other applications (apps) compatible with our system.

## 2023-09-06 NOTE — PHARMACOTHERAPY INTERVENTION NOTE - INTERVENTION TYPE RECOOMEND
Dose Optimization/Non-renal Dose Adjustment
IV to PO
Therapy Recommended - Drug indicated but not ordered
Therapy Recommended - Contraindication

## 2023-09-06 NOTE — PROGRESS NOTE ADULT - PROVIDER SPECIALTY LIST ADULT
CCU
CCU
CT Surgery
Critical Care
Hospitalist
Pulmonology
Pulmonology
CCU
CT Surgery
Cardiology
Internal Medicine
CT Surgery
Critical Care
Hospitalist
Internal Medicine
Neurology
Cardiology
Critical Care
Gastroenterology
Hospitalist
Internal Medicine
Internal Medicine
Pulmonology
CT Surgery
Critical Care
Infectious Disease
Intervent Cardiology

## 2023-09-06 NOTE — PHARMACOTHERAPY INTERVENTION NOTE - COMMENTS
Patient with DVT and also now with a fib currently receiving apixaban 2.5 mg po BID. Patient does not require dose reduction (age < 78, wt > 60 kg, SCr= 0.6). Recommended dose increase to apixaban 5 mg po BID. Spoke with CAMILO Pichardo per Dr Salinas keep dose as 2.5 mg po BID for now and plan to adjust to full dose outpatient.

## 2023-09-06 NOTE — PROGRESS NOTE ADULT - REASON FOR ADMISSION
Arm Pain, AMS
Arm Pain, AMS, acidosis
Arm Pain, AMS
Arm Pain, AMS , seizures
Arm Pain, AMS

## 2023-09-06 NOTE — PROGRESS NOTE ADULT - SUBJECTIVE AND OBJECTIVE BOX
OPERATIVE PROCEDURE(s): CABG x3               POD # 7                      SURGEON(s): ANIRUDH Coon      SUBJECTIVE ASSESSMENT:68yFemale patient seen and examined at bedside. No complaints at this time.     Vital Signs Last 24 Hrs  T(F): 98.3 (06 Sep 2023 07:20), Max: 99.5 (05 Sep 2023 18:07)  HR: 84 (06 Sep 2023 07:20) (84 - 99)  BP: 115/68 (06 Sep 2023 07:20) (110/54 - 137/92)  BP(mean): 85 (06 Sep 2023 07:20) (76 - 110)  RR: 21 (06 Sep 2023 07:20) (17 - 21)  SpO2: 95% (06 Sep 2023 07:20) (95% - 98%)    I&O's Detail    05 Sep 2023 07:01  -  06 Sep 2023 07:00  --------------------------------------------------------  IN:    Oral Fluid: 960 mL  Total IN: 960 mL    OUT:    Voided (mL): 1500 mL  Total OUT: 1500 mL        Net: I&O's Detail    04 Sep 2023 07:01  -  05 Sep 2023 07:00  --------------------------------------------------------  Total NET: -1850 mL      05 Sep 2023 07:01  -  06 Sep 2023 07:00  --------------------------------------------------------  Total NET: -540 mL        CAPILLARY BLOOD GLUCOSE      POCT Blood Glucose.: 194 mg/dL (05 Sep 2023 21:45)  POCT Blood Glucose.: 199 mg/dL (05 Sep 2023 16:51)  POCT Blood Glucose.: 184 mg/dL (05 Sep 2023 12:11)    A1C with Estimated Average Glucose Result: 8.2 % (08-14-23 @ 04:40)      Physical Exam:  General: NAD; A&Ox3  Cardiac: S1/S2, RRR, no murmur, no rubs  Lungs: unlabored respirations, CTA b/l, no wheeze, no rales, no crackles  Abdomen: Soft/NT/ND; positive bowel sounds x 4  Sternum: Intact, no click, incision healing well with no drainage  Incisions: Incisions clean/dry/intact  Extremities: No edema b/l lower extremities; good capillary refill; no cyanosis; palpable 1+ pedal pulses b/l      BOWEL MOVEMENT:  [] YES [] NO, If No, Timing since last BM Day #        LABS:                        7.6<L>  5.63  )-----------( 209      ( 05 Sep 2023 01:45 )             25.1<L>                        8.0<L>  6.60  )-----------( 188      ( 04 Sep 2023 01:40 )             26.2<L>    09-05    137  |  105  |  9<L>  ----------------------------<  66<L>  3.5   |  25  |  0.6<L>  09-04    138  |  106  |  10  ----------------------------<  143<H>  4.1   |  23  |  0.5<L>    Ca    7.5<L>      05 Sep 2023 01:45  Mg     1.9     09-05        Urinalysis Basic - ( 05 Sep 2023 01:45 )    Color: x / Appearance: x / SG: x / pH: x  Gluc: 66 mg/dL / Ketone: x  / Bili: x / Urobili: x   Blood: x / Protein: x / Nitrite: x   Leuk Esterase: x / RBC: x / WBC x   Sq Epi: x / Non Sq Epi: x / Bacteria: x        RADIOLOGY & ADDITIONAL TESTS:  CXR:   < from: Xray Chest 2 Views PA/Lat (09.05.23 @ 09:13) >  INTERPRETATION:  Clinical History / Reason for exam: Shortness of breath    Comparison : Chest radiograph earlier in the same day.    Technique/Positioning: Frontal and lateral.    Findings:    Support devices: Telemetry leads. Right neck central catheter.    Cardiac/mediastinum/hilum: Cardiomegaly. Status post median sternotomy.    Lung parenchyma/Pleura: CHF.    Skeleton/soft tissues: Unchanged    Impression:    CHF. Cardiomegaly.        EKG:  < from: 12 Lead ECG (08.31.23 @ 08:22) >  Ventricular Rate 105 BPM    Atrial Rate 105 BPM    P-R Interval 138 ms    QRS Duration 134 ms    Q-T Interval 372 ms    QTC Calculation(Bazett) 491 ms    P Axis 78 degrees    R Axis -27 degrees    T Axis 171 degrees    Diagnosis Line Sinus tachycardia  Left bundle branch block  Abnormal ECG        Allergies    No Known Allergies    Intolerances      MEDICATIONS  (STANDING):  aMIOdarone    Tablet 200 milliGRAM(s) Oral every 12 hours  apixaban 2.5 milliGRAM(s) Oral every 12 hours  aspirin enteric coated 81 milliGRAM(s) Oral daily  atorvastatin 40 milliGRAM(s) Oral at bedtime  bisacodyl Suppository 10 milliGRAM(s) Rectal once  chlorhexidine 2% Cloths 1 Application(s) Topical daily  clopidogrel Tablet 75 milliGRAM(s) Oral daily  dextrose 5%. 1000 milliLiter(s) (50 mL/Hr) IV Continuous <Continuous>  dextrose 50% Injectable 50 milliLiter(s) IV Push every 15 minutes  ferrous    sulfate 325 milliGRAM(s) Oral daily  folic acid 1 milliGRAM(s) Oral daily  furosemide   Injectable 40 milliGRAM(s) IV Push every 12 hours  glucagon  Injectable 1 milliGRAM(s) IntraMuscular once  insulin glargine Injectable (LANTUS) 40 Unit(s) SubCutaneous every morning  insulin lispro (ADMELOG) corrective regimen sliding scale   SubCutaneous three times a day before meals  insulin lispro Injectable (ADMELOG) 7 Unit(s) SubCutaneous three times a day before meals  ipratropium    for Nebulization 500 MICROGram(s) Nebulizer every 6 hours  lisinopril 5 milliGRAM(s) Oral daily  metoprolol tartrate 12.5 milliGRAM(s) Oral every 12 hours  multivitamin/minerals 1 Tablet(s) Oral daily  pantoprazole    Tablet 40 milliGRAM(s) Oral before breakfast  polyethylene glycol 3350 17 Gram(s) Oral daily  potassium chloride    Tablet ER 40 milliEquivalent(s) Oral daily  senna 2 Tablet(s) Oral at bedtime    MEDICATIONS  (PRN):  dextrose Oral Gel 15 Gram(s) Oral once PRN Blood Glucose LESS THAN 70 milliGRAM(s)/deciliter  ondansetron Injectable 4 milliGRAM(s) IV Push every 8 hours PRN Nausea and/or Vomiting  oxyCODONE    IR 10 milliGRAM(s) Oral every 4 hours PRN Severe Pain (7 - 10)  oxyCODONE    IR 5 milliGRAM(s) Oral every 4 hours PRN Moderate Pain (4 - 6)    Home Medications:  aspirin 81 mg oral tablet, chewable: 1 chewed once a day (13 Aug 2023 19:53)  atorvastatin 10 mg oral tablet: 1 orally once a day (at bedtime) (13 Aug 2023 19:52)  Jardiance 10 mg oral tablet: 1 orally once a day (13 Aug 2023 19:53)  MetFORMIN (Eqv-Glumetza) 500 mg oral tablet, extended release: 1 orally 2 times a day (13 Aug 2023 19:51)  pioglitazone 30 mg oral tablet: 1 orally once a day (13 Aug 2023 19:52)      Pharmacologic DVT Prophylaxis [X] YES, [] NO: Contraindication:  SCD's: YES b/l    GI Prophylaxis: protonix    Post-Op Beta-Blockers: [X] Yes, [] No: contraindication:  Post-Op Aspirin:  [X] Yes, [] No: contraindication:  Post-Op Statin:  [X] Yes, [] No: contraindication:    Ambulation/Activity Status: ambulate as tolerated    Assessment/Plan:  68y Female status-post CABG POD 7  - Case and plan discussed with CTU Intensivist and CT Surgeon - Dr. Simental/Jaymie/Jim/Reshma  - Continue CTU supportive care and ongoing plan of care as per continuing CTU rounds.   - Continue DVT/GI prophylaxis  - Incentive Spirometry 10 times an hour  - Continue to advance physical activity as tolerated and continue PT/OT as directed  1. CAD s/p CABG: Continue ASA 81mg po qd, statin, and lopressor 12.5 mg po q12h  2. HTN: lisinopril 5 milliGRAM(s) Oral daily  3. Hypoxia: SpO2 97% - 100% on 3L NCS; Cont albuterol/ipratropium 3ml Q6 for nebulization. Encourage incentive spirometry deep breathing, and coughing to bring up any secretions   4. DM/Glucose Control: A1C 8.2; FS well controlled on Lantus 40, prandial 7, & sliding scale.  5. DVT/PE: venous duplex 8/30 -> DVT R peroneal vein. Vascular following-> No IVC filter needed. Cont Eliquis 2.5mg q12h.   6. Afib: patient had episode of rapid afib which converted to sinus rhythm 9/1/23. Currently NSR, transition to amiodarone PO for maintenance.   7. Chronic systolic dysfunction: weaned off milrinone; tolerating lisinopril for after-load reduction. Repeat echo shows an EF of 25% ; EP consult appreciated ; pt will need Lifevest- process has been started by EP team    Social Service Disposition:     OPERATIVE PROCEDURE(s): CABG x3               POD # 7                      SURGEON(s): ANIRUDH Coon      SUBJECTIVE ASSESSMENT:68yFemale patient seen and examined at bedside. No complaints at this time.     Vital Signs Last 24 Hrs  T(F): 98.3 (06 Sep 2023 07:20), Max: 99.5 (05 Sep 2023 18:07)  HR: 84 (06 Sep 2023 07:20) (84 - 99)  BP: 115/68 (06 Sep 2023 07:20) (110/54 - 137/92)  BP(mean): 85 (06 Sep 2023 07:20) (76 - 110)  RR: 21 (06 Sep 2023 07:20) (17 - 21)  SpO2: 95% (06 Sep 2023 07:20) (95% - 98%)    I&O's Detail    05 Sep 2023 07:01  -  06 Sep 2023 07:00  --------------------------------------------------------  IN:    Oral Fluid: 960 mL  Total IN: 960 mL    OUT:    Voided (mL): 1500 mL  Total OUT: 1500 mL        Net: I&O's Detail    04 Sep 2023 07:01  -  05 Sep 2023 07:00  --------------------------------------------------------  Total NET: -1850 mL      05 Sep 2023 07:01  -  06 Sep 2023 07:00  --------------------------------------------------------  Total NET: -540 mL        CAPILLARY BLOOD GLUCOSE      POCT Blood Glucose.: 194 mg/dL (05 Sep 2023 21:45)  POCT Blood Glucose.: 199 mg/dL (05 Sep 2023 16:51)  POCT Blood Glucose.: 184 mg/dL (05 Sep 2023 12:11)    A1C with Estimated Average Glucose Result: 8.2 % (08-14-23 @ 04:40)      Physical Exam:  General: NAD; A&Ox3  Cardiac: S1/S2, RRR, no murmur, no rubs  Lungs: unlabored respirations, CTA b/l, no wheeze, no rales, no crackles  Abdomen: Soft/NT/ND; positive bowel sounds x 4  Sternum: Intact, no click, incision healing well with no drainage  Incisions: Incisions clean/dry/intact  Extremities: No edema b/l lower extremities; good capillary refill; no cyanosis; palpable 1+ pedal pulses b/l      BOWEL MOVEMENT:  [] YES [] NO, If No, Timing since last BM Day #        LABS:                        7.6<L>  5.63  )-----------( 209      ( 05 Sep 2023 01:45 )             25.1<L>                        8.0<L>  6.60  )-----------( 188      ( 04 Sep 2023 01:40 )             26.2<L>    09-05    137  |  105  |  9<L>  ----------------------------<  66<L>  3.5   |  25  |  0.6<L>  09-04    138  |  106  |  10  ----------------------------<  143<H>  4.1   |  23  |  0.5<L>    Ca    7.5<L>      05 Sep 2023 01:45  Mg     1.9     09-05        Urinalysis Basic - ( 05 Sep 2023 01:45 )    Color: x / Appearance: x / SG: x / pH: x  Gluc: 66 mg/dL / Ketone: x  / Bili: x / Urobili: x   Blood: x / Protein: x / Nitrite: x   Leuk Esterase: x / RBC: x / WBC x   Sq Epi: x / Non Sq Epi: x / Bacteria: x        RADIOLOGY & ADDITIONAL TESTS:  CXR:   < from: Xray Chest 2 Views PA/Lat (09.05.23 @ 09:13) >  INTERPRETATION:  Clinical History / Reason for exam: Shortness of breath    Comparison : Chest radiograph earlier in the same day.    Technique/Positioning: Frontal and lateral.    Findings:    Support devices: Telemetry leads. Right neck central catheter.    Cardiac/mediastinum/hilum: Cardiomegaly. Status post median sternotomy.    Lung parenchyma/Pleura: CHF.    Skeleton/soft tissues: Unchanged    Impression:    CHF. Cardiomegaly.        EKG:  < from: 12 Lead ECG (08.31.23 @ 08:22) >  Ventricular Rate 105 BPM    Atrial Rate 105 BPM    P-R Interval 138 ms    QRS Duration 134 ms    Q-T Interval 372 ms    QTC Calculation(Bazett) 491 ms    P Axis 78 degrees    R Axis -27 degrees    T Axis 171 degrees    Diagnosis Line Sinus tachycardia  Left bundle branch block  Abnormal ECG        Allergies    No Known Allergies    Intolerances      MEDICATIONS  (STANDING):  aMIOdarone    Tablet 200 milliGRAM(s) Oral every 12 hours  apixaban 2.5 milliGRAM(s) Oral every 12 hours  aspirin enteric coated 81 milliGRAM(s) Oral daily  atorvastatin 40 milliGRAM(s) Oral at bedtime  bisacodyl Suppository 10 milliGRAM(s) Rectal once  chlorhexidine 2% Cloths 1 Application(s) Topical daily  clopidogrel Tablet 75 milliGRAM(s) Oral daily  dextrose 5%. 1000 milliLiter(s) (50 mL/Hr) IV Continuous <Continuous>  dextrose 50% Injectable 50 milliLiter(s) IV Push every 15 minutes  ferrous    sulfate 325 milliGRAM(s) Oral daily  folic acid 1 milliGRAM(s) Oral daily  furosemide   Injectable 40 milliGRAM(s) IV Push every 12 hours  glucagon  Injectable 1 milliGRAM(s) IntraMuscular once  insulin glargine Injectable (LANTUS) 40 Unit(s) SubCutaneous every morning  insulin lispro (ADMELOG) corrective regimen sliding scale   SubCutaneous three times a day before meals  insulin lispro Injectable (ADMELOG) 7 Unit(s) SubCutaneous three times a day before meals  ipratropium    for Nebulization 500 MICROGram(s) Nebulizer every 6 hours  lisinopril 5 milliGRAM(s) Oral daily  metoprolol tartrate 12.5 milliGRAM(s) Oral every 12 hours  multivitamin/minerals 1 Tablet(s) Oral daily  pantoprazole    Tablet 40 milliGRAM(s) Oral before breakfast  polyethylene glycol 3350 17 Gram(s) Oral daily  potassium chloride    Tablet ER 40 milliEquivalent(s) Oral daily  senna 2 Tablet(s) Oral at bedtime    MEDICATIONS  (PRN):  dextrose Oral Gel 15 Gram(s) Oral once PRN Blood Glucose LESS THAN 70 milliGRAM(s)/deciliter  ondansetron Injectable 4 milliGRAM(s) IV Push every 8 hours PRN Nausea and/or Vomiting  oxyCODONE    IR 10 milliGRAM(s) Oral every 4 hours PRN Severe Pain (7 - 10)  oxyCODONE    IR 5 milliGRAM(s) Oral every 4 hours PRN Moderate Pain (4 - 6)    Home Medications:  aspirin 81 mg oral tablet, chewable: 1 chewed once a day (13 Aug 2023 19:53)  atorvastatin 10 mg oral tablet: 1 orally once a day (at bedtime) (13 Aug 2023 19:52)  Jardiance 10 mg oral tablet: 1 orally once a day (13 Aug 2023 19:53)  MetFORMIN (Eqv-Glumetza) 500 mg oral tablet, extended release: 1 orally 2 times a day (13 Aug 2023 19:51)  pioglitazone 30 mg oral tablet: 1 orally once a day (13 Aug 2023 19:52)      Pharmacologic DVT Prophylaxis [X] YES, [] NO: Contraindication:  SCD's: YES b/l    GI Prophylaxis: protonix    Post-Op Beta-Blockers: [X] Yes, [] No: contraindication:  Post-Op Aspirin:  [X] Yes, [] No: contraindication:  Post-Op Statin:  [X] Yes, [] No: contraindication:    Ambulation/Activity Status: ambulate as tolerated    Assessment/Plan:  68y Female status-post CABG POD 7  - Case and plan discussed with CTU Intensivist and CT Surgeon - Dr. Simental/Jaymie/Jim/Reshma  - Continue CTU supportive care and ongoing plan of care as per continuing CTU rounds.   - Continue DVT/GI prophylaxis  - Incentive Spirometry 10 times an hour  - Continue to advance physical activity as tolerated and continue PT/OT as directed  1. CAD s/p CABG: Continue ASA 81mg po qd, statin, and lopressor 12.5 mg po q12h  2. HTN: lisinopril 5 milliGRAM(s) Oral daily  3. Hypoxia: SpO2 97% - 100% on 3L NCS; Cont albuterol/ipratropium 3ml Q6 for nebulization. Encourage incentive spirometry deep breathing, and coughing to bring up any secretions   4. DM/Glucose Control: A1C 8.2; FS well controlled on Lantus 40, prandial 7, & sliding scale.  5. DVT/PE: venous duplex 8/30 -> DVT R peroneal vein. Vascular following-> No IVC filter needed. Cont Eliquis 2.5mg q12h.   6. Afib: patient had episode of rapid afib which converted to sinus rhythm 9/1/23. Currently NSR, transition to amiodarone PO for maintenance.   7. Chronic systolic dysfunction: weaned off milrinone; tolerating lisinopril for after-load reduction. Repeat echo shows an EF of 25% ; EP following -> lifevest fitted and on patient    Social Service Disposition:  patient d/c'd home with home care. Discharge education given to patient and family.

## 2023-09-07 ENCOUNTER — APPOINTMENT (OUTPATIENT)
Dept: CARE COORDINATION | Facility: HOME HEALTH | Age: 68
End: 2023-09-07
Payer: MEDICAID

## 2023-09-07 ENCOUNTER — TRANSCRIPTION ENCOUNTER (OUTPATIENT)
Age: 68
End: 2023-09-07

## 2023-09-07 PROCEDURE — 99024 POSTOP FOLLOW-UP VISIT: CPT

## 2023-09-07 RX ORDER — MULTIVIT-MIN/FOLIC/VIT K/LYCOP 400-300MCG
TABLET ORAL DAILY
Refills: 0 | Status: ACTIVE | COMMUNITY
Start: 2023-09-07

## 2023-09-07 RX ORDER — SENNOSIDES 8.6 MG/1
8.6 TABLET ORAL
Qty: 30 | Refills: 0 | Status: ACTIVE | COMMUNITY
Start: 2023-09-07

## 2023-09-07 NOTE — PLAN
[TextEntry] : Post operative teaching reinforced, daily weights, showering daily, no heavy lifting greater than 5 pounds, no driving until seen by surgeon.  Obtain temperature daily. Diabetes glucose control. Medication adherence. Encouraged the use of incentive spirometry 10 times every hour.  Care Navigator contact information provided & yellow card reinforced.  Patient was encouraged to call Care Navigator with any issues, concerns, or questions.   1. Daily Shower 2. Weight yourself daily and notify any weight gain greater than 2-3 pounds in 24 hours. 3. Regular diet - low fat, low cholesterol, no added salt. 4. Cleanse sternum & leg incision daily while showering with warm water and mild soap, pat dry and maintain open to air.  DO NOT APPLY ANY LOTION, CREAMS, OR POWDERS TO INCISIONS, KEEP OPEN TO AIR 5. No driving until cleared by MD.  6. No heavy lifting nothing greater than 5 pounds until cleared by MD.  7. Call / Notify MD any fever greater than 101.0 8. Increase Activity as tolerated. 9. Utilize heart pillow to splint pillow 10. Encouraged to arrange follow up appointment with cardiology & PCP.

## 2023-09-07 NOTE — HISTORY OF PRESENT ILLNESS
[FreeTextEntry1] : FOLLOW YOUR HEART HOME VISIT-[facility] Telephonic Home Visit made to  Mrs. Ball for post discharge transitional care management and post follow up.  Patient of Dr. Coon s/p CABG on 8/30/23.  Discharge on 9/6/23 from [facility]  Mrs. Ball is a 68 year old Latvian speaking female with a PMH of Type II DM, HTN, DLD, obesity, lateral epicondylitis presented to the ED w/ Left Arm pain.  This arm started about two months ago and has continued to progress but in the last few days became severe. On this admission she presented with severe left arm pain w findings of LBBB on ECG (unsure if new or not).  Patient was then noted to have acute mental status change, SOB, lactate elevation, and acidosis on abg.. Patient had two seizure type episodes and was treated with benzodiazepine. Intubated by ER for airway protection.   [place] code called - no CVA but patient developed elevated troponins and TTE revealed EF of 30% with CCTA revealing score of 134 and LAD disease. Patient went for Cardiac cath that revealed multi-vessel CAD.  On 08/30/23, she underwent s/p CABG. Post-operatively, the patient's hospitalization was prolonged due to hypoxia, ischemic cardiomyopathy, and DVT right peroneal vein. A venous duplex was performed, which revealed a right peroneal DVT. The patient was assessed by vascular, who recommended the patient be started on anticoagulation. The patient was started on Eliquis BID.  An echocardiogram was performed 9/4, which revealed an EF 25%. EP was consulted who recommended the patient be fitted for a wearable defibrillator. The patient was fitted and received vest upon discharge to home.  Spoke with son & patient with Latvian  Melly #[id number] for follow up s/p hospitalization. Weight today 135 lbs. Denies any new complaints/issues at this time.  Compliant with all medications as per d/c order with +teach back.  Patient has scheduled follow up appointment. Reminded of CN role and contact number was provided to the patient.  Advised to call with any questions/concerns, verbalized understanding.

## 2023-09-07 NOTE — REASON FOR VISIT
[Home] : at home, [unfilled] , at the time of the visit. [Other Location: e.g. Home (Enter Location, City,State)___] : at [unfilled] [Family Member] : family member [[employer] ] : provided by [employer]   [Time Spent: ____ minutes] : Total time spent using  services: [unfilled] minutes. The patient's primary language is not English thus required  services. [Verbal consent obtained from patient] : the patient, [unfilled] [Interpreters_IDNumber] : [id number] [Interpreters_FullName] : Melly [FreeTextEntry4] : Derrick (son)

## 2023-09-12 ENCOUNTER — APPOINTMENT (OUTPATIENT)
Dept: CARE COORDINATION | Facility: HOME HEALTH | Age: 68
End: 2023-09-12
Payer: MEDICAID

## 2023-09-12 VITALS
SYSTOLIC BLOOD PRESSURE: 104 MMHG | RESPIRATION RATE: 15 BRPM | HEART RATE: 87 BPM | DIASTOLIC BLOOD PRESSURE: 50 MMHG | OXYGEN SATURATION: 96 %

## 2023-09-12 PROCEDURE — 99024 POSTOP FOLLOW-UP VISIT: CPT

## 2023-09-13 ENCOUNTER — APPOINTMENT (OUTPATIENT)
Dept: CARDIOTHORACIC SURGERY | Facility: CLINIC | Age: 68
End: 2023-09-13
Payer: MEDICAID

## 2023-09-13 VITALS
BODY MASS INDEX: 23.92 KG/M2 | SYSTOLIC BLOOD PRESSURE: 122 MMHG | DIASTOLIC BLOOD PRESSURE: 74 MMHG | RESPIRATION RATE: 12 BRPM | HEART RATE: 84 BPM | HEIGHT: 62 IN | TEMPERATURE: 98.4 F | WEIGHT: 130 LBS | OXYGEN SATURATION: 93 %

## 2023-09-13 LAB
CULTURE RESULTS: SIGNIFICANT CHANGE UP
HCT VFR BLD CALC: 33.4 %
HGB BLD-MCNC: 10 G/DL
INR PPP: 1.13 RATIO
MCHC RBC-ENTMCNC: 27.4 PG
MCHC RBC-ENTMCNC: 29.9 G/DL
MCV RBC AUTO: 91.5 FL
PLATELET # BLD AUTO: 330 K/UL
PMV BLD: 9.1 FL
PT BLD: 12.9 SEC
RBC # BLD: 3.65 M/UL
RBC # FLD: 23.3 %
SPECIMEN SOURCE: SIGNIFICANT CHANGE UP
WBC # FLD AUTO: 8.97 K/UL

## 2023-09-13 PROCEDURE — 99024 POSTOP FOLLOW-UP VISIT: CPT

## 2023-09-14 ENCOUNTER — NON-APPOINTMENT (OUTPATIENT)
Age: 68
End: 2023-09-14

## 2023-09-14 LAB
ALBUMIN SERPL ELPH-MCNC: 3.3 G/DL
ALP BLD-CCNC: 99 U/L
ALT SERPL-CCNC: 33 U/L
ANION GAP SERPL CALC-SCNC: 17 MMOL/L
AST SERPL-CCNC: 26 U/L
BILIRUB SERPL-MCNC: 0.3 MG/DL
BUN SERPL-MCNC: 8 MG/DL
CALCIUM SERPL-MCNC: 8.5 MG/DL
CHLORIDE SERPL-SCNC: 100 MMOL/L
CO2 SERPL-SCNC: 23 MMOL/L
CREAT SERPL-MCNC: 0.8 MG/DL
EGFR: 79 ML/MIN/1.73M2
GLUCOSE SERPL-MCNC: 47 MG/DL
POTASSIUM SERPL-SCNC: 4.9 MMOL/L
PROT SERPL-MCNC: 7.1 G/DL
SODIUM SERPL-SCNC: 140 MMOL/L

## 2023-09-17 ENCOUNTER — INPATIENT (INPATIENT)
Facility: HOSPITAL | Age: 68
LOS: 3 days | Discharge: HOME HEALTH | DRG: 206 | End: 2023-09-21
Attending: THORACIC SURGERY (CARDIOTHORACIC VASCULAR SURGERY) | Admitting: THORACIC SURGERY (CARDIOTHORACIC VASCULAR SURGERY)
Payer: MEDICAID

## 2023-09-17 VITALS
WEIGHT: 130.07 LBS | OXYGEN SATURATION: 99 % | DIASTOLIC BLOOD PRESSURE: 72 MMHG | HEIGHT: 60 IN | SYSTOLIC BLOOD PRESSURE: 144 MMHG | TEMPERATURE: 98 F | RESPIRATION RATE: 22 BRPM | HEART RATE: 70 BPM

## 2023-09-17 DIAGNOSIS — I82.451 ACUTE EMBOLISM AND THROMBOSIS OF RIGHT PERONEAL VEIN: ICD-10-CM

## 2023-09-17 DIAGNOSIS — Z95.1 PRESENCE OF AORTOCORONARY BYPASS GRAFT: ICD-10-CM

## 2023-09-17 DIAGNOSIS — Z86.711 PERSONAL HISTORY OF PULMONARY EMBOLISM: ICD-10-CM

## 2023-09-17 DIAGNOSIS — Z79.01 LONG TERM (CURRENT) USE OF ANTICOAGULANTS: ICD-10-CM

## 2023-09-17 DIAGNOSIS — Z95.810 PRESENCE OF AUTOMATIC (IMPLANTABLE) CARDIAC DEFIBRILLATOR: ICD-10-CM

## 2023-09-17 DIAGNOSIS — Z87.891 PERSONAL HISTORY OF NICOTINE DEPENDENCE: ICD-10-CM

## 2023-09-17 DIAGNOSIS — I44.7 LEFT BUNDLE-BRANCH BLOCK, UNSPECIFIED: ICD-10-CM

## 2023-09-17 DIAGNOSIS — Z79.899 OTHER LONG TERM (CURRENT) DRUG THERAPY: ICD-10-CM

## 2023-09-17 DIAGNOSIS — I97.89 OTHER POSTPROCEDURAL COMPLICATIONS AND DISORDERS OF THE CIRCULATORY SYSTEM, NOT ELSEWHERE CLASSIFIED: ICD-10-CM

## 2023-09-17 DIAGNOSIS — E78.00 PURE HYPERCHOLESTEROLEMIA, UNSPECIFIED: ICD-10-CM

## 2023-09-17 DIAGNOSIS — Z79.51 LONG TERM (CURRENT) USE OF INHALED STEROIDS: ICD-10-CM

## 2023-09-17 DIAGNOSIS — Z79.84 LONG TERM (CURRENT) USE OF ORAL HYPOGLYCEMIC DRUGS: ICD-10-CM
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
## 2023-09-17 DIAGNOSIS — Z79.82 LONG TERM (CURRENT) USE OF ASPIRIN: ICD-10-CM

## 2023-09-17 DIAGNOSIS — I25.5 ISCHEMIC CARDIOMYOPATHY: ICD-10-CM

## 2023-09-17 DIAGNOSIS — I48.91 UNSPECIFIED ATRIAL FIBRILLATION: ICD-10-CM

## 2023-09-17 DIAGNOSIS — I25.10 ATHEROSCLEROTIC HEART DISEASE OF NATIVE CORONARY ARTERY WITHOUT ANGINA PECTORIS: ICD-10-CM

## 2023-09-17 DIAGNOSIS — Z28.9 IMMUNIZATION NOT CARRIED OUT FOR UNSPECIFIED REASON: ICD-10-CM

## 2023-09-17 DIAGNOSIS — R09.02 HYPOXEMIA: ICD-10-CM

## 2023-09-17 DIAGNOSIS — I11.0 HYPERTENSIVE HEART DISEASE WITH HEART FAILURE: ICD-10-CM

## 2023-09-17 DIAGNOSIS — J95.89 OTHER POSTPROCEDURAL COMPLICATIONS AND DISORDERS OF RESPIRATORY SYSTEM, NOT ELSEWHERE CLASSIFIED: ICD-10-CM

## 2023-09-17 DIAGNOSIS — Z79.02 LONG TERM (CURRENT) USE OF ANTITHROMBOTICS/ANTIPLATELETS: ICD-10-CM

## 2023-09-17 DIAGNOSIS — E11.9 TYPE 2 DIABETES MELLITUS WITHOUT COMPLICATIONS: ICD-10-CM

## 2023-09-17 DIAGNOSIS — Z98.890 OTHER SPECIFIED POSTPROCEDURAL STATES: Chronic | ICD-10-CM

## 2023-09-17 DIAGNOSIS — J90 PLEURAL EFFUSION, NOT ELSEWHERE CLASSIFIED: ICD-10-CM

## 2023-09-17 DIAGNOSIS — G47.00 INSOMNIA, UNSPECIFIED: ICD-10-CM

## 2023-09-17 DIAGNOSIS — Z86.74 PERSONAL HISTORY OF SUDDEN CARDIAC ARREST: ICD-10-CM

## 2023-09-17 DIAGNOSIS — I50.23 ACUTE ON CHRONIC SYSTOLIC (CONGESTIVE) HEART FAILURE: ICD-10-CM
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
## 2023-09-17 LAB
ALBUMIN SERPL ELPH-MCNC: 3.3 G/DL — LOW (ref 3.5–5.2)
ALP SERPL-CCNC: 109 U/L — SIGNIFICANT CHANGE UP (ref 30–115)
ALT FLD-CCNC: 26 U/L — SIGNIFICANT CHANGE UP (ref 0–41)
ANION GAP SERPL CALC-SCNC: 12 MMOL/L — SIGNIFICANT CHANGE UP (ref 7–14)
AST SERPL-CCNC: 42 U/L — HIGH (ref 0–41)
BASOPHILS # BLD AUTO: 0.04 K/UL — SIGNIFICANT CHANGE UP (ref 0–0.2)
BASOPHILS NFR BLD AUTO: 0.4 % — SIGNIFICANT CHANGE UP (ref 0–1)
BILIRUB SERPL-MCNC: 0.2 MG/DL — SIGNIFICANT CHANGE UP (ref 0.2–1.2)
BUN SERPL-MCNC: 7 MG/DL — LOW (ref 10–20)
CALCIUM SERPL-MCNC: 8.2 MG/DL — LOW (ref 8.4–10.5)
CHLORIDE SERPL-SCNC: 101 MMOL/L — SIGNIFICANT CHANGE UP (ref 98–110)
CO2 SERPL-SCNC: 25 MMOL/L — SIGNIFICANT CHANGE UP (ref 17–32)
CREAT SERPL-MCNC: 0.6 MG/DL — LOW (ref 0.7–1.5)
EGFR: 96 ML/MIN/1.73M2 — SIGNIFICANT CHANGE UP
EOSINOPHIL # BLD AUTO: 0.08 K/UL — SIGNIFICANT CHANGE UP (ref 0–0.7)
EOSINOPHIL NFR BLD AUTO: 0.9 % — SIGNIFICANT CHANGE UP (ref 0–8)
GLUCOSE BLDC GLUCOMTR-MCNC: 85 MG/DL — SIGNIFICANT CHANGE UP (ref 70–99)
GLUCOSE SERPL-MCNC: 62 MG/DL — LOW (ref 70–99)
HCT VFR BLD CALC: 33.6 % — LOW (ref 37–47)
HGB BLD-MCNC: 10.1 G/DL — LOW (ref 12–16)
IMM GRANULOCYTES NFR BLD AUTO: 0.3 % — SIGNIFICANT CHANGE UP (ref 0.1–0.3)
LYMPHOCYTES # BLD AUTO: 2.22 K/UL — SIGNIFICANT CHANGE UP (ref 1.2–3.4)
LYMPHOCYTES # BLD AUTO: 23.6 % — SIGNIFICANT CHANGE UP (ref 20.5–51.1)
MCHC RBC-ENTMCNC: 26.9 PG — LOW (ref 27–31)
MCHC RBC-ENTMCNC: 30.1 G/DL — LOW (ref 32–37)
MCV RBC AUTO: 89.4 FL — SIGNIFICANT CHANGE UP (ref 81–99)
MONOCYTES # BLD AUTO: 0.43 K/UL — SIGNIFICANT CHANGE UP (ref 0.1–0.6)
MONOCYTES NFR BLD AUTO: 4.6 % — SIGNIFICANT CHANGE UP (ref 1.7–9.3)
NEUTROPHILS # BLD AUTO: 6.6 K/UL — HIGH (ref 1.4–6.5)
NEUTROPHILS NFR BLD AUTO: 70.2 % — SIGNIFICANT CHANGE UP (ref 42.2–75.2)
NRBC # BLD: 0 /100 WBCS — SIGNIFICANT CHANGE UP (ref 0–0)
NT-PROBNP SERPL-SCNC: 3373 PG/ML — HIGH (ref 0–300)
PLATELET # BLD AUTO: 338 K/UL — SIGNIFICANT CHANGE UP (ref 130–400)
PMV BLD: 9 FL — SIGNIFICANT CHANGE UP (ref 7.4–10.4)
POTASSIUM SERPL-MCNC: 4.3 MMOL/L — SIGNIFICANT CHANGE UP (ref 3.5–5)
POTASSIUM SERPL-SCNC: 4.3 MMOL/L — SIGNIFICANT CHANGE UP (ref 3.5–5)
PROT SERPL-MCNC: 7.7 G/DL — SIGNIFICANT CHANGE UP (ref 6–8)
RBC # BLD: 3.76 M/UL — LOW (ref 4.2–5.4)
RBC # FLD: 22.4 % — HIGH (ref 11.5–14.5)
SODIUM SERPL-SCNC: 138 MMOL/L — SIGNIFICANT CHANGE UP (ref 135–146)
TROPONIN T SERPL-MCNC: <0.01 NG/ML — SIGNIFICANT CHANGE UP
WBC # BLD: 9.4 K/UL — SIGNIFICANT CHANGE UP (ref 4.8–10.8)
WBC # FLD AUTO: 9.4 K/UL — SIGNIFICANT CHANGE UP (ref 4.8–10.8)

## 2023-09-17 PROCEDURE — 71045 X-RAY EXAM CHEST 1 VIEW: CPT | Mod: 26

## 2023-09-17 PROCEDURE — 93010 ELECTROCARDIOGRAM REPORT: CPT | Mod: 77

## 2023-09-17 RX ORDER — FUROSEMIDE 40 MG
40 TABLET ORAL ONCE
Refills: 0 | Status: COMPLETED | OUTPATIENT
Start: 2023-09-17 | End: 2023-09-17

## 2023-09-17 RX ADMIN — Medication 40 MILLIGRAM(S): at 19:46

## 2023-09-17 NOTE — ED ADULT NURSE NOTE - NSFALLUNIVINTERV_ED_ALL_ED
Bed/Stretcher in lowest position, wheels locked, appropriate side rails in place/Call bell, personal items and telephone in reach/Instruct patient to call for assistance before getting out of bed/chair/stretcher/Non-slip footwear applied when patient is off stretcher/[place] to call system/Physically safe environment - no spills, clutter or unnecessary equipment/Purposeful proactive rounding/Room/bathroom lighting operational, light cord in reach

## 2023-09-17 NOTE — ED PROVIDER NOTE - ATTENDING APP SHARED VISIT CONTRIBUTION OF CARE
68-year-old female past medical history noted including atrial fibrillation on Eliquis, history of hypertension and diabetes, status post CABG on August 30, left bundle branch block, status post cardiac arrest, history of DVT and PE presents via EMS from home accompanied by family for evaluation of shortness of breath all day today.  Patient reports feeling like something is stuck in her chest.  Patient also has a portable defibrillator.  On exam patient is AAOx3, lungs CTA B/L, surgical scar is healing well without evidence of infection, abdomen soft nontender nondistended, no edema    Upon review of EKG STEMI code was called.  Cardiac NP in ED.  I discussed case with Dr. Zamora cardiac fellow.  STEMI was canceled.  Patient will still require transfer secondary to CABG less than 30 days.  Family made aware.  Dr. Lidia Lynch aware of patient's pending arrival

## 2023-09-17 NOTE — PROGRESS NOTE ADULT - SUBJECTIVE AND OBJECTIVE BOX
HPI:  SP CABG 3 weeks ago  presented to [facility] with SOB, STEMI called and discussed with cards  then transferred [facility]    saw and examined patient in ED [facility] states her SOB improved  no chest pain  + cough with small amounts of clear expectoration (inspected by myself)        PHYSICAL EXAM    ICU Vital Signs Last 24 Hrs  T(C): 36.7 (17 Sep 2023 19:23), Max: 36.7 (17 Sep 2023 17:24)  T(F): 98 (17 Sep 2023 19:23), Max: 98 (17 Sep 2023 17:24)  HR: 92 (17 Sep 2023 19:23) (70 - 92)  BP: 126/58 (17 Sep 2023 19:23) (126/58 - 144/72)  BP(mean): --  ABP: --  ABP(mean): --  RR: 18 (17 Sep 2023 19:23) (18 - 22)  SpO2: 98% (17 Sep 2023 19:23) (98% - 99%)    O2 Parameters below as of 17 Sep 2023 19:23  Patient On (Oxygen Delivery Method): nasal cannula  O2 Flow (L/min): 3          General:  HEENT:              NCAT  Lungs: Bilateral breath sounds heard but diminished on left base, no abnormal adventitious sounds, sternal wound healed, stable  Cardiovascular: Regular S1S2  Abdomen: Soft, Positive BS  Extremities: 1+ pitting ankle edema      LABS:                          10.1   9.40  )-----------( 338      ( 17 Sep 2023 17:50 )             33.6         17 Sep 2023 17:50    138    |  101    |  7      ----------------------------<  62     4.3     |  25     |  0.6      Ca    8.2        17 Sep 2023 17:50    TPro  7.7    /  Alb  3.3    /  TBili  0.2    /  DBili  x      /  AST  42     /  ALT  26     /  AlkPhos  109    17 Sep 2023 17:50  Amylase x     lipase x                                                                                        Urinalysis Basic - ( 17 Sep 2023 17:50 )    Color: x / Appearance: x / SG: x / pH: x  Gluc: 62 mg/dL / Ketone: x  / Bili: x / Urobili: x   Blood: x / Protein: x / Nitrite: x   Leuk Esterase: x / RBC: x / WBC x   Sq Epi: x / Non Sq Epi: x / Bacteria: x          CARDIAC MARKERS ( 17 Sep 2023 17:50 )  x     / <0.01 ng/mL / x     / x     / x                Xrays:    < from: Xray Chest 1 View- PORTABLE-Urgent (09.17.23 @ 18:36) >  Impression:    Left pleural effusion/opacity increased. No air leak.    < end of copied text >       ECHO:  performed at bedside by ED: no effusion    IMPRESSION:  CAD SP CABG  SOB  Left pleural effusion  fluid overload    PLAN:    PULMONARY: stable on 3L NC, wean to off, diuresis for pleural effusion    CARDIOVASCULAR: /58 not on any drips. obtain 2D echo by technician in AM    GI: GI prophylaxis.    RENAL: FU lytes, Lasix 40 IV x 1    INFECTIOUS DISEASE: no fever no leukocytosis, doubt PNA, no ABX    HEMATOLOGICAL:  DVT prophylaxis. continue eliquis    MUSCULOSKELETAL: venous duplex lower extremities      spoke to ED team at bedside  spoke to CTS PA by phone who states CTS attending on call aware of patient and recommends admit to observation unit overnight

## 2023-09-17 NOTE — ED PROVIDER NOTE - OBJECTIVE STATEMENT
67yo F w/a PMH of NIDDM, HTN, DLD, recent CABG, DVT/PE, a.fib on eliquis 69 y/o female with hx of NIDDM, HTN, hyperchol, LBBB, recent CABG 3 weeks ago, DVT/PE, a.fib on eliquis presents to the Ed for evaluation of SOB since this am. no chest pain or leg pain. no fevers. no cough or hemoptysis. no back pain .

## 2023-09-17 NOTE — CHART NOTE - NSCHARTNOTEFT_GEN_A_CORE
STEMI code called in [facility] [place] to which I responded immediately.   EKG reviewed. Pt seen and examined bedside.   Case discussed with Interventional cardiologist on call.  STEMI canceled.  CTSx consult

## 2023-09-17 NOTE — ED CDU PROVIDER INITIAL DAY NOTE - OBJECTIVE STATEMENT
69 y/o female with hx of NIDDM, HTN, hyperchol, LBBB, recent CABG 3 weeks ago, DVT/PE, a.fib on eliquis presents to the Ed for evaluation of SOB since this am. no chest pain or leg pain. no fevers. no cough or hemoptysis. no back pain .

## 2023-09-17 NOTE — ED ADULT NURSE NOTE - NSICDXPASTMEDICALHX_GEN_ALL_CORE_FT
PAST MEDICAL HISTORY:  Diabetes     High blood cholesterol     HTN (hypertension)      PAST MEDICAL HISTORY:  Afib     Cardiac arrest     Diabetes     High blood cholesterol     HTN (hypertension)     Pulmonary embolism     Pulmonary embolism

## 2023-09-17 NOTE — ED ADULT NURSE REASSESSMENT NOTE - NS ED NURSE REASSESS COMMENT FT1
Pt. received from previous RN. Pt. lying on stretcher, breathing with ease on RA. A&O x4. On CCM. 18g noted to B/L extremities; IV intact, no redness/swelling at site. Awaiting clean bed assignment. Pt. received from previous RN. Pt. lying on stretcher, breathing with ease on RA. A&O x4. On CCM. 18g noted to B/L extremities; IV intact, no redness/swelling at site. Halter monitor noted; pre arrival. Awaiting clean bed assignment.

## 2023-09-17 NOTE — ED PROVIDER NOTE - PHYSICAL EXAMINATION
Vital Signs: I have reviewed the initial vital signs.  Constitutional: well-nourished, no acute distress, normocephalic  Eyes: PERRLA, EOMI,  clear conjunctiva  ENT: MMM, no JVD  Cardiovascular: regular rate, regular rhythm, no murmur appreciated  Respiratory: unlabored respiratory effort, clear to auscultation bilaterally  Gastrointestinal: soft, non-tender, non-distended  abdomen, no pulsatile mass  Musculoskeletal: supple neck, no lower extremity edema, no bony tenderness  Integumentary: warm, dry, no rash  Neurologic: awake, alert, cranial nerves II-XII grossly intact, extremities’ motor and sensory functions grossly intact, no focal deficits

## 2023-09-17 NOTE — ED ADULT NURSE REASSESSMENT NOTE - NS ED NURSE REASSESS COMMENT FT1
pt received from [facility], cardiac monitoring initiated, denies pain/discomforts. family at bedside. vss. obs for sob.

## 2023-09-17 NOTE — ED PROVIDER NOTE - CLINICAL SUMMARY MEDICAL DECISION MAKING FREE TEXT BOX
Patient transferred from [facility] for further evaluation.  Patient was evaluated by CT surgery and critical care.  Patient replaced into observation for further evaluation and care and echo.  Patient does not require admission at this time

## 2023-09-17 NOTE — ED ADULT NURSE NOTE - NSFALLHARMRISKINTERV_ED_ALL_ED
Assistance OOB with selected safe patient handling equipment if applicable/Communicate risk of Fall with Harm to all staff, patient, and family/Monitor gait and stability/Provide patient with walking aids/Provide visual cue: red socks, yellow wristband, yellow gown, etc/Reinforce activity limits and safety measures with patient and family/Use of alarms - bed, stretcher, chair and/or video monitoring/Bed in lowest position, wheels locked, appropriate side rails in place/Call bell, personal items and telephone in reach/Instruct patient to call for assistance before getting out of bed/chair/stretcher/Non-slip footwear applied when patient is off stretcher/[place] to call system/Physically safe environment - no spills, clutter or unnecessary equipment/Purposeful Proactive Rounding/Room/bathroom lighting operational, light cord in reach

## 2023-09-17 NOTE — ED ADULT TRIAGE NOTE - ARRIVAL FROM
Pt called leaving me a VM requesting PT referral for her hip. Stated she was having problems with her walk changing?    Please advise,    Thank you.    Home

## 2023-09-17 NOTE — ED PROVIDER NOTE - PROGRESS NOTE DETAILS
stemi called. spoke with dr henderson, dr. galindo. will send to [facility]. patient s/p cabg one month ago PS: Pt [place]. Cardio aware, they recommend CT surgery eval. CT surgery consulted, will come see patient PS: CT surgery recommends observation for TTE

## 2023-09-18 DIAGNOSIS — J90 PLEURAL EFFUSION, NOT ELSEWHERE CLASSIFIED: ICD-10-CM

## 2023-09-18 LAB
ALBUMIN SERPL ELPH-MCNC: 2.6 G/DL — LOW (ref 3.5–5.2)
ALP SERPL-CCNC: 101 U/L — SIGNIFICANT CHANGE UP (ref 30–115)
ALT FLD-CCNC: 18 U/L — SIGNIFICANT CHANGE UP (ref 0–41)
ANION GAP SERPL CALC-SCNC: 14 MMOL/L — SIGNIFICANT CHANGE UP (ref 7–14)
AST SERPL-CCNC: 21 U/L — SIGNIFICANT CHANGE UP (ref 0–41)
BILIRUB SERPL-MCNC: 0.3 MG/DL — SIGNIFICANT CHANGE UP (ref 0.2–1.2)
BUN SERPL-MCNC: 11 MG/DL — SIGNIFICANT CHANGE UP (ref 10–20)
CALCIUM SERPL-MCNC: 7.5 MG/DL — LOW (ref 8.4–10.4)
CHLORIDE SERPL-SCNC: 108 MMOL/L — SIGNIFICANT CHANGE UP (ref 98–110)
CO2 SERPL-SCNC: 21 MMOL/L — SIGNIFICANT CHANGE UP (ref 17–32)
CREAT SERPL-MCNC: 0.6 MG/DL — LOW (ref 0.7–1.5)
EGFR: 96 ML/MIN/1.73M2 — SIGNIFICANT CHANGE UP
GAS PNL BLDA: SIGNIFICANT CHANGE UP
GLUCOSE BLDC GLUCOMTR-MCNC: 130 MG/DL — HIGH (ref 70–99)
GLUCOSE BLDC GLUCOMTR-MCNC: 149 MG/DL — HIGH (ref 70–99)
GLUCOSE SERPL-MCNC: 214 MG/DL — HIGH (ref 70–99)
HCT VFR BLD CALC: 34.7 % — LOW (ref 37–47)
HGB BLD-MCNC: 10.6 G/DL — LOW (ref 12–16)
MCHC RBC-ENTMCNC: 27.8 PG — SIGNIFICANT CHANGE UP (ref 27–31)
MCHC RBC-ENTMCNC: 30.5 G/DL — LOW (ref 32–37)
MCV RBC AUTO: 91.1 FL — SIGNIFICANT CHANGE UP (ref 81–99)
NRBC # BLD: 0 /100 WBCS — SIGNIFICANT CHANGE UP (ref 0–0)
PLATELET # BLD AUTO: 385 K/UL — SIGNIFICANT CHANGE UP (ref 130–400)
PMV BLD: 9.4 FL — SIGNIFICANT CHANGE UP (ref 7.4–10.4)
POTASSIUM SERPL-MCNC: 3.9 MMOL/L — SIGNIFICANT CHANGE UP (ref 3.5–5)
POTASSIUM SERPL-SCNC: 3.9 MMOL/L — SIGNIFICANT CHANGE UP (ref 3.5–5)
PROT SERPL-MCNC: 6 G/DL — SIGNIFICANT CHANGE UP (ref 6–8)
RBC # BLD: 3.81 M/UL — LOW (ref 4.2–5.4)
RBC # FLD: 22.2 % — HIGH (ref 11.5–14.5)
SODIUM SERPL-SCNC: 143 MMOL/L — SIGNIFICANT CHANGE UP (ref 135–146)
TROPONIN T SERPL-MCNC: <0.01 NG/ML — SIGNIFICANT CHANGE UP
TROPONIN T SERPL-MCNC: <0.01 NG/ML — SIGNIFICANT CHANGE UP
WBC # BLD: 14.99 K/UL — HIGH (ref 4.8–10.8)
WBC # FLD AUTO: 14.99 K/UL — HIGH (ref 4.8–10.8)

## 2023-09-18 PROCEDURE — P9045: CPT

## 2023-09-18 PROCEDURE — 85014 HEMATOCRIT: CPT

## 2023-09-18 PROCEDURE — 85027 COMPLETE CBC AUTOMATED: CPT

## 2023-09-18 PROCEDURE — 80053 COMPREHEN METABOLIC PANEL: CPT

## 2023-09-18 PROCEDURE — 84134 ASSAY OF PREALBUMIN: CPT

## 2023-09-18 PROCEDURE — 82803 BLOOD GASES ANY COMBINATION: CPT

## 2023-09-18 PROCEDURE — 82962 GLUCOSE BLOOD TEST: CPT

## 2023-09-18 PROCEDURE — 71046 X-RAY EXAM CHEST 2 VIEWS: CPT

## 2023-09-18 PROCEDURE — 94660 CPAP INITIATION&MGMT: CPT

## 2023-09-18 PROCEDURE — 85610 PROTHROMBIN TIME: CPT

## 2023-09-18 PROCEDURE — 71045 X-RAY EXAM CHEST 1 VIEW: CPT | Mod: 26,76

## 2023-09-18 PROCEDURE — 81001 URINALYSIS AUTO W/SCOPE: CPT

## 2023-09-18 PROCEDURE — 93970 EXTREMITY STUDY: CPT

## 2023-09-18 PROCEDURE — 83880 ASSAY OF NATRIURETIC PEPTIDE: CPT

## 2023-09-18 PROCEDURE — 36415 COLL VENOUS BLD VENIPUNCTURE: CPT

## 2023-09-18 PROCEDURE — 82330 ASSAY OF CALCIUM: CPT

## 2023-09-18 PROCEDURE — 94640 AIRWAY INHALATION TREATMENT: CPT

## 2023-09-18 PROCEDURE — 93005 ELECTROCARDIOGRAM TRACING: CPT

## 2023-09-18 PROCEDURE — 97163 PT EVAL HIGH COMPLEX 45 MIN: CPT | Mod: GP

## 2023-09-18 PROCEDURE — 85730 THROMBOPLASTIN TIME PARTIAL: CPT

## 2023-09-18 PROCEDURE — 85025 COMPLETE CBC W/AUTO DIFF WBC: CPT

## 2023-09-18 PROCEDURE — 71045 X-RAY EXAM CHEST 1 VIEW: CPT

## 2023-09-18 PROCEDURE — 84443 ASSAY THYROID STIM HORMONE: CPT

## 2023-09-18 PROCEDURE — 93970 EXTREMITY STUDY: CPT | Mod: 26

## 2023-09-18 PROCEDURE — 84132 ASSAY OF SERUM POTASSIUM: CPT

## 2023-09-18 PROCEDURE — 83605 ASSAY OF LACTIC ACID: CPT

## 2023-09-18 PROCEDURE — 85018 HEMOGLOBIN: CPT

## 2023-09-18 PROCEDURE — 83735 ASSAY OF MAGNESIUM: CPT

## 2023-09-18 PROCEDURE — 32555 ASPIRATE PLEURA W/ IMAGING: CPT

## 2023-09-18 PROCEDURE — 83036 HEMOGLOBIN GLYCOSYLATED A1C: CPT

## 2023-09-18 PROCEDURE — 97162 PT EVAL MOD COMPLEX 30 MIN: CPT | Mod: GP

## 2023-09-18 PROCEDURE — 93306 TTE W/DOPPLER COMPLETE: CPT | Mod: 26

## 2023-09-18 PROCEDURE — 86803 HEPATITIS C AB TEST: CPT

## 2023-09-18 PROCEDURE — 84484 ASSAY OF TROPONIN QUANT: CPT

## 2023-09-18 PROCEDURE — 84295 ASSAY OF SERUM SODIUM: CPT

## 2023-09-18 PROCEDURE — 97110 THERAPEUTIC EXERCISES: CPT | Mod: GP

## 2023-09-18 RX ORDER — SODIUM CHLORIDE 9 MG/ML
3 INJECTION INTRAMUSCULAR; INTRAVENOUS; SUBCUTANEOUS EVERY 8 HOURS
Refills: 0 | Status: DISCONTINUED | OUTPATIENT
Start: 2023-09-18 | End: 2023-09-21

## 2023-09-18 RX ORDER — LISINOPRIL 2.5 MG/1
5 TABLET ORAL DAILY
Refills: 0 | Status: DISCONTINUED | OUTPATIENT
Start: 2023-09-18 | End: 2023-09-19

## 2023-09-18 RX ORDER — AMIODARONE HYDROCHLORIDE 400 MG/1
0.5 TABLET ORAL
Qty: 450 | Refills: 0 | Status: DISCONTINUED | OUTPATIENT
Start: 2023-09-18 | End: 2023-09-19

## 2023-09-18 RX ORDER — METOPROLOL TARTRATE 50 MG
25 TABLET ORAL
Refills: 0 | Status: DISCONTINUED | OUTPATIENT
Start: 2023-09-18 | End: 2023-09-19

## 2023-09-18 RX ORDER — DEXTROSE 50 % IN WATER 50 %
15 SYRINGE (ML) INTRAVENOUS ONCE
Refills: 0 | Status: DISCONTINUED | OUTPATIENT
Start: 2023-09-18 | End: 2023-09-21

## 2023-09-18 RX ORDER — ASPIRIN/CALCIUM CARB/MAGNESIUM 324 MG
81 TABLET ORAL DAILY
Refills: 0 | Status: DISCONTINUED | OUTPATIENT
Start: 2023-09-18 | End: 2023-09-21

## 2023-09-18 RX ORDER — FUROSEMIDE 40 MG
40 TABLET ORAL ONCE
Refills: 0 | Status: COMPLETED | OUTPATIENT
Start: 2023-09-18 | End: 2023-09-18

## 2023-09-18 RX ORDER — DEXTROSE 50 % IN WATER 50 %
12.5 SYRINGE (ML) INTRAVENOUS ONCE
Refills: 0 | Status: DISCONTINUED | OUTPATIENT
Start: 2023-09-18 | End: 2023-09-21

## 2023-09-18 RX ORDER — FOLIC ACID 0.8 MG
1 TABLET ORAL DAILY
Refills: 0 | Status: DISCONTINUED | OUTPATIENT
Start: 2023-09-18 | End: 2023-09-21

## 2023-09-18 RX ORDER — INSULIN GLARGINE 100 [IU]/ML
10 INJECTION, SOLUTION SUBCUTANEOUS EVERY MORNING
Refills: 0 | Status: DISCONTINUED | OUTPATIENT
Start: 2023-09-18 | End: 2023-09-21

## 2023-09-18 RX ORDER — INSULIN LISPRO 100/ML
VIAL (ML) SUBCUTANEOUS
Refills: 0 | Status: DISCONTINUED | OUTPATIENT
Start: 2023-09-18 | End: 2023-09-21

## 2023-09-18 RX ORDER — ALBUMIN HUMAN 25 %
250 VIAL (ML) INTRAVENOUS ONCE
Refills: 0 | Status: COMPLETED | OUTPATIENT
Start: 2023-09-18 | End: 2023-09-19

## 2023-09-18 RX ORDER — ACETAMINOPHEN 500 MG
650 TABLET ORAL EVERY 6 HOURS
Refills: 0 | Status: DISCONTINUED | OUTPATIENT
Start: 2023-09-18 | End: 2023-09-21

## 2023-09-18 RX ORDER — AMIODARONE HYDROCHLORIDE 400 MG/1
200 TABLET ORAL EVERY 12 HOURS
Refills: 0 | Status: DISCONTINUED | OUTPATIENT
Start: 2023-09-18 | End: 2023-09-18

## 2023-09-18 RX ORDER — APIXABAN 2.5 MG/1
2.5 TABLET, FILM COATED ORAL EVERY 12 HOURS
Refills: 0 | Status: DISCONTINUED | OUTPATIENT
Start: 2023-09-19 | End: 2023-09-21

## 2023-09-18 RX ORDER — ACETAMINOPHEN 500 MG
1000 TABLET ORAL ONCE
Refills: 0 | Status: COMPLETED | OUTPATIENT
Start: 2023-09-18 | End: 2023-09-18

## 2023-09-18 RX ORDER — SODIUM CHLORIDE 9 MG/ML
1000 INJECTION, SOLUTION INTRAVENOUS
Refills: 0 | Status: DISCONTINUED | OUTPATIENT
Start: 2023-09-18 | End: 2023-09-21

## 2023-09-18 RX ORDER — PANTOPRAZOLE SODIUM 20 MG/1
40 TABLET, DELAYED RELEASE ORAL
Refills: 0 | Status: DISCONTINUED | OUTPATIENT
Start: 2023-09-18 | End: 2023-09-21

## 2023-09-18 RX ORDER — DEXTROSE 50 % IN WATER 50 %
25 SYRINGE (ML) INTRAVENOUS ONCE
Refills: 0 | Status: DISCONTINUED | OUTPATIENT
Start: 2023-09-18 | End: 2023-09-21

## 2023-09-18 RX ORDER — METOPROLOL TARTRATE 50 MG
5 TABLET ORAL ONCE
Refills: 0 | Status: COMPLETED | OUTPATIENT
Start: 2023-09-18 | End: 2023-09-18

## 2023-09-18 RX ORDER — CLOPIDOGREL BISULFATE 75 MG/1
75 TABLET, FILM COATED ORAL DAILY
Refills: 0 | Status: DISCONTINUED | OUTPATIENT
Start: 2023-09-18 | End: 2023-09-21

## 2023-09-18 RX ORDER — ATORVASTATIN CALCIUM 80 MG/1
40 TABLET, FILM COATED ORAL AT BEDTIME
Refills: 0 | Status: DISCONTINUED | OUTPATIENT
Start: 2023-09-18 | End: 2023-09-21

## 2023-09-18 RX ORDER — ONDANSETRON 8 MG/1
4 TABLET, FILM COATED ORAL EVERY 8 HOURS
Refills: 0 | Status: DISCONTINUED | OUTPATIENT
Start: 2023-09-18 | End: 2023-09-21

## 2023-09-18 RX ORDER — GLUCAGON INJECTION, SOLUTION 0.5 MG/.1ML
1 INJECTION, SOLUTION SUBCUTANEOUS ONCE
Refills: 0 | Status: DISCONTINUED | OUTPATIENT
Start: 2023-09-18 | End: 2023-09-21

## 2023-09-18 RX ORDER — LANOLIN ALCOHOL/MO/W.PET/CERES
3 CREAM (GRAM) TOPICAL AT BEDTIME
Refills: 0 | Status: DISCONTINUED | OUTPATIENT
Start: 2023-09-18 | End: 2023-09-21

## 2023-09-18 RX ORDER — MULTIVIT-MIN/FERROUS GLUCONATE 9 MG/15 ML
1 LIQUID (ML) ORAL DAILY
Refills: 0 | Status: DISCONTINUED | OUTPATIENT
Start: 2023-09-18 | End: 2023-09-21

## 2023-09-18 RX ORDER — INSULIN LISPRO 100/ML
3 VIAL (ML) SUBCUTANEOUS
Refills: 0 | Status: DISCONTINUED | OUTPATIENT
Start: 2023-09-18 | End: 2023-09-21

## 2023-09-18 RX ADMIN — SODIUM CHLORIDE 3 MILLILITER(S): 9 INJECTION INTRAMUSCULAR; INTRAVENOUS; SUBCUTANEOUS at 14:09

## 2023-09-18 RX ADMIN — Medication 5 MILLIGRAM(S): at 17:23

## 2023-09-18 RX ADMIN — Medication 400 MILLIGRAM(S): at 18:00

## 2023-09-18 RX ADMIN — AMIODARONE HYDROCHLORIDE 16.7 MG/MIN: 400 TABLET ORAL at 18:35

## 2023-09-18 RX ADMIN — Medication 1.25 MILLIGRAM(S): at 18:11

## 2023-09-18 RX ADMIN — Medication 1000 MILLIGRAM(S): at 19:22

## 2023-09-18 RX ADMIN — ATORVASTATIN CALCIUM 40 MILLIGRAM(S): 80 TABLET, FILM COATED ORAL at 22:46

## 2023-09-18 RX ADMIN — Medication 40 MILLIGRAM(S): at 08:18

## 2023-09-18 RX ADMIN — SODIUM CHLORIDE 3 MILLILITER(S): 9 INJECTION INTRAMUSCULAR; INTRAVENOUS; SUBCUTANEOUS at 21:41

## 2023-09-18 RX ADMIN — Medication 5 MILLIGRAM(S): at 18:00

## 2023-09-18 RX ADMIN — INSULIN GLARGINE 10 UNIT(S): 100 INJECTION, SOLUTION SUBCUTANEOUS at 14:53

## 2023-09-18 RX ADMIN — Medication 1 TABLET(S): at 14:54

## 2023-09-18 RX ADMIN — Medication 1 MILLIGRAM(S): at 14:54

## 2023-09-18 RX ADMIN — Medication 3 UNIT(S): at 16:29

## 2023-09-18 NOTE — ED CDU PROVIDER SUBSEQUENT DAY NOTE - PROGRESS NOTE DETAILS
Patient tachypneic, lungs  decreased BS, PO 96% RA. Repeat chest xray shows worsening pleural effusion. Bipap and Lasix ordered. CT Surgery QUYEN Trimble aware. Pateint was seen by ct sx team bedside --> admit to CTU

## 2023-09-18 NOTE — H&P ADULT - NSHPLABSRESULTS_GEN_ALL_CORE
10.1   9.40  )-----------( 338      ( 17 Sep 2023 17:50 )             33.6         17 Sep 2023 17:50    138    |  101    |  7      ----------------------------<  62     4.3     |  25     |  0.6      Ca    8.2        17 Sep 2023 17:50    TPro  7.7    /  Alb  3.3    /  TBili  0.2    /  DBili  x      /  AST  42     /  ALT  26     /  AlkPhos  109    17 Sep 2023 17:50  Amylase x     lipase x                                                                                        Urinalysis Basic - ( 17 Sep 2023 17:50 )    Color: x / Appearance: x / SG: x / pH: x  Gluc: 62 mg/dL / Ketone: x  / Bili: x / Urobili: x   Blood: x / Protein: x / Nitrite: x   Leuk Esterase: x / RBC: x / WBC x   Sq Epi: x / Non Sq Epi: x / Bacteria: x          CARDIAC MARKERS ( 17 Sep 2023 17:50 )  x     / <0.01 ng/mL / x     / x     / x                Xrays:    < from: Xray Chest 1 View- PORTABLE-Urgent (09.17.23 @ 18:36) >  Impression:    Left pleural effusion/opacity increased. No air leak.    < end of copied text >       ECHO:  performed at bedside by ED: no effusion    IMPRESSION:  CAD SP CABG  SOB  Left pleural effusion  fluid overload    EKG  Ventricular Rate 91 BPM    Atrial Rate 91 BPM    P-R Interval 144 ms    QRS Duration 162 ms    Q-T Interval 438 ms    QTC Calculation(Bazett) 538 ms    P Axis 62 degrees    R Axis -29 degrees    T Axis 119 degrees    Diagnosis Line Normal sinus rhythm  Non-specific intra-ventricular conduction block  Abnormal ECG    Confirmed by Yoko Garcia MD [id number]) on 9/18/2023 8:51:59 AM 10.1   9.40  )-----------( 338      ( 17 Sep 2023 17:50 )             33.6         17 Sep 2023 17:50    138    |  101    |  7      ----------------------------<  62     4.3     |  25     |  0.6      Ca    8.2        17 Sep 2023 17:50    TPro  7.7    /  Alb  3.3    /  TBili  0.2    /  DBili  x      /  AST  42     /  ALT  26     /  AlkPhos  109    17 Sep 2023 17:50  Amylase x     lipase x                                                                                        Urinalysis Basic - ( 17 Sep 2023 17:50 )    Color: x / Appearance: x / SG: x / pH: x  Gluc: 62 mg/dL / Ketone: x  / Bili: x / Urobili: x   Blood: x / Protein: x / Nitrite: x   Leuk Esterase: x / RBC: x / WBC x   Sq Epi: x / Non Sq Epi: x / Bacteria: x          CARDIAC MARKERS ( 17 Sep 2023 17:50 )  x     / <0.01 ng/mL / x     / x     / x                Xrays:    < from: Xray Chest 1 View- PORTABLE-Urgent (09.17.23 @ 18:36) >  Impression:    Left pleural effusion/opacity increased. No air leak.    < end of copied text >       ECHO:  performed at bedside by ED: no effusion    IMPRESSION:  CAD SP CABG  SOB  Left pleural effusion  fluid overload    EKG  Ventricular Rate 91 BPM    Atrial Rate 91 BPM    P-R Interval 144 ms    QRS Duration 162 ms    Q-T Interval 438 ms    QTC Calculation(Bazett) 538 ms    P Axis 62 degrees    R Axis -29 degrees    T Axis 119 degrees    Diagnosis Line Normal sinus rhythm  Non-specific intra-ventricular conduction block  Abnormal ECG    Confirmed by Yoko Garcia MD ([id number]) on 9/18/2023 8:51:59 AM    ECHO- official echo done yesterday was negative for pericardial effusion; EF 30-35%

## 2023-09-18 NOTE — H&P ADULT - NSICDXPASTMEDICALHX_GEN_ALL_CORE_FT
PAST MEDICAL HISTORY:  Afib     Cardiac arrest     Diabetes     High blood cholesterol     HTN (hypertension)     Pulmonary embolism     Pulmonary embolism

## 2023-09-18 NOTE — ED CDU PROVIDER DISPOSITION NOTE - CLINICAL COURSE
68-year-old female with history of HTN, HLD, LBBB, NIDDM, DVT/PE/A-fib on Eliquis, presented to [facility] yesterday with shortness of breath, code STEMI in the ED though noted to be her baseline LBBB, transferred [place] for CT surgery consult, seen by CT surgery and requested ED observation. On my assessment with [facility] , patient tachypneic and unable to provide history. On exam, afebrile, hemodynamically stable, saturating well on room air, tachypneic, head NCAT, EOMI grossly, anicteric, MMM, R sided JVD, RRR, nml S1/S2, no m/r/g, lungs decreased, abd soft, NT, ND, nml BS, no rebound or guarding, AAO, CN's 3-12 grossly intact, LEONARD spontaneously, no leg cyanosis or edema, skin warm, well perfused, no rashes or hives, post-vein site L leg C/D/I. I reviewed labs, imaging, ECGs from ED course to this point. Repeat chest x-ray with worsening left pleural effusion. Given IV Lasix, initiated BiPAP. Discussed with CT surgery and seen with intensivist Dr. Maguire and CT surg Karen at bedside and will admit to CTU under Dr. MAGALY Coon.

## 2023-09-18 NOTE — H&P ADULT - HISTORY OF PRESENT ILLNESS
69yo F w/a PMH of Type II DM, HTN, DLD, obesity, lateral epicondylitis presented to the ED w/ Left Arm pain and ruled out for a NSTEMI via cardiac enzymes.  Cardiac cath that revealed multi-vessel CAD. On 08/30/23, she underwent surgical myocardial revascularization (CABG x  Post-operatively, the patient's hospitalization was prolonged due to hypoxia, ischemic cardiomyopathy, and DVT right peroneal vein. A venous duplex was performed, which revealed a right peroneal DVT. The patient was assessed by vascular, who recommended the patient be started on anticoagulation. The patient was started on Elquis BID. The patient was given nebulizer treatments and practiced cough and deep breathing exercises, the patient was weaned off supplemental O2. An echocardiogram was performed 9/4, which revealed an EF 25%. EP was consulted who recommended the patient be fitted for a wearable defibrillator. The patient was fitted and recieved the vest. She was then discharged home with home care services on POD # 7. The patient and family was educated on post-op instruction via [facility]  [facility] #[id number].     On 9/17, the patient returned to the ER complaining of worsening shortness of breath at rest and during exertion.  The patient was found to have a large left pleural effusion on CXR that increased in size since discharge.  The patient denies chest pain and states via an  that she has otherwise been doing well at home and lives with her son.  The patient admits to being compliant with her medical therapy and that the last dose of Eliquis she took on the morning of 9/17.       69yo F w/a PMH of Type II DM, HTN, DLD, obesity, lateral epicondylitis presented to the ED w/ Left Arm pain and ruled out for a NSTEMI via cardiac enzymes.  Cardiac cath that revealed multi-vessel CAD. On 08/30/23, she underwent surgical myocardial revascularization (CABG x 3).  Post-operatively, the patient's hospitalization was prolonged due to hypoxia, ischemic cardiomyopathy, and DVT right peroneal vein. A venous duplex was performed, which revealed a right peroneal DVT. The patient was assessed by vascular, who recommended the patient be started on anticoagulation. The patient was started on Elquis BID. The patient was given nebulizer treatments and practiced cough and deep breathing exercises, the patient was weaned off supplemental O2. An echocardiogram was performed 9/4, which revealed an EF 25%. EP was consulted who recommended the patient be fitted for a wearable defibrillator. The patient was fitted and recieved the vest. She was then discharged home with home care services on POD # 7. The patient and family was educated on post-op instruction via [facility] #[id number].     On 9/17, the patient returned to the ER complaining of worsening shortness of breath at rest and during exertion.  The patient was found to have a large left pleural effusion on CXR that increased in size since discharge.  The patient denies chest pain and states via an  that she has otherwise been doing well at home and lives with her son.  The patient admits to being compliant with her medical therapy and that the last dose of Eliquis she took on the morning of 9/17.

## 2023-09-18 NOTE — H&P ADULT - NSHPREVIEWOFSYSTEMS_GEN_ALL_CORE
Review of Systems  CONSTITUTIONAL: no fever, chills or night sweats]   NEURO:  denies seizures, paralysis or paresthesias                                                                                EYES: wears glasses, no double vision, no blurry vision                                                                          ENMT: no difficulty hearing, vertigo, dysphagia, epistaxis recent dental work [ ]                                      CV:  denies chest pain,  palpitations at current time, + dyspnea                                                                                      RESPIRATORY:  no cough or hemoptysis                                                                 GI:  no nausea, vomiting, constipation or diarrhea   : denies dysuria, hematuria, incontinence or retention                                                                                           MUSKULOSKELETAL:  denies joint swelling or muscle weakness  PSYCH:  denies dementia, depression, anxiety   ENDOCRINE:  cold intolerance[ ] heat intolerance[ ] polydipsia[ ]

## 2023-09-18 NOTE — H&P ADULT - NSHPPHYSICALEXAM_GEN_ALL_CORE
CONSTITUTIONAL:    well nourished, well developed, overweight in NAD but dyspneic when speaking                                                                       Neuro: oriented to person/place & time with no focal motor or sensory  deficits     Eyes: PERRLA, EOMI, no nystagmus, sclera anicteric  ENT:  nasal/oral mucosa with absence of cyanosis, poor dentition  Neck: no jugular vein distention, trachea midline, no goiter   Chest: bilateral breath sounds with fair air movement absence of wheezes, rales, or rhonchi                                                                           CV:  RSR, S1S2, no significant murmur appreciated  Carotids: No Bruits  GI:  soft, non-tender non-distended, + bowel sounds, umbiilcal hernia                                                                                                         Extremities:  no evidence of cyanosis or deformity, no pedal edema   Extremity Pulses: right / left:  femorals 2+/ 2+; DP's 2+/2+; radials pressure dressing/2+    negative Allens  SKIN : no rashes CONSTITUTIONAL:    well nourished, well developed, overweight in NAD but dyspneic when speaking                                                                       Neuro: oriented to person/place & time with no focal motor or sensory  deficits     Eyes: PERRLA, EOMI, no nystagmus, sclera anicteric  ENT:  nasal/oral mucosa with absence of cyanosis, poor dentition  Neck: no jugular vein distention, trachea midline, no goiter   Chest: dec breath sounds on left base; absence of wheezes, rales, or rhonchi ; pt's breathing does appear somewhat labored                                                            CV:  RSR, S1S2, no significant murmur appreciated  Carotids: No Bruits  GI:  soft, non-tender non-distended, + bowel sounds, umbiilcal hernia                                                                                                         Extremities:  no evidence of cyanosis or deformity, no pedal edema   Extremity Pulses: right / left:  femorals 2+/ 2+; DP's 2+/2+; radials pressure dressing/2+  negative Allens  SKIN : no rashes

## 2023-09-18 NOTE — H&P ADULT - ASSESSMENT
Assessment/ Plan:  69yo F w/a PMHx of Type II DM, HTN, DLD, obesity, lateral epicondylitis    Assessment/ Plan:  69yo F w/a PMHx of Type II DM, HTN, DLD, obesity, lateral epicondylitis, and CAD (s/p CABG x 3 on 8/30/23) systolic dysfunction CHF has LifeVest who now is presenting with increase left pleural effusion secondary to most likely recent cardiac surgery, CHF, and being on DAPT as well as Eliquis  cont present tx as per CT surgeon  s/p cabg- asa, and plavix on hold now for planned thoracentesis  left pleural effusion- pt will need left pleural thoracentesis for drainage of fluid, planned for today  admit pt to ctu for close monitoring/thoracentesis and for medical optimization of CHF s/p recent CABg  cont diuresis with IV Lasix  dm- will treat pt with insulin for now while she is hospitalized ; oral agents on hold  prior peroneal DVT and hx of PE- anticoagulation on hold ; awaiting repeat venous duplex  CAD- cont Lopressor for A. fib ppx  gi ppx and dvt ppx with scd's   Assessment/ Plan:  69yo F w/a PMHx of Type II DM, HTN, DLD, obesity, lateral epicondylitis, and CAD (s/p CABG x 3 on 8/30/23) systolic dysfunction CHF has LifeVest who now is presenting with increase left pleural effusion secondary to most likely recent cardiac surgery, CHF, and being on DAPT as well as Eliquis  cont present tx as per CT surgeon  s/p cabg- asa, and plavix on hold now for planned thoracentesis  left pleural effusion- pt will need left pleural thoracentesis for drainage of fluid, planned for today  admit pt to ctu for close monitoring/thoracentesis and for medical optimization of CHF s/p recent CABg  chronic systolic dysfunction CHF -cont diuresis with IV Lasix and start low dose Lisinopril  dm- will treat pt with insulin for now while she is hospitalized ; oral agents on hold  prior peroneal DVT and hx of PE- anticoagulation on hold ; awaiting repeat venous duplex  CAD- cont Lopressor for A. fib ppx  gi ppx and dvt ppx with scd's

## 2023-09-19 LAB
A1C WITH ESTIMATED AVERAGE GLUCOSE RESULT: 6.3 % — HIGH (ref 4–5.6)
ALBUMIN SERPL ELPH-MCNC: 3.5 G/DL — SIGNIFICANT CHANGE UP (ref 3.5–5.2)
ALBUMIN SERPL ELPH-MCNC: 3.6 G/DL — SIGNIFICANT CHANGE UP (ref 3.5–5.2)
ALP SERPL-CCNC: 82 U/L — SIGNIFICANT CHANGE UP (ref 30–115)
ALP SERPL-CCNC: 86 U/L — SIGNIFICANT CHANGE UP (ref 30–115)
ALT FLD-CCNC: 16 U/L — SIGNIFICANT CHANGE UP (ref 0–41)
ALT FLD-CCNC: 16 U/L — SIGNIFICANT CHANGE UP (ref 0–41)
ANION GAP SERPL CALC-SCNC: 12 MMOL/L — SIGNIFICANT CHANGE UP (ref 7–14)
ANION GAP SERPL CALC-SCNC: 12 MMOL/L — SIGNIFICANT CHANGE UP (ref 7–14)
APPEARANCE UR: CLEAR — SIGNIFICANT CHANGE UP
APTT BLD: 31.2 SEC — SIGNIFICANT CHANGE UP (ref 27–39.2)
AST SERPL-CCNC: 16 U/L — SIGNIFICANT CHANGE UP (ref 0–41)
AST SERPL-CCNC: 17 U/L — SIGNIFICANT CHANGE UP (ref 0–41)
BACTERIA # UR AUTO: NEGATIVE /HPF — SIGNIFICANT CHANGE UP
BASOPHILS # BLD AUTO: 0.03 K/UL — SIGNIFICANT CHANGE UP (ref 0–0.2)
BASOPHILS NFR BLD AUTO: 0.4 % — SIGNIFICANT CHANGE UP (ref 0–1)
BILIRUB SERPL-MCNC: 0.4 MG/DL — SIGNIFICANT CHANGE UP (ref 0.2–1.2)
BILIRUB SERPL-MCNC: 0.5 MG/DL — SIGNIFICANT CHANGE UP (ref 0.2–1.2)
BILIRUB UR-MCNC: NEGATIVE — SIGNIFICANT CHANGE UP
BUN SERPL-MCNC: 13 MG/DL — SIGNIFICANT CHANGE UP (ref 10–20)
BUN SERPL-MCNC: 14 MG/DL — SIGNIFICANT CHANGE UP (ref 10–20)
CALCIUM SERPL-MCNC: 8.6 MG/DL — SIGNIFICANT CHANGE UP (ref 8.4–10.5)
CALCIUM SERPL-MCNC: 8.6 MG/DL — SIGNIFICANT CHANGE UP (ref 8.4–10.5)
CAST: 9 /LPF — HIGH (ref 0–4)
CHLORIDE SERPL-SCNC: 107 MMOL/L — SIGNIFICANT CHANGE UP (ref 98–110)
CHLORIDE SERPL-SCNC: 107 MMOL/L — SIGNIFICANT CHANGE UP (ref 98–110)
CO2 SERPL-SCNC: 25 MMOL/L — SIGNIFICANT CHANGE UP (ref 17–32)
CO2 SERPL-SCNC: 26 MMOL/L — SIGNIFICANT CHANGE UP (ref 17–32)
COLOR SPEC: YELLOW — SIGNIFICANT CHANGE UP
CREAT SERPL-MCNC: 0.5 MG/DL — LOW (ref 0.7–1.5)
CREAT SERPL-MCNC: <0.5 MG/DL — LOW (ref 0.7–1.5)
DIFF PNL FLD: ABNORMAL
EGFR: 101 ML/MIN/1.73M2 — SIGNIFICANT CHANGE UP
EGFR: 106 ML/MIN/1.73M2 — SIGNIFICANT CHANGE UP
EOSINOPHIL # BLD AUTO: 0.14 K/UL — SIGNIFICANT CHANGE UP (ref 0–0.7)
EOSINOPHIL NFR BLD AUTO: 1.6 % — SIGNIFICANT CHANGE UP (ref 0–8)
ESTIMATED AVERAGE GLUCOSE: 134 MG/DL — HIGH (ref 68–114)
GAS PNL BLDA: SIGNIFICANT CHANGE UP
GLUCOSE BLDC GLUCOMTR-MCNC: 107 MG/DL — HIGH (ref 70–99)
GLUCOSE BLDC GLUCOMTR-MCNC: 85 MG/DL — SIGNIFICANT CHANGE UP (ref 70–99)
GLUCOSE BLDC GLUCOMTR-MCNC: 89 MG/DL — SIGNIFICANT CHANGE UP (ref 70–99)
GLUCOSE SERPL-MCNC: 103 MG/DL — HIGH (ref 70–99)
GLUCOSE SERPL-MCNC: 91 MG/DL — SIGNIFICANT CHANGE UP (ref 70–99)
GLUCOSE UR QL: >=1000 MG/DL
HCT VFR BLD CALC: 28.3 % — LOW (ref 37–47)
HCT VFR BLD CALC: 29.6 % — LOW (ref 37–47)
HCV AB S/CO SERPL IA: 0.06 COI — SIGNIFICANT CHANGE UP
HCV AB SERPL-IMP: SIGNIFICANT CHANGE UP
HGB BLD-MCNC: 8.3 G/DL — LOW (ref 12–16)
HGB BLD-MCNC: 8.9 G/DL — LOW (ref 12–16)
HYALINE CASTS # UR AUTO: 9 /LPF — HIGH (ref 0–4)
IMM GRANULOCYTES NFR BLD AUTO: 0.4 % — HIGH (ref 0.1–0.3)
INR BLD: 1.23 RATIO — SIGNIFICANT CHANGE UP (ref 0.65–1.3)
KETONES UR-MCNC: 15 MG/DL
LEUKOCYTE ESTERASE UR-ACNC: NEGATIVE — SIGNIFICANT CHANGE UP
LYMPHOCYTES # BLD AUTO: 1.23 K/UL — SIGNIFICANT CHANGE UP (ref 1.2–3.4)
LYMPHOCYTES # BLD AUTO: 14.4 % — LOW (ref 20.5–51.1)
MAGNESIUM SERPL-MCNC: 2.3 MG/DL — SIGNIFICANT CHANGE UP (ref 1.8–2.4)
MCHC RBC-ENTMCNC: 26.6 PG — LOW (ref 27–31)
MCHC RBC-ENTMCNC: 27.4 PG — SIGNIFICANT CHANGE UP (ref 27–31)
MCHC RBC-ENTMCNC: 29.3 G/DL — LOW (ref 32–37)
MCHC RBC-ENTMCNC: 30.1 G/DL — LOW (ref 32–37)
MCV RBC AUTO: 90.7 FL — SIGNIFICANT CHANGE UP (ref 81–99)
MCV RBC AUTO: 91.1 FL — SIGNIFICANT CHANGE UP (ref 81–99)
MONOCYTES # BLD AUTO: 0.34 K/UL — SIGNIFICANT CHANGE UP (ref 0.1–0.6)
MONOCYTES NFR BLD AUTO: 4 % — SIGNIFICANT CHANGE UP (ref 1.7–9.3)
NEUTROPHILS # BLD AUTO: 6.76 K/UL — HIGH (ref 1.4–6.5)
NEUTROPHILS NFR BLD AUTO: 79.2 % — HIGH (ref 42.2–75.2)
NITRITE UR-MCNC: NEGATIVE — SIGNIFICANT CHANGE UP
NRBC # BLD: 0 /100 WBCS — SIGNIFICANT CHANGE UP (ref 0–0)
NRBC # BLD: 0 /100 WBCS — SIGNIFICANT CHANGE UP (ref 0–0)
NT-PROBNP SERPL-SCNC: 1757 PG/ML — HIGH (ref 0–300)
PH UR: 5 — SIGNIFICANT CHANGE UP (ref 5–8)
PLATELET # BLD AUTO: 287 K/UL — SIGNIFICANT CHANGE UP (ref 130–400)
PLATELET # BLD AUTO: 293 K/UL — SIGNIFICANT CHANGE UP (ref 130–400)
PMV BLD: 9.3 FL — SIGNIFICANT CHANGE UP (ref 7.4–10.4)
PMV BLD: 9.5 FL — SIGNIFICANT CHANGE UP (ref 7.4–10.4)
POTASSIUM SERPL-MCNC: 3.6 MMOL/L — SIGNIFICANT CHANGE UP (ref 3.5–5)
POTASSIUM SERPL-MCNC: 4.2 MMOL/L — SIGNIFICANT CHANGE UP (ref 3.5–5)
POTASSIUM SERPL-SCNC: 3.6 MMOL/L — SIGNIFICANT CHANGE UP (ref 3.5–5)
POTASSIUM SERPL-SCNC: 4.2 MMOL/L — SIGNIFICANT CHANGE UP (ref 3.5–5)
PREALB SERPL-MCNC: 9 MG/DL — LOW (ref 20–40)
PROT SERPL-MCNC: 6.4 G/DL — SIGNIFICANT CHANGE UP (ref 6–8)
PROT SERPL-MCNC: 6.5 G/DL — SIGNIFICANT CHANGE UP (ref 6–8)
PROT UR-MCNC: 30 MG/DL
PROTHROM AB SERPL-ACNC: 14.1 SEC — HIGH (ref 9.95–12.87)
RBC # BLD: 3.12 M/UL — LOW (ref 4.2–5.4)
RBC # BLD: 3.25 M/UL — LOW (ref 4.2–5.4)
RBC # FLD: 21.9 % — HIGH (ref 11.5–14.5)
RBC # FLD: 22 % — HIGH (ref 11.5–14.5)
RBC CASTS # UR COMP ASSIST: 28 /HPF — HIGH (ref 0–4)
SODIUM SERPL-SCNC: 144 MMOL/L — SIGNIFICANT CHANGE UP (ref 135–146)
SODIUM SERPL-SCNC: 145 MMOL/L — SIGNIFICANT CHANGE UP (ref 135–146)
SP GR SPEC: 1.02 — SIGNIFICANT CHANGE UP (ref 1–1.03)
SQUAMOUS # UR AUTO: 1 /HPF — SIGNIFICANT CHANGE UP (ref 0–5)
TSH SERPL-MCNC: 5.09 UIU/ML — HIGH (ref 0.27–4.2)
UROBILINOGEN FLD QL: 0.2 MG/DL — SIGNIFICANT CHANGE UP (ref 0.2–1)
WBC # BLD: 8.53 K/UL — SIGNIFICANT CHANGE UP (ref 4.8–10.8)
WBC # BLD: 9.13 K/UL — SIGNIFICANT CHANGE UP (ref 4.8–10.8)
WBC # FLD AUTO: 8.53 K/UL — SIGNIFICANT CHANGE UP (ref 4.8–10.8)
WBC # FLD AUTO: 9.13 K/UL — SIGNIFICANT CHANGE UP (ref 4.8–10.8)
WBC UR QL: 5 /HPF — SIGNIFICANT CHANGE UP (ref 0–5)

## 2023-09-19 PROCEDURE — 71045 X-RAY EXAM CHEST 1 VIEW: CPT | Mod: 26

## 2023-09-19 RX ORDER — LISINOPRIL 2.5 MG/1
5 TABLET ORAL DAILY
Refills: 0 | Status: DISCONTINUED | OUTPATIENT
Start: 2023-09-20 | End: 2023-09-21

## 2023-09-19 RX ORDER — LISINOPRIL 2.5 MG/1
5 TABLET ORAL ONCE
Refills: 0 | Status: COMPLETED | OUTPATIENT
Start: 2023-09-19 | End: 2023-09-19

## 2023-09-19 RX ORDER — METOPROLOL TARTRATE 50 MG
2.5 TABLET ORAL ONCE
Refills: 0 | Status: COMPLETED | OUTPATIENT
Start: 2023-09-19 | End: 2023-09-20

## 2023-09-19 RX ORDER — METOPROLOL TARTRATE 50 MG
25 TABLET ORAL
Refills: 0 | Status: DISCONTINUED | OUTPATIENT
Start: 2023-09-19 | End: 2023-09-21

## 2023-09-19 RX ORDER — POTASSIUM CHLORIDE 20 MEQ
20 PACKET (EA) ORAL
Refills: 0 | Status: COMPLETED | OUTPATIENT
Start: 2023-09-19 | End: 2023-09-19

## 2023-09-19 RX ORDER — AMIODARONE HYDROCHLORIDE 400 MG/1
0.5 TABLET ORAL
Qty: 450 | Refills: 0 | Status: DISCONTINUED | OUTPATIENT
Start: 2023-09-19 | End: 2023-09-21

## 2023-09-19 RX ORDER — AMIODARONE HYDROCHLORIDE 400 MG/1
0.5 TABLET ORAL
Qty: 450 | Refills: 0 | Status: DISCONTINUED | OUTPATIENT
Start: 2023-09-19 | End: 2023-09-20

## 2023-09-19 RX ADMIN — Medication 1 TABLET(S): at 11:54

## 2023-09-19 RX ADMIN — LISINOPRIL 5 MILLIGRAM(S): 2.5 TABLET ORAL at 18:42

## 2023-09-19 RX ADMIN — CLOPIDOGREL BISULFATE 75 MILLIGRAM(S): 75 TABLET, FILM COATED ORAL at 11:54

## 2023-09-19 RX ADMIN — Medication 25 MILLIGRAM(S): at 18:09

## 2023-09-19 RX ADMIN — Medication 1 MILLIGRAM(S): at 11:54

## 2023-09-19 RX ADMIN — SODIUM CHLORIDE 3 MILLILITER(S): 9 INJECTION INTRAMUSCULAR; INTRAVENOUS; SUBCUTANEOUS at 06:46

## 2023-09-19 RX ADMIN — SODIUM CHLORIDE 3 MILLILITER(S): 9 INJECTION INTRAMUSCULAR; INTRAVENOUS; SUBCUTANEOUS at 13:00

## 2023-09-19 RX ADMIN — APIXABAN 2.5 MILLIGRAM(S): 2.5 TABLET, FILM COATED ORAL at 18:09

## 2023-09-19 RX ADMIN — AMIODARONE HYDROCHLORIDE 16.7 MG/MIN: 400 TABLET ORAL at 22:52

## 2023-09-19 RX ADMIN — Medication 81 MILLIGRAM(S): at 11:54

## 2023-09-19 RX ADMIN — Medication 125 MILLILITER(S): at 05:19

## 2023-09-19 RX ADMIN — ATORVASTATIN CALCIUM 40 MILLIGRAM(S): 80 TABLET, FILM COATED ORAL at 21:31

## 2023-09-19 RX ADMIN — Medication 50 MILLIEQUIVALENT(S): at 12:23

## 2023-09-19 RX ADMIN — SODIUM CHLORIDE 3 MILLILITER(S): 9 INJECTION INTRAMUSCULAR; INTRAVENOUS; SUBCUTANEOUS at 21:00

## 2023-09-19 RX ADMIN — Medication 50 MILLIEQUIVALENT(S): at 10:23

## 2023-09-19 NOTE — PROGRESS NOTE ADULT - SUBJECTIVE AND OBJECTIVE BOX
OPERATIVE PROCEDURE(s):                POD #                       SURGEON(s): ANIRUDH Coon      SUBJECTIVE ASSESSMENT:68yFemale patient seen and examined at bedside.     Vital Signs Last 24 Hrs  T(F): 97 (19 Sep 2023 08:00), Max: 98.6 (18 Sep 2023 20:00)  HR: 75 (19 Sep 2023 08:00) (68 - 111)  BP: 137/64 (19 Sep 2023 08:00) (87/52 - 218/82)  BP(mean): 92 (19 Sep 2023 08:00) (67 - 118)  ABP: 132/32 (19 Sep 2023 08:00) (89/31 - 157/48)  ABP(mean): 63 (19 Sep 2023 08:00)  RR: 29 (19 Sep 2023 08:00) (18 - 59)  SpO2: 100% (19 Sep 2023 08:00) (96% - 100%)      I&O's Detail    18 Sep 2023 07:01  -  19 Sep 2023 07:00  --------------------------------------------------------  IN:    Amiodarone: 217.1 mL    IV PiggyBack: 280 mL    Oral Fluid: 180 mL  Total IN: 677.1 mL    OUT:    Indwelling Catheter - Urethral (mL): 1075 mL    Voided (mL): 500 mL  Total OUT: 1575 mL        Net: I&O's Detail    18 Sep 2023 07:01  -  19 Sep 2023 07:00  --------------------------------------------------------  Total NET: -897.9 mL        CAPILLARY BLOOD GLUCOSE      POCT Blood Glucose.: 89 mg/dL (19 Sep 2023 07:39)  POCT Blood Glucose.: 130 mg/dL (18 Sep 2023 16:24)  POCT Blood Glucose.: 149 mg/dL (18 Sep 2023 11:58)    A1C with Estimated Average Glucose Result: 8.2 % (08-14-23 @ 04:40)      Physical Exam:  General: NAD; A&Ox3  Cardiac: S1/S2, RRR, no murmur, no rubs  Lungs: unlabored respirations, CTA b/l, no wheeze, no rales, no crackles  Abdomen: Soft/NT/ND; positive bowel sounds x 4  Sternum: Intact, no click, incision healing well with no drainage  Incisions: Incisions clean/dry/intact  Extremities: No edema b/l lower extremities; good capillary refill; no cyanosis; palpable 1+ pedal pulses b/l    Central Venous Catheter: Yes: critical illness, intravenous access  Day #  Johnson Catheter: Yes: critical illness; monitor strict i/o's                    Day #  EPICARDIAL WIRES:  YES                                                                         Day #  BOWEL MOVEMENT:  [] YES [] NO, If No, Timing since last BM Day #  CHEST TUBE(MS/Left/Right):  [] YES [] NO, If yes -  AIR LEAKS:  YES/NO        LABS:                        8.3<L>  8.53  )-----------( 287      ( 19 Sep 2023 06:35 )             28.3<L>                        10.6<L>  14.99<H> )-----------( 385      ( 18 Sep 2023 18:05 )             34.7<L>    09-19    145  |  107  |  14  ----------------------------<  103<H>  3.6   |  26  |  <0.5<L>  09-18    143  |  108  |  11  ----------------------------<  214<H>  3.9   |  21  |  0.6<L>    Ca    8.6      19 Sep 2023 06:35    TPro  6.5 [6.0 - 8.0]  /  Alb  3.6 [3.5 - 5.2]  /  TBili  0.5 [0.2 - 1.2]  /  DBili  x   /  AST  16 [0 - 41]  /  ALT  16 [0 - 41]  /  AlkPhos  82 [30 - 115]  09-19    PT/INR - ( 19 Sep 2023 06:35 )   PT: ;   INR: 1.23 ratio         PTT - ( 19 Sep 2023 06:35 )  PTT:31.2 sec  Urinalysis Basic - ( 19 Sep 2023 06:35 )    Color: x / Appearance: x / SG: x / pH: x  Gluc: 103 mg/dL / Ketone: x  / Bili: x / Urobili: x   Blood: x / Protein: x / Nitrite: x   Leuk Esterase: x / RBC: x / WBC x   Sq Epi: x / Non Sq Epi: x / Bacteria: x      ABG - ( 19 Sep 2023 03:51 )  pH: 7.41  /  pCO2: 45    /  pO2: 147   / HCO3: 28    / Base Excess: 3.4   /  SaO2: 98.3  /  LA: 0.50             RADIOLOGY & ADDITIONAL TESTS:  CXR:   EKG:    Allergies    No Known Allergies    Intolerances      MEDICATIONS  (STANDING):  aMIOdarone Infusion 0.5 mG/Min (16.7 mL/Hr) IV Continuous <Continuous>  apixaban 2.5 milliGRAM(s) Oral every 12 hours  aspirin  chewable 81 milliGRAM(s) Oral daily  atorvastatin 40 milliGRAM(s) Oral at bedtime  clopidogrel Tablet 75 milliGRAM(s) Oral daily  dextrose 5%. 1000 milliLiter(s) (50 mL/Hr) IV Continuous <Continuous>  dextrose 5%. 1000 milliLiter(s) (100 mL/Hr) IV Continuous <Continuous>  dextrose 50% Injectable 25 Gram(s) IV Push once  dextrose 50% Injectable 12.5 Gram(s) IV Push once  dextrose 50% Injectable 25 Gram(s) IV Push once  folic acid 1 milliGRAM(s) Oral daily  glucagon  Injectable 1 milliGRAM(s) IntraMuscular once  insulin glargine Injectable (LANTUS) 10 Unit(s) SubCutaneous every morning  insulin lispro (ADMELOG) corrective regimen sliding scale   SubCutaneous three times a day before meals  insulin lispro Injectable (ADMELOG) 3 Unit(s) SubCutaneous three times a day before meals  lisinopril 5 milliGRAM(s) Oral daily  metoprolol tartrate 25 milliGRAM(s) Oral two times a day  multivitamin/minerals 1 Tablet(s) Oral daily  pantoprazole    Tablet 40 milliGRAM(s) Oral before breakfast  sodium chloride 0.9% lock flush 3 milliLiter(s) IV Push every 8 hours    MEDICATIONS  (PRN):  acetaminophen     Tablet .. 650 milliGRAM(s) Oral every 6 hours PRN Temp greater or equal to 38C (100.4F), Mild Pain (1 - 3)  aluminum hydroxide/magnesium hydroxide/simethicone Suspension 30 milliLiter(s) Oral every 4 hours PRN Dyspepsia  dextrose Oral Gel 15 Gram(s) Oral once PRN Blood Glucose LESS THAN 70 milliGRAM(s)/deciliter  melatonin 3 milliGRAM(s) Oral at bedtime PRN Insomnia  ondansetron Injectable 4 milliGRAM(s) IV Push every 8 hours PRN Nausea and/or Vomiting    Home Medications:  Jardiance 10 mg oral tablet: 1 orally once a day (13 Aug 2023 19:53)  MetFORMIN (Eqv-Glumetza) 500 mg oral tablet, extended release: 1 orally 2 times a day (13 Aug 2023 19:51)  pioglitazone 30 mg oral tablet: 1 orally once a day (13 Aug 2023 19:52)      Pharmacologic DVT Prophylaxis [] YES, [] NO: Contraindication:  SCD's: YES b/l    GI Prophylaxis:     Post-Op Beta-Blockers: [] Yes, [] No: contraindication:  Post-Op CCB: [] Yes, [] No: contraindication:  Post-Op Aspirin:  [] Yes, [] No: contraindication:  Post-Op Statin:  [] Yes, [] No: contraindication:    Ambulation/Activity Status:    Assessment/Plan:  68y Female status-post  - Case and plan discussed with CTU Intensivist and CT Surgeon - Dr. Simental/Jaymie/Jim/Reshma  - Continue CTU supportive care and ongoing plan of care as per continuing CTU rounds.   - Continue DVT/GI prophylaxis  - Incentive Spirometry 10 times an hour  - Continue to advance physical activity as tolerated and continue PT/OT as directed  1. CAD s/p CABG: Continue ASA, statin, BB  2. HTN:   3. Post-op A. Fib ppx:   4. COPD/Hypoxia:   5. DM/Glucose Control:     Social Service Disposition:     OPERATIVE PROCEDURE(s):                POD #                       SURGEON(s): ANIRUDH Coon      SUBJECTIVE ASSESSMENT: 68y Female patient seen and examined at bedside.     Vital Signs Last 24 Hrs  T(F): 97 (19 Sep 2023 08:00), Max: 98.6 (18 Sep 2023 20:00)  HR: 75 (19 Sep 2023 08:00) (68 - 111)  BP: 137/64 (19 Sep 2023 08:00) (87/52 - 218/82)  BP(mean): 92 (19 Sep 2023 08:00) (67 - 118)  ABP: 132/32 (19 Sep 2023 08:00) (89/31 - 157/48)  ABP(mean): 63 (19 Sep 2023 08:00)  RR: 29 (19 Sep 2023 08:00) (18 - 59)  SpO2: 100% (19 Sep 2023 08:00) (96% - 100%)    I&O's Detail    18 Sep 2023 07:01  -  19 Sep 2023 07:00  --------------------------------------------------------  IN:    Amiodarone: 217.1 mL    IV PiggyBack: 280 mL    Oral Fluid: 180 mL  Total IN: 677.1 mL    OUT:    Indwelling Catheter - Urethral (mL): 1075 mL    Voided (mL): 500 mL  Total OUT: 1575 mL    Net: I&O's Detail    18 Sep 2023 07:01  -  19 Sep 2023 07:00  --------------------------------------------------------  Total NET: -897.9 mL    CAPILLARY BLOOD GLUCOSE  POCT Blood Glucose.: 89 mg/dL (19 Sep 2023 07:39)  POCT Blood Glucose.: 130 mg/dL (18 Sep 2023 16:24)  POCT Blood Glucose.: 149 mg/dL (18 Sep 2023 11:58)    A1C with Estimated Average Glucose Result: 8.2 % (08-14-23 @ 04:40)    Physical Exam:  General: NAD; A&Ox3  Cardiac: S1/S2, RRR, no murmur, no rubs  Lungs: shallow respirations, decreased bs at bases L>R, no wheeze, no rales, no crackles  Abdomen: Soft/NT/ND; positive bowel sounds x 4  Sternum: Intact, no click, incision healing well with no drainage  Incisions: Incisions clean/dry/intact  Extremities:  b/l lower extremity edema; good capillary refill; no cyanosis; palpable 1+ pedal pulses b/l    Central Venous Catheter: Yes: critical illness, intravenous access  Day #  Johnson Catheter: Yes: critical illness; monitor strict i/o's                    Day #  EPICARDIAL WIRES:  YES                                                                         Day #  BOWEL MOVEMENT:  [] YES [] NO, If No, Timing since last BM Day #  CHEST TUBE(MS/Left/Right):  [] YES [] NO, If yes -  AIR LEAKS:  YES/NO        LABS:                        8.3<L>  8.53  )-----------( 287      ( 19 Sep 2023 06:35 )             28.3<L>                        10.6<L>  14.99<H> )-----------( 385      ( 18 Sep 2023 18:05 )             34.7<L>    09-19    145  |  107  |  14  ----------------------------<  103<H>  3.6   |  26  |  <0.5<L>  09-18    143  |  108  |  11  ----------------------------<  214<H>  3.9   |  21  |  0.6<L>    Ca    8.6      19 Sep 2023 06:35    TPro  6.5 [6.0 - 8.0]  /  Alb  3.6 [3.5 - 5.2]  /  TBili  0.5 [0.2 - 1.2]  /  DBili  x   /  AST  16 [0 - 41]  /  ALT  16 [0 - 41]  /  AlkPhos  82 [30 - 115]  09-19    PT/INR - ( 19 Sep 2023 06:35 )   PT: ;   INR: 1.23 ratio      PTT - ( 19 Sep 2023 06:35 )  PTT:31.2 sec    Urinalysis Basic - ( 19 Sep 2023 06:35 )  Color: x / Appearance: x / SG: x / pH: x  Gluc: 103 mg/dL / Ketone: x  / Bili: x / Urobili: x   Blood: x / Protein: x / Nitrite: x   Leuk Esterase: x / RBC: x / WBC x   Sq Epi: x / Non Sq Epi: x / Bacteria: x    ABG - ( 19 Sep 2023 03:51 )  pH: 7.41  /  pCO2: 45    /  pO2: 147   / HCO3: 28    / Base Excess: 3.4   /  SaO2: 98.3  /  LA: 0.50     RADIOLOGY & ADDITIONAL TESTS:  CXR:   < from: Xray Chest 1 View AP/PA (09.19.23 @ 06:54) >  PROCEDURE DATE:  09/19/2023      INTERPRETATION:  STUDY INDICATION: Shortness of Breath    TECHNIQUE:  Portable frontal view of the chest obtained.    COMPARISON: 9/18/2023    FINDINGS/IMPRESSION:    Left effusion/opacity without significant change. No pneumothorax    Stable cardiomediastinal silhouette.    Unchanged osseous structures.    --- End of Report ---    Allergies  No Known Allergies    MEDICATIONS  (STANDING):  aMIOdarone Infusion 0.5 mG/Min (16.7 mL/Hr) IV Continuous <Continuous>  apixaban 2.5 milliGRAM(s) Oral every 12 hours  aspirin  chewable 81 milliGRAM(s) Oral daily  atorvastatin 40 milliGRAM(s) Oral at bedtime  clopidogrel Tablet 75 milliGRAM(s) Oral daily  dextrose 5%. 1000 milliLiter(s) (50 mL/Hr) IV Continuous <Continuous>  dextrose 5%. 1000 milliLiter(s) (100 mL/Hr) IV Continuous <Continuous>  dextrose 50% Injectable 25 Gram(s) IV Push once  dextrose 50% Injectable 12.5 Gram(s) IV Push once  dextrose 50% Injectable 25 Gram(s) IV Push once  folic acid 1 milliGRAM(s) Oral daily  glucagon  Injectable 1 milliGRAM(s) IntraMuscular once  insulin glargine Injectable (LANTUS) 10 Unit(s) SubCutaneous every morning  insulin lispro (ADMELOG) corrective regimen sliding scale   SubCutaneous three times a day before meals  insulin lispro Injectable (ADMELOG) 3 Unit(s) SubCutaneous three times a day before meals  lisinopril 5 milliGRAM(s) Oral daily  metoprolol tartrate 25 milliGRAM(s) Oral two times a day  multivitamin/minerals 1 Tablet(s) Oral daily  pantoprazole    Tablet 40 milliGRAM(s) Oral before breakfast  sodium chloride 0.9% lock flush 3 milliLiter(s) IV Push every 8 hours    MEDICATIONS  (PRN):  acetaminophen     Tablet .. 650 milliGRAM(s) Oral every 6 hours PRN Temp greater or equal to 38C (100.4F), Mild Pain (1 - 3)  aluminum hydroxide/magnesium hydroxide/simethicone Suspension 30 milliLiter(s) Oral every 4 hours PRN Dyspepsia  dextrose Oral Gel 15 Gram(s) Oral once PRN Blood Glucose LESS THAN 70 milliGRAM(s)/deciliter  melatonin 3 milliGRAM(s) Oral at bedtime PRN Insomnia  ondansetron Injectable 4 milliGRAM(s) IV Push every 8 hours PRN Nausea and/or Vomiting    Home Medications:  Jardiance 10 mg oral tablet: 1 orally once a day (13 Aug 2023 19:53)  MetFORMIN (Eqv-Glumetza) 500 mg oral tablet, extended release: 1 orally 2 times a day (13 Aug 2023 19:51)  pioglitazone 30 mg oral tablet: 1 orally once a day (13 Aug 2023 19:52)    Pharmacologic DVT Prophylaxis [X] YES, [] NO: Contraindication:  SCD's: YES b/l    GI Prophylaxis: Pantoprazole 40mg     Beta-Blockers: [X] Yes, [] No: contraindication:  Aspirin:  [X] Yes, [] No: contraindication:  Statin:  [X] Yes, [] No: contraindication:    Ambulation/Activity Status: Ambulate patient as tolerated with assistance     Assessment/Plan:  68y Female re-admitted for L pleural effusion s/p thoracentesis, history of CABGx3 on 08/30/2023  - Case and plan discussed with CTU Intensivist and CT Surgeon - Dr. Coon  - Continue CTU supportive care and ongoing plan of care as per continuing CTU rounds.   - Continue DVT/GI prophylaxis  - Incentive Spirometry 10 times an hour  - Continue to advance physical activity as tolerated and continue PT/OT as directed  1. CAD s/p CABG: Continue ASA, statin, BB  2. hx of DVT/PE: patient approved to restart her triple anticoagulation of ASA, Plavix and Eliquis   3. Post-op A. Fib ppx: on amiodarone 0.5mg/min infusion; continue to monitor   4. Hypoxia: patient readmitted with L pleural effusion which was drained. She is nowon intermittent BiPAP of 1 hour on, 2 hours off. SPO2 ranges  on Bipap. Continue to wean O2 as tolerated, monitor CXR for any returning effusions  5. DM/Glucose Control: A1C 8.2; FS ranging  on Lantus 10/Lispro 3/ Lispro sliding scale     Social Service Disposition:  TBD

## 2023-09-19 NOTE — PATIENT PROFILE ADULT - [id number]_GEN_[id number]_NUR
Children younger than 13 must be in the rear seat of a vehicle when available and properly restrained.  
CHANTEL pt intubated & sedated

## 2023-09-19 NOTE — PATIENT PROFILE ADULT - HAS THE PATIENT RECEIVED THE INFLUENZA VACCINE THIS SEASON?
Birth Control Pills Counseling: Birth Control Pill Counseling: I discussed with the patient the potential side effects of OCPs including but not limited to increased risk of stroke, heart attack, thrombophlebitis, deep venous thrombosis, hepatic adenomas, breast changes, GI upset, headaches, and depression.  The patient verbalized understanding of the proper use and possible adverse effects of OCPs. All of the patient's questions and concerns were addressed. Isotretinoin Pregnancy And Lactation Text: This medication is Pregnancy Category X and is considered extremely dangerous during pregnancy. It is unknown if it is excreted in breast milk. Topical Clindamycin Pregnancy And Lactation Text: This medication is Pregnancy Category B and is considered safe during pregnancy. It is unknown if it is excreted in breast milk. Tetracycline Counseling: Patient counseled regarding possible photosensitivity and increased risk for sunburn.  Patient instructed to avoid sunlight, if possible.  When exposed to sunlight, patients should wear protective clothing, sunglasses, and sunscreen.  The patient was instructed to call the office immediately if the following severe adverse effects occur:  hearing changes, easy bruising/bleeding, severe headache, or vision changes.  The patient verbalized understanding of the proper use and possible adverse effects of tetracycline.  All of the patient's questions and concerns were addressed. Patient understands to avoid pregnancy while on therapy due to potential birth defects. Doxycycline Counseling:  Patient counseled regarding possible photosensitivity and increased risk for sunburn.  Patient instructed to avoid sunlight, if possible.  When exposed to sunlight, patients should wear protective clothing, sunglasses, and sunscreen.  The patient was instructed to call the office immediately if the following severe adverse effects occur:  hearing changes, easy bruising/bleeding, severe headache, or vision changes.  The patient verbalized understanding of the proper use and possible adverse effects of doxycycline.  All of the patient's questions and concerns were addressed. Minocycline Pregnancy And Lactation Text: This medication is Pregnancy Category D and not consider safe during pregnancy. It is also excreted in breast milk. Topical Retinoid counseling:  Patient advised to apply a pea-sized amount only at bedtime and wait 30 minutes after washing their face before applying.  If too drying, patient may add a non-comedogenic moisturizer. The patient verbalized understanding of the proper use and possible adverse effects of retinoids.  All of the patient's questions and concerns were addressed. Azelaic Acid Pregnancy And Lactation Text: This medication is considered safe during pregnancy and breast feeding. Use Enhanced Medication Counseling?: No Bactrim Pregnancy And Lactation Text: This medication is Pregnancy Category D and is known to cause fetal risk.  It is also excreted in breast milk. Isotretinoin Counseling: Patient should get monthly blood tests, not donate blood, not drive at night if vision affected, not share medication, and not undergo elective surgery for 6 months after tx completed. Side effects reviewed, pt to contact office should one occur. Spironolactone Pregnancy And Lactation Text: This medication can cause feminization of the male fetus and should be avoided during pregnancy. The active metabolite is also found in breast milk. Minocycline Counseling: Patient advised regarding possible photosensitivity and discoloration of the teeth, skin, lips, tongue and gums.  Patient instructed to avoid sunlight, if possible.  When exposed to sunlight, patients should wear protective clothing, sunglasses, and sunscreen.  The patient was instructed to call the office immediately if the following severe adverse effects occur:  hearing changes, easy bruising/bleeding, severe headache, or vision changes.  The patient verbalized understanding of the proper use and possible adverse effects of minocycline.  All of the patient's questions and concerns were addressed. Topical Clindamycin Counseling: Patient counseled that this medication may cause skin irritation or allergic reactions.  In the event of skin irritation, the patient was advised to reduce the amount of the drug applied or use it less frequently.   The patient verbalized understanding of the proper use and possible adverse effects of clindamycin.  All of the patient's questions and concerns were addressed. Benzoyl Peroxide Pregnancy And Lactation Text: This medication is Pregnancy Category C. It is unknown if benzoyl peroxide is excreted in breast milk. Dapsone Pregnancy And Lactation Text: This medication is Pregnancy Category C and is not considered safe during pregnancy or breast feeding. Bactrim Counseling:  I discussed with the patient the risks of sulfa antibiotics including but not limited to GI upset, allergic reaction, drug rash, diarrhea, dizziness, photosensitivity, and yeast infections.  Rarely, more serious reactions can occur including but not limited to aplastic anemia, agranulocytosis, methemoglobinemia, blood dyscrasias, liver or kidney failure, lung infiltrates or desquamative/blistering drug rashes. Azelaic Acid Counseling: Patient counseled that medicine may cause skin irritation and to avoid applying near the eyes.  In the event of skin irritation, the patient was advised to reduce the amount of the drug applied or use it less frequently.   The patient verbalized understanding of the proper use and possible adverse effects of azelaic acid.  All of the patient's questions and concerns were addressed. Erythromycin Pregnancy And Lactation Text: This medication is Pregnancy Category B and is considered safe during pregnancy. It is also excreted in breast milk. Spironolactone Counseling: Patient advised regarding risks of diarrhea, abdominal pain, hyperkalemia, birth defects (for female patients), liver toxicity and renal toxicity. The patient may need blood work to monitor liver and kidney function and potassium levels while on therapy. The patient verbalized understanding of the proper use and possible adverse effects of spironolactone.  All of the patient's questions and concerns were addressed. High Dose Vitamin A Pregnancy And Lactation Text: High dose vitamin A therapy is contraindicated during pregnancy and breast feeding. Tazorac Pregnancy And Lactation Text: This medication is not safe during pregnancy. It is unknown if this medication is excreted in breast milk. Benzoyl Peroxide Counseling: Patient counseled that medicine may cause skin irritation and bleach clothing.  In the event of skin irritation, the patient was advised to reduce the amount of the drug applied or use it less frequently.   The patient verbalized understanding of the proper use and possible adverse effects of benzoyl peroxide.  All of the patient's questions and concerns were addressed. Topical Sulfur Applications Pregnancy And Lactation Text: This medication is Pregnancy Category C and has an unknown safety profile during pregnancy. It is unknown if this topical medication is excreted in breast milk. Dapsone Counseling: I discussed with the patient the risks of dapsone including but not limited to hemolytic anemia, agranulocytosis, rashes, methemoglobinemia, kidney failure, peripheral neuropathy, headaches, GI upset, and liver toxicity.  Patients who start dapsone require monitoring including baseline LFTs and weekly CBCs for the first month, then every month thereafter.  The patient verbalized understanding of the proper use and possible adverse effects of dapsone.  All of the patient's questions and concerns were addressed. Detail Level: Zone Azithromycin Pregnancy And Lactation Text: This medication is considered safe during pregnancy and is also secreted in breast milk. Erythromycin Counseling:  I discussed with the patient the risks of erythromycin including but not limited to GI upset, allergic reaction, drug rash, diarrhea, increase in liver enzymes, and yeast infections. High Dose Vitamin A Counseling: Side effects reviewed, pt to contact office should one occur. Tazorac Counseling:  Patient advised that medication is irritating and drying.  Patient may need to apply sparingly and wash off after an hour before eventually leaving it on overnight.  The patient verbalized understanding of the proper use and possible adverse effects of tazorac.  All of the patient's questions and concerns were addressed. Birth Control Pills Pregnancy And Lactation Text: This medication should be avoided if pregnant and for the first 30 days post-partum. Winlevi Pregnancy And Lactation Text: This medication is considered safe during pregnancy and breastfeeding. Topical Sulfur Applications Counseling: Topical Sulfur Counseling: Patient counseled that this medication may cause skin irritation or allergic reactions.  In the event of skin irritation, the patient was advised to reduce the amount of the drug applied or use it less frequently.   The patient verbalized understanding of the proper use and possible adverse effects of topical sulfur application.  All of the patient's questions and concerns were addressed. Doxycycline Pregnancy And Lactation Text: This medication is Pregnancy Category D and not consider safe during pregnancy. It is also excreted in breast milk but is considered safe for shorter treatment courses. Sarecycline Counseling: Patient advised regarding possible photosensitivity and discoloration of the teeth, skin, lips, tongue and gums.  Patient instructed to avoid sunlight, if possible.  When exposed to sunlight, patients should wear protective clothing, sunglasses, and sunscreen.  The patient was instructed to call the office immediately if the following severe adverse effects occur:  hearing changes, easy bruising/bleeding, severe headache, or vision changes.  The patient verbalized understanding of the proper use and possible adverse effects of sarecycline.  All of the patient's questions and concerns were addressed. Topical Retinoid Pregnancy And Lactation Text: This medication is Pregnancy Category C. It is unknown if this medication is excreted in breast milk. Azithromycin Counseling:  I discussed with the patient the risks of azithromycin including but not limited to GI upset, allergic reaction, drug rash, diarrhea, and yeast infections. Winlevi Counseling:  I discussed with the patient the risks of topical clascoterone including but not limited to erythema, scaling, itching, and stinging. Patient voiced their understanding. Aklief counseling:  Patient advised to apply a pea-sized amount only at bedtime and wait 30 minutes after washing their face before applying.  If too drying, patient may add a non-comedogenic moisturizer.  The most commonly reported side effects including irritation, redness, scaling, dryness, stinging, burning, itching, and increased risk of sunburn.  The patient verbalized understanding of the proper use and possible adverse effects of retinoids.  All of the patient's questions and concerns were addressed. Aklief Pregnancy And Lactation Text: It is unknown if this medication is safe to use during pregnancy.  It is unknown if this medication is excreted in breast milk.  Breastfeeding women should use the topical cream on the smallest area of the skin for the shortest time needed while breastfeeding.  Do not apply to nipple and areola. no...

## 2023-09-19 NOTE — PATIENT PROFILE ADULT - FALL HARM RISK - HARM RISK INTERVENTIONS

## 2023-09-20 LAB
ALBUMIN SERPL ELPH-MCNC: 3.3 G/DL — LOW (ref 3.5–5.2)
ALP SERPL-CCNC: 85 U/L — SIGNIFICANT CHANGE UP (ref 30–115)
ALT FLD-CCNC: 14 U/L — SIGNIFICANT CHANGE UP (ref 0–41)
ANION GAP SERPL CALC-SCNC: 12 MMOL/L — SIGNIFICANT CHANGE UP (ref 7–14)
AST SERPL-CCNC: 17 U/L — SIGNIFICANT CHANGE UP (ref 0–41)
BASOPHILS # BLD AUTO: 0.03 K/UL — SIGNIFICANT CHANGE UP (ref 0–0.2)
BASOPHILS NFR BLD AUTO: 0.3 % — SIGNIFICANT CHANGE UP (ref 0–1)
BILIRUB SERPL-MCNC: 0.4 MG/DL — SIGNIFICANT CHANGE UP (ref 0.2–1.2)
BUN SERPL-MCNC: 13 MG/DL — SIGNIFICANT CHANGE UP (ref 10–20)
CALCIUM SERPL-MCNC: 8.6 MG/DL — SIGNIFICANT CHANGE UP (ref 8.4–10.5)
CHLORIDE SERPL-SCNC: 108 MMOL/L — SIGNIFICANT CHANGE UP (ref 98–110)
CO2 SERPL-SCNC: 23 MMOL/L — SIGNIFICANT CHANGE UP (ref 17–32)
CREAT SERPL-MCNC: <0.5 MG/DL — LOW (ref 0.7–1.5)
EGFR: 106 ML/MIN/1.73M2 — SIGNIFICANT CHANGE UP
EOSINOPHIL # BLD AUTO: 0.12 K/UL — SIGNIFICANT CHANGE UP (ref 0–0.7)
EOSINOPHIL NFR BLD AUTO: 1.3 % — SIGNIFICANT CHANGE UP (ref 0–8)
GAS PNL BLDA: SIGNIFICANT CHANGE UP
GLUCOSE BLDC GLUCOMTR-MCNC: 103 MG/DL — HIGH (ref 70–99)
GLUCOSE BLDC GLUCOMTR-MCNC: 111 MG/DL — HIGH (ref 70–99)
GLUCOSE BLDC GLUCOMTR-MCNC: 129 MG/DL — HIGH (ref 70–99)
GLUCOSE BLDC GLUCOMTR-MCNC: 130 MG/DL — HIGH (ref 70–99)
GLUCOSE BLDC GLUCOMTR-MCNC: 159 MG/DL — HIGH (ref 70–99)
GLUCOSE SERPL-MCNC: 107 MG/DL — HIGH (ref 70–99)
HCT VFR BLD CALC: 30.1 % — LOW (ref 37–47)
HGB BLD-MCNC: 8.9 G/DL — LOW (ref 12–16)
IMM GRANULOCYTES NFR BLD AUTO: 0.4 % — HIGH (ref 0.1–0.3)
LYMPHOCYTES # BLD AUTO: 1.23 K/UL — SIGNIFICANT CHANGE UP (ref 1.2–3.4)
LYMPHOCYTES # BLD AUTO: 13.5 % — LOW (ref 20.5–51.1)
MCHC RBC-ENTMCNC: 27.7 PG — SIGNIFICANT CHANGE UP (ref 27–31)
MCHC RBC-ENTMCNC: 29.6 G/DL — LOW (ref 32–37)
MCV RBC AUTO: 93.8 FL — SIGNIFICANT CHANGE UP (ref 81–99)
MONOCYTES # BLD AUTO: 0.39 K/UL — SIGNIFICANT CHANGE UP (ref 0.1–0.6)
MONOCYTES NFR BLD AUTO: 4.3 % — SIGNIFICANT CHANGE UP (ref 1.7–9.3)
NEUTROPHILS # BLD AUTO: 7.28 K/UL — HIGH (ref 1.4–6.5)
NEUTROPHILS NFR BLD AUTO: 80.2 % — HIGH (ref 42.2–75.2)
NRBC # BLD: 0 /100 WBCS — SIGNIFICANT CHANGE UP (ref 0–0)
PLATELET # BLD AUTO: 292 K/UL — SIGNIFICANT CHANGE UP (ref 130–400)
PMV BLD: 9 FL — SIGNIFICANT CHANGE UP (ref 7.4–10.4)
POTASSIUM SERPL-MCNC: 4.7 MMOL/L — SIGNIFICANT CHANGE UP (ref 3.5–5)
POTASSIUM SERPL-SCNC: 4.7 MMOL/L — SIGNIFICANT CHANGE UP (ref 3.5–5)
PROT SERPL-MCNC: 6.4 G/DL — SIGNIFICANT CHANGE UP (ref 6–8)
RBC # BLD: 3.21 M/UL — LOW (ref 4.2–5.4)
RBC # FLD: 21.6 % — HIGH (ref 11.5–14.5)
SODIUM SERPL-SCNC: 143 MMOL/L — SIGNIFICANT CHANGE UP (ref 135–146)
WBC # BLD: 9.09 K/UL — SIGNIFICANT CHANGE UP (ref 4.8–10.8)
WBC # FLD AUTO: 9.09 K/UL — SIGNIFICANT CHANGE UP (ref 4.8–10.8)

## 2023-09-20 PROCEDURE — 71045 X-RAY EXAM CHEST 1 VIEW: CPT | Mod: 26

## 2023-09-20 RX ORDER — LISINOPRIL 2.5 MG/1
5 TABLET ORAL ONCE
Refills: 0 | Status: COMPLETED | OUTPATIENT
Start: 2023-09-20 | End: 2023-09-20

## 2023-09-20 RX ORDER — IPRATROPIUM/ALBUTEROL SULFATE 18-103MCG
3 AEROSOL WITH ADAPTER (GRAM) INHALATION EVERY 6 HOURS
Refills: 0 | Status: DISCONTINUED | OUTPATIENT
Start: 2023-09-20 | End: 2023-09-21

## 2023-09-20 RX ORDER — AMIODARONE HYDROCHLORIDE 400 MG/1
200 TABLET ORAL DAILY
Refills: 0 | Status: DISCONTINUED | OUTPATIENT
Start: 2023-09-20 | End: 2023-09-21

## 2023-09-20 RX ADMIN — SODIUM CHLORIDE 3 MILLILITER(S): 9 INJECTION INTRAMUSCULAR; INTRAVENOUS; SUBCUTANEOUS at 05:52

## 2023-09-20 RX ADMIN — Medication 1 MILLIGRAM(S): at 12:58

## 2023-09-20 RX ADMIN — Medication 3 MILLIGRAM(S): at 21:40

## 2023-09-20 RX ADMIN — SODIUM CHLORIDE 3 MILLILITER(S): 9 INJECTION INTRAMUSCULAR; INTRAVENOUS; SUBCUTANEOUS at 21:38

## 2023-09-20 RX ADMIN — Medication 3 UNIT(S): at 17:26

## 2023-09-20 RX ADMIN — Medication 3 MILLILITER(S): at 18:42

## 2023-09-20 RX ADMIN — Medication 1 TABLET(S): at 12:58

## 2023-09-20 RX ADMIN — Medication 2.5 MILLIGRAM(S): at 05:34

## 2023-09-20 RX ADMIN — SODIUM CHLORIDE 3 MILLILITER(S): 9 INJECTION INTRAMUSCULAR; INTRAVENOUS; SUBCUTANEOUS at 14:06

## 2023-09-20 RX ADMIN — LISINOPRIL 5 MILLIGRAM(S): 2.5 TABLET ORAL at 14:56

## 2023-09-20 RX ADMIN — Medication 25 MILLIGRAM(S): at 09:10

## 2023-09-20 RX ADMIN — APIXABAN 2.5 MILLIGRAM(S): 2.5 TABLET, FILM COATED ORAL at 05:34

## 2023-09-20 RX ADMIN — ATORVASTATIN CALCIUM 40 MILLIGRAM(S): 80 TABLET, FILM COATED ORAL at 21:39

## 2023-09-20 RX ADMIN — Medication 81 MILLIGRAM(S): at 12:57

## 2023-09-20 RX ADMIN — Medication 3 UNIT(S): at 11:06

## 2023-09-20 RX ADMIN — PANTOPRAZOLE SODIUM 40 MILLIGRAM(S): 20 TABLET, DELAYED RELEASE ORAL at 06:12

## 2023-09-20 RX ADMIN — APIXABAN 2.5 MILLIGRAM(S): 2.5 TABLET, FILM COATED ORAL at 17:26

## 2023-09-20 RX ADMIN — Medication 25 MILLIGRAM(S): at 21:39

## 2023-09-20 RX ADMIN — INSULIN GLARGINE 10 UNIT(S): 100 INJECTION, SOLUTION SUBCUTANEOUS at 09:11

## 2023-09-20 RX ADMIN — AMIODARONE HYDROCHLORIDE 200 MILLIGRAM(S): 400 TABLET ORAL at 10:43

## 2023-09-20 RX ADMIN — CLOPIDOGREL BISULFATE 75 MILLIGRAM(S): 75 TABLET, FILM COATED ORAL at 12:58

## 2023-09-20 RX ADMIN — Medication 1: at 11:06

## 2023-09-20 NOTE — PROGRESS NOTE ADULT - SUBJECTIVE AND OBJECTIVE BOX
OPERATIVE PROCEDURE(s):                POD #                       68yFemale  SURGEON(s): ANIRUDH Coon  SUBJECTIVE ASSESSMENT:   Vital Signs Last 24 Hrs  T(F): 98.2 (19 Sep 2023 16:00), Max: 98.2 (19 Sep 2023 16:00)  HR: 69 (20 Sep 2023 07:00) (65 - 80)  BP: 140/61 (20 Sep 2023 07:00) (121/56 - 164/70)  BP(mean): 88 (20 Sep 2023 07:00) (79 - 111)  ABP: 141/46 (20 Sep 2023 07:00) (123/44 - 175/51)  ABP(mean): 78 (20 Sep 2023 07:00)  RR: 22 (20 Sep 2023 07:00) (17 - 33)  SpO2: 100% (20 Sep 2023 07:00) (100% - 100%)  CVP(mm Hg): --  CVP(cm H2O): --  CO: --  CI: --  PA: --  SVR: --    I&O's Detail    19 Sep 2023 07:01  -  20 Sep 2023 07:00  --------------------------------------------------------  IN:    Amiodarone: 167 mL    Amiodarone: 217.1 mL    IV PiggyBack: 400 mL    Oral Fluid: 550 mL  Total IN: 1334.1 mL    OUT:    Indwelling Catheter - Urethral (mL): 665 mL    Voided (mL): 435 mL  Total OUT: 1100 mL        Net:   I&O's Detail    18 Sep 2023 07:01  -  19 Sep 2023 07:00  --------------------------------------------------------  Total NET: -897.9 mL      19 Sep 2023 07:01  -  20 Sep 2023 07:00  --------------------------------------------------------  Total NET: 234.1 mL        CAPILLARY BLOOD GLUCOSE      POCT Blood Glucose.: 111 mg/dL (20 Sep 2023 08:34)  POCT Blood Glucose.: 103 mg/dL (20 Sep 2023 06:41)  POCT Blood Glucose.: 107 mg/dL (19 Sep 2023 16:55)  POCT Blood Glucose.: 85 mg/dL (19 Sep 2023 11:58)    Physical Exam:  General: NAD; A&Ox3/Patient is intubated and sedated  Cardiac: S1/S2, RRR, no murmur, no rubs  Lungs: unlabored respirations, CTA b/l, no wheeze, no rales, no crackles  Abdomen: Soft/NT/ND; positive bowel sounds x 4  Sternum: Intact, no click, incision healing well with no drainage  Incisions: Incisions clean/dry/intact  Extremities: No edema b/l lower extremities; good capillary refill; no cyanosis; palpable 1+ pedal pulses b/l    Central Venous Catheter: Yes[]  No[] , If Yes indication:           Day #  Johnson Catheter: Yes  [] , No  [] , If yes indication:                      Day #  NGT: Yes [] No [] ,    If Yes Placement:                                     Day #  EPICARDIAL WIRES:  [] YES [] NO                                              Day #  BOWEL MOVEMENT:  [] YES [] NO, If No, Timing since last BM:      Day #  CHEST TUBE(Left/Right):  [] YES [] NO, If yes -  AIR LEAKS:  [] YES [] NO        LABS:                        8.9<L>  9.09  )-----------( 292      ( 20 Sep 2023 04:20 )             30.1<L>                        8.9<L>  9.13  )-----------( 293      ( 19 Sep 2023 11:40 )             29.6<L>    09-20    143  |  108  |  13  ----------------------------<  107<H>  4.7   |  23  |  <0.5<L>  09-19    144  |  107  |  13  ----------------------------<  91  4.2   |  25  |  0.5<L>    Ca    8.6      20 Sep 2023 04:20  Mg     2.3     09-19    TPro  6.4 [6.0 - 8.0]  /  Alb  3.3<L> [3.5 - 5.2]  /  TBili  0.4 [0.2 - 1.2]  /  DBili  x   /  AST  17 [0 - 41]  /  ALT  14 [0 - 41]  /  AlkPhos  85 [30 - 115]  09-20    PT/INR - ( 19 Sep 2023 06:35 )   PT: ;   INR: 1.23 ratio         PTT - ( 19 Sep 2023 06:35 )  PTT:31.2 sec  Urinalysis Basic - ( 20 Sep 2023 04:20 )    Color: x / Appearance: x / SG: x / pH: x  Gluc: 107 mg/dL / Ketone: x  / Bili: x / Urobili: x   Blood: x / Protein: x / Nitrite: x   Leuk Esterase: x / RBC: x / WBC x   Sq Epi: x / Non Sq Epi: x / Bacteria: x      ABG - ( 20 Sep 2023 03:05 )  pH: 7.36  /  pCO2: 47    /  pO2: 166   / HCO3: 27    / Base Excess: 0.8   /  SaO2: 99.3  /  LA: 0.70             RADIOLOGY & ADDITIONAL TESTS:  CXR:  EKG:  MEDICATIONS  (STANDING):  aMIOdarone Infusion 0.5 mG/Min (16.7 mL/Hr) IV Continuous <Continuous>  aMIOdarone Infusion 0.501 mG/Min (16.7 mL/Hr) IV Continuous <Continuous>  apixaban 2.5 milliGRAM(s) Oral every 12 hours  aspirin  chewable 81 milliGRAM(s) Oral daily  atorvastatin 40 milliGRAM(s) Oral at bedtime  clopidogrel Tablet 75 milliGRAM(s) Oral daily  dextrose 5%. 1000 milliLiter(s) (50 mL/Hr) IV Continuous <Continuous>  dextrose 5%. 1000 milliLiter(s) (100 mL/Hr) IV Continuous <Continuous>  dextrose 50% Injectable 25 Gram(s) IV Push once  dextrose 50% Injectable 25 Gram(s) IV Push once  dextrose 50% Injectable 12.5 Gram(s) IV Push once  folic acid 1 milliGRAM(s) Oral daily  glucagon  Injectable 1 milliGRAM(s) IntraMuscular once  insulin glargine Injectable (LANTUS) 10 Unit(s) SubCutaneous every morning  insulin lispro (ADMELOG) corrective regimen sliding scale   SubCutaneous three times a day before meals  insulin lispro Injectable (ADMELOG) 3 Unit(s) SubCutaneous three times a day before meals  lisinopril 5 milliGRAM(s) Oral daily  metoprolol tartrate 25 milliGRAM(s) Oral <User Schedule>  multivitamin/minerals 1 Tablet(s) Oral daily  pantoprazole    Tablet 40 milliGRAM(s) Oral before breakfast  sodium chloride 0.9% lock flush 3 milliLiter(s) IV Push every 8 hours    MEDICATIONS  (PRN):  acetaminophen     Tablet .. 650 milliGRAM(s) Oral every 6 hours PRN Temp greater or equal to 38C (100.4F), Mild Pain (1 - 3)  aluminum hydroxide/magnesium hydroxide/simethicone Suspension 30 milliLiter(s) Oral every 4 hours PRN Dyspepsia  dextrose Oral Gel 15 Gram(s) Oral once PRN Blood Glucose LESS THAN 70 milliGRAM(s)/deciliter  melatonin 3 milliGRAM(s) Oral at bedtime PRN Insomnia  ondansetron Injectable 4 milliGRAM(s) IV Push every 8 hours PRN Nausea and/or Vomiting    HEPARIN:  [] YES [] NO  Dose: XX UNITS/HR UNITS Q8H  LOVENOX:[] YES [] NO  Dose: XX mg Q24H  COUMADIN: []  YES [] NO  Dose: XX mg  Q24H  SCD's: YES b/l  GI Prophylaxis: Protonix [], Pepcid [], None [], (Contra-indication:.....)    Post-Op Beta-Blockers: Yes [], No[], If No, then contraindication:  Post-Op Aspirin: Yes [],  No [], If No, then contraindication:  Post-Op Statin: Yes [], No[], If No, then contraindication:  Allergies    No Known Allergies    Intolerances      Ambulation/Activity Status:    Assessment/Plan:  68y Female status-post .....  - Case and plan discussed with CTU Intensivist and CT Surgeon - Dr. Gonzales/Jaymie/Jim  - Continue CTU supportive care    - Continue DVT/GI prophylaxis  - Incentive Spirometry 10 times an hour  - Continue to advance physical activity as tolerated and continue PT/OT as directed  1. CAD: Continue ASA, statin, BB  2. HTN:   3. A. Fib:   4. COPD/Hypoxia:   5. DM/Glucose Control:     Social Service Disposition:     OPERATIVE PROCEDURE(s): CABG readmit with dyspnea secondary to pleural effusion      POD #                       68yFemale  SURGEON(s): ANIRUDH Coon  SUBJECTIVE ASSESSMENT: pt seen and examined. no acute complaints at this time.   Vital Signs Last 24 Hrs  T(F): 98.2 (19 Sep 2023 16:00), Max: 98.2 (19 Sep 2023 16:00)  HR: 69 (20 Sep 2023 07:00) (65 - 80)  BP: 140/61 (20 Sep 2023 07:00) (121/56 - 164/70)  BP(mean): 88 (20 Sep 2023 07:00) (79 - 111)  ABP: 141/46 (20 Sep 2023 07:00) (123/44 - 175/51)  ABP(mean): 78 (20 Sep 2023 07:00)  RR: 22 (20 Sep 2023 07:00) (17 - 33)  SpO2: 100% (20 Sep 2023 07:00) (100% - 100%)      I&O's Detail    19 Sep 2023 07:01  -  20 Sep 2023 07:00  --------------------------------------------------------  IN:    Amiodarone: 167 mL    Amiodarone: 217.1 mL    IV PiggyBack: 400 mL    Oral Fluid: 550 mL  Total IN: 1334.1 mL    OUT:    Indwelling Catheter - Urethral (mL): 665 mL    Voided (mL): 435 mL  Total OUT: 1100 mL        Net:   I&O's Detail    18 Sep 2023 07:01  -  19 Sep 2023 07:00  --------------------------------------------------------  Total NET: -897.9 mL      19 Sep 2023 07:01  -  20 Sep 2023 07:00  --------------------------------------------------------  Total NET: 234.1 mL        CAPILLARY BLOOD GLUCOSE      POCT Blood Glucose.: 111 mg/dL (20 Sep 2023 08:34)  POCT Blood Glucose.: 103 mg/dL (20 Sep 2023 06:41)  POCT Blood Glucose.: 107 mg/dL (19 Sep 2023 16:55)  POCT Blood Glucose.: 85 mg/dL (19 Sep 2023 11:58)    Physical Exam:  General: NAD; A&Ox3  Cardiac: S1/S2, RRR, no murmur, no rubs  Lungs: shallow respirations, decreased bs at bases L>R, no wheeze, no rales, no crackles  Abdomen: Soft/NT/ND; positive bowel sounds x 4  Sternum: Intact, no click, incision healing well with no drainage  Incisions: Incisions clean/dry/intact  Extremities:  b/l lower extremity edema; good capillary refill; no cyanosis; palpable 1+ pedal pulses b/l    BOWEL MOVEMENT:  [] YES [] NO, If No, Timing since last BM Day #    LABS:                        8.9<L>  9.09  )-----------( 292      ( 20 Sep 2023 04:20 )             30.1<L>                        8.9<L>  9.13  )-----------( 293      ( 19 Sep 2023 11:40 )             29.6<L>    09-20    143  |  108  |  13  ----------------------------<  107<H>  4.7   |  23  |  <0.5<L>  09-19    144  |  107  |  13  ----------------------------<  91  4.2   |  25  |  0.5<L>    Ca    8.6      20 Sep 2023 04:20  Mg     2.3     09-19    TPro  6.4 [6.0 - 8.0]  /  Alb  3.3<L> [3.5 - 5.2]  /  TBili  0.4 [0.2 - 1.2]  /  DBili  x   /  AST  17 [0 - 41]  /  ALT  14 [0 - 41]  /  AlkPhos  85 [30 - 115]  09-20    PT/INR - ( 19 Sep 2023 06:35 )   PT: ;   INR: 1.23 ratio         PTT - ( 19 Sep 2023 06:35 )  PTT:31.2 sec  Urinalysis Basic - ( 20 Sep 2023 04:20 )    Color: x / Appearance: x / SG: x / pH: x  Gluc: 107 mg/dL / Ketone: x  / Bili: x / Urobili: x   Blood: x / Protein: x / Nitrite: x   Leuk Esterase: x / RBC: x / WBC x   Sq Epi: x / Non Sq Epi: x / Bacteria: x      ABG - ( 20 Sep 2023 03:05 )  pH: 7.36  /  pCO2: 47    /  pO2: 166   / HCO3: 27    / Base Excess: 0.8   /  SaO2: 99.3  /  LA: 0.70             RADIOLOGY & ADDITIONAL TESTS:  CXR: < from: Xray Chest 1 View AP/PA (09.19.23 @ 06:54) >  IMPRESSION:    Left effusion/opacity without significant change. No pneumothorax    Stable cardiomediastinal silhouette.    Unchanged osseous structures.    < end of copied text >    EKG: < from: 12 Lead ECG (09.17.23 @ 19:33) >    Ventricular Rate 91 BPM    Atrial Rate 91 BPM    P-R Interval 144 ms    QRS Duration 162 ms    Q-T Interval 438 ms    QTC Calculation(Bazett) 538 ms    P Axis 62 degrees    R Axis -29 degrees    T Axis 119 degrees    Diagnosis Line Normal sinus rhythm  Non-specific intra-ventricular conduction block  Abnormal ECG    < end of copied text >    MEDICATIONS  (STANDING):  aMIOdarone Infusion 0.5 mG/Min (16.7 mL/Hr) IV Continuous <Continuous>  aMIOdarone Infusion 0.501 mG/Min (16.7 mL/Hr) IV Continuous <Continuous>  apixaban 2.5 milliGRAM(s) Oral every 12 hours  aspirin  chewable 81 milliGRAM(s) Oral daily  atorvastatin 40 milliGRAM(s) Oral at bedtime  clopidogrel Tablet 75 milliGRAM(s) Oral daily  dextrose 5%. 1000 milliLiter(s) (50 mL/Hr) IV Continuous <Continuous>  dextrose 5%. 1000 milliLiter(s) (100 mL/Hr) IV Continuous <Continuous>  dextrose 50% Injectable 25 Gram(s) IV Push once  dextrose 50% Injectable 25 Gram(s) IV Push once  dextrose 50% Injectable 12.5 Gram(s) IV Push once  folic acid 1 milliGRAM(s) Oral daily  glucagon  Injectable 1 milliGRAM(s) IntraMuscular once  insulin glargine Injectable (LANTUS) 10 Unit(s) SubCutaneous every morning  insulin lispro (ADMELOG) corrective regimen sliding scale   SubCutaneous three times a day before meals  insulin lispro Injectable (ADMELOG) 3 Unit(s) SubCutaneous three times a day before meals  lisinopril 5 milliGRAM(s) Oral daily  metoprolol tartrate 25 milliGRAM(s) Oral <User Schedule>  multivitamin/minerals 1 Tablet(s) Oral daily  pantoprazole    Tablet 40 milliGRAM(s) Oral before breakfast  sodium chloride 0.9% lock flush 3 milliLiter(s) IV Push every 8 hours    MEDICATIONS  (PRN):  acetaminophen     Tablet .. 650 milliGRAM(s) Oral every 6 hours PRN Temp greater or equal to 38C (100.4F), Mild Pain (1 - 3)  aluminum hydroxide/magnesium hydroxide/simethicone Suspension 30 milliLiter(s) Oral every 4 hours PRN Dyspepsia  dextrose Oral Gel 15 Gram(s) Oral once PRN Blood Glucose LESS THAN 70 milliGRAM(s)/deciliter  melatonin 3 milliGRAM(s) Oral at bedtime PRN Insomnia  ondansetron Injectable 4 milliGRAM(s) IV Push every 8 hours PRN Nausea and/or Vomiting    Eliquis  SCD's: YES b/l  GI Prophylaxis: Protonix [x], Pepcid [], None [], (Contra-indication:.....)    Post-Op Beta-Blockers: Yes [x], No[], If No, then contraindication:  Post-Op Aspirin: Yes [x],  No [], If No, then contraindication:  Post-Op Statin: Yes [x], No[], If No, then contraindication:  Allergies    No Known Allergies    Intolerances      Ambulation/Activity Status: ambulate as tolerated     Assessment/Plan:  68y Female re-admitted for L pleural effusion s/p thoracentesis, history of CABGx3 on 08/30/2023  - Case and plan discussed with CTU Intensivist and CT Surgeon - Dr. Coon  - Continue CTU supportive care and ongoing plan of care as per continuing CTU rounds.   - Continue DVT/GI prophylaxis  - Incentive Spirometry 10 times an hour  - Continue to advance physical activity as tolerated and continue PT/OT as directed  1. CAD s/p CABG: Continue ASA, statin, BB  2. hx of DVT/PE: patient approved to restart her triple anticoagulation of ASA, Plavix and Eliquis   3. Post-op A. Fib ppx: change amio gtt to amio po 200mg q24  4. Hypoxia: patient readmitted with L pleural effusion which was drained. She is nowon intermittent BiPAP of 1 hour on, 2 hours off. SPO2 ranges  on Bipap. Continue to wean O2 as tolerated, monitor CXR for any returning effusions  5. DM/Glucose Control: A1C 8.2; FS ranging  on Lantus 10/Lispro 3/ Lispro sliding scale     Social Service Disposition:  TBD

## 2023-09-20 NOTE — PHYSICAL THERAPY INITIAL EVALUATION ADULT - PERTINENT HX OF CURRENT PROBLEM, REHAB EVAL
67yo F w/a PMH of Type II DM, HTN, DLD, obesity, lateral epicondylitis presented to the ED w/ Left Arm pain and ruled out for a NSTEMI via cardiac enzymes.  Cardiac cath that revealed multi-vessel CAD. On 08/30/23, she underwent surgical myocardial revascularization (CABG x 3).  Post-operatively, the patient's hospitalization was prolonged due to hypoxia, ischemic cardiomyopathy, and DVT right peroneal vein. A venous duplex was performed, which revealed a right peroneal DVT. The patient was assessed by vascular, who recommended the patient be started on anticoagulation. The patient was started on Elquis BID. The patient was given nebulizer treatments and practiced cough and deep breathing exercises, the patient was weaned off supplemental O2. An echocardiogram was performed 9/4, which revealed an EF 25%. EP was consulted who recommended the patient be fitted for a wearable defibrillator. The patient was fitted and recieved the vest. She was then discharged home with home care services on POD # 7. The patient and family was educated on post-op instruction via [facility] #[id number].     On 9/17, the patient returned to the ER complaining of worsening shortness of breath at rest and during exertion.  The patient was found to have a large left pleural effusion on CXR that increased in size since discharge.  The patient denies chest pain and states via an  that she has otherwise been doing well at home and lives with her son.  The patient admits to being compliant with her medical therapy and that the last dose of Eliquis she took on the morning of 9/17.

## 2023-09-20 NOTE — PHYSICAL THERAPY INITIAL EVALUATION ADULT - GENERAL OBSERVATIONS, REHAB EVAL
Discussed PT POC with pt in am after CTU rounds, explained role of b/s PT and content of PT IE. Pt aware from b/s PT services from recent previous admission. Pt however needed to be placed on Bipap mask as per CTU rounds. Upon PT f/u in pm; pt remains on bipap. Discussed with BRIAN Woodruff, to keep mask on until ~1500. To hold b/s PT at this time, to f/u once appropriate.
9:15-10:00 Pt encountered semi velazquez in bed in NAD with +call bell, +bed rails, +bed alarm, +VSS, Rn José Miguel present throughout session, +L radial line, +felder, +2L via NC, +tele, +BP cuff, +Pulse ox, son at bedside able to provide Faroese Translation.

## 2023-09-20 NOTE — PHYSICAL THERAPY INITIAL EVALUATION ADULT - ADDITIONAL COMMENTS
Pt lives in private house with 1 step to enter 0 steps inside (can stay on first floor), pt was recently discharged home with services after CABGx3 8/30/23 and was reecieving help from family to get dressed and shower with shower chair. Pt was ambulating with straight cane short household distances.

## 2023-09-20 NOTE — PHYSICAL THERAPY INITIAL EVALUATION ADULT - NSACTIVITYREC_GEN_A_PT
Patient requires assistance with functional mobility. PT recommends D/C to home with assistance vs rehabilitation facility when medically appropriate, pending progress and stair assessment. Refer to evaluation for details.

## 2023-09-21 ENCOUNTER — TRANSCRIPTION ENCOUNTER (OUTPATIENT)
Age: 68
End: 2023-09-21

## 2023-09-21 VITALS
HEART RATE: 85 BPM | OXYGEN SATURATION: 95 % | RESPIRATION RATE: 15 BRPM | DIASTOLIC BLOOD PRESSURE: 51 MMHG | TEMPERATURE: 99 F | SYSTOLIC BLOOD PRESSURE: 105 MMHG

## 2023-09-21 LAB
ALBUMIN SERPL ELPH-MCNC: 2.8 G/DL — LOW (ref 3.5–5.2)
ALP SERPL-CCNC: 92 U/L — SIGNIFICANT CHANGE UP (ref 30–115)
ALT FLD-CCNC: 13 U/L — SIGNIFICANT CHANGE UP (ref 0–41)
ANION GAP SERPL CALC-SCNC: 10 MMOL/L — SIGNIFICANT CHANGE UP (ref 7–14)
AST SERPL-CCNC: 16 U/L — SIGNIFICANT CHANGE UP (ref 0–41)
BASOPHILS # BLD AUTO: 0.03 K/UL — SIGNIFICANT CHANGE UP (ref 0–0.2)
BASOPHILS NFR BLD AUTO: 0.3 % — SIGNIFICANT CHANGE UP (ref 0–1)
BILIRUB SERPL-MCNC: 0.3 MG/DL — SIGNIFICANT CHANGE UP (ref 0.2–1.2)
BUN SERPL-MCNC: 17 MG/DL — SIGNIFICANT CHANGE UP (ref 10–20)
CALCIUM SERPL-MCNC: 8.4 MG/DL — SIGNIFICANT CHANGE UP (ref 8.4–10.4)
CHLORIDE SERPL-SCNC: 105 MMOL/L — SIGNIFICANT CHANGE UP (ref 98–110)
CO2 SERPL-SCNC: 23 MMOL/L — SIGNIFICANT CHANGE UP (ref 17–32)
CREAT SERPL-MCNC: 0.6 MG/DL — LOW (ref 0.7–1.5)
EGFR: 96 ML/MIN/1.73M2 — SIGNIFICANT CHANGE UP
EOSINOPHIL # BLD AUTO: 0.08 K/UL — SIGNIFICANT CHANGE UP (ref 0–0.7)
EOSINOPHIL NFR BLD AUTO: 0.9 % — SIGNIFICANT CHANGE UP (ref 0–8)
GLUCOSE BLDC GLUCOMTR-MCNC: 112 MG/DL — HIGH (ref 70–99)
GLUCOSE BLDC GLUCOMTR-MCNC: 206 MG/DL — HIGH (ref 70–99)
GLUCOSE BLDC GLUCOMTR-MCNC: 218 MG/DL — HIGH (ref 70–99)
GLUCOSE BLDC GLUCOMTR-MCNC: 230 MG/DL — HIGH (ref 70–99)
GLUCOSE SERPL-MCNC: 115 MG/DL — HIGH (ref 70–99)
HCT VFR BLD CALC: 27.3 % — LOW (ref 37–47)
HGB BLD-MCNC: 8.2 G/DL — LOW (ref 12–16)
IMM GRANULOCYTES NFR BLD AUTO: 0.5 % — HIGH (ref 0.1–0.3)
LYMPHOCYTES # BLD AUTO: 1.44 K/UL — SIGNIFICANT CHANGE UP (ref 1.2–3.4)
LYMPHOCYTES # BLD AUTO: 16.3 % — LOW (ref 20.5–51.1)
MCHC RBC-ENTMCNC: 27.1 PG — SIGNIFICANT CHANGE UP (ref 27–31)
MCHC RBC-ENTMCNC: 30 G/DL — LOW (ref 32–37)
MCV RBC AUTO: 90.1 FL — SIGNIFICANT CHANGE UP (ref 81–99)
MONOCYTES # BLD AUTO: 0.37 K/UL — SIGNIFICANT CHANGE UP (ref 0.1–0.6)
MONOCYTES NFR BLD AUTO: 4.2 % — SIGNIFICANT CHANGE UP (ref 1.7–9.3)
NEUTROPHILS # BLD AUTO: 6.88 K/UL — HIGH (ref 1.4–6.5)
NEUTROPHILS NFR BLD AUTO: 77.8 % — HIGH (ref 42.2–75.2)
NRBC # BLD: 0 /100 WBCS — SIGNIFICANT CHANGE UP (ref 0–0)
PLATELET # BLD AUTO: 294 K/UL — SIGNIFICANT CHANGE UP (ref 130–400)
PMV BLD: 9.4 FL — SIGNIFICANT CHANGE UP (ref 7.4–10.4)
POTASSIUM SERPL-MCNC: 4.4 MMOL/L — SIGNIFICANT CHANGE UP (ref 3.5–5)
POTASSIUM SERPL-SCNC: 4.4 MMOL/L — SIGNIFICANT CHANGE UP (ref 3.5–5)
PROT SERPL-MCNC: 6 G/DL — SIGNIFICANT CHANGE UP (ref 6–8)
RBC # BLD: 3.03 M/UL — LOW (ref 4.2–5.4)
RBC # FLD: 21.4 % — HIGH (ref 11.5–14.5)
SODIUM SERPL-SCNC: 138 MMOL/L — SIGNIFICANT CHANGE UP (ref 135–146)
WBC # BLD: 8.84 K/UL — SIGNIFICANT CHANGE UP (ref 4.8–10.8)
WBC # FLD AUTO: 8.84 K/UL — SIGNIFICANT CHANGE UP (ref 4.8–10.8)

## 2023-09-21 PROCEDURE — 71046 X-RAY EXAM CHEST 2 VIEWS: CPT | Mod: 26

## 2023-09-21 PROCEDURE — 93010 ELECTROCARDIOGRAM REPORT: CPT

## 2023-09-21 RX ORDER — LISINOPRIL 2.5 MG/1
1 TABLET ORAL
Qty: 30 | Refills: 0
Start: 2023-09-21 | End: 2023-10-20

## 2023-09-21 RX ORDER — INFLUENZA VIRUS VACCINE 15; 15; 15; 15 UG/.5ML; UG/.5ML; UG/.5ML; UG/.5ML
0.7 SUSPENSION INTRAMUSCULAR ONCE
Refills: 0 | Status: DISCONTINUED | OUTPATIENT
Start: 2023-09-21 | End: 2023-09-21

## 2023-09-21 RX ORDER — POTASSIUM CHLORIDE 20 MEQ
20 PACKET (EA) ORAL ONCE
Refills: 0 | Status: COMPLETED | OUTPATIENT
Start: 2023-09-21 | End: 2023-09-21

## 2023-09-21 RX ORDER — METOPROLOL TARTRATE 50 MG
1 TABLET ORAL
Qty: 60 | Refills: 0
Start: 2023-09-21 | End: 2023-10-20

## 2023-09-21 RX ORDER — METOPROLOL TARTRATE 50 MG
2 TABLET ORAL
Qty: 60 | Refills: 0 | DISCHARGE
Start: 2023-09-21 | End: 2023-10-20

## 2023-09-21 RX ORDER — AMIODARONE HYDROCHLORIDE 400 MG/1
1 TABLET ORAL
Qty: 30 | Refills: 0
Start: 2023-09-21 | End: 2023-10-20

## 2023-09-21 RX ORDER — CEPHALEXIN 500 MG
1 CAPSULE ORAL
Qty: 14 | Refills: 0
Start: 2023-09-21 | End: 2023-09-27

## 2023-09-21 RX ORDER — FUROSEMIDE 40 MG
40 TABLET ORAL DAILY
Refills: 0 | Status: DISCONTINUED | OUTPATIENT
Start: 2023-09-21 | End: 2023-09-21

## 2023-09-21 RX ADMIN — Medication 2: at 12:50

## 2023-09-21 RX ADMIN — APIXABAN 2.5 MILLIGRAM(S): 2.5 TABLET, FILM COATED ORAL at 05:12

## 2023-09-21 RX ADMIN — LISINOPRIL 5 MILLIGRAM(S): 2.5 TABLET ORAL at 05:12

## 2023-09-21 RX ADMIN — Medication 40 MILLIGRAM(S): at 08:39

## 2023-09-21 RX ADMIN — Medication 3 MILLILITER(S): at 14:17

## 2023-09-21 RX ADMIN — Medication 81 MILLIGRAM(S): at 12:36

## 2023-09-21 RX ADMIN — Medication 3 UNIT(S): at 07:24

## 2023-09-21 RX ADMIN — CLOPIDOGREL BISULFATE 75 MILLIGRAM(S): 75 TABLET, FILM COATED ORAL at 12:36

## 2023-09-21 RX ADMIN — PANTOPRAZOLE SODIUM 40 MILLIGRAM(S): 20 TABLET, DELAYED RELEASE ORAL at 05:12

## 2023-09-21 RX ADMIN — Medication 3 MILLILITER(S): at 07:00

## 2023-09-21 RX ADMIN — Medication 20 MILLIEQUIVALENT(S): at 08:39

## 2023-09-21 RX ADMIN — Medication 1 TABLET(S): at 12:36

## 2023-09-21 RX ADMIN — Medication 3 UNIT(S): at 12:36

## 2023-09-21 RX ADMIN — Medication 1 MILLIGRAM(S): at 12:36

## 2023-09-21 RX ADMIN — Medication 2: at 16:11

## 2023-09-21 RX ADMIN — AMIODARONE HYDROCHLORIDE 200 MILLIGRAM(S): 400 TABLET ORAL at 05:13

## 2023-09-21 RX ADMIN — APIXABAN 2.5 MILLIGRAM(S): 2.5 TABLET, FILM COATED ORAL at 17:49

## 2023-09-21 RX ADMIN — Medication 25 MILLIGRAM(S): at 10:42

## 2023-09-21 RX ADMIN — INSULIN GLARGINE 10 UNIT(S): 100 INJECTION, SOLUTION SUBCUTANEOUS at 07:59

## 2023-09-21 RX ADMIN — SODIUM CHLORIDE 3 MILLILITER(S): 9 INJECTION INTRAMUSCULAR; INTRAVENOUS; SUBCUTANEOUS at 14:27

## 2023-09-21 RX ADMIN — SODIUM CHLORIDE 3 MILLILITER(S): 9 INJECTION INTRAMUSCULAR; INTRAVENOUS; SUBCUTANEOUS at 05:06

## 2023-09-21 RX ADMIN — Medication 3 UNIT(S): at 16:11

## 2023-09-21 NOTE — DISCHARGE NOTE NURSING/CASE MANAGEMENT/SOCIAL WORK - PATIENT PORTAL LINK FT
You can access the FollowMyHealth Patient Portal offered by [facility] by registering at the following website: [email or web address]. By joining [facility]’s FollowMyHealth portal, you will also be able to view your health information using other applications (apps) compatible with our system.
No

## 2023-09-21 NOTE — PROGRESS NOTE ADULT - SUBJECTIVE AND OBJECTIVE BOX
OPERATIVE PROCEDURE(s):                POD #                       68yFemale  SURGEON(s): ANIRUDH Coon  SUBJECTIVE ASSESSMENT: pt seen and examined. no acute complaints   Vital Signs Last 24 Hrs  T(F): 96.8 (21 Sep 2023 11:30), Max: 97.3 (21 Sep 2023 08:00)  HR: 75 (21 Sep 2023 14:00) (68 - 90)  BP: 119/59 (21 Sep 2023 14:00) (118/52 - 156/66)  BP(mean): 85 (21 Sep 2023 14:00) (75 - 95)  ABP: 130/38 (21 Sep 2023 08:00) (109/42 - 162/48)  ABP(mean): 58 (21 Sep 2023 08:00)  RR: 21 (21 Sep 2023 14:00) (15 - 34)  SpO2: 94% (21 Sep 2023 14:00) (89% - 100%)      I&O's Detail    20 Sep 2023 07:01  -  21 Sep 2023 07:00  --------------------------------------------------------  IN:    Amiodarone: 83.5 mL    Oral Fluid: 1080 mL  Total IN: 1163.5 mL    OUT:    Amiodarone: 0 mL    Indwelling Catheter - Urethral (mL): 1035 mL  Total OUT: 1035 mL        Net:   I&O's Detail    19 Sep 2023 07:01  -  20 Sep 2023 07:00  --------------------------------------------------------  Total NET: 234.1 mL      20 Sep 2023 07:01  -  21 Sep 2023 07:00  --------------------------------------------------------  Total NET: 128.5 mL        CAPILLARY BLOOD GLUCOSE      POCT Blood Glucose.: 218 mg/dL (21 Sep 2023 12:43)  POCT Blood Glucose.: 230 mg/dL (21 Sep 2023 11:27)  POCT Blood Glucose.: 112 mg/dL (21 Sep 2023 07:05)  POCT Blood Glucose.: 129 mg/dL (20 Sep 2023 22:42)  POCT Blood Glucose.: 130 mg/dL (20 Sep 2023 16:34)    Physical Exam:  General: NAD; A&Ox3/Patient is intubated and sedated  Cardiac: S1/S2, RRR, no murmur, no rubs  Lungs: unlabored respirations, CTA b/l, no wheeze, no rales, no crackles  Abdomen: Soft/NT/ND; positive bowel sounds x 4  Sternum: Intact, no click, incision healing well with no drainage  Incisions: Incisions clean/dry/intact  Extremities: No edema b/l lower extremities; good capillary refill; no cyanosis; palpable 1+ pedal pulses b/l    Central Venous Catheter: Yes[]  No[] , If Yes indication:           Day #  Johnson Catheter: Yes  [] , No  [] , If yes indication:                      Day #  NGT: Yes [] No [] ,    If Yes Placement:                                     Day #  EPICARDIAL WIRES:  [] YES [] NO                                              Day #  BOWEL MOVEMENT:  [] YES [] NO, If No, Timing since last BM:      Day #  CHEST TUBE(Left/Right):  [] YES [] NO, If yes -  AIR LEAKS:  [] YES [] NO        LABS:                        8.2<L>  8.84  )-----------( 294      ( 21 Sep 2023 02:50 )             27.3<L>                        8.9<L>  9.09  )-----------( 292      ( 20 Sep 2023 04:20 )             30.1<L>    09-21    138  |  105  |  17  ----------------------------<  115<H>  4.4   |  23  |  0.6<L>  09-20    143  |  108  |  13  ----------------------------<  107<H>  4.7   |  23  |  <0.5<L>    Ca    8.4      21 Sep 2023 02:50  Mg     2.3     09-19    TPro  6.0 [6.0 - 8.0]  /  Alb  2.8<L> [3.5 - 5.2]  /  TBili  0.3 [0.2 - 1.2]  /  DBili  x   /  AST  16 [0 - 41]  /  ALT  13 [0 - 41]  /  AlkPhos  92 [30 - 115]  09-21      Urinalysis Basic - ( 21 Sep 2023 02:50 )    Color: x / Appearance: x / SG: x / pH: x  Gluc: 115 mg/dL / Ketone: x  / Bili: x / Urobili: x   Blood: x / Protein: x / Nitrite: x   Leuk Esterase: x / RBC: x / WBC x   Sq Epi: x / Non Sq Epi: x / Bacteria: x      ABG - ( 21 Sep 2023 04:19 )  pH: 7.45  /  pCO2: 38    /  pO2: 134   / HCO3: 26    / Base Excess: 2.3   /  SaO2: 99.2  /  LA: 0.80             RADIOLOGY & ADDITIONAL TESTS:  CXR:  EKG:  MEDICATIONS  (STANDING):  albuterol/ipratropium for Nebulization 3 milliLiter(s) Nebulizer every 6 hours  aMIOdarone    Tablet 200 milliGRAM(s) Oral daily  aMIOdarone Infusion 0.501 mG/Min (16.7 mL/Hr) IV Continuous <Continuous>  apixaban 2.5 milliGRAM(s) Oral every 12 hours  aspirin  chewable 81 milliGRAM(s) Oral daily  atorvastatin 40 milliGRAM(s) Oral at bedtime  clopidogrel Tablet 75 milliGRAM(s) Oral daily  dextrose 5%. 1000 milliLiter(s) (50 mL/Hr) IV Continuous <Continuous>  dextrose 5%. 1000 milliLiter(s) (100 mL/Hr) IV Continuous <Continuous>  dextrose 50% Injectable 25 Gram(s) IV Push once  dextrose 50% Injectable 25 Gram(s) IV Push once  dextrose 50% Injectable 12.5 Gram(s) IV Push once  folic acid 1 milliGRAM(s) Oral daily  furosemide   Injectable 40 milliGRAM(s) IV Push daily  glucagon  Injectable 1 milliGRAM(s) IntraMuscular once  insulin glargine Injectable (LANTUS) 10 Unit(s) SubCutaneous every morning  insulin lispro (ADMELOG) corrective regimen sliding scale   SubCutaneous three times a day before meals  insulin lispro Injectable (ADMELOG) 3 Unit(s) SubCutaneous three times a day before meals  lisinopril 5 milliGRAM(s) Oral daily  metoprolol tartrate 25 milliGRAM(s) Oral <User Schedule>  multivitamin/minerals 1 Tablet(s) Oral daily  pantoprazole    Tablet 40 milliGRAM(s) Oral before breakfast  sodium chloride 0.9% lock flush 3 milliLiter(s) IV Push every 8 hours    MEDICATIONS  (PRN):  acetaminophen     Tablet .. 650 milliGRAM(s) Oral every 6 hours PRN Temp greater or equal to 38C (100.4F), Mild Pain (1 - 3)  aluminum hydroxide/magnesium hydroxide/simethicone Suspension 30 milliLiter(s) Oral every 4 hours PRN Dyspepsia  dextrose Oral Gel 15 Gram(s) Oral once PRN Blood Glucose LESS THAN 70 milliGRAM(s)/deciliter  melatonin 3 milliGRAM(s) Oral at bedtime PRN Insomnia  ondansetron Injectable 4 milliGRAM(s) IV Push every 8 hours PRN Nausea and/or Vomiting    HEPARIN:  [] YES [] NO  Dose: XX UNITS/HR UNITS Q8H  LOVENOX:[] YES [] NO  Dose: XX mg Q24H  COUMADIN: []  YES [] NO  Dose: XX mg  Q24H  SCD's: YES b/l  GI Prophylaxis: Protonix [], Pepcid [], None [], (Contra-indication:.....)    Post-Op Beta-Blockers: Yes [], No[], If No, then contraindication:  Post-Op Aspirin: Yes [],  No [], If No, then contraindication:  Post-Op Statin: Yes [], No[], If No, then contraindication:  Allergies    No Known Allergies    Intolerances      Ambulation/Activity Status:    Assessment/Plan:  68y Female status-post .....  - Case and plan discussed with CTU Intensivist and CT Surgeon - Dr. Gonzales/Jaymie/Jim  - Continue CTU supportive care    - Continue DVT/GI prophylaxis  - Incentive Spirometry 10 times an hour  - Continue to advance physical activity as tolerated and continue PT/OT as directed  1. CAD: Continue ASA, statin, BB  2. HTN:   3. A. Fib:   4. COPD/Hypoxia:   5. DM/Glucose Control:     Social Service Disposition:     OPERATIVE PROCEDURE(s):  CABG readmit with dyspnea secondary to pleural effusion              POD #                       68yFemale  SURGEON(s): ANIRUDH Coon  SUBJECTIVE ASSESSMENT: pt seen and examined. no acute complaints   Vital Signs Last 24 Hrs  T(F): 96.8 (21 Sep 2023 11:30), Max: 97.3 (21 Sep 2023 08:00)  HR: 75 (21 Sep 2023 14:00) (68 - 90)  BP: 119/59 (21 Sep 2023 14:00) (118/52 - 156/66)  BP(mean): 85 (21 Sep 2023 14:00) (75 - 95)  ABP: 130/38 (21 Sep 2023 08:00) (109/42 - 162/48)  ABP(mean): 58 (21 Sep 2023 08:00)  RR: 21 (21 Sep 2023 14:00) (15 - 34)  SpO2: 94% (21 Sep 2023 14:00) (89% - 100%)      I&O's Detail    20 Sep 2023 07:01  -  21 Sep 2023 07:00  --------------------------------------------------------  IN:    Amiodarone: 83.5 mL    Oral Fluid: 1080 mL  Total IN: 1163.5 mL    OUT:    Amiodarone: 0 mL    Indwelling Catheter - Urethral (mL): 1035 mL  Total OUT: 1035 mL        Net:   I&O's Detail    19 Sep 2023 07:01  -  20 Sep 2023 07:00  --------------------------------------------------------  Total NET: 234.1 mL      20 Sep 2023 07:01  -  21 Sep 2023 07:00  --------------------------------------------------------  Total NET: 128.5 mL        CAPILLARY BLOOD GLUCOSE      POCT Blood Glucose.: 218 mg/dL (21 Sep 2023 12:43)  POCT Blood Glucose.: 230 mg/dL (21 Sep 2023 11:27)  POCT Blood Glucose.: 112 mg/dL (21 Sep 2023 07:05)  POCT Blood Glucose.: 129 mg/dL (20 Sep 2023 22:42)  POCT Blood Glucose.: 130 mg/dL (20 Sep 2023 16:34)    Physical Exam:  General: NAD; A&Ox3  Cardiac: S1/S2, RRR, no murmur, no rubs  Lungs: shallow respirations, decreased bs at bases L>R, no wheeze, no rales, no crackles  Abdomen: Soft/NT/ND; positive bowel sounds x 4  Sternum: Intact, no click, incision healing well with no drainage  Incisions: Incisions clean/dry/intact  Extremities:  b/l lower extremity edema; good capillary refill; no cyanosis; palpable 1+ pedal pulses b/l    BOWEL MOVEMENT:  [] YES [] NO, If No, Timing since last BM Day #      LABS:                        8.2<L>  8.84  )-----------( 294      ( 21 Sep 2023 02:50 )             27.3<L>                        8.9<L>  9.09  )-----------( 292      ( 20 Sep 2023 04:20 )             30.1<L>    09-21    138  |  105  |  17  ----------------------------<  115<H>  4.4   |  23  |  0.6<L>  09-20    143  |  108  |  13  ----------------------------<  107<H>  4.7   |  23  |  <0.5<L>    Ca    8.4      21 Sep 2023 02:50  Mg     2.3     09-19    TPro  6.0 [6.0 - 8.0]  /  Alb  2.8<L> [3.5 - 5.2]  /  TBili  0.3 [0.2 - 1.2]  /  DBili  x   /  AST  16 [0 - 41]  /  ALT  13 [0 - 41]  /  AlkPhos  92 [30 - 115]  09-21      Urinalysis Basic - ( 21 Sep 2023 02:50 )    Color: x / Appearance: x / SG: x / pH: x  Gluc: 115 mg/dL / Ketone: x  / Bili: x / Urobili: x   Blood: x / Protein: x / Nitrite: x   Leuk Esterase: x / RBC: x / WBC x   Sq Epi: x / Non Sq Epi: x / Bacteria: x      ABG - ( 21 Sep 2023 04:19 )  pH: 7.45  /  pCO2: 38    /  pO2: 134   / HCO3: 26    / Base Excess: 2.3   /  SaO2: 99.2  /  LA: 0.80             RADIOLOGY & ADDITIONAL TESTS:  CXR: < from: Xray Chest 2 Views PA/Lat (09.21.23 @ 09:19) >  impression:    Poststernotomy. Stable cardiac silhouette    Increasing left lung opacity/effusion.    No pneumothorax    < end of copied text >    EKG: < from: 12 Lead ECG (09.21.23 @ 07:39) >    Ventricular Rate 91 BPM    Atrial Rate 91 BPM    P-R Interval 144 ms    QRS Duration 156 ms    Q-T Interval 422 ms    QTC Calculation(Bazett) 519 ms    P Axis 48 degrees    R Axis -24 degrees    T Axis 130 degrees    Diagnosis Line Normal sinus rhythm  Possible Left atrial enlargement  Left bundle branch block  Abnormal ECG    < end of copied text >    MEDICATIONS  (STANDING):  albuterol/ipratropium for Nebulization 3 milliLiter(s) Nebulizer every 6 hours  aMIOdarone    Tablet 200 milliGRAM(s) Oral daily  aMIOdarone Infusion 0.501 mG/Min (16.7 mL/Hr) IV Continuous <Continuous>  apixaban 2.5 milliGRAM(s) Oral every 12 hours  aspirin  chewable 81 milliGRAM(s) Oral daily  atorvastatin 40 milliGRAM(s) Oral at bedtime  clopidogrel Tablet 75 milliGRAM(s) Oral daily  dextrose 5%. 1000 milliLiter(s) (50 mL/Hr) IV Continuous <Continuous>  dextrose 5%. 1000 milliLiter(s) (100 mL/Hr) IV Continuous <Continuous>  dextrose 50% Injectable 25 Gram(s) IV Push once  dextrose 50% Injectable 25 Gram(s) IV Push once  dextrose 50% Injectable 12.5 Gram(s) IV Push once  folic acid 1 milliGRAM(s) Oral daily  furosemide   Injectable 40 milliGRAM(s) IV Push daily  glucagon  Injectable 1 milliGRAM(s) IntraMuscular once  insulin glargine Injectable (LANTUS) 10 Unit(s) SubCutaneous every morning  insulin lispro (ADMELOG) corrective regimen sliding scale   SubCutaneous three times a day before meals  insulin lispro Injectable (ADMELOG) 3 Unit(s) SubCutaneous three times a day before meals  lisinopril 5 milliGRAM(s) Oral daily  metoprolol tartrate 25 milliGRAM(s) Oral <User Schedule>  multivitamin/minerals 1 Tablet(s) Oral daily  pantoprazole    Tablet 40 milliGRAM(s) Oral before breakfast  sodium chloride 0.9% lock flush 3 milliLiter(s) IV Push every 8 hours    MEDICATIONS  (PRN):  acetaminophen     Tablet .. 650 milliGRAM(s) Oral every 6 hours PRN Temp greater or equal to 38C (100.4F), Mild Pain (1 - 3)  aluminum hydroxide/magnesium hydroxide/simethicone Suspension 30 milliLiter(s) Oral every 4 hours PRN Dyspepsia  dextrose Oral Gel 15 Gram(s) Oral once PRN Blood Glucose LESS THAN 70 milliGRAM(s)/deciliter  melatonin 3 milliGRAM(s) Oral at bedtime PRN Insomnia  ondansetron Injectable 4 milliGRAM(s) IV Push every 8 hours PRN Nausea and/or Vomiting    ELIQUIS  SCD's: YES b/l  GI Prophylaxis: Protonix [X], Pepcid [], None [], (Contra-indication:.....)    Allergies    No Known Allergies    Intolerances      Ambulation/Activity Status: AMBULATE     Assessment/Plan:  68y Female re-admitted for L pleural effusion s/p thoracentesis, history of CABGx3 on 08/30/2023  - Case and plan discussed with CTU Intensivist and CT Surgeon - Dr. Coon  - Continue CTU supportive care and ongoing plan of care as per continuing CTU rounds.   - Continue DVT/GI prophylaxis  - Incentive Spirometry 10 times an hour  - Continue to advance physical activity as tolerated and continue PT/OT as directed  1. CAD s/p CABG: Continue ASA, statin, BB  2. hx of DVT/PE: patient approved to restart her triple anticoagulation of ASA, Plavix and Eliquis   3. Post-op A. Fib ppx: change amio gtt to amio po 200mg q24  4. Hypoxia: patient readmitted with L pleural effusion which was drained. She is nowon intermittent BiPAP of 1 hour on, 2 hours off. SPO2 ranges  on Bipap. Continue to wean O2 as tolerated, monitor CXR for any returning effusions  5. DM/Glucose Control: A1C 8.2; FS ranging  on Lantus 10/Lispro 3/ Lispro sliding scale     Social Service Disposition:  TBD

## 2023-09-21 NOTE — DISCHARGE NOTE PROVIDER - NSDCCPCAREPLAN_GEN_ALL_CORE_FT
PRINCIPAL DISCHARGE DIAGNOSIS  Diagnosis: Large pleural effusion  Assessment and Plan of Treatment:       SECONDARY DISCHARGE DIAGNOSES  Diagnosis: Acute CHF  Assessment and Plan of Treatment:     Diagnosis: Postoperative state  Assessment and Plan of Treatment:

## 2023-09-21 NOTE — DISCHARGE NOTE PROVIDER - HOSPITAL COURSE
69yo F w/a PMH of Type II DM, HTN, DLD, obesity, lateral epicondylitis presented to the ED w/ Left Arm pain and ruled out for a NSTEMI via cardiac enzymes.  Cardiac cath that revealed multi-vessel CAD. On 08/30/23, she underwent surgical myocardial revascularization (CABG x 3).  Post-operatively, the patient's hospitalization was prolonged due to hypoxia, ischemic cardiomyopathy, and DVT right peroneal vein. A venous duplex was performed, which revealed a right peroneal DVT. The patient was assessed by vascular, who recommended the patient be started on anticoagulation. The patient was started on Elquis BID. The patient was given nebulizer treatments and practiced cough and deep breathing exercises, the patient was weaned off supplemental O2. An echocardiogram was performed 9/4, which revealed an EF 25%. EP was consulted who recommended the patient be fitted for a wearable defibrillator. The patient was fitted and recieved the vest. She was then discharged home with home care services on POD # 7. The patient and family was educated on post-op instruction via [facility]  [facility] #[id number].     On 9/17, the patient returned to the ER complaining of worsening shortness of breath at rest and during exertion.  The patient was found to have a large left pleural effusion on CXR that increased in size since discharge.  The patient denies chest pain and states via an  that she has otherwise been doing well at home and lives with her son.  The patient admits to being compliant with her medical therapy and that the last dose of Eliquis she took on the morning of 9/17.  On 9/18/2023, patient had a left thoracentesis which drained 1100cc serosanginous fluid. Patient was also diuresed with lasix. Patient was discharged home in stable condition on hospital day#5 with instructions to follow up with Dr. Coon on 9/27/2023 @ 1:45pm.

## 2023-09-21 NOTE — DISCHARGE NOTE PROVIDER - CARE PROVIDER_API CALL
Glynn Coon  Thoracic and Cardiac Surgery  [address] [place] [address], [address] [address]  Phone: [phone]  Fax: [phone]  Scheduled Appointment: 09/27/2023 01:45 PM

## 2023-09-21 NOTE — DISCHARGE NOTE PROVIDER - NSDCHHATTENDCERT_GEN_ALL_CORE
Female My signature below certifies that the above stated patient is homebound and upon completion of the Face-To-Face encounter, has the need for intermittent skilled nursing, physical therapy and/or speech or occupational therapy services in their home for their current diagnosis as outlined in their initial plan of care. These services will continue to be monitored by myself or another physician.

## 2023-09-21 NOTE — DISCHARGE NOTE PROVIDER - NSDCFUSCHEDAPPT_GEN_ALL_CORE_FT
Glynn Coon  [facility]  CTSURG [address]  Scheduled Appointment: 09/27/2023    Isai Wright  [facility]  CARDIOLOGY [address]  Scheduled Appointment: 09/29/2023    Nga Mayberry  [facility]  ELECTROPH [address]  Scheduled Appointment: 11/06/2023

## 2023-09-21 NOTE — DISCHARGE NOTE NURSING/CASE MANAGEMENT/SOCIAL WORK - NSDCPEFALRISK_GEN_ALL_CORE
For information on Fall & Injury Prevention, visit: [email or web address] OR  [email or web address] OR  https://www.cdc.gov/steadi/patient.html

## 2023-09-21 NOTE — DISCHARGE NOTE PROVIDER - NSDCMRMEDTOKEN_GEN_ALL_CORE_FT
Albuterol (Eqv-Proventil HFA) 90 mcg/inh inhalation aerosol: 2 puff(s) inhaled every 6 hours as needed for  shortness of breath and/or wheezing  amiodarone 200 mg oral tablet: 1 tab(s) orally once a day  apixaban 2.5 mg oral tablet: 1 tab(s) orally every 12 hours  aspirin 81 mg oral delayed release tablet: 1 tab(s) orally once a day  atorvastatin 40 mg oral tablet: 1 tab(s) orally once a day (at bedtime)  clopidogrel 75 mg oral tablet: 1 tab(s) orally once a day  ferrous sulfate 325 mg (65 mg elemental iron) oral tablet: 1 tab(s) orally once a day  folic acid 1 mg oral tablet: 1 tab(s) orally once a day  furosemide 40 mg oral tablet: 1 tab(s) orally once a day  ipratropium 17 mcg/inh inhalation aerosol: 2 puff(s) by metered dose inhaler every 6 hours as needed for  shortness of breath and/or wheezing  Jardiance 10 mg oral tablet: 1 orally once a day  lisinopril 5 mg oral tablet: 1 tab(s) orally once a day  MetFORMIN (Eqv-Glumetza) 500 mg oral tablet, extended release: 1 orally 2 times a day  metoprolol tartrate 25 mg oral tablet: 1 tab(s) orally 2 times a day  Multiple Vitamins with Minerals oral tablet: 1 tab(s) orally once a day  pantoprazole 40 mg oral delayed release tablet: 1 tab(s) orally once a day (before a meal)  pioglitazone 30 mg oral tablet: 1 orally once a day  potassium chloride 20 mEq oral tablet, extended release: 1 tab(s) orally once a day  senna leaf extract oral tablet: 2 tab(s) orally once a day (at bedtime) as needed for  constipation   Albuterol (Eqv-Proventil HFA) 90 mcg/inh inhalation aerosol: 2 puff(s) inhaled every 6 hours as needed for  shortness of breath and/or wheezing  amiodarone 200 mg oral tablet: 1 tab(s) orally once a day  apixaban 2.5 mg oral tablet: 1 tab(s) orally every 12 hours  aspirin 81 mg oral delayed release tablet: 1 tab(s) orally once a day  atorvastatin 40 mg oral tablet: 1 tab(s) orally once a day (at bedtime)  cephalexin 500 mg oral tablet: 1 tab(s) orally 2 times a day  clopidogrel 75 mg oral tablet: 1 tab(s) orally once a day  ferrous sulfate 325 mg (65 mg elemental iron) oral tablet: 1 tab(s) orally once a day  folic acid 1 mg oral tablet: 1 tab(s) orally once a day  furosemide 40 mg oral tablet: 1 tab(s) orally once a day  ipratropium 17 mcg/inh inhalation aerosol: 2 puff(s) by metered dose inhaler every 6 hours as needed for  shortness of breath and/or wheezing  Jardiance 10 mg oral tablet: 1 orally once a day  lisinopril 5 mg oral tablet: 1 tab(s) orally once a day  MetFORMIN (Eqv-Glumetza) 500 mg oral tablet, extended release: 1 orally 2 times a day  metoprolol tartrate 25 mg oral tablet: 1 tab(s) orally 2 times a day  Multiple Vitamins with Minerals oral tablet: 1 tab(s) orally once a day  pantoprazole 40 mg oral delayed release tablet: 1 tab(s) orally once a day (before a meal)  pioglitazone 30 mg oral tablet: 1 orally once a day  potassium chloride 20 mEq oral tablet, extended release: 1 tab(s) orally once a day  senna leaf extract oral tablet: 2 tab(s) orally once a day (at bedtime) as needed for  constipation

## 2023-09-27 ENCOUNTER — APPOINTMENT (OUTPATIENT)
Dept: CARDIOTHORACIC SURGERY | Facility: CLINIC | Age: 68
End: 2023-09-27
Payer: MEDICAID

## 2023-09-27 VITALS
HEART RATE: 75 BPM | HEIGHT: 62 IN | BODY MASS INDEX: 23.92 KG/M2 | SYSTOLIC BLOOD PRESSURE: 134 MMHG | DIASTOLIC BLOOD PRESSURE: 71 MMHG | RESPIRATION RATE: 12 BRPM | OXYGEN SATURATION: 96 % | WEIGHT: 130 LBS | TEMPERATURE: 97.9 F

## 2023-09-27 PROBLEM — I26.99 OTHER PULMONARY EMBOLISM WITHOUT ACUTE COR PULMONALE: Chronic | Status: ACTIVE | Noted: 2023-09-17

## 2023-09-27 PROBLEM — E78.00 PURE HYPERCHOLESTEROLEMIA, UNSPECIFIED: Chronic | Status: ACTIVE | Noted: 2023-09-17

## 2023-09-27 PROBLEM — I46.9 CARDIAC ARREST, CAUSE UNSPECIFIED: Chronic | Status: ACTIVE | Noted: 2023-09-17

## 2023-09-27 PROBLEM — I48.91 UNSPECIFIED ATRIAL FIBRILLATION: Chronic | Status: ACTIVE | Noted: 2023-09-17

## 2023-09-27 PROBLEM — I10 ESSENTIAL (PRIMARY) HYPERTENSION: Chronic | Status: ACTIVE | Noted: 2023-09-17

## 2023-09-27 PROCEDURE — 99024 POSTOP FOLLOW-UP VISIT: CPT

## 2023-09-28 NOTE — CDI QUERY NOTE - NSCDIOTHERTXTBX_GEN_ALL_CORE_HH
Based on your professional judgment and the clinical indicators provided below, please clarify if the left pleural effusion can be further specified as:  • Left pleural effusion possibly multifactorial associated with acute systolic CHF and recent cardiac surgery could not be excluded at the time of discharge  • Left pleural effusion most likely associated with recent cardiac surgery could not be excluded at the time of discharge  • Left pleural effusion most likely associated with acute systolic CHF could not be excluded at the time of discharge  • Other (please specify):  • Clinically unable to determine    CLINICAL INDICATORS    9/18 H&P Adult: … 69yo F w/a PMHx of Type II DM, HTN, DLD, obesity, lateral epicondylitis, and CAD (s/p CABG x 3 on 8/30/23) systolic dysfunction CHF has LifeVest who now is presenting with increase left pleural effusion secondary to most likely recent cardiac surgery, CHF, and being on DAPT as well as Eliquis … admit pt to ctu for close monitoring/thoracentesis and for medical optimization of CHF s/p recent CABG    9/21 Progress Note Adult-CT Surgery Physician Assistant: … re-admitted for L pleural effusion s/p thoracentesis, history of CABGx3 on 08/30/2023 9/21 Discharge Note Provider: … The patient was found to have a large left pleural effusion on CXR that increased in size since discharge … PRINCIPAL DISCHARGE DIAGNOSIS: Large pleural effusion. SECONDARY DISCHARGE DIAGNOSES: Acute CHF: Diagnosis: Postoperative state    Thank you,  Franca Almaraz RN [facility]  [phone]

## 2023-09-29 ENCOUNTER — APPOINTMENT (OUTPATIENT)
Dept: CARDIOLOGY | Facility: CLINIC | Age: 68
End: 2023-09-29
Payer: MEDICAID

## 2023-09-29 VITALS
HEIGHT: 62 IN | SYSTOLIC BLOOD PRESSURE: 116 MMHG | HEART RATE: 88 BPM | BODY MASS INDEX: 23.92 KG/M2 | DIASTOLIC BLOOD PRESSURE: 52 MMHG | WEIGHT: 130 LBS

## 2023-09-29 DIAGNOSIS — I22.2 SUBSEQUENT NON-ST ELEVATION (NSTEMI) MYOCARDIAL INFARCTION: ICD-10-CM

## 2023-09-29 DIAGNOSIS — Z87.09 PERSONAL HISTORY OF OTHER DISEASES OF THE RESPIRATORY SYSTEM: ICD-10-CM

## 2023-09-29 DIAGNOSIS — I82.499 ACUTE EMBOLISM AND THROMBOSIS OF OTHER SPECIFIED DEEP VEIN OF UNSPECIFIED LOWER EXTREMITY: ICD-10-CM

## 2023-09-29 PROCEDURE — 93000 ELECTROCARDIOGRAM COMPLETE: CPT

## 2023-09-29 PROCEDURE — 99214 OFFICE O/P EST MOD 30 MIN: CPT | Mod: 25

## 2023-09-29 RX ORDER — IPRATROPIUM BROMIDE 17 UG/1
17 AEROSOL, METERED RESPIRATORY (INHALATION) EVERY 6 HOURS
Qty: 1 | Refills: 0 | Status: ACTIVE | COMMUNITY
Start: 2023-09-07 | End: 1900-01-01

## 2023-09-29 RX ORDER — ASPIRIN ENTERIC COATED TABLETS 81 MG 81 MG/1
81 TABLET, DELAYED RELEASE ORAL DAILY
Qty: 90 | Refills: 2 | Status: ACTIVE | COMMUNITY
Start: 2023-09-07

## 2023-09-29 RX ORDER — ALBUTEROL SULFATE 90 UG/1
108 (90 BASE) INHALANT RESPIRATORY (INHALATION)
Qty: 1 | Refills: 1 | Status: ACTIVE | COMMUNITY
Start: 2023-09-07 | End: 1900-01-01

## 2023-09-30 LAB
CULTURE RESULTS: SIGNIFICANT CHANGE UP
SPECIMEN SOURCE: SIGNIFICANT CHANGE UP

## 2023-10-02 RX ORDER — PANTOPRAZOLE 40 MG/1
40 TABLET, DELAYED RELEASE ORAL DAILY
Qty: 30 | Refills: 1 | Status: ACTIVE | COMMUNITY
Start: 2023-09-07 | End: 1900-01-01

## 2023-10-06 ENCOUNTER — TRANSCRIPTION ENCOUNTER (OUTPATIENT)
Age: 68
End: 2023-10-06

## 2023-10-09 DIAGNOSIS — R21 RASH AND OTHER NONSPECIFIC SKIN ERUPTION: ICD-10-CM

## 2023-10-09 RX ORDER — MICONAZOLE NITRATE 2 G/100G
2 CREAM TOPICAL TWICE DAILY
Qty: 1 | Refills: 0 | Status: ACTIVE | COMMUNITY
Start: 2023-10-09 | End: 1900-01-01

## 2023-10-11 ENCOUNTER — APPOINTMENT (OUTPATIENT)
Dept: CARDIOTHORACIC SURGERY | Facility: CLINIC | Age: 68
End: 2023-10-11
Payer: MEDICAID

## 2023-10-11 VITALS
SYSTOLIC BLOOD PRESSURE: 142 MMHG | WEIGHT: 130 LBS | RESPIRATION RATE: 12 BRPM | HEART RATE: 74 BPM | DIASTOLIC BLOOD PRESSURE: 84 MMHG | OXYGEN SATURATION: 94 % | HEIGHT: 62 IN | BODY MASS INDEX: 23.92 KG/M2 | TEMPERATURE: 98 F

## 2023-10-11 PROCEDURE — 99024 POSTOP FOLLOW-UP VISIT: CPT

## 2023-10-11 RX ORDER — MICONAZOLE
POWDER (GRAM) MISCELLANEOUS
Qty: 1 | Refills: 1 | Status: DISCONTINUED | COMMUNITY
Start: 2023-10-09 | End: 2023-10-11

## 2023-10-11 RX ORDER — NYSTATIN 100000 1/G
100000 POWDER TOPICAL
Qty: 1 | Refills: 0 | Status: ACTIVE | COMMUNITY
Start: 2023-10-11 | End: 1900-01-01

## 2023-11-01 ENCOUNTER — OUTPATIENT (OUTPATIENT)
Dept: OUTPATIENT SERVICES | Facility: HOSPITAL | Age: 68
LOS: 1 days | End: 2023-11-01
Payer: COMMERCIAL

## 2023-11-01 ENCOUNTER — APPOINTMENT (OUTPATIENT)
Dept: ENDOCRINOLOGY | Facility: CLINIC | Age: 68
End: 2023-11-01

## 2023-11-01 VITALS
SYSTOLIC BLOOD PRESSURE: 130 MMHG | DIASTOLIC BLOOD PRESSURE: 80 MMHG | HEART RATE: 97 BPM | HEIGHT: 62 IN | BODY MASS INDEX: 23.55 KG/M2 | WEIGHT: 128 LBS

## 2023-11-01 DIAGNOSIS — C83.30 DIFFUSE LARGE B-CELL LYMPHOMA, UNSPECIFIED SITE: ICD-10-CM

## 2023-11-01 DIAGNOSIS — E11.65 TYPE 2 DIABETES MELLITUS WITH HYPERGLYCEMIA: ICD-10-CM

## 2023-11-01 PROCEDURE — 99214 OFFICE O/P EST MOD 30 MIN: CPT

## 2023-11-01 RX ORDER — FERROUS SULFATE TAB EC 325 MG (65 MG FE EQUIVALENT) 325 (65 FE) MG
325 (65 FE) TABLET DELAYED RESPONSE ORAL DAILY
Qty: 30 | Refills: 0 | Status: ACTIVE | COMMUNITY
Start: 2023-09-07 | End: 1900-01-01

## 2023-11-01 RX ORDER — FOLIC ACID 1 MG/1
1 TABLET ORAL DAILY
Qty: 30 | Refills: 0 | Status: ACTIVE | COMMUNITY
Start: 2023-09-07 | End: 1900-01-01

## 2023-11-06 ENCOUNTER — APPOINTMENT (OUTPATIENT)
Dept: CARDIOLOGY | Facility: CLINIC | Age: 68
End: 2023-11-06
Payer: MEDICAID

## 2023-11-06 ENCOUNTER — APPOINTMENT (OUTPATIENT)
Dept: ELECTROPHYSIOLOGY | Facility: CLINIC | Age: 68
End: 2023-11-06
Payer: MEDICAID

## 2023-11-06 VITALS
HEIGHT: 62 IN | WEIGHT: 128 LBS | SYSTOLIC BLOOD PRESSURE: 130 MMHG | BODY MASS INDEX: 23.55 KG/M2 | HEART RATE: 96 BPM | DIASTOLIC BLOOD PRESSURE: 76 MMHG

## 2023-11-06 PROCEDURE — 99215 OFFICE O/P EST HI 40 MIN: CPT

## 2023-11-06 PROCEDURE — ZZZZZ: CPT

## 2023-11-06 PROCEDURE — 93000 ELECTROCARDIOGRAM COMPLETE: CPT | Mod: 59

## 2023-11-06 PROCEDURE — 93292 WCD DEVICE INTERROGATE: CPT

## 2023-11-06 RX ORDER — PIOGLITAZONE HYDROCHLORIDE 30 MG/1
30 TABLET ORAL
Qty: 90 | Refills: 2 | Status: DISCONTINUED | COMMUNITY
Start: 2023-04-27 | End: 2023-11-06

## 2023-11-06 RX ORDER — AMIODARONE HYDROCHLORIDE 200 MG/1
200 TABLET ORAL DAILY
Qty: 90 | Refills: 2 | Status: DISCONTINUED | COMMUNITY
Start: 2023-09-07 | End: 2023-11-06

## 2023-11-10 DIAGNOSIS — E78.00 PURE HYPERCHOLESTEROLEMIA, UNSPECIFIED: ICD-10-CM

## 2023-11-10 DIAGNOSIS — E11.65 TYPE 2 DIABETES MELLITUS WITH HYPERGLYCEMIA: ICD-10-CM

## 2023-11-14 RX ORDER — POTASSIUM CHLORIDE 750 MG/1
10 TABLET, FILM COATED, EXTENDED RELEASE ORAL
Qty: 90 | Refills: 0 | Status: ACTIVE | COMMUNITY
Start: 2023-09-07 | End: 1900-01-01

## 2023-11-26 ENCOUNTER — RX RENEWAL (OUTPATIENT)
Age: 68
End: 2023-11-26

## 2023-11-29 ENCOUNTER — APPOINTMENT (OUTPATIENT)
Dept: CARDIOLOGY | Facility: CLINIC | Age: 68
End: 2023-11-29
Payer: MEDICAID

## 2023-11-29 DIAGNOSIS — I50.9 HEART FAILURE, UNSPECIFIED: ICD-10-CM

## 2023-11-29 PROCEDURE — 93306 TTE W/DOPPLER COMPLETE: CPT

## 2023-12-28 ENCOUNTER — APPOINTMENT (OUTPATIENT)
Dept: ELECTROPHYSIOLOGY | Facility: CLINIC | Age: 68
End: 2023-12-28
Payer: MEDICAID

## 2023-12-28 VITALS
RESPIRATION RATE: 18 BRPM | HEIGHT: 62 IN | WEIGHT: 130 LBS | TEMPERATURE: 97.1 F | DIASTOLIC BLOOD PRESSURE: 60 MMHG | SYSTOLIC BLOOD PRESSURE: 130 MMHG | BODY MASS INDEX: 23.92 KG/M2

## 2023-12-28 PROCEDURE — 99215 OFFICE O/P EST HI 40 MIN: CPT | Mod: 25

## 2023-12-28 PROCEDURE — 93000 ELECTROCARDIOGRAM COMPLETE: CPT | Mod: 59

## 2023-12-28 NOTE — ASSESSMENT
[FreeTextEntry1] : # ICM, LBBB s/p revascularization 8/30/2023 - Cont GDMT including Lisinopril 5 mg daily and Metoprolol Tartrate 25 mg BID.  - Further increase in GDMT restricted due to symptoms of dizziness and blood pressure.  - Repeat echo showed low EF. Will proceed with CRT-D.  - I discussed the risks, benefits and alternatives for device implantation with patient and available family members.   I reported a risk of major complications at 1% and minor complications at 3%. The details of the procedure and risks associated with undergoing the procedure were discussed in detail including but not limited to, death, myocardial ischemia, stroke, cardiac perforation, potential need for temporary pacemaker implantation, lung damage requiring chest tube (pneumothorax), blood clot, blood collection requiring blood transfusion or repeat surgery (hematoma), infection, or vascular injury.  All questions were answered to satisfaction and the patient expressed verbal understanding of the procedure, the benefits, and risks. The patient agreed to proceed with the procedure.  # Paroxysmal AFib - Increase Eliquis to 5 mg PO BID for CHADS VASc at least 6 (CHF, HTN, Age > 65, DM, F, CAD). Denies bleeding.  - MCOT reviewed.  No AF. Will continue with Eliquis for now.  - They have an appointment with the cardiologist next week where they will discuss the role of DAPT with Eliquis.    # HTN - BP well controlled - Cont Lisinopril 5 mg daily and Metoprolol Tartrate 25 mg BID. - 2g Na diet enforced  I have also advised the patient to go to the nearest emergency room if she experiences any chest pain, dyspnea, syncope, or has any other compelling symptoms.   Follow up after CRT-D placement.

## 2023-12-28 NOTE — HISTORY OF PRESENT ILLNESS
[FreeTextEntry1] : Cardio: Dr. Wright  69 yo Tajik speaking F with history of HTN, HL, DM, obesity, s/p hospitalization in Sept for NSTEMI with cath showing multivessel CAD s/p 3v CABG 8/30/2023. Patient was noted to have post op AF and ischemic cardiomyopathy. She was started on Eliquis BID and discharged with wearable defibrillator.   On 9/17/23, she was hospitalized for dyspnea and found to have a large pleural effusion s/p left thoracentesis. Wore Assure wearable defibrillator until about a month ago because she developed a rash. Never shocked. Denies chest pain, palpitations, dizziness, lightheadedness, presyncope or syncope.   12/28/2023: Feels fine. Denies any complaints. Accompanied by son who acted as . Transition service offered, but patient does not want to use translation service.     Denies chest pain, shortness of breath, palpitation, dizziness or LOC except noted above.   EKG (12/28/2023): SR @ 82, , , QTc 479, LBBB   Echo (11/2023): EF 29%, normal RV, mild LVH, moderate MR, mild TR, small left pleural effusion.

## 2023-12-28 NOTE — ADDENDUM
[FreeTextEntry1] : Karen OLIVARES assisted in documentation on 12/28/2023 acting as a scribe for Dr. Shalom Allred.

## 2023-12-28 NOTE — PHYSICAL EXAM
[Well Developed] : well developed [Well Nourished] : well nourished [No Acute Distress] : no acute distress [Normal Conjunctiva] : normal conjunctiva [Normal Venous Pressure] : normal venous pressure [Normal S1, S2] : normal S1, S2 [No Murmur] : no murmur [Clear Lung Fields] : clear lung fields [Good Air Entry] : good air entry [No Respiratory Distress] : no respiratory distress  [Soft] : abdomen soft [Normal Gait] : normal gait [No Edema] : no edema [No Rash] : no rash [Moves all extremities] : moves all extremities [Normal Speech] : normal speech [Alert and Oriented] : alert and oriented [de-identified] : sternotomy scar

## 2023-12-28 NOTE — CARDIOLOGY SUMMARY
[de-identified] : 11/6/2023 NSR (HR 96 bpm), LBBB ( msec), biatrial enlargement [de-identified] : 9/18/2023 EF 30-35% Grade 1 DD. Mild TR.  9/4/2023 EF 25%. Mild LAE. Mild-mod MR. Mild TR.  [de-identified] : 8/21/2023 Sig LM plus triple vessel CAD.

## 2023-12-28 NOTE — REASON FOR VISIT
[Other: ____] : [unfilled] [Patient Declined  Services] : - None: Patient declined  services [FreeTextEntry3] : Dr. Wright [Interpreters_Relationshiptopatient] : son [TWNoteComboBox1] : Evita

## 2024-01-04 ENCOUNTER — RESULT CHARGE (OUTPATIENT)
Age: 69
End: 2024-01-04

## 2024-01-05 ENCOUNTER — APPOINTMENT (OUTPATIENT)
Dept: CARDIOLOGY | Facility: CLINIC | Age: 69
End: 2024-01-05
Payer: MEDICAID

## 2024-01-05 VITALS
WEIGHT: 130 LBS | BODY MASS INDEX: 23.92 KG/M2 | HEART RATE: 81 BPM | SYSTOLIC BLOOD PRESSURE: 140 MMHG | HEIGHT: 62 IN | DIASTOLIC BLOOD PRESSURE: 72 MMHG

## 2024-01-05 DIAGNOSIS — E78.5 HYPERLIPIDEMIA, UNSPECIFIED: ICD-10-CM

## 2024-01-05 DIAGNOSIS — I10 ESSENTIAL (PRIMARY) HYPERTENSION: ICD-10-CM

## 2024-01-05 DIAGNOSIS — I25.10 ATHEROSCLEROTIC HEART DISEASE OF NATIVE CORONARY ARTERY W/OUT ANGINA PECTORIS: ICD-10-CM

## 2024-01-05 DIAGNOSIS — I48.0 PAROXYSMAL ATRIAL FIBRILLATION: ICD-10-CM

## 2024-01-05 DIAGNOSIS — Z95.1 PRESENCE OF AORTOCORONARY BYPASS GRAFT: ICD-10-CM

## 2024-01-05 DIAGNOSIS — I44.7 LEFT BUNDLE-BRANCH BLOCK, UNSPECIFIED: ICD-10-CM

## 2024-01-05 PROCEDURE — 99214 OFFICE O/P EST MOD 30 MIN: CPT | Mod: 25

## 2024-01-05 PROCEDURE — 93000 ELECTROCARDIOGRAM COMPLETE: CPT

## 2024-01-05 RX ORDER — METOPROLOL SUCCINATE 50 MG/1
50 TABLET, EXTENDED RELEASE ORAL DAILY
Qty: 90 | Refills: 3 | Status: ACTIVE | COMMUNITY
Start: 2024-01-05 | End: 1900-01-01

## 2024-01-05 RX ORDER — FUROSEMIDE 40 MG/1
40 TABLET ORAL DAILY
Qty: 90 | Refills: 3 | Status: ACTIVE | COMMUNITY
Start: 2023-09-07 | End: 1900-01-01

## 2024-01-05 RX ORDER — LISINOPRIL 10 MG/1
10 TABLET ORAL DAILY
Qty: 90 | Refills: 3 | Status: ACTIVE | COMMUNITY
Start: 1900-01-01 | End: 1900-01-01

## 2024-01-05 RX ORDER — METOPROLOL TARTRATE 25 MG/1
25 TABLET, FILM COATED ORAL TWICE DAILY
Qty: 60 | Refills: 2 | Status: DISCONTINUED | COMMUNITY
Start: 2023-09-07 | End: 2024-01-05

## 2024-01-05 RX ORDER — CLOPIDOGREL BISULFATE 75 MG/1
75 TABLET, FILM COATED ORAL DAILY
Qty: 1 | Refills: 2 | Status: DISCONTINUED | COMMUNITY
Start: 2023-09-07 | End: 2024-01-05

## 2024-01-05 NOTE — PHYSICAL EXAM
[No Acute Distress] : no acute distress [Normal Venous Pressure] : normal venous pressure [No Carotid Bruit] : no carotid bruit [Normal S1, S2] : normal S1, S2 [No Murmur] : no murmur [No Rub] : no rub [No Gallop] : no gallop [Clear Lung Fields] : clear lung fields [No Respiratory Distress] : no respiratory distress  [No Edema] : no edema [Moves all extremities] : moves all extremities [Normal Speech] : normal speech [Alert and Oriented] : alert and oriented [de-identified] : healed midchest surgical scar  No

## 2024-01-05 NOTE — HISTORY OF PRESENT ILLNESS
[FreeTextEntry1] : Pt is 69 yo F w/a PMH of Type II DM, HTN, DLD, obesity s/p hospitalization for NSTEMI  Cardiac cath revealed multi-vessel CAD. On 08/30/23, S/p CABG x 3.  Post Op pt developed hypoxia, ischemic cardiomyopathy, and a.fib.   Pt on Elquis BID Amiodarone d/miles  TTE 09/18//23 EF 30%- 35%  Life Vest vest was d/miles  11/29/23 TTE EF 29%  Will be planned for CRT-D Implantation  Pt stopped lasix as per Dr Allred.  Pt became fluid overloaded and resumed lasix 40mg QD.

## 2024-01-05 NOTE — ASSESSMENT
[FreeTextEntry1] : #CAD #S/p CABG #DM #DVT #HL #HTN #Paroxysmal AFib 09/14/23 BUn8/Cr 0.8  Plan: -TTE reviewed, agree wiht CRT-D -Stop Amiodarone and Plavix -Cont AC/ASA -Cont Atorvastatin, Lasix/ACEi, change bb to xl form -Cont F/u with PCP for Diabetic Management -Activities as tolerated -Low Chol, Low Na, Low Carbs Diet -F/u 4 months  att note pt seen and examined with NP agree with above.

## 2024-01-05 NOTE — REASON FOR VISIT
[CV Risk Factors and Non-Cardiac Disease] : CV risk factors and non-cardiac disease [Coronary Artery Disease] : coronary artery disease [Other: _____] : [unfilled]

## 2024-01-18 ENCOUNTER — OUTPATIENT (OUTPATIENT)
Dept: OUTPATIENT SERVICES | Facility: HOSPITAL | Age: 69
LOS: 1 days | End: 2024-01-18
Payer: MEDICAID

## 2024-01-18 ENCOUNTER — RESULT REVIEW (OUTPATIENT)
Age: 69
End: 2024-01-18

## 2024-01-18 VITALS
HEART RATE: 74 BPM | OXYGEN SATURATION: 100 % | TEMPERATURE: 98 F | WEIGHT: 125.66 LBS | SYSTOLIC BLOOD PRESSURE: 141 MMHG | RESPIRATION RATE: 18 BRPM | DIASTOLIC BLOOD PRESSURE: 64 MMHG

## 2024-01-18 DIAGNOSIS — Z98.890 OTHER SPECIFIED POSTPROCEDURAL STATES: Chronic | ICD-10-CM

## 2024-01-18 DIAGNOSIS — Z01.818 ENCOUNTER FOR OTHER PREPROCEDURAL EXAMINATION: ICD-10-CM

## 2024-01-18 DIAGNOSIS — I50.22 CHRONIC SYSTOLIC (CONGESTIVE) HEART FAILURE: ICD-10-CM

## 2024-01-18 LAB
APPEARANCE UR: CLEAR — SIGNIFICANT CHANGE UP
BACTERIA # UR AUTO: NEGATIVE /HPF — SIGNIFICANT CHANGE UP
BASOPHILS # BLD AUTO: 0.03 K/UL — SIGNIFICANT CHANGE UP (ref 0–0.2)
BASOPHILS NFR BLD AUTO: 0.4 % — SIGNIFICANT CHANGE UP (ref 0–1)
BILIRUB UR-MCNC: NEGATIVE — SIGNIFICANT CHANGE UP
BLD GP AB SCN SERPL QL: SIGNIFICANT CHANGE UP
CAST: 0 /LPF — SIGNIFICANT CHANGE UP (ref 0–4)
COLOR SPEC: YELLOW — SIGNIFICANT CHANGE UP
DIFF PNL FLD: ABNORMAL
EOSINOPHIL # BLD AUTO: 0.04 K/UL — SIGNIFICANT CHANGE UP (ref 0–0.7)
EOSINOPHIL NFR BLD AUTO: 0.6 % — SIGNIFICANT CHANGE UP (ref 0–8)
GLUCOSE UR QL: >=1000 MG/DL
HCT VFR BLD CALC: 45.2 % — SIGNIFICANT CHANGE UP (ref 37–47)
HGB BLD-MCNC: 13.8 G/DL — SIGNIFICANT CHANGE UP (ref 12–16)
IMM GRANULOCYTES NFR BLD AUTO: 0.1 % — SIGNIFICANT CHANGE UP (ref 0.1–0.3)
KETONES UR-MCNC: NEGATIVE MG/DL — SIGNIFICANT CHANGE UP
LEUKOCYTE ESTERASE UR-ACNC: ABNORMAL
LYMPHOCYTES # BLD AUTO: 2.94 K/UL — SIGNIFICANT CHANGE UP (ref 1.2–3.4)
LYMPHOCYTES # BLD AUTO: 43.7 % — SIGNIFICANT CHANGE UP (ref 20.5–51.1)
MCHC RBC-ENTMCNC: 29.7 PG — SIGNIFICANT CHANGE UP (ref 27–31)
MCHC RBC-ENTMCNC: 30.5 G/DL — LOW (ref 32–37)
MCV RBC AUTO: 97.2 FL — SIGNIFICANT CHANGE UP (ref 81–99)
MONOCYTES # BLD AUTO: 0.35 K/UL — SIGNIFICANT CHANGE UP (ref 0.1–0.6)
MONOCYTES NFR BLD AUTO: 5.2 % — SIGNIFICANT CHANGE UP (ref 1.7–9.3)
NEUTROPHILS # BLD AUTO: 3.36 K/UL — SIGNIFICANT CHANGE UP (ref 1.4–6.5)
NEUTROPHILS NFR BLD AUTO: 50 % — SIGNIFICANT CHANGE UP (ref 42.2–75.2)
NITRITE UR-MCNC: NEGATIVE — SIGNIFICANT CHANGE UP
NRBC # BLD: 0 /100 WBCS — SIGNIFICANT CHANGE UP (ref 0–0)
PH UR: 6 — SIGNIFICANT CHANGE UP (ref 5–8)
PLATELET # BLD AUTO: 192 K/UL — SIGNIFICANT CHANGE UP (ref 130–400)
PMV BLD: 11.8 FL — HIGH (ref 7.4–10.4)
PROT UR-MCNC: NEGATIVE MG/DL — SIGNIFICANT CHANGE UP
RBC # BLD: 4.65 M/UL — SIGNIFICANT CHANGE UP (ref 4.2–5.4)
RBC # FLD: 14 % — SIGNIFICANT CHANGE UP (ref 11.5–14.5)
RBC CASTS # UR COMP ASSIST: 1 /HPF — SIGNIFICANT CHANGE UP (ref 0–4)
SP GR SPEC: 1.02 — SIGNIFICANT CHANGE UP (ref 1–1.03)
SQUAMOUS # UR AUTO: 1 /HPF — SIGNIFICANT CHANGE UP (ref 0–5)
UROBILINOGEN FLD QL: 0.2 MG/DL — SIGNIFICANT CHANGE UP (ref 0.2–1)
WBC # BLD: 6.73 K/UL — SIGNIFICANT CHANGE UP (ref 4.8–10.8)
WBC # FLD AUTO: 6.73 K/UL — SIGNIFICANT CHANGE UP (ref 4.8–10.8)
WBC UR QL: 17 /HPF — HIGH (ref 0–5)

## 2024-01-18 PROCEDURE — 71046 X-RAY EXAM CHEST 2 VIEWS: CPT

## 2024-01-18 PROCEDURE — 87641 MR-STAPH DNA AMP PROBE: CPT

## 2024-01-18 PROCEDURE — 93010 ELECTROCARDIOGRAM REPORT: CPT

## 2024-01-18 PROCEDURE — 81001 URINALYSIS AUTO W/SCOPE: CPT

## 2024-01-18 PROCEDURE — 99214 OFFICE O/P EST MOD 30 MIN: CPT | Mod: 25

## 2024-01-18 PROCEDURE — 36415 COLL VENOUS BLD VENIPUNCTURE: CPT

## 2024-01-18 PROCEDURE — 80053 COMPREHEN METABOLIC PANEL: CPT

## 2024-01-18 PROCEDURE — 86900 BLOOD TYPING SEROLOGIC ABO: CPT

## 2024-01-18 PROCEDURE — 71046 X-RAY EXAM CHEST 2 VIEWS: CPT | Mod: 26

## 2024-01-18 PROCEDURE — 93005 ELECTROCARDIOGRAM TRACING: CPT

## 2024-01-18 PROCEDURE — 85025 COMPLETE CBC W/AUTO DIFF WBC: CPT

## 2024-01-18 PROCEDURE — 87640 STAPH A DNA AMP PROBE: CPT

## 2024-01-18 PROCEDURE — 87086 URINE CULTURE/COLONY COUNT: CPT

## 2024-01-18 PROCEDURE — 86901 BLOOD TYPING SEROLOGIC RH(D): CPT

## 2024-01-18 PROCEDURE — 86850 RBC ANTIBODY SCREEN: CPT

## 2024-01-18 RX ORDER — INSULIN ASPART 100 [IU]/ML
15 INJECTION, SUSPENSION SUBCUTANEOUS
Refills: 0 | DISCHARGE

## 2024-01-18 RX ORDER — EMPAGLIFLOZIN 10 MG/1
1 TABLET, FILM COATED ORAL
Refills: 0 | DISCHARGE

## 2024-01-18 RX ORDER — PIOGLITAZONE HYDROCHLORIDE 15 MG/1
1 TABLET ORAL
Refills: 0 | DISCHARGE

## 2024-01-18 RX ORDER — METFORMIN HYDROCHLORIDE 850 MG/1
1 TABLET ORAL
Refills: 0 | DISCHARGE

## 2024-01-18 NOTE — H&P PST ADULT - NSICDXPASTMEDICALHX_GEN_ALL_CORE_FT
PAST MEDICAL HISTORY:  Afib     Cardiac arrest     Diabetes     High blood cholesterol     HTN (hypertension)     Pulmonary embolism

## 2024-01-18 NOTE — H&P PST ADULT - HISTORY OF PRESENT ILLNESS
Patient is a 68 year old female presenting to PAST in preparation for  ICD CRT-DIMPLANT on  1/25 under sed anesthesia by Dr. Mayberry  Pt accompanied by her son  who reports she had Cardiac cath  that revealed multi-vessel CAD.,on 08/30/23,  had 3v cabg. Post Op pt developed hypoxia, ischemic cardiomyopathy, and a.fib  She had an echo 11/29/23 revealing  EF 29% Now scheduled  for CRT-D Implantation  .PATIENT CURRENTLY DENIES CHEST PAIN  SHORTNESS OF BREATH  PALPITATIONS,  DYSURIA, OR UPPER RESPIRATORY INFECTION IN PAST 2 WEEKS    Anesthesia Alert  NO--Difficult Airway  NO--History of neck surgery or radiation  NO--Limited ROM of neck  NO--History of Malignant hyperthermia  NO--Personal or family history of Pseudocholinesterase deficiency  NO--Prior Anesthesia Complication  NO--Latex Allergy  NO--Loose teeth  NO--History of Rheumatoid Arthritis  NO--ARABELLA  yes-- BLEEDING RISK ( takes eliquis daily)  NO--Other_____    Duke Activity Status Index (DASI) from [employer]  on 1/18/2024      RESULT SUMMARY:  15.45 points  The higher the score (maximum 58.2), the higher the functional status.    4.64 METs        INPUTS:  Take care of self —> 2.75 = Yes  Walk indoors —> 1.75 = Yes  Walk 1&ndash;2 blocks on level ground —> 2.75 = Yes  Climb a flight of stairs or walk up a hill —> 5.5 = Yes  Run a short distance —> 0 = No  Do light work around the house —> 2.7 = Yes  Do moderate work around the house —> 0 = No  Do heavy work around the house —> 0 = No  Do yardwork —> 0 = No  Have sexual relations —> 0 = No  Participate in moderate recreational activities —> 0 = No  Participate in strenuous sports —> 0 = No    Revised Cardiac Risk Index for Pre-Operative Risk from [employer]  on 1/18/2024  ** All calculations should be rechecked by clinician prior to use **    RESULT SUMMARY:  2 points  Class III Risk    10.1 %  30-day risk of death, MI, or cardiac arrest    From Duceppe 2017. These numbers are higher than those from the original study (Gallo 1999). See Evidence for details.      INPUTS:  Elevated-risk surgery —> 0 = No  History of ischemic heart disease —> 1 = Yes  History of congestive heart failure —> 0 = No  History of cerebrovascular disease —> 0 = No  Pre-operative treatment with insulin —> 1 = Yes  Pre-operative creatinine >2 mg/dL / 176.8 µmol/L —> 0 = No        Diabetes education given during PAST visit received copy preprocedural diabetes instruction sheet        As per patient, this is their complete medical and surgical history, including medications both prescribed or over the counter.  Patient verbalized understanding of instructions and was given the opportunity to ask questions and have them answered.

## 2024-01-19 DIAGNOSIS — Z01.818 ENCOUNTER FOR OTHER PREPROCEDURAL EXAMINATION: ICD-10-CM

## 2024-01-19 DIAGNOSIS — I50.22 CHRONIC SYSTOLIC (CONGESTIVE) HEART FAILURE: ICD-10-CM

## 2024-01-19 LAB
ALBUMIN SERPL ELPH-MCNC: 4.8 G/DL — SIGNIFICANT CHANGE UP (ref 3.5–5.2)
ALP SERPL-CCNC: 118 U/L — HIGH (ref 30–115)
ALT FLD-CCNC: 17 U/L — SIGNIFICANT CHANGE UP (ref 0–41)
ANION GAP SERPL CALC-SCNC: 19 MMOL/L — HIGH (ref 7–14)
AST SERPL-CCNC: 18 U/L — SIGNIFICANT CHANGE UP (ref 0–41)
BILIRUB SERPL-MCNC: 0.3 MG/DL — SIGNIFICANT CHANGE UP (ref 0.2–1.2)
BUN SERPL-MCNC: 16 MG/DL — SIGNIFICANT CHANGE UP (ref 10–20)
CALCIUM SERPL-MCNC: 9.9 MG/DL — SIGNIFICANT CHANGE UP (ref 8.4–10.5)
CHLORIDE SERPL-SCNC: 102 MMOL/L — SIGNIFICANT CHANGE UP (ref 98–110)
CO2 SERPL-SCNC: 22 MMOL/L — SIGNIFICANT CHANGE UP (ref 17–32)
CREAT SERPL-MCNC: 0.9 MG/DL — SIGNIFICANT CHANGE UP (ref 0.7–1.5)
EGFR: 69 ML/MIN/1.73M2 — SIGNIFICANT CHANGE UP
GLUCOSE SERPL-MCNC: 207 MG/DL — HIGH (ref 70–99)
MRSA PCR RESULT.: NEGATIVE — SIGNIFICANT CHANGE UP
POTASSIUM SERPL-MCNC: 5.3 MMOL/L — HIGH (ref 3.5–5)
POTASSIUM SERPL-SCNC: 5.3 MMOL/L — HIGH (ref 3.5–5)
PROT SERPL-MCNC: 8.6 G/DL — HIGH (ref 6–8)
SODIUM SERPL-SCNC: 143 MMOL/L — SIGNIFICANT CHANGE UP (ref 135–146)

## 2024-01-20 LAB
CULTURE RESULTS: SIGNIFICANT CHANGE UP
SPECIMEN SOURCE: SIGNIFICANT CHANGE UP

## 2024-01-25 ENCOUNTER — APPOINTMENT (OUTPATIENT)
Dept: ELECTROPHYSIOLOGY | Facility: HOSPITAL | Age: 69
End: 2024-01-25

## 2024-02-21 RX ORDER — ATORVASTATIN CALCIUM 40 MG/1
40 TABLET, FILM COATED ORAL
Qty: 90 | Refills: 2 | Status: ACTIVE | COMMUNITY
Start: 2023-09-07 | End: 1900-01-01

## 2024-02-21 RX ORDER — APIXABAN 5 MG/1
5 TABLET, FILM COATED ORAL
Qty: 180 | Refills: 2 | Status: ACTIVE | COMMUNITY
Start: 2023-09-07 | End: 1900-01-01

## 2024-04-11 NOTE — DISCHARGE NOTE NURSING/CASE MANAGEMENT/SOCIAL WORK - NSPROEXTENSIONSOFSELF_GEN_A_NUR
0024: Pt changing into paper scrubs w David PCT.     0027: Security called to carlos pt. Clothes in bag and outside room. Mother at bedside w pt.     0030: Security at bedside.     0047: Pt's right ankle red and tender where ankle monitor rubbing. Cleaned w saline and wrapped w coband.   
none

## 2024-04-19 ENCOUNTER — RX RENEWAL (OUTPATIENT)
Age: 69
End: 2024-04-19

## 2024-04-19 RX ORDER — EMPAGLIFLOZIN AND METFORMIN HYDROCHLORIDE 5; 500 MG/1; MG/1
5-500 TABLET ORAL
Qty: 180 | Refills: 0 | Status: ACTIVE | COMMUNITY
Start: 2024-02-16 | End: 1900-01-01

## 2024-05-01 RX ORDER — METFORMIN HYDROCHLORIDE 500 MG/1
500 TABLET, COATED ORAL
Qty: 180 | Refills: 3 | Status: DISCONTINUED | COMMUNITY
Start: 2023-04-27 | End: 2024-05-01

## 2024-05-01 RX ORDER — INSULIN ASPART 100 [IU]/ML
(70-30) 100 INJECTION, SUSPENSION SUBCUTANEOUS 3 TIMES DAILY
Qty: 1 | Refills: 1 | Status: ACTIVE | COMMUNITY
Start: 2023-06-02 | End: 1900-01-01

## 2024-05-01 RX ORDER — EMPAGLIFLOZIN 10 MG/1
10 TABLET, FILM COATED ORAL
Qty: 90 | Refills: 3 | Status: DISCONTINUED | COMMUNITY
Start: 2023-04-27 | End: 2024-05-01

## 2024-05-26 ENCOUNTER — RX RENEWAL (OUTPATIENT)
Age: 69
End: 2024-05-26

## 2024-05-26 RX ORDER — LISINOPRIL 5 MG/1
5 TABLET ORAL
Qty: 90 | Refills: 0 | Status: ACTIVE | COMMUNITY
Start: 2024-05-26 | End: 1900-01-01

## 2024-05-26 RX ORDER — ATORVASTATIN CALCIUM 10 MG/1
10 TABLET, FILM COATED ORAL
Qty: 90 | Refills: 0 | Status: ACTIVE | COMMUNITY
Start: 2024-05-26 | End: 1900-01-01

## 2024-07-18 ENCOUNTER — RX RENEWAL (OUTPATIENT)
Age: 69
End: 2024-07-18

## 2024-12-12 ENCOUNTER — RX RENEWAL (OUTPATIENT)
Age: 69
End: 2024-12-12

## 2025-04-24 NOTE — ED ADULT NURSE NOTE - NS ED NOTE ABUSE SUSPICION NEGLECT YN
Normal volume, rate, productivity, spontaneity and articulation Normal volume, rate, productivity, spontaneity and articulation Normal volume, rate, productivity, spontaneity and articulation No Normal volume, rate, productivity, spontaneity and articulation Normal volume, rate, productivity, spontaneity and articulation Normal volume, rate, productivity, spontaneity and articulation Normal volume, rate, productivity, spontaneity and articulation Normal volume, rate, productivity, spontaneity and articulation Normal volume, rate, productivity, spontaneity and articulation